# Patient Record
Sex: MALE | Race: WHITE | NOT HISPANIC OR LATINO
[De-identification: names, ages, dates, MRNs, and addresses within clinical notes are randomized per-mention and may not be internally consistent; named-entity substitution may affect disease eponyms.]

---

## 2018-01-11 ENCOUNTER — APPOINTMENT (OUTPATIENT)
Dept: OPHTHALMOLOGY | Facility: CLINIC | Age: 68
End: 2018-01-11
Payer: MEDICARE

## 2018-01-11 PROCEDURE — 92014 COMPRE OPH EXAM EST PT 1/>: CPT

## 2018-01-11 PROCEDURE — 92225: CPT | Mod: RT

## 2018-01-11 PROCEDURE — 92134 CPTRZ OPH DX IMG PST SGM RTA: CPT

## 2018-07-24 ENCOUNTER — APPOINTMENT (OUTPATIENT)
Dept: OPHTHALMOLOGY | Facility: CLINIC | Age: 68
End: 2018-07-24
Payer: MEDICARE

## 2018-07-24 PROCEDURE — 92226: CPT | Mod: RT

## 2018-07-24 PROCEDURE — 92014 COMPRE OPH EXAM EST PT 1/>: CPT

## 2018-07-24 PROCEDURE — 92015 DETERMINE REFRACTIVE STATE: CPT

## 2019-01-23 ENCOUNTER — APPOINTMENT (OUTPATIENT)
Dept: TRANSPLANT | Facility: CLINIC | Age: 69
End: 2019-01-23
Payer: COMMERCIAL

## 2019-01-23 ENCOUNTER — APPOINTMENT (OUTPATIENT)
Dept: NEPHROLOGY | Facility: CLINIC | Age: 69
End: 2019-01-23
Payer: COMMERCIAL

## 2019-01-23 VITALS
HEART RATE: 68 BPM | TEMPERATURE: 97.9 F | DIASTOLIC BLOOD PRESSURE: 63 MMHG | HEIGHT: 65 IN | SYSTOLIC BLOOD PRESSURE: 131 MMHG | BODY MASS INDEX: 34.16 KG/M2 | WEIGHT: 205 LBS | RESPIRATION RATE: 17 BRPM | OXYGEN SATURATION: 94 %

## 2019-01-23 VITALS
HEART RATE: 68 BPM | TEMPERATURE: 97.9 F | WEIGHT: 205 LBS | SYSTOLIC BLOOD PRESSURE: 131 MMHG | BODY MASS INDEX: 34.11 KG/M2 | RESPIRATION RATE: 17 BRPM | DIASTOLIC BLOOD PRESSURE: 63 MMHG

## 2019-01-23 DIAGNOSIS — E11.21 TYPE 2 DIABETES MELLITUS WITH DIABETIC NEPHROPATHY: ICD-10-CM

## 2019-01-23 PROCEDURE — 99205 OFFICE O/P NEW HI 60 MIN: CPT

## 2019-01-23 PROCEDURE — 99204 OFFICE O/P NEW MOD 45 MIN: CPT

## 2019-01-23 NOTE — ASSESSMENT
[FreeTextEntry1] : .Mr. MOSES 68 years old male, He is evaluated for kidney transplantation.\par Pre transplant/CKD: Patient will benefit from renal allotransplantation he is clinically an acceptable/ moderate risk candidate, diabetes\par Medical risks: Cardiovascular, cancer screening.\par Diabetes Mellitus: Discussed implications. Continue follow up with primary physicians\par Hypertension: Discussed implications. Continue follow up with primary physicians.\par Cardiac risk:  will get further evaluation; echo, stress test; Reviewed cardiovascular risk reduction strategies\par Cancer screening: PSA .  Colon jean-paul screening. No known h/o neoplastic disease\par ID: Serology for acute and chronic viral infections. Screening for latent TB \par Imaging: Renal/abdominal /chest /Iliac/ carotids imaging \par Consults: Nutrition, social work, cardiology, Transplant surgery.\par Reviewed factors affecting survival and morbidity while on wait list and reviewed kimberly-operative and long-term risk factors affecting outcome in kidney transplantation.\par Details of transplant surgery, immunosuppression and its complications and benefits of live donor transplantation as well as variability in wait times across regions and multiple listing were discussed. KDPI >85% and PHS high risk criteria donors were discussed. Discussed factors affecting morbidity and mortality while on hemodialysis.\par Patient has potential live donor (possioble ) at present. \par Will proceed with completing/ updating work up and listing for transplant/ live donor transplant once work up is reviewed and found to be ok.\par

## 2019-01-23 NOTE — PHYSICAL EXAM
[General Appearance - In No Acute Distress] : in no acute distress [Sclera] : the sclera and conjunctiva were normal [PERRL With Normal Accommodation] : pupils were equal in size, round, and reactive to light [Extraocular Movements] : extraocular movements were intact [Outer Ear] : the ears and nose were normal in appearance [Oropharynx] : the oropharynx was normal [Neck Appearance] : the appearance of the neck was normal [Neck Cervical Mass (___cm)] : no neck mass was observed [Jugular Venous Distention Increased] : there was no jugular-venous distention [Thyroid Diffuse Enlargement] : the thyroid was not enlarged [Thyroid Nodule] : there were no palpable thyroid nodules [Auscultation Breath Sounds / Voice Sounds] : lungs were clear to auscultation bilaterally [Heart Rate And Rhythm] : heart rate was normal and rhythm regular [Heart Sounds] : normal S1 and S2 [Heart Sounds Gallop] : no gallops [Murmurs] : no murmurs [Heart Sounds Pericardial Friction Rub] : no pericardial rub [Bowel Sounds] : normal bowel sounds [Abdomen Soft] : soft [Abdomen Tenderness] : non-tender [Cervical Lymph Nodes Enlarged Posterior Bilaterally] : posterior cervical [Cervical Lymph Nodes Enlarged Anterior Bilaterally] : anterior cervical [Supraclavicular Lymph Nodes Enlarged Bilaterally] : supraclavicular [Axillary Lymph Nodes Enlarged Bilaterally] : axillary [Inguinal Lymph Nodes Enlarged Bilaterally] : inguinal [Involuntary Movements] : no involuntary movements were seen [___ (cm) Fistula] : [unfilled] (cm) fistula [Bruit] : a bruit was present [Thrill] : a thrill was present [FreeTextEntry1] : left forearm [] : no rash [No Focal Deficits] : no focal deficits [Oriented To Time, Place, And Person] : oriented to person, place, and time [Impaired Insight] : insight and judgment were intact [Affect] : the affect was normal

## 2019-01-23 NOTE — HISTORY OF PRESENT ILLNESS
[FreeTextEntry1] : 68 years old male, born in [place], living in [place] since 1956\par Patient has known CKD (2016), on follow up with  is here for pre kidney transplant evaluation. \par He is a preemptive candidate.\par he is accompanied by his wife, Rossy today.\par He has known DM (age 43) On insulin (age 65); HTN (age 43). H/o Hyperlipidemia on lipitor / no h/o Gout\par No known h/o kidney stone/ Prostatism.\par No hematuria/Transfusions\par Urine out put: normal\par Nocturia:4 times\par Has no h/o Pneumonia / UTI.\par No known h/o active CAD/CVA/PVD/DVT/neoplasia/active infections/bleeding.\par He had stress test 2 weeks ago was told to be normal.\par Reports  major allergies- Ibuprofen (kidney function)  and Levaquin (swelling and breathing difficulty) Does not take any blood thinners.Takes aspirin 81 mg/day. No known h/o tuberculosis or hepatitis.\par Most recent hospitalization/for: 2018 for evaluation of kidney function.\par Past surgeries:\par Left forearm AVF\par No history of kidney/ bladder/ prostate surgery.\par Non smoker.\par \par Fam: Parents are  . Father -  at age 94 Mother- 94 years lives in [place] Siblings- sister who is 65 years old.\par Children: Healthy. 2 Children - 8 and 13- 2 sons.\par Lives with wife and children. Also has older children.\par Has family history of kidney disease- Maternal grand father has h/o kidney disease\par Independent for ADL\par Able to walk one block, can climb stairs with difficulty.\par ROS: Has h/o shortness of breath on exertion. No h/o Sleep apnea. No h/o Thyroid disease.\par Functional/employment status:  and Real estate\par Dialysis history: Preemptive\par Kidney Biopsy: None\par Potential Live donors: Being explored.\par \par Prior Studies:\par Cardiology: Micaela\par Endo: Dr. Abad\par Cancer Screen: Colonoscopy had 5 years ago\par Primary MD: Theodore Novak\par \par

## 2019-01-25 LAB
ABO + RH PNL BLD: NORMAL
ALBUMIN SERPL ELPH-MCNC: 3.7 G/DL
ALP BLD-CCNC: 94 U/L
ALT SERPL-CCNC: 15 U/L
ANION GAP SERPL CALC-SCNC: 15 MMOL/L
APPEARANCE: ABNORMAL
AST SERPL-CCNC: 15 U/L
BACTERIA: NEGATIVE
BASOPHILS # BLD AUTO: 0.03 K/UL
BASOPHILS NFR BLD AUTO: 0.5 %
BILIRUB SERPL-MCNC: 0.3 MG/DL
BILIRUBIN URINE: NEGATIVE
BLOOD URINE: NEGATIVE
BUN SERPL-MCNC: 65 MG/DL
C PEPTIDE SERPL-MCNC: 1.8 NG/ML
CALCIUM SERPL-MCNC: 9.4 MG/DL
CHLORIDE SERPL-SCNC: 106 MMOL/L
CMV IGG SERPL QL: <0.2 U/ML
CMV IGG SERPL-IMP: NEGATIVE
CO2 SERPL-SCNC: 21 MMOL/L
COLOR: YELLOW
CREAT SERPL-MCNC: 5.72 MG/DL
CREAT SPEC-SCNC: 74 MG/DL
CREAT/PROT UR: 4.7 RATIO
EBV DNA SERPL NAA+PROBE-ACNC: NOT DETECTED IU/ML
EBV EA AB SER IA-ACNC: <5 U/ML
EBV EA AB TITR SER IF: POSITIVE
EBV EA IGG SER QL IA: 216 U/ML
EBV EA IGG SER-ACNC: NEGATIVE
EBV EA IGM SER IA-ACNC: NEGATIVE
EBV PATRN SPEC IB-IMP: NORMAL
EBV VCA IGG SER IA-ACNC: 77.7 U/ML
EBV VCA IGM SER QL IA: <10 U/ML
EBVPCR LOG: NOT DETECTED LOGIU/ML
EOSINOPHIL # BLD AUTO: 0.11 K/UL
EOSINOPHIL NFR BLD AUTO: 1.8 %
EPSTEIN-BARR VIRUS CAPSID ANTIGEN IGG: POSITIVE
GLUCOSE QUALITATIVE U: NEGATIVE MG/DL
GLUCOSE SERPL-MCNC: 122 MG/DL
HAV IGM SER QL: NONREACTIVE
HBA1C MFR BLD HPLC: 7 %
HBV CORE IGG+IGM SER QL: NONREACTIVE
HBV SURFACE AB SER QL: REACTIVE
HBV SURFACE AG SER QL: NONREACTIVE
HCT VFR BLD CALC: 31.7 %
HCV AB SER QL: NONREACTIVE
HCV S/CO RATIO: 0.09 S/CO
HGB BLD-MCNC: 10.1 G/DL
HIV1+2 AB SPEC QL IA.RAPID: NONREACTIVE
HSV 1+2 IGG SER IA-IMP: NEGATIVE
HSV 1+2 IGG SER IA-IMP: NEGATIVE
HSV1 IGG SER QL: <0.01 INDEX
HSV2 IGG SER QL: 0.05 INDEX
HYALINE CASTS: 2 /LPF
IMM GRANULOCYTES NFR BLD AUTO: 0.3 %
KETONES URINE: NEGATIVE
LEUKOCYTE ESTERASE URINE: NEGATIVE
LYMPHOCYTES # BLD AUTO: 0.66 K/UL
LYMPHOCYTES NFR BLD AUTO: 10.9 %
MAGNESIUM SERPL-MCNC: 2.5 MG/DL
MAN DIFF?: NORMAL
MCHC RBC-ENTMCNC: 30.2 PG
MCHC RBC-ENTMCNC: 31.9 GM/DL
MCV RBC AUTO: 94.9 FL
MICROSCOPIC-UA: NORMAL
MONOCYTES # BLD AUTO: 0.58 K/UL
MONOCYTES NFR BLD AUTO: 9.5 %
NEUTROPHILS # BLD AUTO: 4.68 K/UL
NEUTROPHILS NFR BLD AUTO: 77 %
NITRITE URINE: NEGATIVE
PH URINE: 5.5
PHOSPHATE SERPL-MCNC: 4.8 MG/DL
PLATELET # BLD AUTO: 179 K/UL
POTASSIUM SERPL-SCNC: 5.4 MMOL/L
PROT SERPL-MCNC: 7 G/DL
PROT UR-MCNC: 350 MG/DL
PROTEIN URINE: 300 MG/DL
PSA SERPL-MCNC: 1.99 NG/ML
RBC # BLD: 3.34 M/UL
RBC # FLD: 13.7 %
RED BLOOD CELLS URINE: 2 /HPF
RUBV IGG FLD-ACNC: 28.5 INDEX
RUBV IGG SER-IMP: POSITIVE
SODIUM SERPL-SCNC: 142 MMOL/L
SPECIFIC GRAVITY URINE: 1.02
SQUAMOUS EPITHELIAL CELLS: 2 /HPF
T GONDII AB SER-IMP: NEGATIVE
T GONDII IGG SER QL: <3 IU/ML
T PALLIDUM AB SER QL IA: NEGATIVE
URATE SERPL-MCNC: 8.3 MG/DL
UROBILINOGEN URINE: NEGATIVE MG/DL
VZV AB TITR SER: POSITIVE
VZV IGG SER IF-ACNC: 2744 INDEX
WBC # FLD AUTO: 6.08 K/UL
WHITE BLOOD CELLS URINE: 6 /HPF

## 2019-01-28 LAB
M TB IFN-G BLD-IMP: NEGATIVE
QUANTIFERON TB PLUS MITOGEN MINUS NIL: 3.37 IU/ML
QUANTIFERON TB PLUS NIL: 0.02 IU/ML
QUANTIFERON TB PLUS TB1 MINUS NIL: -0.01 IU/ML
QUANTIFERON TB PLUS TB2 MINUS NIL: 0 IU/ML

## 2019-02-13 ENCOUNTER — APPOINTMENT (OUTPATIENT)
Dept: TRANSPLANT | Facility: CLINIC | Age: 69
End: 2019-02-13

## 2019-02-14 LAB — ABO + RH PNL BLD: NORMAL

## 2019-02-26 ENCOUNTER — NON-APPOINTMENT (OUTPATIENT)
Age: 69
End: 2019-02-26

## 2019-02-26 ENCOUNTER — APPOINTMENT (OUTPATIENT)
Dept: ULTRASOUND IMAGING | Facility: CLINIC | Age: 69
End: 2019-02-26
Payer: COMMERCIAL

## 2019-02-26 ENCOUNTER — APPOINTMENT (OUTPATIENT)
Dept: CARDIOLOGY | Facility: CLINIC | Age: 69
End: 2019-02-26
Payer: COMMERCIAL

## 2019-02-26 ENCOUNTER — APPOINTMENT (OUTPATIENT)
Dept: RADIOLOGY | Facility: CLINIC | Age: 69
End: 2019-02-26
Payer: COMMERCIAL

## 2019-02-26 ENCOUNTER — OUTPATIENT (OUTPATIENT)
Dept: OUTPATIENT SERVICES | Facility: HOSPITAL | Age: 69
LOS: 1 days | End: 2019-02-26
Payer: COMMERCIAL

## 2019-02-26 VITALS
HEART RATE: 54 BPM | BODY MASS INDEX: 31.49 KG/M2 | WEIGHT: 189 LBS | HEIGHT: 65 IN | SYSTOLIC BLOOD PRESSURE: 164 MMHG | DIASTOLIC BLOOD PRESSURE: 80 MMHG | OXYGEN SATURATION: 98 %

## 2019-02-26 VITALS — SYSTOLIC BLOOD PRESSURE: 136 MMHG | DIASTOLIC BLOOD PRESSURE: 70 MMHG

## 2019-02-26 DIAGNOSIS — Z01.818 ENCOUNTER FOR OTHER PREPROCEDURAL EXAMINATION: ICD-10-CM

## 2019-02-26 PROCEDURE — 71046 X-RAY EXAM CHEST 2 VIEWS: CPT | Mod: 26

## 2019-02-26 PROCEDURE — 76700 US EXAM ABDOM COMPLETE: CPT | Mod: 26,59

## 2019-02-26 PROCEDURE — 93975 VASCULAR STUDY: CPT | Mod: 26

## 2019-02-26 PROCEDURE — 99204 OFFICE O/P NEW MOD 45 MIN: CPT

## 2019-02-26 PROCEDURE — 93975 VASCULAR STUDY: CPT

## 2019-02-26 PROCEDURE — 93000 ELECTROCARDIOGRAM COMPLETE: CPT

## 2019-02-26 PROCEDURE — 71046 X-RAY EXAM CHEST 2 VIEWS: CPT

## 2019-02-26 PROCEDURE — 76700 US EXAM ABDOM COMPLETE: CPT

## 2019-02-26 RX ORDER — HYDRALAZINE HYDROCHLORIDE 100 MG/1
100 TABLET ORAL 3 TIMES DAILY
Refills: 0 | Status: DISCONTINUED | COMMUNITY
End: 2019-02-26

## 2019-02-26 NOTE — DISCUSSION/SUMMARY
[FreeTextEntry1] : Patient is a 69 year-old with cardiovascular risk factors as above who presents for evaluation prior to possible renal transplant.\par He is having great success with dietary changes to address his insulin dependent diabetes and chronic kidney disease. \par \par He had a recent negative ischemic evaluation and normal echocardiogram with his outpatient cardiologist.\par \par No further work-up is necessary at this time.\par Would repeat ischemic evaluation in one year or earlier as needed if clinical status changes.\par \par Follow-up with PMD/nephrologist, cardiologist, and endocrinologist.

## 2019-02-26 NOTE — PHYSICAL EXAM
[General Appearance - Well Developed] : well developed [Normal Appearance] : normal appearance [Well Groomed] : well groomed [General Appearance - Well Nourished] : well nourished [No Deformities] : no deformities [General Appearance - In No Acute Distress] : no acute distress [Conjunctiva] : the conjunctiva were normal in both eyes [PERRL] : pupils were equal in size, round, and reactive to light [EOM Intact] : extraocular movements were intact [Yellow Sclera (Icteric)] : no scleral icterus was seen [Normal Oral Mucosa] : normal oral mucosa [No Oral Pallor] : no oral pallor [No Oral Cyanosis] : no oral cyanosis [Normal Oropharynx] : normal oropharynx [Normal Jugular Venous A Waves Present] : normal jugular venous A waves present [Normal Jugular Venous V Waves Present] : normal jugular venous V waves present [No Jugular Venous Weiss A Waves] : no jugular venous weiss A waves [5th Left ICS - MCL] : palpated at the 5th LICS in the midclavicular line [Normal] : normal [No Precordial Heave] : no precordial heave was noted [Bradycardia] : bradycardic [Rhythm Regular] : regular [Normal S1] : normal S1 [Normal S2] : normal S2 [No Gallop] : no gallop heard [No Murmur] : no murmurs heard [Right Carotid Bruit] : no bruit heard over the right carotid [Left Carotid Bruit] : no bruit heard over the left carotid [1+] : right 1+ [No Pitting Edema] : no pitting edema present [] : no respiratory distress [Respiration, Rhythm And Depth] : normal respiratory rhythm and effort [Exaggerated Use Of Accessory Muscles For Inspiration] : no accessory muscle use [Auscultation Breath Sounds / Voice Sounds] : lungs were clear to auscultation bilaterally [Bowel Sounds] : normal bowel sounds [Abdomen Soft] : soft [Abdomen Tenderness] : non-tender [Abnormal Walk] : normal gait [Gait - Sufficient For Exercise Testing] : the gait was sufficient for exercise testing [Nail Clubbing] : no clubbing of the fingernails [Cyanosis, Localized] : no localized cyanosis [Skin Color & Pigmentation] : normal skin color and pigmentation [No Venous Stasis] : no venous stasis [No Xanthoma] : no  xanthoma was observed [Oriented To Time, Place, And Person] : oriented to person, place, and time [Impaired Insight] : insight and judgment were intact [Affect] : the affect was normal [Mood] : the mood was normal [No Anxiety] : not feeling anxious

## 2019-02-26 NOTE — HISTORY OF PRESENT ILLNESS
[FreeTextEntry1] : Patient is a 69 year-old gentleman with known cardiovascular risk factors of hypertension, dyslipidemia, insulin dependent type II diabetes, but no known cardiac disease who presents today for cardiac evaluation prior to possible renal transplant.\par Patient has recently started exercising with elliptical machine at his home.\par Recently lost 17 lbs by changing his diet to a plant based diet. Of note, he also had the flu in January 2019. Patient is requiring less insulin since changing his diet.\par No significant family history of coronary artery disease - both parents lived into their mid 90s.\par \par Patient had echo and pharmacologic nuclear stress test with his cardiologist in January 2019.\par Patient has daytime somnolence, but he has not yet been evaluated for sleep apnea.\par \par PMD/nephrologist: Demetrio Novak MD [phone]\par Cardiologist: Isaac Hinojosa MD [phone]\par Endocrinologist: Micky Abad MD [phone]

## 2019-02-26 NOTE — REASON FOR VISIT
[Initial Evaluation] : an initial evaluation of [FreeTextEntry2] : pretransplant cardiac evaluation prior to possible renal transplant [Spouse] : spouse

## 2019-03-22 ENCOUNTER — APPOINTMENT (OUTPATIENT)
Dept: PSYCHIATRY | Facility: CLINIC | Age: 69
End: 2019-03-22
Payer: COMMERCIAL

## 2019-03-22 PROCEDURE — 99205 OFFICE O/P NEW HI 60 MIN: CPT

## 2019-04-16 ENCOUNTER — APPOINTMENT (OUTPATIENT)
Dept: OPHTHALMOLOGY | Facility: CLINIC | Age: 69
End: 2019-04-16
Payer: COMMERCIAL

## 2019-04-16 ENCOUNTER — NON-APPOINTMENT (OUTPATIENT)
Age: 69
End: 2019-04-16

## 2019-04-16 PROCEDURE — 92015 DETERMINE REFRACTIVE STATE: CPT

## 2019-04-16 PROCEDURE — 92134 CPTRZ OPH DX IMG PST SGM RTA: CPT

## 2019-04-16 PROCEDURE — 92012 INTRM OPH EXAM EST PATIENT: CPT

## 2019-05-03 ENCOUNTER — APPOINTMENT (OUTPATIENT)
Dept: PSYCHIATRY | Facility: CLINIC | Age: 69
End: 2019-05-03
Payer: COMMERCIAL

## 2019-05-03 PROCEDURE — 99213 OFFICE O/P EST LOW 20 MIN: CPT

## 2019-05-03 RX ORDER — FLUOXETINE HYDROCHLORIDE 40 MG/1
40 CAPSULE ORAL DAILY
Refills: 0 | Status: DISCONTINUED | COMMUNITY
Start: 2019-01-23 | End: 2019-05-03

## 2019-05-07 ENCOUNTER — APPOINTMENT (OUTPATIENT)
Dept: TRANSPLANT | Facility: CLINIC | Age: 69
End: 2019-05-07

## 2019-07-18 ENCOUNTER — APPOINTMENT (OUTPATIENT)
Dept: TRANSPLANT | Facility: CLINIC | Age: 69
End: 2019-07-18

## 2019-07-25 ENCOUNTER — NON-APPOINTMENT (OUTPATIENT)
Age: 69
End: 2019-07-25

## 2019-07-25 ENCOUNTER — APPOINTMENT (OUTPATIENT)
Dept: OPHTHALMOLOGY | Facility: CLINIC | Age: 69
End: 2019-07-25
Payer: COMMERCIAL

## 2019-07-25 PROCEDURE — 92014 COMPRE OPH EXAM EST PT 1/>: CPT

## 2019-07-25 PROCEDURE — 92226: CPT | Mod: RT

## 2019-08-09 ENCOUNTER — APPOINTMENT (OUTPATIENT)
Dept: PSYCHIATRY | Facility: CLINIC | Age: 69
End: 2019-08-09

## 2019-08-16 ENCOUNTER — APPOINTMENT (OUTPATIENT)
Dept: TRANSPLANT | Facility: CLINIC | Age: 69
End: 2019-08-16

## 2019-10-09 ENCOUNTER — APPOINTMENT (OUTPATIENT)
Dept: TRANSPLANT | Facility: CLINIC | Age: 69
End: 2019-10-09

## 2019-11-12 ENCOUNTER — APPOINTMENT (OUTPATIENT)
Dept: TRANSPLANT | Facility: CLINIC | Age: 69
End: 2019-11-12

## 2020-01-13 ENCOUNTER — APPOINTMENT (OUTPATIENT)
Dept: TRANSPLANT | Facility: CLINIC | Age: 70
End: 2020-01-13

## 2020-01-15 ENCOUNTER — APPOINTMENT (OUTPATIENT)
Dept: TRANSPLANT | Facility: CLINIC | Age: 70
End: 2020-01-15

## 2020-04-14 ENCOUNTER — APPOINTMENT (OUTPATIENT)
Dept: TRANSPLANT | Facility: CLINIC | Age: 70
End: 2020-04-14

## 2020-05-13 ENCOUNTER — APPOINTMENT (OUTPATIENT)
Dept: TRANSPLANT | Facility: CLINIC | Age: 70
End: 2020-05-13
Payer: COMMERCIAL

## 2020-05-13 PROCEDURE — 99215 OFFICE O/P EST HI 40 MIN: CPT | Mod: 95

## 2020-05-13 NOTE — HISTORY OF PRESENT ILLNESS
[TextBox_42] : 70 years old male candidate who presents via telehealth for annual f/u while on transplant wait list. \par Patient has known CKD (2016) He is a preemptive candidate.  \par He has known DM (age 43) On insulin (age 65); HTN (age 43). H/o Hyperlipidemia on lipitor, Gout, Anxiety, Depression, OCD \par Surgical Hx: AVF, Humeral fx, Ruptured Achilles' tendon, Tonsillectomy, PD Cath placement \par \par Since last visit he has started dialysis 3 weeks ago at [facility] feeling well but looking to transition to PD. \par Nephrologist Dr. Novak \par \par Last Seen: 19\par Listed: 19\par ABO: A \par \par Most Recent Testing: \par Dr. Nieves  on 2019 No further work-up is necessary at this time\par [id number], sinus bradycardia at 54 bpm \par Stress Test: 2019, no Ischemia, pharmacologic nuclear stress test with inferior defect consistent with diaphragmatic attenuation; LVEF 70% and LVEDV 101 mL \par Echo: 2019, mild aortic sclerosis, normal LV function, no pulmonary    hypertension, normal LA size, no mitral regurgitation LVEF 55-60%.\par \par Radiology: \par CXR – 2019 Right costophrenic angle blunting which could be due to a small right pleural effusion or pleural reaction.\par US Abd/doppler – 2019 Both kidneys are mildly echogenic which is in keeping with medical renal disease. \par Vasculature: The aorta, common and external iliac arteries are patent without evidence to suggest a significant stenosis Arteriosclerotic changes of the abdominal aorta are demonstrated. The IVC and iliac veins are patent demonstrating phasic waveforms.\par \par Cancer Screening: \par PSA – 19 – 1.99\par Colonoscopy – 2012  - [facility] \par \par \par  [de-identified] : I personally discussed kidney transplantation and evaluated patient by telehealth.\par Ms. Jacquelin COUCH NP was also present.\par Since my last encounter with patient\par - has started dialysis in April 2020 (With Emiliano Novak MD)\par - has had a PD catheter placed\par - can walk 5 blocks or more and goes fishing\par - his lower extremity edema has resolved.\par Patient has a live donor who is waiting for a visa to come to the [place].\par Has been in contact with our center.\par Will continue with our routine follow up.\par Eventually will evaluate physically to determine pedal pulses.\par During our last encounter pedal pulses were difficult to evaluate given the lower extremity edema.\par

## 2020-05-13 NOTE — REASON FOR VISIT
[Home] : at home, [unfilled] , at the time of the visit. [Medical Office: ([facility])___] : at the medical office located in  [Patient] : the patient [Follow-Up] : a follow-up visit for [Self] : self [Kidney Transplant Evaluation] : kidney transplant evaluation

## 2020-06-25 ENCOUNTER — APPOINTMENT (OUTPATIENT)
Dept: CARDIOLOGY | Facility: CLINIC | Age: 70
End: 2020-06-25
Payer: COMMERCIAL

## 2020-06-25 ENCOUNTER — APPOINTMENT (OUTPATIENT)
Dept: ULTRASOUND IMAGING | Facility: CLINIC | Age: 70
End: 2020-06-25
Payer: COMMERCIAL

## 2020-06-25 ENCOUNTER — APPOINTMENT (OUTPATIENT)
Dept: RADIOLOGY | Facility: CLINIC | Age: 70
End: 2020-06-25
Payer: COMMERCIAL

## 2020-06-25 ENCOUNTER — OUTPATIENT (OUTPATIENT)
Dept: OUTPATIENT SERVICES | Facility: HOSPITAL | Age: 70
LOS: 1 days | End: 2020-06-25
Payer: COMMERCIAL

## 2020-06-25 ENCOUNTER — NON-APPOINTMENT (OUTPATIENT)
Age: 70
End: 2020-06-25

## 2020-06-25 VITALS
DIASTOLIC BLOOD PRESSURE: 84 MMHG | BODY MASS INDEX: 28.99 KG/M2 | RESPIRATION RATE: 17 BRPM | SYSTOLIC BLOOD PRESSURE: 184 MMHG | TEMPERATURE: 98 F | OXYGEN SATURATION: 96 % | HEIGHT: 65 IN | HEART RATE: 64 BPM | WEIGHT: 174 LBS

## 2020-06-25 VITALS — SYSTOLIC BLOOD PRESSURE: 160 MMHG | DIASTOLIC BLOOD PRESSURE: 80 MMHG

## 2020-06-25 DIAGNOSIS — N18.6 END STAGE RENAL DISEASE: ICD-10-CM

## 2020-06-25 DIAGNOSIS — Z00.8 ENCOUNTER FOR OTHER GENERAL EXAMINATION: ICD-10-CM

## 2020-06-25 DIAGNOSIS — Z99.2 END STAGE RENAL DISEASE: ICD-10-CM

## 2020-06-25 PROCEDURE — 93975 VASCULAR STUDY: CPT | Mod: 26

## 2020-06-25 PROCEDURE — 76700 US EXAM ABDOM COMPLETE: CPT | Mod: 26,59

## 2020-06-25 PROCEDURE — 71046 X-RAY EXAM CHEST 2 VIEWS: CPT

## 2020-06-25 PROCEDURE — 36415 COLL VENOUS BLD VENIPUNCTURE: CPT

## 2020-06-25 PROCEDURE — 99215 OFFICE O/P EST HI 40 MIN: CPT

## 2020-06-25 PROCEDURE — 93975 VASCULAR STUDY: CPT

## 2020-06-25 PROCEDURE — 71046 X-RAY EXAM CHEST 2 VIEWS: CPT | Mod: 26

## 2020-06-25 PROCEDURE — 93000 ELECTROCARDIOGRAM COMPLETE: CPT | Mod: NC

## 2020-06-25 PROCEDURE — 76700 US EXAM ABDOM COMPLETE: CPT

## 2020-06-25 NOTE — HISTORY OF PRESENT ILLNESS
[FreeTextEntry1] : Patient is a 70 year-old gentleman with known cardiovascular risk factors of hypertension, dyslipidemia, insulin dependent type II diabetes, but no known cardiac disease who presents today for cardiac evaluation prior to possible renal transplant.\par Patient has recently started exercising with elliptical machine at his home.\par Recently lost 17 lbs by changing his diet to a plant based diet. Of note, he also had the flu in January 2019. Patient is requiring less insulin since changing his diet.\par No significant family history of coronary artery disease - both parents lived into their mid 90s.\par \par Patient had echo and pharmacologic nuclear stress test with his cardiologist in January 2019.\par Patient has daytime somnolence, but he has not yet been evaluated for sleep apnea.\par \par PMD/nephrologist: Demetrio Novak MD [phone]\par Cardiologist: Isaac Hinojosa MD [phone]\par Endocrinologist: Micky Abad MD [phone]

## 2020-06-25 NOTE — PHYSICAL EXAM
[General Appearance - Well Developed] : well developed [Normal Appearance] : normal appearance [Well Groomed] : well groomed [General Appearance - Well Nourished] : well nourished [No Deformities] : no deformities [Normal Oral Mucosa] : normal oral mucosa [General Appearance - In No Acute Distress] : no acute distress [No Oral Cyanosis] : no oral cyanosis [No Oral Pallor] : no oral pallor [Normal Oropharynx] : normal oropharynx [Normal Jugular Venous A Waves Present] : normal jugular venous A waves present [Normal Jugular Venous V Waves Present] : normal jugular venous V waves present [No Jugular Venous Weiss A Waves] : no jugular venous weiss A waves [] : no respiratory distress [Exaggerated Use Of Accessory Muscles For Inspiration] : no accessory muscle use [Respiration, Rhythm And Depth] : normal respiratory rhythm and effort [Auscultation Breath Sounds / Voice Sounds] : lungs were clear to auscultation bilaterally [Abdomen Soft] : soft [Bowel Sounds] : normal bowel sounds [Gait - Sufficient For Exercise Testing] : the gait was sufficient for exercise testing [Abdomen Tenderness] : non-tender [Abnormal Walk] : normal gait [Cyanosis, Localized] : no localized cyanosis [Nail Clubbing] : no clubbing of the fingernails [No Venous Stasis] : no venous stasis [Skin Color & Pigmentation] : normal skin color and pigmentation [Oriented To Time, Place, And Person] : oriented to person, place, and time [No Xanthoma] : no  xanthoma was observed [Impaired Insight] : insight and judgment were intact [Affect] : the affect was normal [Mood] : the mood was normal [No Anxiety] : not feeling anxious [Conjunctiva] : the conjunctiva were normal in both eyes [PERRL] : pupils were equal in size, round, and reactive to light [EOM Intact] : extraocular movements were intact [Yellow Sclera (Icteric)] : no scleral icterus was seen [5th Left ICS - MCL] : palpated at the 5th LICS in the midclavicular line [Normal] : normal [No Precordial Heave] : no precordial heave was noted [Rhythm Regular] : regular [Bradycardia] : bradycardic [Normal S1] : normal S1 [Normal S2] : normal S2 [No Gallop] : no gallop heard [No Murmur] : no murmurs heard [Right Carotid Bruit] : no bruit heard over the right carotid [1+] : right 1+ [Left Carotid Bruit] : no bruit heard over the left carotid [No Pitting Edema] : no pitting edema present

## 2020-06-25 NOTE — DISCUSSION/SUMMARY
[FreeTextEntry1] : Patient is a 70 year-old with cardiovascular risk factors as above who presents for evaluation prior to possible renal transplant.\par He is having great success with dietary changes to address his insulin dependent diabetes and chronic kidney disease. \par \par In early 2019, he had a negative ischemic evaluation and normal echocardiogram with his outpatient cardiologist.\par Will repeat ischemic evaluation and echocardiogram at this time. \par \par Follow-up with PMD/nephrologist, cardiologist, and endocrinologist.

## 2020-06-25 NOTE — CARDIOLOGY SUMMARY
[No Ischemia] : no Ischemia [LVEF ___%] : LVEF [unfilled]% [___] : [unfilled] [Normal] : normal LA size [None] : no pulmonary hypertension

## 2020-06-25 NOTE — REASON FOR VISIT
[Follow-Up - Clinic] : a clinic follow-up of [FreeTextEntry1] : June 2020 - Patient returns today in his usual state of health. In April 2020, patient started hemodialysis via left forearm AV fistula. In June 2020, he transitioned to peritoneal dialysis. He finds dialysis very burdensome. [FreeTextEntry2] : pretransplant cardiac evaluation prior to possible renal transplant

## 2020-06-27 LAB
25(OH)D3 SERPL-MCNC: 60.6 NG/ML
ALBUMIN SERPL ELPH-MCNC: 3.9 G/DL
ALP BLD-CCNC: 110 U/L
ALT SERPL-CCNC: 12 U/L
ANION GAP SERPL CALC-SCNC: 19 MMOL/L
AST SERPL-CCNC: 8 U/L
BASOPHILS # BLD AUTO: 0.07 K/UL
BASOPHILS NFR BLD AUTO: 0.9 %
BILIRUB SERPL-MCNC: 0.3 MG/DL
BUN SERPL-MCNC: 60 MG/DL
CALCIUM SERPL-MCNC: 8.8 MG/DL
CHLORIDE SERPL-SCNC: 95 MMOL/L
CHOLEST SERPL-MCNC: 139 MG/DL
CHOLEST/HDLC SERPL: 2.8 RATIO
CO2 SERPL-SCNC: 25 MMOL/L
CREAT SERPL-MCNC: 8.92 MG/DL
EOSINOPHIL # BLD AUTO: 0.22 K/UL
EOSINOPHIL NFR BLD AUTO: 2.7 %
ESTIMATED AVERAGE GLUCOSE: 137 MG/DL
GLUCOSE SERPL-MCNC: 172 MG/DL
HBA1C MFR BLD HPLC: 6.4 %
HCT VFR BLD CALC: 38.4 %
HDLC SERPL-MCNC: 50 MG/DL
HGB BLD-MCNC: 12.7 G/DL
IMM GRANULOCYTES NFR BLD AUTO: 0.2 %
LDLC SERPL CALC-MCNC: 60 MG/DL
LYMPHOCYTES # BLD AUTO: 0.67 K/UL
LYMPHOCYTES NFR BLD AUTO: 8.3 %
MAN DIFF?: NORMAL
MCHC RBC-ENTMCNC: 31.2 PG
MCHC RBC-ENTMCNC: 33.1 GM/DL
MCV RBC AUTO: 94.3 FL
MONOCYTES # BLD AUTO: 0.61 K/UL
MONOCYTES NFR BLD AUTO: 7.5 %
NEUTROPHILS # BLD AUTO: 6.49 K/UL
NEUTROPHILS NFR BLD AUTO: 80.4 %
PLATELET # BLD AUTO: 152 K/UL
POTASSIUM SERPL-SCNC: 5.6 MMOL/L
PROT SERPL-MCNC: 6.7 G/DL
RBC # BLD: 4.07 M/UL
RBC # FLD: 13.4 %
SARS-COV-2 IGG SERPL IA-ACNC: 0.01 INDEX
SARS-COV-2 IGG SERPL QL IA: NEGATIVE
SODIUM SERPL-SCNC: 138 MMOL/L
TRIGL SERPL-MCNC: 143 MG/DL
TSH SERPL-ACNC: 2.16 UIU/ML
WBC # FLD AUTO: 8.08 K/UL

## 2020-07-09 ENCOUNTER — APPOINTMENT (OUTPATIENT)
Dept: CARDIOLOGY | Facility: CLINIC | Age: 70
End: 2020-07-09

## 2020-07-13 ENCOUNTER — APPOINTMENT (OUTPATIENT)
Dept: CARDIOLOGY | Facility: CLINIC | Age: 70
End: 2020-07-13

## 2020-07-22 ENCOUNTER — APPOINTMENT (OUTPATIENT)
Dept: CARDIOLOGY | Facility: CLINIC | Age: 70
End: 2020-07-22
Payer: COMMERCIAL

## 2020-07-22 PROCEDURE — 93306 TTE W/DOPPLER COMPLETE: CPT

## 2020-07-23 ENCOUNTER — APPOINTMENT (OUTPATIENT)
Dept: TRANSPLANT | Facility: CLINIC | Age: 70
End: 2020-07-23

## 2020-09-21 ENCOUNTER — APPOINTMENT (OUTPATIENT)
Dept: CARDIOLOGY | Facility: CLINIC | Age: 70
End: 2020-09-21
Payer: COMMERCIAL

## 2020-09-21 PROCEDURE — A9500: CPT

## 2020-09-21 PROCEDURE — 93015 CV STRESS TEST SUPVJ I&R: CPT

## 2020-09-21 PROCEDURE — 78452 HT MUSCLE IMAGE SPECT MULT: CPT

## 2021-03-08 ENCOUNTER — TRANSCRIPTION ENCOUNTER (OUTPATIENT)
Age: 71
End: 2021-03-08

## 2021-03-09 ENCOUNTER — NON-APPOINTMENT (OUTPATIENT)
Age: 71
End: 2021-03-09

## 2021-03-09 ENCOUNTER — INPATIENT (INPATIENT)
Facility: HOSPITAL | Age: 71
LOS: 11 days | Discharge: HOME | DRG: 651 | End: 2021-03-21
Attending: TRANSPLANT SURGERY | Admitting: TRANSPLANT SURGERY
Payer: COMMERCIAL

## 2021-03-09 ENCOUNTER — APPOINTMENT (OUTPATIENT)
Dept: TRANSPLANT | Facility: HOSPITAL | Age: 71
End: 2021-03-09

## 2021-03-09 VITALS
HEART RATE: 59 BPM | RESPIRATION RATE: 18 BRPM | TEMPERATURE: 99 F | SYSTOLIC BLOOD PRESSURE: 171 MMHG | WEIGHT: 184.31 LBS | OXYGEN SATURATION: 94 % | DIASTOLIC BLOOD PRESSURE: 76 MMHG

## 2021-03-09 DIAGNOSIS — E11.9 TYPE 2 DIABETES MELLITUS WITHOUT COMPLICATIONS: ICD-10-CM

## 2021-03-09 DIAGNOSIS — I10 ESSENTIAL (PRIMARY) HYPERTENSION: ICD-10-CM

## 2021-03-09 DIAGNOSIS — Z76.82 AWAITING ORGAN TRANSPLANT STATUS: ICD-10-CM

## 2021-03-09 DIAGNOSIS — Z94.0 KIDNEY TRANSPLANT STATUS: ICD-10-CM

## 2021-03-09 DIAGNOSIS — N18.6 END STAGE RENAL DISEASE: ICD-10-CM

## 2021-03-09 LAB
ALBUMIN SERPL ELPH-MCNC: 3.6 G/DL — SIGNIFICANT CHANGE UP (ref 3.3–5)
ALP SERPL-CCNC: 115 U/L — SIGNIFICANT CHANGE UP (ref 40–120)
ALT FLD-CCNC: 6 U/L — LOW (ref 10–45)
ANION GAP SERPL CALC-SCNC: 21 MMOL/L — HIGH (ref 5–17)
APTT BLD: 34.1 SEC — SIGNIFICANT CHANGE UP (ref 27.5–35.5)
AST SERPL-CCNC: 5 U/L — LOW (ref 10–40)
BILIRUB SERPL-MCNC: 0.3 MG/DL — SIGNIFICANT CHANGE UP (ref 0.2–1.2)
BLD GP AB SCN SERPL QL: NEGATIVE — SIGNIFICANT CHANGE UP
BUN SERPL-MCNC: 60 MG/DL — HIGH (ref 7–23)
CALCIUM SERPL-MCNC: 8.8 MG/DL — SIGNIFICANT CHANGE UP (ref 8.4–10.5)
CHLORIDE SERPL-SCNC: 93 MMOL/L — LOW (ref 96–108)
CO2 SERPL-SCNC: 21 MMOL/L — LOW (ref 22–31)
CREAT SERPL-MCNC: 10.88 MG/DL — HIGH (ref 0.5–1.3)
GLUCOSE SERPL-MCNC: 139 MG/DL — HIGH (ref 70–99)
HBV CORE AB SER-ACNC: SIGNIFICANT CHANGE UP
HBV SURFACE AB SER-ACNC: 56.4 MIU/ML — SIGNIFICANT CHANGE UP
HBV SURFACE AG SER-ACNC: SIGNIFICANT CHANGE UP
HCT VFR BLD CALC: 27.7 % — LOW (ref 39–50)
HCV AB S/CO SERPL IA: 0.32 S/CO — SIGNIFICANT CHANGE UP (ref 0–0.99)
HCV AB SERPL-IMP: SIGNIFICANT CHANGE UP
HGB BLD-MCNC: 9.2 G/DL — LOW (ref 13–17)
HIV 1+2 AB+HIV1 P24 AG SERPL QL IA: SIGNIFICANT CHANGE UP
INR BLD: 1.13 RATIO — SIGNIFICANT CHANGE UP (ref 0.88–1.16)
MAGNESIUM SERPL-MCNC: 2.3 MG/DL — SIGNIFICANT CHANGE UP (ref 1.6–2.6)
MAGNESIUM SERPL-MCNC: 2.4 MG/DL — SIGNIFICANT CHANGE UP (ref 1.6–2.6)
MCHC RBC-ENTMCNC: 31.7 PG — SIGNIFICANT CHANGE UP (ref 27–34)
MCHC RBC-ENTMCNC: 33.2 GM/DL — SIGNIFICANT CHANGE UP (ref 32–36)
MCV RBC AUTO: 95.5 FL — SIGNIFICANT CHANGE UP (ref 80–100)
PHOSPHATE SERPL-MCNC: 7.6 MG/DL — HIGH (ref 2.5–4.5)
PHOSPHATE SERPL-MCNC: 8.2 MG/DL — HIGH (ref 2.5–4.5)
PLATELET # BLD AUTO: 142 K/UL — LOW (ref 150–400)
POTASSIUM SERPL-MCNC: 4 MMOL/L — SIGNIFICANT CHANGE UP (ref 3.5–5.3)
POTASSIUM SERPL-SCNC: 4 MMOL/L — SIGNIFICANT CHANGE UP (ref 3.5–5.3)
PROT SERPL-MCNC: 7.2 G/DL — SIGNIFICANT CHANGE UP (ref 6–8.3)
PROTHROM AB SERPL-ACNC: 13.5 SEC — SIGNIFICANT CHANGE UP (ref 10.6–13.6)
RBC # BLD: 2.9 M/UL — LOW (ref 4.2–5.8)
RBC # FLD: 14.9 % — HIGH (ref 10.3–14.5)
RH IG SCN BLD-IMP: POSITIVE — SIGNIFICANT CHANGE UP
SARS-COV-2 RNA SPEC QL NAA+PROBE: SIGNIFICANT CHANGE UP
SODIUM SERPL-SCNC: 135 MMOL/L — SIGNIFICANT CHANGE UP (ref 135–145)
WBC # BLD: 9.06 K/UL — SIGNIFICANT CHANGE UP (ref 3.8–10.5)
WBC # FLD AUTO: 9.06 K/UL — SIGNIFICANT CHANGE UP (ref 3.8–10.5)

## 2021-03-09 PROCEDURE — 50360 RNL ALTRNSPLJ W/O RCP NFRCT: CPT | Mod: GC

## 2021-03-09 PROCEDURE — 93010 ELECTROCARDIOGRAM REPORT: CPT

## 2021-03-09 PROCEDURE — 74177 CT ABD & PELVIS W/CONTRAST: CPT | Mod: 26

## 2021-03-09 PROCEDURE — 71045 X-RAY EXAM CHEST 1 VIEW: CPT | Mod: 26

## 2021-03-09 PROCEDURE — 50605 INSERT URETERAL SUPPORT: CPT | Mod: GC

## 2021-03-09 PROCEDURE — 50325 PREP DONOR RENAL GRAFT: CPT | Mod: GC

## 2021-03-09 PROCEDURE — 99222 1ST HOSP IP/OBS MODERATE 55: CPT | Mod: GC

## 2021-03-09 PROCEDURE — 76776 US EXAM K TRANSPL W/DOPPLER: CPT | Mod: 26,RT

## 2021-03-09 PROCEDURE — 76998 US GUIDE INTRAOP: CPT | Mod: 26,GC

## 2021-03-09 RX ORDER — INSULIN GLARGINE 100 [IU]/ML
12 INJECTION, SOLUTION SUBCUTANEOUS ONCE
Refills: 0 | Status: COMPLETED | OUTPATIENT
Start: 2021-03-09 | End: 2021-03-09

## 2021-03-09 RX ORDER — VALGANCICLOVIR 450 MG/1
450 TABLET, FILM COATED ORAL DAILY
Refills: 0 | Status: DISCONTINUED | OUTPATIENT
Start: 2021-03-10 | End: 2021-03-10

## 2021-03-09 RX ORDER — INSULIN GLARGINE 100 [IU]/ML
25 INJECTION, SOLUTION SUBCUTANEOUS AT BEDTIME
Refills: 0 | Status: DISCONTINUED | OUTPATIENT
Start: 2021-03-10 | End: 2021-03-16

## 2021-03-09 RX ORDER — MYCOPHENOLATE MOFETIL 250 MG/1
1000 CAPSULE ORAL
Refills: 0 | Status: DISCONTINUED | OUTPATIENT
Start: 2021-03-10 | End: 2021-03-21

## 2021-03-09 RX ORDER — SODIUM CHLORIDE 9 MG/ML
1000 INJECTION, SOLUTION INTRAVENOUS
Refills: 0 | Status: DISCONTINUED | OUTPATIENT
Start: 2021-03-09 | End: 2021-03-21

## 2021-03-09 RX ORDER — DEXTROSE 50 % IN WATER 50 %
15 SYRINGE (ML) INTRAVENOUS ONCE
Refills: 0 | Status: DISCONTINUED | OUTPATIENT
Start: 2021-03-09 | End: 2021-03-21

## 2021-03-09 RX ORDER — SODIUM CHLORIDE 9 MG/ML
500 INJECTION, SOLUTION INTRAVENOUS
Refills: 0 | Status: DISCONTINUED | OUTPATIENT
Start: 2021-03-09 | End: 2021-03-10

## 2021-03-09 RX ORDER — HYDROMORPHONE HYDROCHLORIDE 2 MG/ML
0.5 INJECTION INTRAMUSCULAR; INTRAVENOUS; SUBCUTANEOUS
Refills: 0 | Status: DISCONTINUED | OUTPATIENT
Start: 2021-03-09 | End: 2021-03-10

## 2021-03-09 RX ORDER — SODIUM CHLORIDE 9 MG/ML
1000 INJECTION INTRAMUSCULAR; INTRAVENOUS; SUBCUTANEOUS
Refills: 0 | Status: DISCONTINUED | OUTPATIENT
Start: 2021-03-09 | End: 2021-03-10

## 2021-03-09 RX ORDER — BASILIXIMAB 20 MG/5ML
20 INJECTION, POWDER, FOR SOLUTION INTRAVENOUS ONCE
Refills: 0 | Status: COMPLETED | OUTPATIENT
Start: 2021-03-13 | End: 2021-03-13

## 2021-03-09 RX ORDER — ONDANSETRON 8 MG/1
4 TABLET, FILM COATED ORAL ONCE
Refills: 0 | Status: DISCONTINUED | OUTPATIENT
Start: 2021-03-09 | End: 2021-03-10

## 2021-03-09 RX ORDER — CARVEDILOL PHOSPHATE 80 MG/1
12.5 CAPSULE, EXTENDED RELEASE ORAL EVERY 12 HOURS
Refills: 0 | Status: DISCONTINUED | OUTPATIENT
Start: 2021-03-10 | End: 2021-03-11

## 2021-03-09 RX ORDER — TACROLIMUS 5 MG/1
6 CAPSULE ORAL
Refills: 0 | Status: DISCONTINUED | OUTPATIENT
Start: 2021-03-10 | End: 2021-03-13

## 2021-03-09 RX ORDER — GLUCAGON INJECTION, SOLUTION 0.5 MG/.1ML
1 INJECTION, SOLUTION SUBCUTANEOUS ONCE
Refills: 0 | Status: DISCONTINUED | OUTPATIENT
Start: 2021-03-09 | End: 2021-03-21

## 2021-03-09 RX ORDER — DEXTROSE 50 % IN WATER 50 %
12.5 SYRINGE (ML) INTRAVENOUS ONCE
Refills: 0 | Status: DISCONTINUED | OUTPATIENT
Start: 2021-03-09 | End: 2021-03-21

## 2021-03-09 RX ORDER — CEFAZOLIN SODIUM 1 G
2000 VIAL (EA) INJECTION ONCE
Refills: 0 | Status: DISCONTINUED | OUTPATIENT
Start: 2021-03-09 | End: 2021-03-09

## 2021-03-09 RX ORDER — DEXTROSE 50 % IN WATER 50 %
25 SYRINGE (ML) INTRAVENOUS ONCE
Refills: 0 | Status: DISCONTINUED | OUTPATIENT
Start: 2021-03-09 | End: 2021-03-21

## 2021-03-09 RX ORDER — FAMOTIDINE 10 MG/ML
20 INJECTION INTRAVENOUS DAILY
Refills: 0 | Status: DISCONTINUED | OUTPATIENT
Start: 2021-03-10 | End: 2021-03-21

## 2021-03-09 RX ORDER — BASILIXIMAB 20 MG/5ML
20 INJECTION, POWDER, FOR SOLUTION INTRAVENOUS ONCE
Refills: 0 | Status: DISCONTINUED | OUTPATIENT
Start: 2021-03-09 | End: 2021-03-09

## 2021-03-09 RX ORDER — FUROSEMIDE 40 MG
80 TABLET ORAL ONCE
Refills: 0 | Status: COMPLETED | OUTPATIENT
Start: 2021-03-09 | End: 2021-03-09

## 2021-03-09 RX ORDER — CARVEDILOL PHOSPHATE 80 MG/1
6.25 CAPSULE, EXTENDED RELEASE ORAL ONCE
Refills: 0 | Status: COMPLETED | OUTPATIENT
Start: 2021-03-09 | End: 2021-03-09

## 2021-03-09 RX ORDER — CHLORHEXIDINE GLUCONATE 213 G/1000ML
1 SOLUTION TOPICAL DAILY
Refills: 0 | Status: DISCONTINUED | OUTPATIENT
Start: 2021-03-09 | End: 2021-03-21

## 2021-03-09 RX ORDER — NYSTATIN 500MM UNIT
500000 POWDER (EA) MISCELLANEOUS
Refills: 0 | Status: DISCONTINUED | OUTPATIENT
Start: 2021-03-10 | End: 2021-03-21

## 2021-03-09 RX ADMIN — Medication 500000 UNIT(S): at 23:57

## 2021-03-09 RX ADMIN — CARVEDILOL PHOSPHATE 6.25 MILLIGRAM(S): 80 CAPSULE, EXTENDED RELEASE ORAL at 23:56

## 2021-03-09 RX ADMIN — SODIUM CHLORIDE 70 MILLILITER(S): 9 INJECTION INTRAMUSCULAR; INTRAVENOUS; SUBCUTANEOUS at 23:16

## 2021-03-09 RX ADMIN — INSULIN GLARGINE 12 UNIT(S): 100 INJECTION, SOLUTION SUBCUTANEOUS at 23:57

## 2021-03-09 RX ADMIN — Medication 80 MILLIGRAM(S): at 23:16

## 2021-03-09 NOTE — RESEARCH COMMUNICATION NOTE - NS AS RESEARCH COMMUNICATION NOTE FT
Subject initials:    Subject  met all the inclusion criteria and did not meet any exclusion criteria for the above trial.  He was enrolled on 03-09-21 @ 13:14. The study was explained; the subject had an opportunity to ask questions, and was given a signed copy of the consent form. Consent was obtained prior to the start of any study procedures.    Physical exam by Dr. Schuler    PMHx: DM nephropathy, ESRD

## 2021-03-09 NOTE — H&P ADULT - NSICDXPASTMEDICALHX_GEN_ALL_CORE_FT
PAST MEDICAL HISTORY:  Chronic kidney disease (CKD)     DM (diabetes mellitus)     HTN (hypertension)

## 2021-03-09 NOTE — PRE-OP CHECKLIST - COMMENTS
half a bottle of water today at 10am half a bottle of water today at 10am.  cbc sent to core lab from [facility]- dr lopes aware and will send abg in or

## 2021-03-09 NOTE — CONSULT NOTE ADULT - ASSESSMENT
Patient is a 72 y/o M w PMH of ESRD on PD for 5 months, was on HD in , with nephrologist Dr Novak, IDDM, HTN, gout, depression, anxiety and OCD. Here for  donor renal transplant.     1. ESRD on PD, now for  donor renal transplant today. Will be for Simulect induction. Monitor labs and urine output. Consent for HD/PD has been obtained and placed in chart just in case if needed for delayed graft function.     2. Immunosupression - Simulect induction, tacro, MMF 1g PO BID and steroid taper     3. Prophylaxis - bactrim/nystatin/valcyte     4. Hypertension - continue Coreg 25 mg PO BID     If any questions, please feel free to contact me     Jennifer Mcdowell  Nephrology Fellow  [facility] Pager: [phone]  [facility] Pager: [phone]

## 2021-03-09 NOTE — H&P ADULT - HISTORY OF PRESENT ILLNESS
70 y/o gentleman with h/o HTN (age 43), DM on insulin (age 43), gout, anxiety, depression, OCD and CKD since 2016.  He started HD via L AVF  in late  at [facility] [place] and has since transitioned to PD about 5 months ago. He makes ~2 ounces of urine 4 times daily.  Nephrologist is Dr. Novak  He now presents for  donor renal transplant.  72 y/o gentleman with h/o HTN (age 43), DM on insulin (age 43), gout, anxiety, depression, OCD and CKD since 2016.  He started HD via L AVF  in 2020 at [facility] [place] and transitioned to PD in 2020. He makes ~2 ounces of urine 4 times daily.  Nephrologist is Dr. Novak  He now presents for  donor renal transplant.  72 y/o gentleman with h/o HTN (age 43), DM on insulin (age 43), gout, anxiety, depression, OCD and CKD since 2016.  He started HD via L AVF  in 2020 at [facility] and transitioned to PD about 5 months ago. He makes ~2 ounces of urine 4 times daily.  Nephrologist is Dr. Novak  He now presents for  donor renal transplant.

## 2021-03-09 NOTE — H&P ADULT - NSHPLABSRESULTS_GEN_ALL_CORE
Vital Signs Last 24 Hrs  T(C): 37 (09 Mar 2021 12:51), Max: 37 (09 Mar 2021 12:51)  T(F): 98.6 (09 Mar 2021 12:51), Max: 98.6 (09 Mar 2021 12:51)  HR: 59 (09 Mar 2021 12:51) (59 - 59)  BP: 171/76 (09 Mar 2021 12:51) (171/76 - 171/76)  BP(mean): --  RR: 18 (09 Mar 2021 12:51) (18 - 18)  SpO2: 94% (09 Mar 2021 12:51) (94% - 94%)      03-09    135  |  93<L>  |  60<H>  ----------------------------<  139<H>  4.0   |  21<L>  |  10.88<H>    Ca    8.8      09 Mar 2021 13:39  Phos  8.2     03-09  Mg     2.4     03-09    TPro  7.2  /  Alb  3.6  /  TBili  0.3  /  DBili  x   /  AST  5<L>  /  ALT  6<L>  /  AlkPhos  115  03-09

## 2021-03-09 NOTE — H&P ADULT - NSHPPHYSICALEXAM_GEN_ALL_CORE
Constitutional: Well developed / well nourished  Eyes: Anicteric, PERRLA  ENMT: nc/at  Neck: supple  Respiratory: CTA B/L  Cardiovascular: RRR  Gastrointestinal: Soft abdomen, NT, ND. PD catheter present  Genitourinary: Voiding spontaneously  Extremities: SCD's in place and working bilaterally, No edema  Vascular: Palpable dp pulses bilaterally  Neurological: A&O x3  Skin: intact  Musculoskeletal: Moving all extremities  Psychiatric: Responsive

## 2021-03-09 NOTE — CONSULT NOTE ADULT - PROBLEM SELECTOR PROBLEM 1
DIRECT SLEEP REFERRAL    REFERRAL/ SLEEP CONSULT NOTE BY GUERO ON 2/9/2021 .    PT CAN HAVE TESTING DONE PRIOR TO NP APPT.    WILL CALL TO SCHEDULE.    PT WILL NEED TO START WITH HST DUE TO INSURANCE.    PLEASE SIGN ORDER.     
Pt was called 3 times for NP appt and once for testing.  
Kidney transplant candidate

## 2021-03-09 NOTE — H&P ADULT - ASSESSMENT
72 y/o gentleman with h/o HTN (age 43), DM on insulin (age 43), gout, anxiety, depression, OCD and CKD since 2016 started on HD briefly in  then transitioned to PD now admitted for  donor renal transplant.    ESRD  - Send Covid PCR  - NPO for OR today  - CBC, CMP, Mag, Phos, INR, Hep B Core, Surface AB quat, Hep B surface Ag, HIV, Hep C Ab, Hep C RNA by PCR  - Type and Screen  - Drain PD fluid  - CT A/P with IV contrast  - Ancef 2gm, Simulect 20mg, Methylpred 500mg IV for OR  - Consent obtained and placed in chart  - Consent obtained for CGM study. Patient given copy  - Discussed with Dr. Schuler

## 2021-03-09 NOTE — BRIEF OPERATIVE NOTE - OPERATION/FINDINGS
Single vein, single artery anastomosis. Ureteroneocystostomy performed over a J stent. 16F Wil drain left next to graft.

## 2021-03-09 NOTE — PATIENT PROFILE ADULT - INFLUENZA IMMUNIZATION DATE (APPROXIMATE)
Detail Level: Detailed 09-Oct-2020 Quality 474: Zoster Vaccination Status: Shingrix Vaccination not Administered or Previously Received, Reason not Otherwise Specified

## 2021-03-09 NOTE — CONSULT NOTE ADULT - ATTENDING COMMENTS
Kidney transplant candidate scheduled for DDRT, from DCD donor.  DM, HTN,   On Peritoneal dialysis  Reviewed history, prior work up and clinical, lab data.  Plan:  Proceed with DDRT as scheduled  Discussed post transplant follow up, Possibility of DGF and need for dialysis.  Has a functioning left forearm AVF in addition to PD catheter.  Will follow  I was present during and reviewed clinical and lab data as well as assessment and plan as documented by the house staff as noted. Please contact if any additional questions with any change in clinical condition or on availability of any additional information or reports.

## 2021-03-09 NOTE — RESEARCH COMMUNICATION NOTE - NS AS RESEARCH STUDY NAME FT
Study -RSGV    Post-operative complications and graft survival with conventional versus continuous glucose monitoring in patients with diabetes mellitus undergoing renal transplantation

## 2021-03-09 NOTE — H&P ADULT - NSHPREVIEWOFSYSTEMS_GEN_ALL_CORE
Gen: No weight changes, fatigue, fevers/chills, weakness  Skin: No rashes  Head/Eyes/Ears/Mouth: No headache; Normal hearing; Normal vision w/o blurriness; No sinus pain/discomfort, sore throat  Respiratory: No dyspnea, cough, wheezing, hemoptysis  CV: No chest pain, PND, orthopnea  GI: Denies diarrhea, constipation, nausea, vomiting, melena, hematochezia  : No increased frequency, dysuria, hematuria, nocturia  MSK: No joint pain/swelling; no back pain; no edema  Neuro: No dizziness/lightheadedness, weakness, seizures, numbness, tingling  Heme: No easy bruising or bleeding  Endo: No heat/cold intolerance  Psych: No significant nervousness, anxiety, stress, depression  All other systems were reviewed and are negative, except as noted.

## 2021-03-09 NOTE — CONSULT NOTE ADULT - SUBJECTIVE AND OBJECTIVE BOX
[facility] DIVISION OF KIDNEY DISEASES AND HYPERTENSION -- [phone]  -- INITIAL CONSULT NOTE  --------------------------------------------------------------------------------  HPI: Patient is a 70 y/o M w PMH of ESRD on PD for 5 months, was on HD in , with nephrologist Dr Novak, IDDM, HTN, gout, depression, anxiety and OCD. Here for  donor renal transplant.     Patient was seen and examined at bedside. Reported feeling well. Denies CP, SOB, fever, chills, nausea, vomiting, diarrhea, LE edema or dysuria.    PAST HISTORY  --------------------------------------------------------------------------------  PAST MEDICAL & SURGICAL HISTORY:  DM (diabetes mellitus)    HTN (hypertension)    Chronic kidney disease (CKD)    FAMILY HISTORY:    PAST SOCIAL HISTORY:    ALLERGIES & MEDICATIONS  --------------------------------------------------------------------------------  Allergies    ACE inhibitors (Angioedema)  Levaquin (Angioedema)    Intolerances    Standing Inpatient Medications  basiliximab  IVPB 20 milliGRAM(s) IV Intermittent once  ceFAZolin   IVPB 2000 milliGRAM(s) IV Intermittent once  methylPREDNISolone sodium succinate IVPB 500 milliGRAM(s) IV Intermittent once    PRN Inpatient Medications    REVIEW OF SYSTEMS  --------------------------------------------------------------------------------  Gen: No fevers/chills  Respiratory: No dyspnea, cough  CV: No chest pain  GI: No abdominal pain, diarrhea  : No dysuria, hematuria  MSK: No  edema    All other systems were reviewed and are negative, except as noted.    VITALS/PHYSICAL EXAM  --------------------------------------------------------------------------------  T(C): 37 (21 @ 12:51), Max: 37 (21 @ 12:51)  HR: 59 (21 @ 12:51) (59 - 59)  BP: 171/76 (21 @ 12:51) (171/76 - 171/76)  RR: 18 (21 @ 12:51) (18 - 18)  SpO2: 94% (21 @ 12:51) (94% - 94%)  Wt(kg): --    Physical Exam:  	Gen: NAD  	HEENT: MMM  	Pulm: CTA B/L  	CV: S1S2  	Abd: Soft, +BS   	Ext: No LE edema B/L  	Neuro: Awake  	Skin: Warm and dry  	Vascular access: PD catheter, LUE AVF with good bruit and palpable thrills     LABS/STUDIES  --------------------------------------------------------------------------------    135  |  93  |  60  ----------------------------<  139      [21 @ 13:39]  4.0   |  21  |  10.88        Ca     8.8     [21 @ 13:39]      Mg     2.4     [21 @ 13:39]      Phos  8.2     [21 @ 13:39]    TPro  7.2  /  Alb  3.6  /  TBili  0.3  /  DBili  x   /  AST  5   /  ALT  6   /  AlkPhos  115  [21 @ 13:39]    PT/INR: PT 13.5 , INR 1.13       [21 @ 13:39]  PTT: 34.1       [21 @ 13:39]      Creatinine Trend:  SCr 10.88 [ 13:39]

## 2021-03-10 ENCOUNTER — TRANSCRIPTION ENCOUNTER (OUTPATIENT)
Age: 71
End: 2021-03-10

## 2021-03-10 DIAGNOSIS — Z94.0 KIDNEY TRANSPLANT STATUS: ICD-10-CM

## 2021-03-10 DIAGNOSIS — N17.9 ACUTE KIDNEY FAILURE, UNSPECIFIED: ICD-10-CM

## 2021-03-10 LAB
ALBUMIN SERPL ELPH-MCNC: 3.1 G/DL — LOW (ref 3.3–5)
ALP SERPL-CCNC: 101 U/L — SIGNIFICANT CHANGE UP (ref 40–120)
ALT FLD-CCNC: 25 U/L — SIGNIFICANT CHANGE UP (ref 10–45)
ANION GAP SERPL CALC-SCNC: 21 MMOL/L — HIGH (ref 5–17)
ANISOCYTOSIS BLD QL: SLIGHT — SIGNIFICANT CHANGE UP
APTT BLD: 31.3 SEC — SIGNIFICANT CHANGE UP (ref 27.5–35.5)
AST SERPL-CCNC: 33 U/L — SIGNIFICANT CHANGE UP (ref 10–40)
BASOPHILS # BLD AUTO: 0.15 K/UL — SIGNIFICANT CHANGE UP (ref 0–0.2)
BASOPHILS NFR BLD AUTO: 1.7 % — SIGNIFICANT CHANGE UP (ref 0–2)
BILIRUB SERPL-MCNC: 0.4 MG/DL — SIGNIFICANT CHANGE UP (ref 0.2–1.2)
BUN SERPL-MCNC: 63 MG/DL — HIGH (ref 7–23)
CALCIUM SERPL-MCNC: 8.2 MG/DL — LOW (ref 8.4–10.5)
CHLORIDE SERPL-SCNC: 92 MMOL/L — LOW (ref 96–108)
CO2 SERPL-SCNC: 19 MMOL/L — LOW (ref 22–31)
CREAT SERPL-MCNC: 10.5 MG/DL — HIGH (ref 0.5–1.3)
EOSINOPHIL # BLD AUTO: 0 K/UL — SIGNIFICANT CHANGE UP (ref 0–0.5)
EOSINOPHIL NFR BLD AUTO: 0 % — SIGNIFICANT CHANGE UP (ref 0–6)
GLUCOSE SERPL-MCNC: 273 MG/DL — HIGH (ref 70–99)
HCT VFR BLD CALC: 28.6 % — LOW (ref 39–50)
HCV RNA SERPL NAA DL=5-ACNC: SIGNIFICANT CHANGE UP
HCV RNA SPEC NAA+PROBE-LOG IU: SIGNIFICANT CHANGE UP LOG10IU/ML
HGB BLD-MCNC: 9.6 G/DL — LOW (ref 13–17)
INR BLD: 1.13 RATIO — SIGNIFICANT CHANGE UP (ref 0.88–1.16)
LYMPHOCYTES # BLD AUTO: 0.15 K/UL — LOW (ref 1–3.3)
LYMPHOCYTES # BLD AUTO: 1.7 % — LOW (ref 13–44)
MANUAL SMEAR VERIFICATION: SIGNIFICANT CHANGE UP
MCHC RBC-ENTMCNC: 31.8 PG — SIGNIFICANT CHANGE UP (ref 27–34)
MCHC RBC-ENTMCNC: 33.6 GM/DL — SIGNIFICANT CHANGE UP (ref 32–36)
MCV RBC AUTO: 94.7 FL — SIGNIFICANT CHANGE UP (ref 80–100)
MONOCYTES # BLD AUTO: 0.08 K/UL — SIGNIFICANT CHANGE UP (ref 0–0.9)
MONOCYTES NFR BLD AUTO: 0.9 % — LOW (ref 2–14)
NEUTROPHILS # BLD AUTO: 8.67 K/UL — HIGH (ref 1.8–7.4)
NEUTROPHILS NFR BLD AUTO: 95.7 % — HIGH (ref 43–77)
NRBC # BLD: 0 /100 WBCS — SIGNIFICANT CHANGE UP (ref 0–0)
PLAT MORPH BLD: NORMAL — SIGNIFICANT CHANGE UP
PLATELET # BLD AUTO: 148 K/UL — LOW (ref 150–400)
POTASSIUM SERPL-MCNC: 5.2 MMOL/L — SIGNIFICANT CHANGE UP (ref 3.5–5.3)
POTASSIUM SERPL-SCNC: 5.2 MMOL/L — SIGNIFICANT CHANGE UP (ref 3.5–5.3)
PROT SERPL-MCNC: 6.1 G/DL — SIGNIFICANT CHANGE UP (ref 6–8.3)
PROTHROM AB SERPL-ACNC: 13.5 SEC — SIGNIFICANT CHANGE UP (ref 10.6–13.6)
RBC # BLD: 3.02 M/UL — LOW (ref 4.2–5.8)
RBC # FLD: 15.2 % — HIGH (ref 10.3–14.5)
RBC BLD AUTO: SIGNIFICANT CHANGE UP
SODIUM SERPL-SCNC: 132 MMOL/L — LOW (ref 135–145)
WBC # BLD: 11.96 K/UL — HIGH (ref 3.8–10.5)
WBC # FLD AUTO: 11.96 K/UL — HIGH (ref 3.8–10.5)

## 2021-03-10 PROCEDURE — 99232 SBSQ HOSP IP/OBS MODERATE 35: CPT | Mod: GC,24

## 2021-03-10 PROCEDURE — 90935 HEMODIALYSIS ONE EVALUATION: CPT | Mod: GC

## 2021-03-10 RX ORDER — ACETAMINOPHEN 500 MG
650 TABLET ORAL EVERY 6 HOURS
Refills: 0 | Status: DISCONTINUED | OUTPATIENT
Start: 2021-03-10 | End: 2021-03-21

## 2021-03-10 RX ORDER — TRAMADOL HYDROCHLORIDE 50 MG/1
50 TABLET ORAL EVERY 6 HOURS
Refills: 0 | Status: DISCONTINUED | OUTPATIENT
Start: 2021-03-10 | End: 2021-03-10

## 2021-03-10 RX ORDER — TRAMADOL HYDROCHLORIDE 50 MG/1
50 TABLET ORAL EVERY 4 HOURS
Refills: 0 | Status: DISCONTINUED | OUTPATIENT
Start: 2021-03-10 | End: 2021-03-17

## 2021-03-10 RX ORDER — INSULIN LISPRO 100/ML
VIAL (ML) SUBCUTANEOUS AT BEDTIME
Refills: 0 | Status: DISCONTINUED | OUTPATIENT
Start: 2021-03-10 | End: 2021-03-21

## 2021-03-10 RX ORDER — FUROSEMIDE 40 MG
80 TABLET ORAL
Refills: 0 | Status: DISCONTINUED | OUTPATIENT
Start: 2021-03-10 | End: 2021-03-13

## 2021-03-10 RX ORDER — INSULIN LISPRO 100/ML
VIAL (ML) SUBCUTANEOUS EVERY 6 HOURS
Refills: 0 | Status: DISCONTINUED | OUTPATIENT
Start: 2021-03-10 | End: 2021-03-10

## 2021-03-10 RX ORDER — HYDRALAZINE HCL 50 MG
10 TABLET ORAL ONCE
Refills: 0 | Status: COMPLETED | OUTPATIENT
Start: 2021-03-10 | End: 2021-03-10

## 2021-03-10 RX ORDER — SENNA PLUS 8.6 MG/1
2 TABLET ORAL AT BEDTIME
Refills: 0 | Status: DISCONTINUED | OUTPATIENT
Start: 2021-03-10 | End: 2021-03-21

## 2021-03-10 RX ORDER — ACETAMINOPHEN 500 MG
1000 TABLET ORAL ONCE
Refills: 0 | Status: COMPLETED | OUTPATIENT
Start: 2021-03-10 | End: 2021-03-10

## 2021-03-10 RX ORDER — INSULIN LISPRO 100/ML
VIAL (ML) SUBCUTANEOUS
Refills: 0 | Status: DISCONTINUED | OUTPATIENT
Start: 2021-03-10 | End: 2021-03-21

## 2021-03-10 RX ORDER — VALGANCICLOVIR 450 MG/1
450 TABLET, FILM COATED ORAL
Refills: 0 | Status: DISCONTINUED | OUTPATIENT
Start: 2021-03-10 | End: 2021-03-21

## 2021-03-10 RX ORDER — TRAMADOL HYDROCHLORIDE 50 MG/1
25 TABLET ORAL EVERY 6 HOURS
Refills: 0 | Status: DISCONTINUED | OUTPATIENT
Start: 2021-03-10 | End: 2021-03-10

## 2021-03-10 RX ORDER — TRAMADOL HYDROCHLORIDE 50 MG/1
25 TABLET ORAL EVERY 4 HOURS
Refills: 0 | Status: DISCONTINUED | OUTPATIENT
Start: 2021-03-10 | End: 2021-03-17

## 2021-03-10 RX ADMIN — Medication 1 TABLET(S): at 11:47

## 2021-03-10 RX ADMIN — INSULIN GLARGINE 25 UNIT(S): 100 INJECTION, SOLUTION SUBCUTANEOUS at 22:32

## 2021-03-10 RX ADMIN — Medication 10 MILLIGRAM(S): at 22:32

## 2021-03-10 RX ADMIN — CARVEDILOL PHOSPHATE 12.5 MILLIGRAM(S): 80 CAPSULE, EXTENDED RELEASE ORAL at 06:09

## 2021-03-10 RX ADMIN — Medication 6: at 02:34

## 2021-03-10 RX ADMIN — Medication 400 MILLIGRAM(S): at 06:10

## 2021-03-10 RX ADMIN — CARVEDILOL PHOSPHATE 12.5 MILLIGRAM(S): 80 CAPSULE, EXTENDED RELEASE ORAL at 17:27

## 2021-03-10 RX ADMIN — Medication 80 MILLIGRAM(S): at 17:29

## 2021-03-10 RX ADMIN — Medication 500000 UNIT(S): at 06:11

## 2021-03-10 RX ADMIN — Medication 125 MILLIGRAM(S): at 17:27

## 2021-03-10 RX ADMIN — Medication 80 MILLIGRAM(S): at 06:14

## 2021-03-10 RX ADMIN — TRAMADOL HYDROCHLORIDE 50 MILLIGRAM(S): 50 TABLET ORAL at 20:15

## 2021-03-10 RX ADMIN — Medication 500000 UNIT(S): at 11:46

## 2021-03-10 RX ADMIN — TRAMADOL HYDROCHLORIDE 50 MILLIGRAM(S): 50 TABLET ORAL at 14:16

## 2021-03-10 RX ADMIN — Medication 10 MILLIGRAM(S): at 00:40

## 2021-03-10 RX ADMIN — Medication 500000 UNIT(S): at 23:15

## 2021-03-10 RX ADMIN — SENNA PLUS 2 TABLET(S): 8.6 TABLET ORAL at 22:32

## 2021-03-10 RX ADMIN — VALGANCICLOVIR 450 MILLIGRAM(S): 450 TABLET, FILM COATED ORAL at 11:46

## 2021-03-10 RX ADMIN — TRAMADOL HYDROCHLORIDE 50 MILLIGRAM(S): 50 TABLET ORAL at 19:44

## 2021-03-10 RX ADMIN — Medication 1000 MILLIGRAM(S): at 06:45

## 2021-03-10 RX ADMIN — Medication 2: at 16:29

## 2021-03-10 RX ADMIN — Medication 4: at 06:09

## 2021-03-10 RX ADMIN — MYCOPHENOLATE MOFETIL 1000 MILLIGRAM(S): 250 CAPSULE ORAL at 09:01

## 2021-03-10 RX ADMIN — CHLORHEXIDINE GLUCONATE 1 APPLICATION(S): 213 SOLUTION TOPICAL at 11:49

## 2021-03-10 RX ADMIN — Medication 500000 UNIT(S): at 17:27

## 2021-03-10 RX ADMIN — TRAMADOL HYDROCHLORIDE 50 MILLIGRAM(S): 50 TABLET ORAL at 15:16

## 2021-03-10 RX ADMIN — TACROLIMUS 6 MILLIGRAM(S): 5 CAPSULE ORAL at 09:01

## 2021-03-10 RX ADMIN — Medication 4: at 11:49

## 2021-03-10 RX ADMIN — FAMOTIDINE 20 MILLIGRAM(S): 10 INJECTION INTRAVENOUS at 11:46

## 2021-03-10 RX ADMIN — SODIUM CHLORIDE 70 MILLILITER(S): 9 INJECTION INTRAMUSCULAR; INTRAVENOUS; SUBCUTANEOUS at 06:14

## 2021-03-10 RX ADMIN — Medication 125 MILLIGRAM(S): at 06:10

## 2021-03-10 RX ADMIN — MYCOPHENOLATE MOFETIL 1000 MILLIGRAM(S): 250 CAPSULE ORAL at 19:44

## 2021-03-10 NOTE — PROGRESS NOTE ADULT - ASSESSMENT
Patient is a 70 y/o M w PMH of ESRD on PD for 5 months, was on HD in 2020, with nephrologist Dr Novak, IDDM, HTN, gout, depression, anxiety and OCD. S/p DCD on 3/9/21.     1. S/p DCD on 3/9/21. S/p Simulect induction. Urine output was 195 cc over 24h. Started on lasix 80 mg IVP BID. Last Scr 10.5. Will do HD today with 0 UF for clearance. Monitor labs and urine output.     2. Immunosupression - Simulect induction, started on Envarsus 7 mg PO daily, MMF 1g PO BID and steroid taper     3. Prophylaxis - bactrim/nystatin/valcyte     4. Hypertension - continue Coreg 12.5 mg PO BID     If any questions, please feel free to contact me     Jennifer Mcdowell  Nephrology Fellow  [facility] Pager: [phone]  [facility] Pager: [phone]

## 2021-03-10 NOTE — PROGRESS NOTE ADULT - ASSESSMENT
71M with h/o HTN (age 43), IDDM (age 43), gout, anxiety, depression, OCD, CKD (since 2016) was on HD via L AVF (4/2020 Dr. Novak) with transition to PD in 9/2020 now s/p R DCD DDRT (3/9/21)    POD#1 s/p R DCD DDRT  -Continue to monitor graft function  -Hemodialysis today  -Remove RIJ central line  -Trend labs  -strict I&Os  -Renal restricted clears  -Coreg   -Lantus 25 units tonight  -CGM study: conventional group  -SCDs on at all times  -Encourage spirometry  -OOB with PT  -Immuno:  ENV 6, MMF 1g BID, standard steroid taper, Simulect #2 on POD#4  -PPx: Bactrim/Valcyte/Nystatin/Pepcid

## 2021-03-10 NOTE — PHYSICAL THERAPY INITIAL EVALUATION ADULT - GENERAL OBSERVATIONS, REHAB EVAL
Imaging Studies/Medications/EKG/Labs
Pt rec'd in chair w/ IVP (disconnect by [facility] levine), room air, alfaro & tele.
Yes

## 2021-03-10 NOTE — PROGRESS NOTE ADULT - ATTENDING COMMENTS
Kidney Recipient with delayed graft function  DM, HTN  JOJO  Reviewed immunosuppression, I/O, clinical and lab data  Plan  Tacrolimus trough level 8-10 ng/ml  Hemodialysis  I have seen the patient and reviewed dialysis prescription and flow sheet. Dialysis access is functioning well. Patient is tolerating dialysis well with no acute symptoms or distress. Dialysis prescription has been adjusted for optimized control of volume status, uremia and electrolytes. Management of additional metabolic abnormalities/anemia will continue to be addressed on follow up.  I was present during and reviewed clinical and lab data as well as assessment and plan as documented by the housestaff as noted. Please contact if any additional questions with any change in clinical condition or on availability of any additional information or reports.

## 2021-03-10 NOTE — PROGRESS NOTE ADULT - ASSESSMENT
MALISSA MOSES is a 71y Male s/p DDRT on 3/9/2021. PMH is significant for HTN, DM2 on insulin, gout, anxiety, depression, OCD, CKD since 2016.    Allergies: ACE inhibitors (angioedema) and levofloxacin (angioedema)  CMV -/-    Transplant Medications  Induction  -Basiliximab 20 mg POD 0 (given in OR) and POD 4  -Methyprednisolone taper (switch to PO prednisone on POD 4)            POD 0: 500 mg IV in OR            POD 1: 125 mg IV Q12H            POD 2: 60 mg IV Q12H            POD 3: 30 mg IV Q12H        Maintenance Immunosuppression  -Envarsus 0.14 mg/kg daily at 8AM (Adjust for goal trough: 8-10)   -Mycophenolate 1,000 mg PO Q12H  -Prednisone             POD 4: 20 mg PO Q12H            POD 5: 10 mg PO Q12H            POD 6: 5 mg PO Q12H            POD 7-: 5 mg daily     Anti-infection   -Bactrim SS tablet (frequency based on renal function)  -Valganciclovir (dose based on CMV serostatus and frequency based on renal function)  -Nystatin swish and swallow 5 mL four times daily    Surgical prophylaxis pre- and intra-operative dosing  -Cefazolin    Prophylaxis  -GI ppx: famotidine 20 mg daily  -Bowel ppx: senna  -DVT: sequential compression device  -Pain:            Mild: Acetaminophen 650 mg every 6 hours PRN           Moderate: Tramadol 25 mg every 4 hours PRN (adjust for renal function)           Severe: Tramadol 50 mg every 4 hours PRN (adjust for renal function)    Home medications  - Aspirin 81 mg daily  - Carvedilol 25 mg twice daily  - Doxazosin 2 mg daily  - Diltiazem 240mg/24hours extended release capsule, 1 capsule daily  - Olmesartan 40 mg daily  - Furosemide 40 mg tablets, 2 tablets once daily (80 mg/day)  - Lantus 25 units at bedtime  - Humalog 1 to 3 units three times daily before meals  - Sevelamer HCl 800 mg tablets, 2 tablets four times daily with food  - Rayaldee 30 mcg extended release capsule, 1 capsule at bedtime  - Iron supplement daily  - Vitamin D 1000 units daily  - Dulcolax 2 tablets at bedtime as needed    Outpatient medication reconciliation reviewed and will be re-started appropriately.  Plan discussed with multidisciplinary team.     Giles Orozco, MistiD

## 2021-03-10 NOTE — PHYSICAL THERAPY INITIAL EVALUATION ADULT - ADDITIONAL COMMENTS
PTA pt lives in private home w/ wife, children & mother in law. ~few steps to enter & 1 flt within w/ R HR to bedroom. Pt endorses being I w/ all ADLs, no devices. +glasses & hearing intact

## 2021-03-10 NOTE — DISCHARGE NOTE PROVIDER - NSDCMRMEDTOKEN_GEN_ALL_CORE_FT
aspirin 81 mg oral tablet, chewable: 1 tab(s) orally once a day  Coreg 25 mg oral tablet: 1 tab(s) orally 2 times a day  dilTIAZem 240 mg/24 hours oral capsule, extended release: 1 cap(s) orally once a day  doxazosin 2 mg oral tablet: 1 tab(s) orally once a day  HumaLOG 100 units/mL subcutaneous solution: 1-3 unit(s) subcutaneous 3 times a day (before meals)  Lantus 100 units/mL subcutaneous solution: 25 unit(s) subcutaneous once a day (at bedtime)  Lasix 40 mg oral tablet: 2 tab(s) orally once a day  olmesartan 40 mg oral tablet: 1 tab(s) orally once a day  Rayaldee 30 mcg oral capsule, extended release: 1 cap(s) orally once a day (at bedtime)  rolling walker:   sevelamer hydrochloride 800 mg oral tablet: 2 tab(s) orally 2 times a day   aspirin 81 mg oral tablet, chewable: 1 tab(s) orally once a day  calcitriol 0.25 mcg oral capsule: 1 cap(s) orally once a day  carvedilol 25 mg oral tablet: 1 tab(s) orally every 12 hours  famotidine 20 mg oral tablet: 1 tab(s) orally once a day  insulin glargine: 20 unit(s) subcutaneous once a day (at bedtime)  insulin lispro 100 units/mL injectable solution: 3 unit(s) injectable 3 times a day  Lasix 80 mg oral tablet: 1 tab(s) orally 2 times a day   mycophenolate mofetil 250 mg oral capsule: 1 milligram(s) orally 2 times a day  NIFEdipine 90 mg oral tablet, extended release: 1 tab(s) orally once a day  nystatin 100,000 units/mL oral suspension: 5 milliliter(s) orally 4 times a day  polyethylene glycol 3350 oral powder for reconstitution: 17 gram(s) orally once a day  predniSONE: 5 milligram(s) orally once a day  senna oral tablet: 2 tab(s) orally once a day (at bedtime)  sevelamer carbonate 800 mg oral tablet: 2 tab(s) orally 3 times a day (with meals)  sulfamethoxazole-trimethoprim 400 mg-80 mg oral tablet: 1 tab(s) orally once a day  tacrolimus 1 mg oral tablet, extended release: 5 tab(s) orally   tamsulosin 0.4 mg oral capsule: 1 cap(s) orally once a day (at bedtime)  valGANciclovir 450 mg oral tablet: 1 tab(s) orally

## 2021-03-10 NOTE — DISCHARGE NOTE PROVIDER - NSDCCPCAREPLAN_GEN_ALL_CORE_FT
PRINCIPAL DISCHARGE DIAGNOSIS  Diagnosis: Kidney transplant recipient  Assessment and Plan of Treatment: - Avoid heavy lifting aything over 5lbs for six weeks following your surgery date. Do NOT drive a car or operate machinery unless cleared by your transplant surgeon during your follow up visit. Avoid straining, use stool softners as needed. Stop stool softners if diarrhea develops.   - Call transplant clinic if you develop fever, increased abdominal pain, redness/swelling or bleeding around your incision site  - Call transplant clinic if you have difficulty urinating, notice decrease in urine output or blood in urine.   - Follow FOOD SAFETY instruction provided to you in your patient education guide bookelet   - Bathing: Shower is allowed, you can let soap and water flow over your incision; do NOT rub the area. Bath is NOT allowed until your incision is healed and you have been cleared by your transplant surgeon.      SECONDARY DISCHARGE DIAGNOSES  Diagnosis: Immunosuppression  Assessment and Plan of Treatment: - Transplant recipients are at risk for developing infection because immune system is lowered due to transplant medications.   - Avoid contact with sick people; practice good hand hygiene; wear mask when around people, social distance, avoid travel  - Take medications as directed by your translant team. Never stop taking medications unless instructed by your transplant physician.  - Do NOT double up medication dose if you missed your dose; call the transplant office for instructions.

## 2021-03-10 NOTE — PROGRESS NOTE ADULT - SUBJECTIVE AND OBJECTIVE BOX
Transplant Surgery - Multidisciplinary Rounds  --------------------------------------------------------------  DCD DDRT (1a/1v/1u--stented)   Date: 3/9/2021         POD# 1    Present:   Patient seen and examined with multidisciplinary team including Transplant Surgeon: Dr. Anthony, Dr. Engel, Dr. Moore, Dr. Floyd, Dr. Schuler, Dr. Pringle. Transplant Nephrologist: Dr. Coburn, Dr. Beck Solares, Dr. Anne Olmos,   Pharmacist: Tammie Arzate. ACPs Osbaldo, Milad, Ruth Ann, Karlos Wiseman, Lucius and unit RN during am rounds.  Disciplines not in attendance will be notified of the plan.     HPI:  70 y/o gentleman with h/o HTN (age 43), DM on insulin (age 43), gout, anxiety, depression, OCD and CKD since 2016.  He started HD via L AVF  in 2020 at [facility] and transitioned to PD about 5 months ago. He makes ~2 ounces of urine 4 times daily.  Nephrologist is Dr. Novak  He now presents for  donor renal transplant.     Recipient: info:  Hx: ESRD on PD since 2020, DM2 x 20yrs, HTN x 20 yrs, HLD, Gout, Anxiety, Depression, OCD.  PSH: AVF, PD Cath, Humeral fx, Ruptured Achilles tendon  Nephrologist: Dr. Demetrio Novak  CPRA: 0%              ABO:  A  CMV: NEGATIVE  EBV Status:  POSITIVE  Last Dialysis: Patient does PD every night. Last dialysis last night.  ALLERGIES: ACE Inhibitors (Angioedema, swelling), Levaquin (Angioedema)    Donor (local): WINIFRED Diaz  Donor ID: [id number]  Match: [id number]  OPO:  GINO Moreno  Age:  50 y/o  ABO:  A1  PHS Increased Risk: No  KDPI:  71%  DCD: YES  WIT: 24 mins  COD:  Anoxia / CV  X Clamp Time: 3/9/21 at 3:10  Medical Hx:  CAD, HTN, Morbid obesity, HLD  Terminal Cr: 8.02  Covid Testing: 3/5 NP Negative, 3/8 NP negative, 3/8 BAL Negative.  CMV-NEGATIVE  EBV-POSITIVE  HepBcAb-NEGATIVE  Hepatitis C-LAKISHA- NEGATIVE  Hepatitis C ab-NEGATIVE  MISMATCH: 0, 0, 0  Kidney Laterality:  RIGHT    Interval Events:  - Renal duplex in PACU with good flow. Small upper pole collection.   - UO steady at 30cc/h after Lasix challenge (80mg IV x1)    Potential Discharge date: pending clinical stability    Education:  Medications    Plan of care:  See Below    MEDICATIONS  (STANDING):  carvedilol 12.5 milliGRAM(s) Oral every 12 hours  chlorhexidine 2% Cloths 1 Application(s) Topical daily  dextrose 40% Gel 15 Gram(s) Oral once  dextrose 5%. 1000 milliLiter(s) (50 mL/Hr) IV Continuous <Continuous>  dextrose 5%. 1000 milliLiter(s) (100 mL/Hr) IV Continuous <Continuous>  dextrose 50% Injectable 25 Gram(s) IV Push once  dextrose 50% Injectable 12.5 Gram(s) IV Push once  dextrose 50% Injectable 25 Gram(s) IV Push once  famotidine    Tablet 20 milliGRAM(s) Oral daily  furosemide   Injectable 80 milliGRAM(s) IV Push two times a day  glucagon  Injectable 1 milliGRAM(s) IntraMuscular once  insulin glargine Injectable (LANTUS) 25 Unit(s) SubCutaneous at bedtime  insulin lispro (ADMELOG) corrective regimen sliding scale   SubCutaneous every 6 hours  methylPREDNISolone sodium succinate Injectable 125 milliGRAM(s) IV Push two times a day  mycophenolate mofetil 1000 milliGRAM(s) Oral <User Schedule>  nystatin    Suspension 933882 Unit(s) Swish and Swallow four times a day  senna 2 Tablet(s) Oral at bedtime  sodium chloride 0.45%. 500 milliLiter(s) (50 mL/Hr) IV Continuous <Continuous>  sodium chloride 0.9%. 1000 milliLiter(s) (70 mL/Hr) IV Continuous <Continuous>  tacrolimus ER Tablet (ENVARSUS XR) 6 milliGRAM(s) Oral <User Schedule>  trimethoprim   80 mG/sulfamethoxazole 400 mG 1 Tablet(s) Oral daily  valGANciclovir 450 milliGRAM(s) Oral daily    MEDICATIONS  (PRN):  acetaminophen   Tablet .. 650 milliGRAM(s) Oral every 6 hours PRN Mild Pain (1 - 3)  traMADol 25 milliGRAM(s) Oral every 4 hours PRN Moderate Pain (4 - 6)  traMADol 50 milliGRAM(s) Oral every 4 hours PRN Severe Pain (7 - 10)      PAST MEDICAL & SURGICAL HISTORY:  DM (diabetes mellitus)    HTN (hypertension)    Chronic kidney disease (CKD)        Vital Signs Last 24 Hrs  T(C): 36.6 (10 Mar 2021 09:00), Max: 37 (09 Mar 2021 12:51)  T(F): 97.9 (10 Mar 2021 09:00), Max: 98.6 (09 Mar 2021 12:51)  HR: 62 (10 Mar 2021 09:) (54 - 65)  BP: 140/67 (10 Mar 2021 09:00) (132/60 - 190/84)  BP(mean): 93 (10 Mar 2021 07:) (83 - 112)  RR: 18 (10 Mar 2021 09:) (12 - 18)  SpO2: 95% (10 Mar 2021 09:00) (94% - 100%)    I&O's Summary    09 Mar 2021 07:01  -  10 Mar 2021 07:00  --------------------------------------------------------  IN: 750 mL / OUT: 435 mL / NET: 315 mL    10 Mar 2021 07:01  -  10 Mar 2021 09:52  --------------------------------------------------------  IN: 140 mL / OUT: 50 mL / NET: 90 mL                              9.6    11.96 )-----------( 148      ( 10 Mar 2021 02:51 )             28.6     03-10    132<L>  |  92<L>  |  63<H>  ----------------------------<  273<H>  5.2   |  19<L>  |  10.50<H>    Ca    8.2<L>      10 Mar 2021 02:51  Phos  8.7     03-10  Mg     2.4     03-10    TPro  6.1  /  Alb  3.1<L>  /  TBili  0.4  /  DBili  x   /  AST  33  /  ALT  25  /  AlkPhos  101  03-10            Review of systems  Gen: No weight changes, fatigue, fevers/chills, weakness  Skin: No rashes  Head/Eyes/Ears/Mouth: No headache; Normal hearing; Normal vision w/o blurriness; No sinus pain/discomfort, sore throat  Respiratory: No dyspnea, cough, wheezing, hemoptysis  CV: No chest pain, PND, orthopnea  GI: Mild abdominal pain at surgical incision site; denies diarrhea, constipation, nausea, vomiting, melena, hematochezia  : No increased frequency, dysuria, hematuria, nocturia  MSK: No joint pain/swelling; no back pain; no edema  Neuro: No dizziness/lightheadedness, weakness, seizures, numbness, tingling  Heme: No easy bruising or bleeding  Endo: No heat/cold intolerance  Psych: No significant nervousness, anxiety, stress, depression  All other systems were reviewed and are negative, except as noted.      PHYSICAL EXAM:  Constitutional: Well developed / well nourished  Eyes: Anicteric, PERRLA  ENMT: nc/at  Neck: central line RIJ  Respiratory: Decreased breath sounds on R lung base  Cardiovascular: RRR  Gastrointestinal: Soft, non distended, mild tenderness at the incision site; incision c/d/i; LORETTA serosanguinous  Genitourinary: Urinary catheter in place  Extremities: SCD's in place and working bilaterally, AVF palpable  Vascular: 1+ DP pulses b/l  Neurological: A&O x3  Skin: no rashes, ulcerations or lesions;  Musculoskeletal: Moving all extremities  Psychiatric: Responsive

## 2021-03-10 NOTE — DISCHARGE NOTE PROVIDER - NSDCFUADDAPPT_GEN_ALL_CORE_FT
Please follow-up with Dr. Schuler on----- Please follow-up with Dr. Schuler on Tuesday  Please follow-up with Dr. Schuler on Monday

## 2021-03-10 NOTE — DISCHARGE NOTE PROVIDER - NSDCHHPTASSISTHOME_GEN_ALL_CORE
Follow up with Dr. Kaiser or Louisa in 2 weeks  Call me if ?'s arise [phone]  Augusta Landeros RN  
Patient Needs Assistance to Leave Residence...

## 2021-03-10 NOTE — PROGRESS NOTE ADULT - SUBJECTIVE AND OBJECTIVE BOX
[facility] DIVISION OF KIDNEY DISEASES AND HYPERTENSION -- FOLLOW UP NOTE  --------------------------------------------------------------------------------    24 hour events/subjective: Patient was seen and examined at bedside. Urine output 195 cc since surgery. Reported feeling well. Denies CP, SOB, fever, chills, nausea, vomiting, diarrhea, LE edema.    PAST HISTORY  --------------------------------------------------------------------------------  No significant changes to PMH, PSH, FHx, SHx, unless otherwise noted    ALLERGIES & MEDICATIONS  --------------------------------------------------------------------------------  Allergies    ACE inhibitors (Angioedema)  Levaquin (Angioedema)    Intolerances      Standing Inpatient Medications  carvedilol 12.5 milliGRAM(s) Oral every 12 hours  chlorhexidine 2% Cloths 1 Application(s) Topical daily  dextrose 40% Gel 15 Gram(s) Oral once  dextrose 5%. 1000 milliLiter(s) IV Continuous <Continuous>  dextrose 5%. 1000 milliLiter(s) IV Continuous <Continuous>  dextrose 50% Injectable 25 Gram(s) IV Push once  dextrose 50% Injectable 12.5 Gram(s) IV Push once  dextrose 50% Injectable 25 Gram(s) IV Push once  famotidine    Tablet 20 milliGRAM(s) Oral daily  furosemide   Injectable 80 milliGRAM(s) IV Push two times a day  glucagon  Injectable 1 milliGRAM(s) IntraMuscular once  insulin glargine Injectable (LANTUS) 25 Unit(s) SubCutaneous at bedtime  insulin lispro (ADMELOG) corrective regimen sliding scale   SubCutaneous every 6 hours  methylPREDNISolone sodium succinate Injectable 125 milliGRAM(s) IV Push two times a day  mycophenolate mofetil 1000 milliGRAM(s) Oral <User Schedule>  nystatin    Suspension 623930 Unit(s) Swish and Swallow four times a day  senna 2 Tablet(s) Oral at bedtime  sodium chloride 0.45%. 500 milliLiter(s) IV Continuous <Continuous>  sodium chloride 0.9%. 1000 milliLiter(s) IV Continuous <Continuous>  tacrolimus ER Tablet (ENVARSUS XR) 6 milliGRAM(s) Oral <User Schedule>  trimethoprim   80 mG/sulfamethoxazole 400 mG 1 Tablet(s) Oral daily  valGANciclovir 450 milliGRAM(s) Oral daily    PRN Inpatient Medications  acetaminophen   Tablet .. 650 milliGRAM(s) Oral every 6 hours PRN  traMADol 25 milliGRAM(s) Oral every 4 hours PRN  traMADol 50 milliGRAM(s) Oral every 4 hours PRN      REVIEW OF SYSTEMS  --------------------------------------------------------------------------------  Gen: No fevers/chills  Respiratory: No dyspnea, cough  CV: No chest pain  GI: No abdominal pain, diarrhea  MSK: No  edema      All other systems were reviewed and are negative, except as noted.    VITALS/PHYSICAL EXAM  --------------------------------------------------------------------------------  T(C): 36.6 (03-10-21 @ 09:00), Max: 37 (03-09-21 @ 12:51)  HR: 61 (03-10-21 @ 11:00) (54 - 65)  BP: 144/66 (03-10-21 @ 11:00) (132/60 - 190/84)  RR: 18 (03-10-21 @ 11:00) (12 - 18)  SpO2: 96% (03-10-21 @ 11:00) (94% - 100%)  Wt(kg): --    Weight (kg): 83.6 (03-09-21 @ 18:16)    03-09-21 @ 07:01  -  03-10-21 @ 07:00  --------------------------------------------------------  IN: 750 mL / OUT: 435 mL / NET: 315 mL    03-10-21 @ 07:01  -  03-10-21 @ 11:43  --------------------------------------------------------  IN: 640 mL / OUT: 75 mL / NET: 565 mL        Physical Exam:  	Gen: NAD  	HEENT: MMM  	Pulm: CTA B/L  	CV: S1S2  	Abd: Soft, +BS   	Ext: No LE edema B/L  	Neuro: Awake  	Skin: Warm and dry  	Vascular access: LUE AVF with good bruit and palpable thrills, PD catheter       LABS/STUDIES  --------------------------------------------------------------------------------              9.6    11.96 >-----------<  148      [03-10-21 @ 02:51]              28.6     132  |  92  |  63  ----------------------------<  273      [03-10-21 @ 02:51]  5.2   |  19  |  10.50        Ca     8.2     [03-10-21 @ 02:51]      Mg     2.4     [03-10-21 @ 02:51]      Phos  8.7     [03-10-21 @ 02:51]    TPro  6.1  /  Alb  3.1  /  TBili  0.4  /  DBili  x   /  AST  33  /  ALT  25  /  AlkPhos  101  [03-10-21 @ 02:51]    PT/INR: PT 13.5 , INR 1.13       [03-10-21 @ 02:51]  PTT: 31.3       [03-10-21 @ 02:51]      Creatinine Trend:  SCr 10.50 [03-10 @ 02:51]  SCr 10.60 [03-09 @ 22:16]  SCr 10.88 [03-09 @ 13:39]          HBsAb 56.4      [03-09-21 @ 17:13]  HBsAg Nonreact      [03-09-21 @ 17:13]  HBcAb Nonreact      [03-09-21 @ 17:13]  HCV 0.32, Nonreact      [03-09-21 @ 17:13]  HIV Nonreact      [03-09-21 @ 18:20]

## 2021-03-10 NOTE — DISCHARGE NOTE PROVIDER - CARE PROVIDER_API CALL
Som Schuler)  Surgery  [address] [place] [address]  Phone: [phone]  Fax: [phone]  Follow Up Time:

## 2021-03-10 NOTE — DISCHARGE NOTE PROVIDER - HOSPITAL COURSE
72 y/o M with PMHx of HTN (age 43), DM on insulin (age 43), gout, anxiety, depression, OCD and CKD since 2016.  He started HD via L AVF  in April 2020 at [facility] and transitioned to PD about 5 months ago. He makes ~2 ounces of urine 4 times daily.      Underwent R DCD DDRT 3/9/2021 on 3/9/2021 (1A/1V/1U stented).   -Post op course c/b DGF requiring HD (which was later transitioned to overnight PD) and diuretic trials with slow response.  -From a surgical perspective, he tolerated diet, had bowel function, had good pain control, and ambulated with PT.    -He suffered from urinary retention after alfaro removal post-op.  Alfaro was replaced, Flomax was started with successful TOV afterwards.    -LORETTA was removed.   -Completed Simulect induction (2 doses).  -D/c Cr:--------    Was discharged home after cleared by our multidisciplinary team of surgeons, nephrologists, pharmacists, ACPs, SW, RNs.    D/C PLAN:  -Envarsus=========, MMF 1g BID, steroid taper  -PPx: Bactrim/Nystatin/Valcyte/Pepcid  -f/u with Dr. Schuler on----    ===========    Recipient: info:  Hx: ESRD on PD since April 2020, DM2 x 20yrs, HTN x 20 yrs, HLD, Gout, Anxiety, Depression, OCD.  PSH: AVF, PD Cath, Humeral fx, Ruptured Achilles tendon  Nephrologist: Dr. Demetrio Novak  CPRA: 0%              ABO:  A  CMV: NEGATIVE  EBV Status:  POSITIVE  Last Dialysis: Patient does PD every night. Last dialysis last night.  ALLERGIES: ACE Inhibitors (Angioedema, swelling), Levaquin (Angioedema)    Donor (local): [place], [place]  Donor ID: [id number]  Match: [id number]  OPO:  GINO Moreno  Age:  48 y/o  ABO:  A1  PHS Increased Risk: No  KDPI:  71%  DCD: YES  WIT: 24 mins  COD:  Anoxia / CV  X Clamp Time: 3/9/21 at 3:10  Medical Hx:  CAD, HTN, Morbid obesity, HLD  Terminal Cr: 8.02  ovid Testing: 3/5 NP Negative, 3/8 NP negative, 3/8 BAL Negative.  CMV-NEGATIVE  EBV-POSITIVE  HepBcAb-NEGATIVE  Hepatitis C-LAKISHA- NEGATIVE  Hepatitis C ab-NEGATIVE  MISMATCH: 0, 0, 0  Kidney Laterality:  RIGHT  -  OR  R DCD DDRT 1a/1v/1u--stented. 1U PRBC intra-op   70 y/o M with PMHx of HTN (age 43), DM on insulin (age 43), gout, anxiety, depression, OCD and CKD since 2016.  He started HD via L AVF  in April 2020 at [facility] and transitioned to PD about 5 months ago. He makes ~2 ounces of urine 4 times daily.      Underwent R DCD DDRT 3/9/2021 on 3/9/2021 (1A/1V/1U stented).   -Post op course c/b DGF requiring HD (which was later transitioned to overnight PD) and diuretic trials with slow response.  -From a surgical perspective, he tolerated diet, had bowel function, had good pain control, and ambulated with PT.    -He had urinary retention after alfaro removal post-op.  Alfaro was replaced, Flomax was started with successful TOV afterwards.    -LORETTA was removed.   -Completed Simulect induction (2 doses).  -D/c Cr  6.7    Was discharged home after cleared by our multidisciplinary team of surgeons, nephrologists, pharmacists, ACPs, SW, RNs.    D/C PLAN:  -Envarsus 5mg daily, MMF 1g BID, Prednisone 5mg daily   -PPx: Bactrim/Nystatin/Valcyte/Pepcid  -Lasix 80mg PO bid  -f/u with Dr. Schuler on Tuesday     ===========    Recipient: info:  Hx: ESRD on PD since April 2020, DM2 x 20yrs, HTN x 20 yrs, HLD, Gout, Anxiety, Depression, OCD.  PSH: AVF, PD Cath, Humeral fx, Ruptured Achilles tendon  Nephrologist: Dr. Demetrio Novak  CPRA: 0%              ABO:  A  CMV: NEGATIVE  EBV Status:  POSITIVE  Last Dialysis: Patient does PD every night. Last dialysis last night.  ALLERGIES: ACE Inhibitors (Angioedema, swelling), Levaquin (Angioedema)    Donor (local): [place]  Donor ID: [id number]  Match: [id number]  OPO:  GINO Moreno  Age:  48 y/o  ABO:  A1  PHS Increased Risk: No  KDPI:  71%  DCD: YES  WIT: 24 mins  COD:  Anoxia / CV  X Clamp Time: 3/9/21 at 3:10  Medical Hx:  CAD, HTN, Morbid obesity, HLD  Terminal Cr: 8.02  ovid Testing: 3/5 NP Negative, 3/8 NP negative, 3/8 BAL Negative.  CMV-NEGATIVE  EBV-POSITIVE  HepBcAb-NEGATIVE  Hepatitis C-LAKISHA- NEGATIVE  Hepatitis C ab-NEGATIVE  MISMATCH: 0, 0, 0  Kidney Laterality:  RIGHT  -  OR  R DCD DDRT 1a/1v/1u--stented. 1U PRBC intra-op   72 y/o M with PMHx of HTN (age 43), DM on insulin (age 43), gout, anxiety, depression, OCD and CKD since 2016.  He started HD via L AVF  in April 2020 at [facility] and transitioned to PD about 5 months ago. He makes ~2 ounces of urine 4 times daily.      Underwent R DCD DDRT 3/9/2021 on 3/9/2021 (1A/1V/1U stented).   -Post op course c/b DGF requiring HD (which was later transitioned to overnight PD) and diuretic trials with slow response.  -From a surgical perspective, he tolerated diet, had bowel function, had good pain control, and ambulated with PT.    -He had urinary retention after alfaro removal post-op.  Alfaro was replaced, Flomax was started with successful TOV afterwards.    -LORETTA was removed.   -Completed Simulect induction (2 doses).  -D/c Cr  6.7    Was discharged home after cleared by our multidisciplinary team of surgeons, nephrologists, pharmacists, ACPs, SW, RNs.    D/C PLAN:  -Envarsus 5mg daily, MMF 1g BID, Prednisone 5mg daily   -PPx: Bactrim/Nystatin/Valcyte/Pepcid  -Lasix 80mg PO bid  -f/u with Dr. Schuler Monday     ===========    Recipient: info:  Hx: ESRD on PD since April 2020, DM2 x 20yrs, HTN x 20 yrs, HLD, Gout, Anxiety, Depression, OCD.  PSH: AVF, PD Cath, Humeral fx, Ruptured Achilles tendon  Nephrologist: Dr. Demetrio Novak  CPRA: 0%              ABO:  A  CMV: NEGATIVE  EBV Status:  POSITIVE  Last Dialysis: Patient does PD every night. Last dialysis last night.  ALLERGIES: ACE Inhibitors (Angioedema, swelling), Levaquin (Angioedema)    Donor (local): WINIFRED [place]  Donor ID: [id number]  Match: [id number]  OPO:  GINO Moreno  Age:  50 y/o  ABO:  A1  PHS Increased Risk: No  KDPI:  71%  DCD: YES  WIT: 24 mins  COD:  Anoxia / CV  X Clamp Time: 3/9/21 at 3:10  Medical Hx:  CAD, HTN, Morbid obesity, HLD  Terminal Cr: 8.02  ovid Testing: 3/5 NP Negative, 3/8 NP negative, 3/8 BAL Negative.  CMV-NEGATIVE  EBV-POSITIVE  HepBcAb-NEGATIVE  Hepatitis C-LAKISHA- NEGATIVE  Hepatitis C ab-NEGATIVE  MISMATCH: 0, 0, 0  Kidney Laterality:  RIGHT  -  OR  R DCD DDRT 1a/1v/1u--stented. 1U PRBC intra-op

## 2021-03-10 NOTE — PROGRESS NOTE ADULT - ASSESSMENT
71M with h/o HTN (age 43), IDDM (age 43), gout, anxiety, depression, OCD, CKD (since 2016) was on HD via L AVF (4/2020 Dr. Novak) with transition to PD in 9/2020 now s/p R DCD DDRT    POD#0 s/p R DCD DDRT  -post op labs stable so far, will trend  -strict I&Os  -SCDs on at all times  -Encourage spirometry  -OOB with PT in AM  -Immuno:  ENV 6, MMF 1g BID, standard steroid taper, Simulect #2 on POD#4  -PPx: Bactrim/Valcyte/Nystatin/Pepcid    HTN  -Coreg started, will adjust regimen accordingly    IDDM  -Lantus 12 post-op, GENOVEVA AC, will adjust accordingly  -CGM study: conventional group    DISPO  -6Monti

## 2021-03-10 NOTE — PROGRESS NOTE ADULT - SUBJECTIVE AND OBJECTIVE BOX
Transplant Surgery - POC  --------------------------------------------------------------  R DCD DDRT 3/10/2021     71M with h/o HTN (age 43), IDDM (age 43), gout, anxiety, depression, OCD, CKD (since 2016) was on HD via L AVF (4/2020 Dr. Novak) with transition to PD in 9/2020 (made urine pre-op).    Now s/p R DCD DDRT (1a/1v/1u--stented)    POD#0  -u/s in PACU with good flow. small upper pole collection.   -UO steady at 30cc/h after Lasix challenge (80mg IV x1).    -LORETTA ss now tapering off, 190 so far post-op  -Afebrile, mild HTN, rest of vitals stable  -AAOx3, pain well controlled      Potential Discharge date: pending clinical stability    Education:  Medications    Plan of care:  See Below    ====  Recipient: info:  Hx: ESRD on PD since April 2020, DM2 x 20yrs, HTN x 20 yrs, HLD, Gout, Anxiety, Depression, OCD.  PSH: AVF, PD Cath, Humeral fx, Ruptured Achilles tendon  Nephrologist: Dr. Demetrio Novak  CPRA: 0%              ABO:  A  CMV: NEGATIVE  EBV Status:  POSITIVE  Last Dialysis: Patient does PD every night. Last dialysis last night.  ALLERGIES: ACE Inhibitors (Angioedema, swelling), Levaquin (Angioedema)    Donor (local): [place], [place]  Donor ID: [id number]  Match: [id number]  OPO:  [place] - Josh  Age:  50 y/o  ABO:  A1  PHS Increased Risk: No  KDPI:  71%  DCD: YES  WIT: 24 mins  COD:  Anoxia / CV  X Clamp Time: 3/9/21 at 3:10  Medical Hx:  CAD, HTN, Morbid obesity, HLD  Terminal Cr: 8.02  Covid Testing: 3/5 NP Negative, 3/8 NP negative, 3/8 BAL Negative.  CMV-NEGATIVE  EBV-POSITIVE  HepBcAb-NEGATIVE  Hepatitis C-LAKISHA- NEGATIVE  Hepatitis C ab-NEGATIVE  MISMATCH: 0, 0, 0  Kidney Laterality:  RIGHT  -    ===  MEDICATIONS  (STANDING):  carvedilol 12.5 milliGRAM(s) Oral every 12 hours  chlorhexidine 2% Cloths 1 Application(s) Topical daily  dextrose 40% Gel 15 Gram(s) Oral once  dextrose 5%. 1000 milliLiter(s) (50 mL/Hr) IV Continuous <Continuous>  dextrose 5%. 1000 milliLiter(s) (100 mL/Hr) IV Continuous <Continuous>  dextrose 50% Injectable 25 Gram(s) IV Push once  dextrose 50% Injectable 12.5 Gram(s) IV Push once  dextrose 50% Injectable 25 Gram(s) IV Push once  famotidine    Tablet 20 milliGRAM(s) Oral daily  glucagon  Injectable 1 milliGRAM(s) IntraMuscular once  insulin glargine Injectable (LANTUS) 25 Unit(s) SubCutaneous at bedtime  insulin lispro (ADMELOG) corrective regimen sliding scale   SubCutaneous every 6 hours  methylPREDNISolone sodium succinate Injectable 125 milliGRAM(s) IV Push two times a day  mycophenolate mofetil 1000 milliGRAM(s) Oral <User Schedule>  nystatin    Suspension 878140 Unit(s) Swish and Swallow four times a day  senna 2 Tablet(s) Oral at bedtime  sodium chloride 0.45%. 500 milliLiter(s) (50 mL/Hr) IV Continuous <Continuous>  sodium chloride 0.9%. 1000 milliLiter(s) (70 mL/Hr) IV Continuous <Continuous>  tacrolimus ER Tablet (ENVARSUS XR) 6 milliGRAM(s) Oral <User Schedule>  trimethoprim   80 mG/sulfamethoxazole 400 mG 1 Tablet(s) Oral daily  valGANciclovir 450 milliGRAM(s) Oral daily    MEDICATIONS  (PRN):  HYDROmorphone  Injectable 0.5 milliGRAM(s) IV Push every 10 minutes PRN Moderate Pain (4 - 6)  ondansetron Injectable 4 milliGRAM(s) IV Push once PRN Nausea and/or Vomiting      PAST MEDICAL & SURGICAL HISTORY:  DM (diabetes mellitus)  HTN (hypertension)  Chronic kidney disease (CKD)        Vital Signs Last 24 Hrs  T(C): 36.4 (10 Mar 2021 01:00), Max: 37 (09 Mar 2021 12:51)  T(F): 97.5 (10 Mar 2021 01:00), Max: 98.6 (09 Mar 2021 12:51)  HR: 63 (10 Mar 2021 02:00) (54 - 63)  BP: 142/65 (10 Mar 2021 02:00) (132/60 - 190/84)  BP(mean): 93 (10 Mar 2021 02:00) (87 - 112)  RR: 16 (10 Mar 2021 02:00) (12 - 18)  SpO2: 100% (10 Mar 2021 02:00) (94% - 100%)    I&O's Summary    09 Mar 2021 07:01  -  10 Mar 2021 02:39  --------------------------------------------------------  IN: 350 mL / OUT: 320 mL / NET: 30 mL                              9.2    9.06  )-----------( 142      ( 09 Mar 2021 22:16 )             27.7     03-09    135  |  93<L>  |  61<H>  ----------------------------<  195<H>  4.6   |  21<L>  |  10.60<H>    Ca    8.5      09 Mar 2021 22:16  Phos  7.6     03-09  Mg     2.3     03-09    TPro  5.7<L>  /  Alb  2.9<L>  /  TBili  0.3  /  DBili  x   /  AST  36  /  ALT  25  /  AlkPhos  96  03-09            Review of systems  Gen: No weight changes, fatigue, fevers/chills, weakness  Skin: No rashes  Head/Eyes/Ears/Mouth: No headache; Normal hearing; Normal vision w/o blurriness; No sinus pain/discomfort, sore throat  Respiratory: No dyspnea, cough, wheezing, hemoptysis  CV: No chest pain, PND, orthopnea  GI: Mild abdominal pain at surgical incision site; denies diarrhea, constipation, nausea, vomiting, melena, hematochezia  : No increased frequency, dysuria, hematuria, nocturia  MSK: No joint pain/swelling; no back pain; no edema  Neuro: No dizziness/lightheadedness, weakness, seizures, numbness, tingling  Heme: No easy bruising or bleeding  Endo: No heat/cold intolerance  Psych: No significant nervousness, anxiety, stress, depression  All other systems were reviewed and are negative, except as noted.      PHYSICAL EXAM:  Constitutional: Well developed / well nourished  Eyes: Anicteric, PERRLA  ENMT: nc/at  Neck: R TLC c/d/i, supple  Respiratory: CTA B/L  Cardiovascular: RRR  Gastrointestinal: Soft, ND. appropriate incisional TTP along RLQ.  dressing c/d/i. JPx1 ss  Genitourinary: Urinary catheter in place, resolving hematuria  Extremities: SCD's in place and working bilaterally, L AVF palpable  Vascular: faint, but palpable dp pulses bilaterally   Neurological: A&O x3  Skin: no rashes, ulcerations or lesions;  Musculoskeletal: Moving all extremities  Psychiatric: Responsive

## 2021-03-11 LAB
ALBUMIN SERPL ELPH-MCNC: 2.9 G/DL — LOW (ref 3.3–5)
ALP SERPL-CCNC: 88 U/L — SIGNIFICANT CHANGE UP (ref 40–120)
ALT FLD-CCNC: 10 U/L — SIGNIFICANT CHANGE UP (ref 10–45)
ANION GAP SERPL CALC-SCNC: 17 MMOL/L — SIGNIFICANT CHANGE UP (ref 5–17)
ANION GAP SERPL CALC-SCNC: 18 MMOL/L — HIGH (ref 5–17)
AST SERPL-CCNC: 10 U/L — SIGNIFICANT CHANGE UP (ref 10–40)
BASOPHILS # BLD AUTO: 0.01 K/UL — SIGNIFICANT CHANGE UP (ref 0–0.2)
BASOPHILS NFR BLD AUTO: 0.1 % — SIGNIFICANT CHANGE UP (ref 0–2)
BILIRUB SERPL-MCNC: 0.2 MG/DL — SIGNIFICANT CHANGE UP (ref 0.2–1.2)
BUN SERPL-MCNC: 54 MG/DL — HIGH (ref 7–23)
BUN SERPL-MCNC: 66 MG/DL — HIGH (ref 7–23)
CALCIUM SERPL-MCNC: 8.2 MG/DL — LOW (ref 8.4–10.5)
CALCIUM SERPL-MCNC: 8.2 MG/DL — LOW (ref 8.4–10.5)
CHLORIDE SERPL-SCNC: 92 MMOL/L — LOW (ref 96–108)
CHLORIDE SERPL-SCNC: 92 MMOL/L — LOW (ref 96–108)
CO2 SERPL-SCNC: 22 MMOL/L — SIGNIFICANT CHANGE UP (ref 22–31)
CO2 SERPL-SCNC: 24 MMOL/L — SIGNIFICANT CHANGE UP (ref 22–31)
CREAT SERPL-MCNC: 8.06 MG/DL — HIGH (ref 0.5–1.3)
CREAT SERPL-MCNC: 8.39 MG/DL — HIGH (ref 0.5–1.3)
EOSINOPHIL # BLD AUTO: 0 K/UL — SIGNIFICANT CHANGE UP (ref 0–0.5)
EOSINOPHIL NFR BLD AUTO: 0 % — SIGNIFICANT CHANGE UP (ref 0–6)
GLUCOSE SERPL-MCNC: 244 MG/DL — HIGH (ref 70–99)
GLUCOSE SERPL-MCNC: 246 MG/DL — HIGH (ref 70–99)
HCT VFR BLD CALC: 23.3 % — LOW (ref 39–50)
HCT VFR BLD CALC: 23.4 % — LOW (ref 39–50)
HGB BLD-MCNC: 7.8 G/DL — LOW (ref 13–17)
HGB BLD-MCNC: 7.9 G/DL — LOW (ref 13–17)
IMM GRANULOCYTES NFR BLD AUTO: 0.3 % — SIGNIFICANT CHANGE UP (ref 0–1.5)
LYMPHOCYTES # BLD AUTO: 0.19 K/UL — LOW (ref 1–3.3)
LYMPHOCYTES # BLD AUTO: 2 % — LOW (ref 13–44)
MAGNESIUM SERPL-MCNC: 2.2 MG/DL — SIGNIFICANT CHANGE UP (ref 1.6–2.6)
MCHC RBC-ENTMCNC: 32.1 PG — SIGNIFICANT CHANGE UP (ref 27–34)
MCHC RBC-ENTMCNC: 32.4 PG — SIGNIFICANT CHANGE UP (ref 27–34)
MCHC RBC-ENTMCNC: 33.5 GM/DL — SIGNIFICANT CHANGE UP (ref 32–36)
MCHC RBC-ENTMCNC: 33.8 GM/DL — SIGNIFICANT CHANGE UP (ref 32–36)
MCV RBC AUTO: 95.9 FL — SIGNIFICANT CHANGE UP (ref 80–100)
MCV RBC AUTO: 95.9 FL — SIGNIFICANT CHANGE UP (ref 80–100)
MONOCYTES # BLD AUTO: 0.58 K/UL — SIGNIFICANT CHANGE UP (ref 0–0.9)
MONOCYTES NFR BLD AUTO: 6.2 % — SIGNIFICANT CHANGE UP (ref 2–14)
NEUTROPHILS # BLD AUTO: 8.6 K/UL — HIGH (ref 1.8–7.4)
NEUTROPHILS NFR BLD AUTO: 91.4 % — HIGH (ref 43–77)
NRBC # BLD: 0 /100 WBCS — SIGNIFICANT CHANGE UP (ref 0–0)
NRBC # BLD: 0 /100 WBCS — SIGNIFICANT CHANGE UP (ref 0–0)
PHOSPHATE SERPL-MCNC: 8 MG/DL — HIGH (ref 2.5–4.5)
PLATELET # BLD AUTO: 142 K/UL — LOW (ref 150–400)
PLATELET # BLD AUTO: 150 K/UL — SIGNIFICANT CHANGE UP (ref 150–400)
POTASSIUM SERPL-MCNC: 4.4 MMOL/L — SIGNIFICANT CHANGE UP (ref 3.5–5.3)
POTASSIUM SERPL-MCNC: 4.6 MMOL/L — SIGNIFICANT CHANGE UP (ref 3.5–5.3)
POTASSIUM SERPL-SCNC: 4.4 MMOL/L — SIGNIFICANT CHANGE UP (ref 3.5–5.3)
POTASSIUM SERPL-SCNC: 4.6 MMOL/L — SIGNIFICANT CHANGE UP (ref 3.5–5.3)
PROT SERPL-MCNC: 5.6 G/DL — LOW (ref 6–8.3)
RBC # BLD: 2.43 M/UL — LOW (ref 4.2–5.8)
RBC # BLD: 2.44 M/UL — LOW (ref 4.2–5.8)
RBC # FLD: 15.9 % — HIGH (ref 10.3–14.5)
RBC # FLD: 15.9 % — HIGH (ref 10.3–14.5)
SODIUM SERPL-SCNC: 132 MMOL/L — LOW (ref 135–145)
SODIUM SERPL-SCNC: 133 MMOL/L — LOW (ref 135–145)
TACROLIMUS SERPL-MCNC: 6.6 NG/ML — SIGNIFICANT CHANGE UP
WBC # BLD: 8.59 K/UL — SIGNIFICANT CHANGE UP (ref 3.8–10.5)
WBC # BLD: 9.41 K/UL — SIGNIFICANT CHANGE UP (ref 3.8–10.5)
WBC # FLD AUTO: 8.59 K/UL — SIGNIFICANT CHANGE UP (ref 3.8–10.5)
WBC # FLD AUTO: 9.41 K/UL — SIGNIFICANT CHANGE UP (ref 3.8–10.5)

## 2021-03-11 PROCEDURE — 72170 X-RAY EXAM OF PELVIS: CPT | Mod: 26

## 2021-03-11 PROCEDURE — 99232 SBSQ HOSP IP/OBS MODERATE 35: CPT | Mod: GC

## 2021-03-11 PROCEDURE — 99232 SBSQ HOSP IP/OBS MODERATE 35: CPT | Mod: GC,24

## 2021-03-11 PROCEDURE — 73070 X-RAY EXAM OF ELBOW: CPT | Mod: 26,50

## 2021-03-11 RX ORDER — NIFEDIPINE 30 MG
30 TABLET, EXTENDED RELEASE 24 HR ORAL DAILY
Refills: 0 | Status: DISCONTINUED | OUTPATIENT
Start: 2021-03-11 | End: 2021-03-13

## 2021-03-11 RX ORDER — CARVEDILOL PHOSPHATE 80 MG/1
25 CAPSULE, EXTENDED RELEASE ORAL EVERY 12 HOURS
Refills: 0 | Status: DISCONTINUED | OUTPATIENT
Start: 2021-03-11 | End: 2021-03-21

## 2021-03-11 RX ORDER — DIPHENHYDRAMINE HCL 50 MG
25 CAPSULE ORAL ONCE
Refills: 0 | Status: COMPLETED | OUTPATIENT
Start: 2021-03-11 | End: 2021-03-11

## 2021-03-11 RX ORDER — INSULIN LISPRO 100/ML
4 VIAL (ML) SUBCUTANEOUS
Refills: 0 | Status: DISCONTINUED | OUTPATIENT
Start: 2021-03-11 | End: 2021-03-14

## 2021-03-11 RX ORDER — CARVEDILOL PHOSPHATE 80 MG/1
12.5 CAPSULE, EXTENDED RELEASE ORAL ONCE
Refills: 0 | Status: COMPLETED | OUTPATIENT
Start: 2021-03-11 | End: 2021-03-11

## 2021-03-11 RX ADMIN — TRAMADOL HYDROCHLORIDE 50 MILLIGRAM(S): 50 TABLET ORAL at 06:28

## 2021-03-11 RX ADMIN — TRAMADOL HYDROCHLORIDE 50 MILLIGRAM(S): 50 TABLET ORAL at 07:30

## 2021-03-11 RX ADMIN — MYCOPHENOLATE MOFETIL 1000 MILLIGRAM(S): 250 CAPSULE ORAL at 07:54

## 2021-03-11 RX ADMIN — Medication 6: at 17:27

## 2021-03-11 RX ADMIN — Medication 4: at 21:43

## 2021-03-11 RX ADMIN — CARVEDILOL PHOSPHATE 12.5 MILLIGRAM(S): 80 CAPSULE, EXTENDED RELEASE ORAL at 06:28

## 2021-03-11 RX ADMIN — Medication 80 MILLIGRAM(S): at 17:25

## 2021-03-11 RX ADMIN — Medication 25 MILLIGRAM(S): at 01:50

## 2021-03-11 RX ADMIN — INSULIN GLARGINE 25 UNIT(S): 100 INJECTION, SOLUTION SUBCUTANEOUS at 21:43

## 2021-03-11 RX ADMIN — SENNA PLUS 2 TABLET(S): 8.6 TABLET ORAL at 21:44

## 2021-03-11 RX ADMIN — Medication 4 UNIT(S): at 17:32

## 2021-03-11 RX ADMIN — MYCOPHENOLATE MOFETIL 1000 MILLIGRAM(S): 250 CAPSULE ORAL at 20:41

## 2021-03-11 RX ADMIN — TACROLIMUS 6 MILLIGRAM(S): 5 CAPSULE ORAL at 07:55

## 2021-03-11 RX ADMIN — CARVEDILOL PHOSPHATE 25 MILLIGRAM(S): 80 CAPSULE, EXTENDED RELEASE ORAL at 17:28

## 2021-03-11 RX ADMIN — Medication 80 MILLIGRAM(S): at 06:29

## 2021-03-11 RX ADMIN — Medication 6: at 08:13

## 2021-03-11 RX ADMIN — Medication 60 MILLIGRAM(S): at 06:29

## 2021-03-11 RX ADMIN — Medication 500000 UNIT(S): at 11:18

## 2021-03-11 RX ADMIN — Medication 500000 UNIT(S): at 17:25

## 2021-03-11 RX ADMIN — Medication 4: at 13:01

## 2021-03-11 RX ADMIN — FAMOTIDINE 20 MILLIGRAM(S): 10 INJECTION INTRAVENOUS at 11:18

## 2021-03-11 RX ADMIN — Medication 500000 UNIT(S): at 06:29

## 2021-03-11 RX ADMIN — Medication 1 TABLET(S): at 11:18

## 2021-03-11 RX ADMIN — Medication 30 MILLIGRAM(S): at 11:17

## 2021-03-11 RX ADMIN — CHLORHEXIDINE GLUCONATE 1 APPLICATION(S): 213 SOLUTION TOPICAL at 11:18

## 2021-03-11 RX ADMIN — Medication 60 MILLIGRAM(S): at 17:25

## 2021-03-11 RX ADMIN — CARVEDILOL PHOSPHATE 12.5 MILLIGRAM(S): 80 CAPSULE, EXTENDED RELEASE ORAL at 11:17

## 2021-03-11 NOTE — PROGRESS NOTE ADULT - SUBJECTIVE AND OBJECTIVE BOX
[facility] DIVISION OF KIDNEY DISEASES AND HYPERTENSION -- FOLLOW UP NOTE  --------------------------------------------------------------------------------      24 hour events/subjective: urine output 96 cc over 24h period. Got HD yesterday with 0L UF. Patient was seen and examined at bedside. Reported feeling well. Denies CP, SOB, fever, chills, nausea, vomiting, diarrhea, LE edema.    PAST HISTORY  --------------------------------------------------------------------------------  No significant changes to PMH, PSH, FHx, SHx, unless otherwise noted    ALLERGIES & MEDICATIONS  --------------------------------------------------------------------------------  Allergies    ACE inhibitors (Angioedema)  Levaquin (Angioedema)    Intolerances    Standing Inpatient Medications  carvedilol 12.5 milliGRAM(s) Oral once  carvedilol 25 milliGRAM(s) Oral every 12 hours  chlorhexidine 2% Cloths 1 Application(s) Topical daily  dextrose 40% Gel 15 Gram(s) Oral once  dextrose 5%. 1000 milliLiter(s) IV Continuous <Continuous>  dextrose 5%. 1000 milliLiter(s) IV Continuous <Continuous>  dextrose 50% Injectable 25 Gram(s) IV Push once  dextrose 50% Injectable 12.5 Gram(s) IV Push once  dextrose 50% Injectable 25 Gram(s) IV Push once  famotidine    Tablet 20 milliGRAM(s) Oral daily  furosemide   Injectable 80 milliGRAM(s) IV Push two times a day  glucagon  Injectable 1 milliGRAM(s) IntraMuscular once  insulin glargine Injectable (LANTUS) 25 Unit(s) SubCutaneous at bedtime  insulin lispro (ADMELOG) corrective regimen sliding scale   SubCutaneous three times a day before meals  insulin lispro (ADMELOG) corrective regimen sliding scale   SubCutaneous at bedtime  methylPREDNISolone sodium succinate Injectable 60 milliGRAM(s) IV Push two times a day  mycophenolate mofetil 1000 milliGRAM(s) Oral <User Schedule>  NIFEdipine XL 30 milliGRAM(s) Oral daily  nystatin    Suspension 815186 Unit(s) Swish and Swallow four times a day  senna 2 Tablet(s) Oral at bedtime  tacrolimus ER Tablet (ENVARSUS XR) 6 milliGRAM(s) Oral <User Schedule>  trimethoprim   80 mG/sulfamethoxazole 400 mG 1 Tablet(s) Oral daily  valGANciclovir 450 milliGRAM(s) Oral <User Schedule>    PRN Inpatient Medications  acetaminophen   Tablet .. 650 milliGRAM(s) Oral every 6 hours PRN  traMADol 25 milliGRAM(s) Oral every 4 hours PRN  traMADol 50 milliGRAM(s) Oral every 4 hours PRN      REVIEW OF SYSTEMS  --------------------------------------------------------------------------------  Gen: No fevers/chills  Respiratory: No dyspnea, cough  CV: No chest pain  GI: No abdominal pain, diarrhea  MSK: No  edema      All other systems were reviewed and are negative, except as noted.    VITALS/PHYSICAL EXAM  --------------------------------------------------------------------------------  T(C): 36.7 (03-11-21 @ 09:00), Max: 37.1 (03-11-21 @ 01:00)  HR: 81 (03-11-21 @ 09:00) (61 - 81)  BP: 173/85 (03-11-21 @ 09:15) (116/81 - 180/83)  RR: 18 (03-11-21 @ 09:00) (18 - 19)  SpO2: 93% (03-11-21 @ 09:00) (92% - 98%)  Wt(kg): --    Weight (kg): 83.6 (03-09-21 @ 18:16)    03-10-21 @ 07:01  -  03-11-21 @ 07:00  --------------------------------------------------------  IN: 2920 mL / OUT: 1032 mL / NET: 1888 mL    03-11-21 @ 07:01  -  03-11-21 @ 10:37  --------------------------------------------------------  IN: 240 mL / OUT: 22 mL / NET: 218 mL        Physical Exam:  	Gen: NAD  	HEENT: MMM  	Pulm: CTA B/L  	CV: S1S2  	Abd: Soft, +BS   	Ext: No LE edema B/L  	Neuro: Awake  	Skin: Warm and dry  	Vascular access: PD catheter, LUE AVF with good bruit and palpable thrills       LABS/STUDIES  --------------------------------------------------------------------------------              7.8    9.41  >-----------<  142      [03-11-21 @ 06:55]              23.3     133  |  92  |  54  ----------------------------<  246      [03-11-21 @ 06:53]  4.4   |  24  |  8.06        Ca     8.2     [03-11-21 @ 06:53]      Mg     2.2     [03-11-21 @ 06:53]      Phos  8.0     [03-11-21 @ 06:53]    TPro  5.6  /  Alb  2.9  /  TBili  0.2  /  DBili  x   /  AST  10  /  ALT  10  /  AlkPhos  88  [03-11-21 @ 06:53]    PT/INR: PT 13.5 , INR 1.13       [03-10-21 @ 02:51]  PTT: 31.3       [03-10-21 @ 02:51]      Creatinine Trend:  SCr 8.06 [03-11 @ 06:53]  SCr 10.50 [03-10 @ 02:51]  SCr 10.60 [03-09 @ 22:16]  SCr 10.88 [03-09 @ 13:39]          HBsAb 56.4      [03-09-21 @ 17:13]  HBsAg Nonreact      [03-09-21 @ 17:13]  HBcAb Nonreact      [03-09-21 @ 17:13]  HCV 0.32, Nonreact      [03-09-21 @ 17:13]  HIV Nonreact      [03-09-21 @ 18:20]

## 2021-03-11 NOTE — DIETITIAN INITIAL EVALUATION ADULT. - ADD RECOMMEND
1. Will continue to monitor PO intake, weight, labs, skin, GI status, diet, urine output. 2. Encourage PO intake and provide food preferences. 3. Provided education on DM and post transplant nutrition therapy and food safety guidelines for transplant recipients with nutrition package before discharge - made aware RD remains available. 1. Will continue to monitor PO intake, weight, labs, skin, GI status, diet, urine output. 2. Encourage PO intake and provide food preferences. 3. Provided extensive education on DM and post transplant nutrition therapy and food safety guidelines for transplant recipients with nutrition package before discharge - made aware RD remains available.

## 2021-03-11 NOTE — DIETITIAN INITIAL EVALUATION ADULT. - ORAL INTAKE PTA/DIET HISTORY
Pt reports good appetite and PO intake at home, but complains of disliking the taste of food since started HD. Confirms NKFA. Reports taking Vitamin C PTA; denies drinking supplements. Reports following a renal diet PTA; low in K+ and Phos. Admits to consume salt since HD, sugar and pastries due to "good BG" and to add taste. Reports monitoring BG 3xday with ranges of 100-140 mg/dl and sometimes 75 mg/dl and states taking Lantus and Humalog at home; HbA1c (03/15) 6.3% per pt - good BG control.

## 2021-03-11 NOTE — PROGRESS NOTE ADULT - ATTENDING COMMENTS
DGF graft.   Otherwise well.  Had fall, no apparent injuries.    XRay, U/S ordered.  Immuno: tac/cellcept/steroid taper.

## 2021-03-11 NOTE — DIETITIAN INITIAL EVALUATION ADULT. - ETIOLOGY
Increased demands for nutrients for surgical healing limited prior education on post-transplant nutrition therapy and food safety guidelines

## 2021-03-11 NOTE — DIETITIAN INITIAL EVALUATION ADULT. - PHYSCIAL ASSESSMENT
Skin: no noted pressure injuries as per documentation.   No visual signs of muscle/fat loss noted. overweight Skin: no noted pressure injuries as per documentation.   Performed nutrition focused physical exam with pt's consent and noted no signs of muscle/fat loss in any area.

## 2021-03-11 NOTE — PROGRESS NOTE ADULT - ASSESSMENT
71M with h/o HTN (age 43), IDDM (age 43), gout, anxiety, depression, OCD, CKD (since 2016) was on HD via L AVF (4/2020 Dr. Novak) with transition to PD in 9/2020 now s/p R DCD DDRT (3/9/21)    POD#2 s/p R DCD DDRT  -Continue to monitor graft function  -Hemodialysis completed 3/10 (no fluid removed)   -Trend labs  -strict I&Os  -Renal restricted diet   -Coreg   -Lantus 25 units tonight  -CGM study: conventional group  -SCDs on at all times  -Encourage spirometry  -OOB with PT  -Immuno:  ENV 6, MMF 1g BID, standard steroid taper, Simulect #2 on POD#4  -PPx: Bactrim/Valcyte/Nystatin/Pepcid  - BP medications adjusted w/ nifedipine 30mg and coreg increased to 25mg BID

## 2021-03-11 NOTE — DIETITIAN INITIAL EVALUATION ADULT. - CONTINUE CURRENT NUTRITION CARE PLAN
Recommend continue Consistent Carbohydrate with snack + renal diet upon discharge. Recommend follow up visit with Transplant MD and outpatient RD for dietary modifications as warranted./yes

## 2021-03-11 NOTE — PROGRESS NOTE ADULT - ASSESSMENT
Patient is a 70 y/o M w PMH of ESRD on PD for 5 months, was on HD in 2020, with nephrologist Dr Novak, IDDM, HTN, gout, depression, anxiety and OCD. S/p DCD on 3/9/21.     1. S/p DCD on 3/9/21. S/p Simulect induction. Urine output was 96 cc over 24h. On Lasix 80 mg IVP BID. Got HD yesterday with 0L UF for clerance. Last Scr 8.06 this morning. Monitor labs and urine output.     2. Immunosupression - Simulect induction, currently on Envarsus 7 mg PO daily, MMF 1g PO BID and steroid taper     3. Prophylaxis - bactrim/nystatin/valcyte     4. Hypertension - BP above target range overnight, increase Coreg to 25 mg PO BID, start on Nifedipine 30 mg PO daily. Monitor BP. Low salt diet.     If any questions, please feel free to contact me     Jennifer Mcdowell  Nephrology Fellow  [facility] Pager: [phone]  FUENTES Pager: [phone]

## 2021-03-11 NOTE — DIETITIAN INITIAL EVALUATION ADULT. - OTHER INFO
Provided education on DM and post transplant nutrition therapy and food safety guidelines for transplant recipients. Discussed importance of thoroughly washing all fresh fruits/vegetables, importance of avoiding uncooked/raw/unpasteurized foods, avoiding pre-made deli/buffet/salad bar meals. Foods recommended as healthy well balanced diet and importance of adequate protein intakes for proper post-surgical healing discussed. Reviewed recommendations to avoid grapefruit, pomegranate and star fruit while taking immunosuppressant medication. Reviewed recommendations for moderate intake of sodium and carbohydrates with transplant medications. Reviewed effect of steroids on BG levels and importance of limiting concentrated sweets. Provided education on foods containing carbohydrates, foods containing proteins, and portion sizes. Stressed the importance of a balanced meal to maintain blood glucose. Encouraged vegetables consumption. Recommended water consumption with avoidance of soda and juice. Pt was receptive and expressed understanding. All questions answered. Provided nutrition package including: USDA Food Safety for Transplant Recipients booklet; food safety and BG/DM control handouts, fridge magnet with cooking temperatures, food thermometer, and RD information card. Pt S/P kidney transplant recipient (03/09) - reports good appetite and PO intake in house, tolerating diet. Denies difficulty chewing. Reports difficulty swallowing due to "horrible" taste in his mouth - requests soft diet (discussed with transplant team). Pt denies nausea, vomiting, diarrhea, or constipation, last BM 3 days ago (03/08). Urine output as per flow sheets (03/11) 3 ml -> (03/10) 96 ml -> (03/09) 195 ml.    Pt reports history of intentional 75 pounds weight loss before dialysis to improve kidney's health, from 235 to 160 pounds; followed by weight gain to 185 pounds. States weight has been stable at 185 pounds for at least 9 months "due to HD". Weight as per flow sheets (03/09) 184 pounds -> (03/11) 194.6 pounds -?accuracy of weight fluctuation as pt S/P DDRT, will continue to monitor.      Provided extensive education on DM and post transplant nutrition therapy and food safety guidelines for transplant recipients. Discussed importance of thoroughly washing all fresh fruits/vegetables, importance of avoiding uncooked/raw/unpasteurized foods, avoiding pre-made deli/buffet/salad bar meals. Foods recommended as healthy well balanced diet and importance of adequate protein intakes for proper post-surgical healing discussed. Reviewed recommendations to avoid grapefruit, pomegranate and star fruit while taking immunosuppressant medication. Reviewed recommendations for moderate intake of sodium and carbohydrates with transplant medications. Reviewed effect of steroids on BG levels and importance of limiting concentrated sweets. Provided education on foods containing carbohydrates, foods containing proteins, and portion sizes. Stressed the importance of a balanced meal to maintain blood glucose. Encouraged vegetables consumption. Recommended water consumption with avoidance of soda and juice. Described HbA1c and stressed importance of its normal levels. Encouraged Pt to continue monitoring blood glucose at home. Discussed how to manage hypoglycemia. Pt was receptive and expressed understanding. All questions answered. Provided nutrition package including: USDA Food Safety for Transplant Recipients booklet; food safety and BG/DM control handouts, fridge magnet with cooking temperatures, food thermometer, and RD information card.

## 2021-03-11 NOTE — DIETITIAN INITIAL EVALUATION ADULT. - PERSON TAUGHT/METHOD
DM and post transplant nutrition therapy and food safety guidelines for transplant recipients/verbal instruction/written material/patient instructed/teach back - (Patient repeats in own words)

## 2021-03-11 NOTE — PROGRESS NOTE ADULT - SUBJECTIVE AND OBJECTIVE BOX
Transplant Surgery - Multidisciplinary Rounds  --------------------------------------------------------------  DCD DDRT (1a/1v/1u--stented)   Date: 3/9/2021         POD# 2    Present:   Patient seen and examined with multidisciplinary team including Transplant Surgeon: Dr. Anthony, Dr. Engel, Dr. Moore, Dr. Floyd, Dr. Schuler, Dr. Pringle. Transplant Nephrologist: Dr. Coburn, Dr. Beck Solares, Dr. Anne Olmos,   Pharmacist: Tammie Arzate. ACPs Osbaldo, Milad, Ruth Ann, Karlos Wiseman, Lucius and unit RN during am rounds.  Disciplines not in attendance will be notified of the plan.     HPI:  72 y/o gentleman with h/o HTN (age 43), DM on insulin (age 43), gout, anxiety, depression, OCD and CKD since 2016.  He started HD via L AVF  in 2020 at [facility] and transitioned to PD about 5 months ago. He makes ~2 ounces of urine 4 times daily.  Nephrologist is Dr. Novak  He now presents for  donor renal transplant.     Recipient: info:  Hx: ESRD on PD since 2020, DM2 x 20yrs, HTN x 20 yrs, HLD, Gout, Anxiety, Depression, OCD.  PSH: AVF, PD Cath, Humeral fx, Ruptured Achilles tendon  Nephrologist: Dr. Demetrio Novak  CPRA: 0%              ABO:  A  CMV: NEGATIVE  EBV Status:  POSITIVE  Last Dialysis: Patient does PD every night. Last dialysis last night.  ALLERGIES: ACE Inhibitors (Angioedema, swelling), Levaquin (Angioedema)    Donor (local): WINIFRED Diaz  Donor ID: [id number]  Match: [id number]  OPO:  GINO Moreno  Age:  50 y/o  ABO:  A1  PHS Increased Risk: No  KDPI:  71%  DCD: YES  WIT: 24 mins  COD:  Anoxia / CV  X Clamp Time: 3/9/21 at 3:10  Medical Hx:  CAD, HTN, Morbid obesity, HLD  Terminal Cr: 8.02  Covid Testing: 3/5 NP Negative, 3/8 NP negative, 3/8 BAL Negative.  CMV-NEGATIVE  EBV-POSITIVE  HepBcAb-NEGATIVE  Hepatitis C-LAKISHA- NEGATIVE  Hepatitis C ab-NEGATIVE  MISMATCH: 0, 0, 0  Kidney Laterality:  RIGHT    Interval Events:  3/9 Admitted for R DCD DDRT (1a/1v/1u--stented). 1U PRBC intra-op.  post-op u/s with good flow.  low UO: Lasix 80x1-->BID.  3/10: HD today, no fluid off. off IVF    Potential Discharge date: pending clinical stability  Education:  Medications  Plan of care:  See Below    MEDICATIONS  (STANDING):  carvedilol 12.5 milliGRAM(s) Oral every 12 hours  chlorhexidine 2% Cloths 1 Application(s) Topical daily  dextrose 40% Gel 15 Gram(s) Oral once  dextrose 5%. 1000 milliLiter(s) (50 mL/Hr) IV Continuous <Continuous>  dextrose 5%. 1000 milliLiter(s) (100 mL/Hr) IV Continuous <Continuous>  dextrose 50% Injectable 25 Gram(s) IV Push once  dextrose 50% Injectable 12.5 Gram(s) IV Push once  dextrose 50% Injectable 25 Gram(s) IV Push once  famotidine    Tablet 20 milliGRAM(s) Oral daily  furosemide   Injectable 80 milliGRAM(s) IV Push two times a day  glucagon  Injectable 1 milliGRAM(s) IntraMuscular once  insulin glargine Injectable (LANTUS) 25 Unit(s) SubCutaneous at bedtime  insulin lispro (ADMELOG) corrective regimen sliding scale   SubCutaneous every 6 hours  methylPREDNISolone sodium succinate Injectable 125 milliGRAM(s) IV Push two times a day  mycophenolate mofetil 1000 milliGRAM(s) Oral <User Schedule>  nystatin    Suspension 480921 Unit(s) Swish and Swallow four times a day  senna 2 Tablet(s) Oral at bedtime  sodium chloride 0.45%. 500 milliLiter(s) (50 mL/Hr) IV Continuous <Continuous>  sodium chloride 0.9%. 1000 milliLiter(s) (70 mL/Hr) IV Continuous <Continuous>  tacrolimus ER Tablet (ENVARSUS XR) 6 milliGRAM(s) Oral <User Schedule>  trimethoprim   80 mG/sulfamethoxazole 400 mG 1 Tablet(s) Oral daily  valGANciclovir 450 milliGRAM(s) Oral daily    MEDICATIONS  (PRN):  acetaminophen   Tablet .. 650 milliGRAM(s) Oral every 6 hours PRN Mild Pain (1 - 3)  traMADol 25 milliGRAM(s) Oral every 4 hours PRN Moderate Pain (4 - 6)  traMADol 50 milliGRAM(s) Oral every 4 hours PRN Severe Pain (7 - 10)    PAST MEDICAL & SURGICAL HISTORY:  DM (diabetes mellitus)  HTN (hypertension)  Chronic kidney disease (CKD)    Vital Signs Last 24 Hrs  T(C): 36.7 (11 Mar 2021 09:00), Max: 37.1 (11 Mar 2021 01:00)  T(F): 98.1 (11 Mar 2021 09:00), Max: 98.7 (11 Mar 2021 01:00)  HR: 81 (11 Mar 2021 09:00) (59 - 81)  BP: 173/85 (11 Mar 2021 09:15) (116/81 - 180/83)  BP(mean): --  RR: 18 (11 Mar 2021 09:00) (18 - 19)  SpO2: 93% (11 Mar 2021 09:00) (92% - 98%)    I&O's Detail    10 Mar 2021 07:01  -  11 Mar 2021 07:00  --------------------------------------------------------  IN:    Oral Fluid: 1280 mL    Other (mL): 800 mL    sodium chloride 0.9%: 840 mL  Total IN: 2920 mL    OUT:    Bulb (mL): 136 mL    Indwelling Catheter - Urethral (mL): 96 mL    Other (mL): 800 mL    sodium chloride 0.45%: 0 mL  Total OUT: 1032 mL    Total NET: 1888 mL      11 Mar 2021 07:01  -  11 Mar 2021 10:20  --------------------------------------------------------  IN:    Oral Fluid: 240 mL  Total IN: 240 mL    OUT:    Bulb (mL): 15 mL    Indwelling Catheter - Urethral (mL): 7 mL  Total OUT: 22 mL    Total NET: 218 mL               7.8    9.41  )-----------( 142      ( 11 Mar 2021 06:55 )             23.3     03-11    133<L>  |  92<L>  |  54<H>  ----------------------------<  246<H>  4.4   |  24  |  8.06<H>    Ca    8.2<L>      11 Mar 2021 06:53  Phos  8.0     -  Mg     2.2     -    TPro  5.6<L>  /  Alb  2.9<L>  /  TBili  0.2  /  DBili  x   /  AST  10  /  ALT  10  /  AlkPhos  88        Review of systems  Gen: No weight changes, fatigue, fevers/chills, weakness  Skin: No rashes  Head/Eyes/Ears/Mouth: No headache; Normal hearing; Normal vision w/o blurriness; No sinus pain/discomfort, sore throat  Respiratory: No dyspnea, cough, wheezing, hemoptysis  CV: No chest pain, PND, orthopnea  GI: Mild abdominal pain at surgical incision site; denies diarrhea, constipation, nausea, vomiting, melena, hematochezia  : No increased frequency, dysuria, hematuria, nocturia  MSK: No joint pain/swelling; no back pain; no edema  Neuro: No dizziness/lightheadedness, weakness, seizures, numbness, tingling  Heme: No easy bruising or bleeding  Endo: No heat/cold intolerance  Psych: No significant nervousness, anxiety, stress, depression  All other systems were reviewed and are negative, except as noted.    PHYSICAL EXAM:  Constitutional: Well developed / well nourished  Eyes: Anicteric, PERRLA  ENMT: nc/at  Neck: central line RIJ  Respiratory: Decreased breath sounds on R lung base  Cardiovascular: RRR  Gastrointestinal: Soft, non distended, mild tenderness at the incision site; incision c/d/i; LORETTA serosanguinous  Genitourinary: Urinary catheter in place  Extremities: SCD's in place and working bilaterally, AVF palpable  Vascular: 1+ DP pulses b/l  Neurological: A&O x3  Skin: no rashes, ulcerations or lesions;  Musculoskeletal: Moving all extremities  Psychiatric: Responsive

## 2021-03-11 NOTE — PROGRESS NOTE ADULT - ATTENDING COMMENTS
Kidney recipient  DGF, JOJO  Reviewed immunosuppression and allograft function  Has native kidney urine output. Clinical and lab data reviewed, meds reviewed.  Plan;  Tac level target 8-10 ng/ml  Will monitor for dialysis need  Will follow  I was present during and reviewed clinical and lab data as well as assessment and plan as documented by the housestaff as noted. Please contact if any additional questions with any change in clinical condition or on availability of any additional information or reports.

## 2021-03-11 NOTE — DIETITIAN INITIAL EVALUATION ADULT. - DIET TYPE
Recommend Soft + Consistent Carbohydrate with snack + renal diet upon discharge (spoke to Transplant Team). Defer diet/fluid consistencies to medical team/pt's preference. Recommend follow up visit with Transplant MD and outpatient RD for dietary modifications as warranted.

## 2021-03-11 NOTE — DIETITIAN INITIAL EVALUATION ADULT. - LITERATURE/VIDEOS GIVEN
Provided nutrition package including: USDA Food Safety for Transplant Recipients booklet; food safety and BG/DM control handouts, fridge magnet with cooking temperatures, food thermometer, and RD information card.

## 2021-03-11 NOTE — DIETITIAN INITIAL EVALUATION ADULT. - REASON FOR ADMISSION
Pt 70 y/o M with PMH as per chart: HTN, DM on insulin, gout, anxiety, depression, OCD, CKD since (2016) previously on HD - transitioned to PD about 5 months ago, admitted for kidney transplant, S/P DDRT (03/09), with delayed graft function, received HD yesterday (03/10).

## 2021-03-12 LAB
ALBUMIN SERPL ELPH-MCNC: 3 G/DL — LOW (ref 3.3–5)
ALP SERPL-CCNC: 95 U/L — SIGNIFICANT CHANGE UP (ref 40–120)
ALT FLD-CCNC: 9 U/L — LOW (ref 10–45)
ANION GAP SERPL CALC-SCNC: 18 MMOL/L — HIGH (ref 5–17)
AST SERPL-CCNC: 13 U/L — SIGNIFICANT CHANGE UP (ref 10–40)
BASOPHILS # BLD AUTO: 0 K/UL — SIGNIFICANT CHANGE UP (ref 0–0.2)
BASOPHILS NFR BLD AUTO: 0 % — SIGNIFICANT CHANGE UP (ref 0–2)
BILIRUB SERPL-MCNC: 0.3 MG/DL — SIGNIFICANT CHANGE UP (ref 0.2–1.2)
BUN SERPL-MCNC: 78 MG/DL — HIGH (ref 7–23)
CALCIUM SERPL-MCNC: 8.2 MG/DL — LOW (ref 8.4–10.5)
CHLORIDE SERPL-SCNC: 88 MMOL/L — LOW (ref 96–108)
CO2 SERPL-SCNC: 22 MMOL/L — SIGNIFICANT CHANGE UP (ref 22–31)
CREAT SERPL-MCNC: 9 MG/DL — HIGH (ref 0.5–1.3)
EOSINOPHIL # BLD AUTO: 0 K/UL — SIGNIFICANT CHANGE UP (ref 0–0.5)
EOSINOPHIL NFR BLD AUTO: 0 % — SIGNIFICANT CHANGE UP (ref 0–6)
GLUCOSE SERPL-MCNC: 279 MG/DL — HIGH (ref 70–99)
HCT VFR BLD CALC: 23 % — LOW (ref 39–50)
HGB BLD-MCNC: 7.7 G/DL — LOW (ref 13–17)
IMM GRANULOCYTES NFR BLD AUTO: 0.7 % — SIGNIFICANT CHANGE UP (ref 0–1.5)
LYMPHOCYTES # BLD AUTO: 0.18 K/UL — LOW (ref 1–3.3)
LYMPHOCYTES # BLD AUTO: 2.2 % — LOW (ref 13–44)
MAGNESIUM SERPL-MCNC: 2.4 MG/DL — SIGNIFICANT CHANGE UP (ref 1.6–2.6)
MCHC RBC-ENTMCNC: 31.8 PG — SIGNIFICANT CHANGE UP (ref 27–34)
MCHC RBC-ENTMCNC: 33.5 GM/DL — SIGNIFICANT CHANGE UP (ref 32–36)
MCV RBC AUTO: 95 FL — SIGNIFICANT CHANGE UP (ref 80–100)
MONOCYTES # BLD AUTO: 0.5 K/UL — SIGNIFICANT CHANGE UP (ref 0–0.9)
MONOCYTES NFR BLD AUTO: 6.2 % — SIGNIFICANT CHANGE UP (ref 2–14)
NEUTROPHILS # BLD AUTO: 7.34 K/UL — SIGNIFICANT CHANGE UP (ref 1.8–7.4)
NEUTROPHILS NFR BLD AUTO: 90.9 % — HIGH (ref 43–77)
NRBC # BLD: 0 /100 WBCS — SIGNIFICANT CHANGE UP (ref 0–0)
PHOSPHATE SERPL-MCNC: 9.2 MG/DL — HIGH (ref 2.5–4.5)
PLATELET # BLD AUTO: 126 K/UL — LOW (ref 150–400)
POTASSIUM SERPL-MCNC: 4.9 MMOL/L — SIGNIFICANT CHANGE UP (ref 3.5–5.3)
POTASSIUM SERPL-SCNC: 4.9 MMOL/L — SIGNIFICANT CHANGE UP (ref 3.5–5.3)
PROT SERPL-MCNC: 5.9 G/DL — LOW (ref 6–8.3)
RBC # BLD: 2.42 M/UL — LOW (ref 4.2–5.8)
RBC # FLD: 15.4 % — HIGH (ref 10.3–14.5)
SODIUM SERPL-SCNC: 128 MMOL/L — LOW (ref 135–145)
TACROLIMUS SERPL-MCNC: 9.5 NG/ML — SIGNIFICANT CHANGE UP
WBC # BLD: 8.08 K/UL — SIGNIFICANT CHANGE UP (ref 3.8–10.5)
WBC # FLD AUTO: 8.08 K/UL — SIGNIFICANT CHANGE UP (ref 3.8–10.5)

## 2021-03-12 PROCEDURE — 71045 X-RAY EXAM CHEST 1 VIEW: CPT | Mod: 26

## 2021-03-12 PROCEDURE — 90935 HEMODIALYSIS ONE EVALUATION: CPT | Mod: GC

## 2021-03-12 PROCEDURE — 76776 US EXAM K TRANSPL W/DOPPLER: CPT | Mod: 26,RT

## 2021-03-12 RX ORDER — POLYETHYLENE GLYCOL 3350 17 G/17G
17 POWDER, FOR SOLUTION ORAL DAILY
Refills: 0 | Status: DISCONTINUED | OUTPATIENT
Start: 2021-03-12 | End: 2021-03-21

## 2021-03-12 RX ORDER — GENTAMICIN SULFATE 0.1 %
1 OINTMENT (GRAM) TOPICAL
Refills: 0 | Status: DISCONTINUED | OUTPATIENT
Start: 2021-03-12 | End: 2021-03-21

## 2021-03-12 RX ADMIN — CARVEDILOL PHOSPHATE 25 MILLIGRAM(S): 80 CAPSULE, EXTENDED RELEASE ORAL at 05:41

## 2021-03-12 RX ADMIN — Medication 500000 UNIT(S): at 05:41

## 2021-03-12 RX ADMIN — VALGANCICLOVIR 450 MILLIGRAM(S): 450 TABLET, FILM COATED ORAL at 07:51

## 2021-03-12 RX ADMIN — INSULIN GLARGINE 25 UNIT(S): 100 INJECTION, SOLUTION SUBCUTANEOUS at 21:30

## 2021-03-12 RX ADMIN — SENNA PLUS 2 TABLET(S): 8.6 TABLET ORAL at 19:50

## 2021-03-12 RX ADMIN — Medication 30 MILLIGRAM(S): at 05:41

## 2021-03-12 RX ADMIN — TRAMADOL HYDROCHLORIDE 50 MILLIGRAM(S): 50 TABLET ORAL at 19:50

## 2021-03-12 RX ADMIN — Medication 4: at 12:39

## 2021-03-12 RX ADMIN — Medication 80 MILLIGRAM(S): at 17:05

## 2021-03-12 RX ADMIN — TRAMADOL HYDROCHLORIDE 50 MILLIGRAM(S): 50 TABLET ORAL at 21:26

## 2021-03-12 RX ADMIN — Medication 1 APPLICATION(S): at 05:41

## 2021-03-12 RX ADMIN — Medication 4 UNIT(S): at 12:39

## 2021-03-12 RX ADMIN — Medication 4 UNIT(S): at 09:03

## 2021-03-12 RX ADMIN — Medication 8: at 09:03

## 2021-03-12 RX ADMIN — Medication 4: at 18:57

## 2021-03-12 RX ADMIN — CHLORHEXIDINE GLUCONATE 1 APPLICATION(S): 213 SOLUTION TOPICAL at 12:40

## 2021-03-12 RX ADMIN — Medication 30 MILLIGRAM(S): at 05:42

## 2021-03-12 RX ADMIN — MYCOPHENOLATE MOFETIL 1000 MILLIGRAM(S): 250 CAPSULE ORAL at 07:55

## 2021-03-12 RX ADMIN — Medication 500000 UNIT(S): at 17:06

## 2021-03-12 RX ADMIN — Medication 80 MILLIGRAM(S): at 05:41

## 2021-03-12 RX ADMIN — CARVEDILOL PHOSPHATE 25 MILLIGRAM(S): 80 CAPSULE, EXTENDED RELEASE ORAL at 17:05

## 2021-03-12 RX ADMIN — MYCOPHENOLATE MOFETIL 1000 MILLIGRAM(S): 250 CAPSULE ORAL at 19:50

## 2021-03-12 RX ADMIN — Medication 500000 UNIT(S): at 00:29

## 2021-03-12 RX ADMIN — Medication 4 UNIT(S): at 18:56

## 2021-03-12 RX ADMIN — TACROLIMUS 6 MILLIGRAM(S): 5 CAPSULE ORAL at 07:51

## 2021-03-12 RX ADMIN — Medication 1 TABLET(S): at 12:38

## 2021-03-12 RX ADMIN — Medication 30 MILLIGRAM(S): at 17:06

## 2021-03-12 RX ADMIN — POLYETHYLENE GLYCOL 3350 17 GRAM(S): 17 POWDER, FOR SOLUTION ORAL at 12:37

## 2021-03-12 RX ADMIN — FAMOTIDINE 20 MILLIGRAM(S): 10 INJECTION INTRAVENOUS at 12:39

## 2021-03-12 RX ADMIN — Medication 500000 UNIT(S): at 12:39

## 2021-03-12 NOTE — PROGRESS NOTE ADULT - ATTENDING COMMENTS
POD#3 s/p DDRT with delayed graft function  plan for HD tomorrow  Cont current immunosuppression with Envarsus 6mg daily, Solumedrol taper, Cellcept 1gm bid.   Will check tacrolimus level and adjust dose accordingly.  Transplant Prophylaxis with nystatin, bactrim, valcyte  GI prophylaxis due to steroids  monitor glucose levels and adjust sliding scare as necessary   OOB, physical therapy  fall risk- xrays negative after fall yesterday  medication teaching

## 2021-03-12 NOTE — PROGRESS NOTE ADULT - ASSESSMENT
Patient is a 72 y/o M w PMH of ESRD on PD for 5 months, was on HD in 2020, with nephrologist Dr Novak, IDDM, HTN, gout, depression, anxiety and OCD. S/p DCD on 3/9/21.     1. S/p DCD on 3/9/21. S/p Simulect induction. Urine output was 90 cc over 24h. On Lasix 80 mg IVP BID. Got HD 3/10/21 with 0L UF. Last Scr 9 this morning. Will be for HD today. Will need to be transitioned to PD over weekend or next week prior to being discharged home. Monitor labs and urine output.     2. Immunosupression - Simulect induction, currently on Envarsus 6 mg PO daily (last trough 9.5), MMF 1g PO BID and steroid taper     3. Prophylaxis - bactrim/nystatin/valcyte     4. Hypertension - BP at target range, on Coreg to 25 mg PO BID and Nifedipine 30 mg PO daily. Monitor BP. Low salt diet.     If any questions, please feel free to contact me     Jennifer Mcdowell  Nephrology Fellow  [facility] Pager: [phone]  [facility] Pager: [phone]

## 2021-03-12 NOTE — PROGRESS NOTE ADULT - SUBJECTIVE AND OBJECTIVE BOX
Transplant Surgery - Multidisciplinary Rounds  --------------------------------------------------------------  DCD DDRT (1a/1v/1u--stented)   Date: 3/9/2021         POD# 3    Present:   Patient seen and examined with multidisciplinary team including Transplant Surgeon: Dr. Anthony, Dr. Engel, Dr. Moore, Dr. Floyd, Dr. Schuler, Dr. Pringle. Transplant Nephrologist: Dr. Coburn, Dr. Beck Solares, Dr. Anne Olmos,   Pharmacist: Tammie Arzate. ACPs Osbaldo, Milad, Ruth Ann, Karlos Wiseman, Lucius and unit RN during am rounds.  Disciplines not in attendance will be notified of the plan.     HPI:  72 y/o gentleman with h/o HTN (age 43), DM on insulin (age 43), gout, anxiety, depression, OCD and CKD since 2016.  He started HD via L AVF  in 2020 at [facility] and transitioned to PD about 5 months ago. He makes ~2 ounces of urine 4 times daily.  Nephrologist is Dr. Novak  He now presents for  donor renal transplant.     Recipient: info:  Hx: ESRD on PD since 2020, DM2 x 20yrs, HTN x 20 yrs, HLD, Gout, Anxiety, Depression, OCD.  PSH: AVF, PD Cath, Humeral fx, Ruptured Achilles tendon  Nephrologist: Dr. Demetrio Novak  CPRA: 0%              ABO:  A  CMV: NEGATIVE  EBV Status:  POSITIVE  Last Dialysis: Patient does PD every night. Last dialysis last night.  ALLERGIES: ACE Inhibitors (Angioedema, swelling), Levaquin (Angioedema)    Donor (local): WINIFRED Diaz  Donor ID: [id number]  Match: [id number]  OPO:  GINO Moreno  Age:  50 y/o  ABO:  A1  PHS Increased Risk: No  KDPI:  71%  DCD: YES  WIT: 24 mins  COD:  Anoxia / CV  X Clamp Time: 3/9/21 at 3:10  Medical Hx:  CAD, HTN, Morbid obesity, HLD  Terminal Cr: 8.02  Covid Testing: 3/5 NP Negative, 3/8 NP negative, 3/8 BAL Negative.  CMV-NEGATIVE  EBV-POSITIVE  HepBcAb-NEGATIVE  Hepatitis C-LAKISHA- NEGATIVE  Hepatitis C ab-NEGATIVE  MISMATCH: 0, 0, 0  Kidney Laterality:  RIGHT    Interval Events:  3/10: HD today, no fluid off. off IVF  3/11-Inc coreg to 25mg bid, start nifedipine 30mg QD, DC TLC, s/p fall to floor. Xray pelvis/ elbows ordered. US renal, NO LOC.  FK level 6.6 no change      Potential Discharge date: pending clinical stability  Education:  Medications  Plan of care:  See Below    MEDICATIONS  (STANDING):  carvedilol 12.5 milliGRAM(s) Oral every 12 hours  chlorhexidine 2% Cloths 1 Application(s) Topical daily  dextrose 40% Gel 15 Gram(s) Oral once  dextrose 5%. 1000 milliLiter(s) (50 mL/Hr) IV Continuous <Continuous>  dextrose 5%. 1000 milliLiter(s) (100 mL/Hr) IV Continuous <Continuous>  dextrose 50% Injectable 25 Gram(s) IV Push once  dextrose 50% Injectable 12.5 Gram(s) IV Push once  dextrose 50% Injectable 25 Gram(s) IV Push once  famotidine    Tablet 20 milliGRAM(s) Oral daily  furosemide   Injectable 80 milliGRAM(s) IV Push two times a day  glucagon  Injectable 1 milliGRAM(s) IntraMuscular once  insulin glargine Injectable (LANTUS) 25 Unit(s) SubCutaneous at bedtime  insulin lispro (ADMELOG) corrective regimen sliding scale   SubCutaneous every 6 hours  methylPREDNISolone sodium succinate Injectable 125 milliGRAM(s) IV Push two times a day  mycophenolate mofetil 1000 milliGRAM(s) Oral <User Schedule>  nystatin    Suspension 661371 Unit(s) Swish and Swallow four times a day  senna 2 Tablet(s) Oral at bedtime  sodium chloride 0.45%. 500 milliLiter(s) (50 mL/Hr) IV Continuous <Continuous>  sodium chloride 0.9%. 1000 milliLiter(s) (70 mL/Hr) IV Continuous <Continuous>  tacrolimus ER Tablet (ENVARSUS XR) 6 milliGRAM(s) Oral <User Schedule>  trimethoprim   80 mG/sulfamethoxazole 400 mG 1 Tablet(s) Oral daily  valGANciclovir 450 milliGRAM(s) Oral daily    MEDICATIONS  (PRN):  acetaminophen   Tablet .. 650 milliGRAM(s) Oral every 6 hours PRN Mild Pain (1 - 3)  traMADol 25 milliGRAM(s) Oral every 4 hours PRN Moderate Pain (4 - 6)  traMADol 50 milliGRAM(s) Oral every 4 hours PRN Severe Pain (7 - 10)    PAST MEDICAL & SURGICAL HISTORY:  DM (diabetes mellitus)  HTN (hypertension)  Chronic kidney disease (CKD)      Vital Signs Last 24 Hrs  T(C): 37 (12 Mar 2021 08:15), Max: 37.1 (11 Mar 2021 16:20)  T(F): 98.6 (12 Mar 2021 08:15), Max: 98.8 (11 Mar 2021 16:20)  HR: 71 (12 Mar 2021 08:15) (68 - 88)  BP: 145/76 (12 Mar 2021 08:15) (132/59 - 185/95)  BP(mean): --  RR: 18 (12 Mar 2021 08:15) (18 - 18)  SpO2: 93% (12 Mar 2021 08:15) (92% - 97%)      I&O's Summary    11 Mar 2021 07:  -  12 Mar 2021 07:00  --------------------------------------------------------  IN: 1440 mL / OUT: 169 mL / NET: 1271 mL    12 Mar 2021 07:01  -  12 Mar 2021 10:58  --------------------------------------------------------  IN: 300 mL / OUT: 17 mL / NET: 283 mL                            7.7    8.08  )-----------( 126      ( 12 Mar 2021 06:34 )             23.0     03-12    128<L>  |  88<L>  |  78<H>  ----------------------------<  279<H>  4.9   |  22  |  9.00<H>    Ca    8.2<L>      12 Mar 2021 06:30  Phos  9.2     -12  Mg     2.4     -    TPro  5.9<L>  /  Alb  3.0<L>  /  TBili  0.3  /  DBili  x   /  AST  13  /  ALT  9<L>  /  AlkPhos  95          Review of systems  Gen: No weight changes, fatigue, fevers/chills, weakness  Skin: No rashes  Head/Eyes/Ears/Mouth: No headache; Normal hearing; Normal vision w/o blurriness; No sinus pain/discomfort, sore throat  Respiratory: No dyspnea, cough, wheezing, hemoptysis  CV: No chest pain, PND, orthopnea  GI: Mild abdominal pain at surgical incision site; denies diarrhea, constipation, nausea, vomiting, melena, hematochezia  : No increased frequency, dysuria, hematuria, nocturia  MSK: No joint pain/swelling; no back pain; no edema  Neuro: No dizziness/lightheadedness, weakness, seizures, numbness, tingling  Heme: No easy bruising or bleeding  Endo: No heat/cold intolerance  Psych: No significant nervousness, anxiety, stress, depression  All other systems were reviewed and are negative, except as noted.    PHYSICAL EXAM:  Constitutional: Well developed / well nourished  Eyes: Anicteric, PERRLA  ENMT: nc/at  Neck: central line RIJ  Respiratory: Decreased breath sounds on R lung base  Cardiovascular: RRR  Gastrointestinal: Soft, non distended, mild tenderness at the incision site; incision c/d/i; LORETTA serosanguinous  Genitourinary: Urinary catheter in place  Extremities: SCD's in place and working bilaterally, AVF palpable  Vascular: 1+ DP pulses b/l  Neurological: A&O x3  Skin: no rashes, ulcerations or lesions;  Musculoskeletal: Moving all extremities  Psychiatric: Responsive

## 2021-03-12 NOTE — PROGRESS NOTE ADULT - ATTENDING COMMENTS
donor kidney recipient, DGF  DM, HTN,   Noted events  Reviewed clinical, lab data, imaging, medications  Suggestions  1. Hemodialysis today  2. Tacrolimus target 8-10 ng/ml  3. Fall precautions  Will follow  I have seen the patient and reviewed dialysis prescription   Dialysis access is functioning well.  Dialysis prescription has been adjusted for optimized control of volume status, uremia and electrolytes. Management of additional metabolic abnormalities/anemia will continue to be addressed on follow up.    I was present during and reviewed clinical and lab data as well as assessment and plan as documented by the house staff as noted. Please contact if any additional questions with any change in clinical condition or on availability of any additional information or reports.

## 2021-03-12 NOTE — PROGRESS NOTE ADULT - ASSESSMENT
71M with h/o HTN (age 43), IDDM (age 43), gout, anxiety, depression, OCD, CKD (since 2016) was on HD via L AVF (4/2020 Dr. Novak) with transition to PD in 9/2020 now s/p R DCD DDRT (3/9/21)    POD#3 s/p R DCD DDRT  -Continue to monitor graft function  -Hemodialysis today, plan to restart PD over the weekend   -Trend labs  -strict I&Os  - f/u final US read performed after patient's fall   -Renal restricted diet   -Coreg   -Lantus 25 units tonight  -CGM study: conventional group  -SCDs on at all times  -Encourage spirometry  -OOB with PT  -Immuno:  ENV 6, MMF 1g BID, standard steroid taper, Simulect #2 on POD#4  -PPx: Bactrim/Valcyte/Nystatin/Pepcid  - BP medications adjusted w/ nifedipine 30mg and coreg increased to 25mg BID   - possible dc home monday

## 2021-03-12 NOTE — CHART NOTE - NSCHARTNOTEFT_GEN_A_CORE
Called by RN to assess pt who was found on the floor on 3/11/21 @1620pm.   Pt is 72 y/o/m s/p DDRT on 3/9/2021 POD #2 with DGF on HD  Pt was assessed back in to bed, pt states "was setting in chair dropped his phone was trying to get up to reach the phone on the floor and found himself on the floor did not hit his head and went down on hid buttocks".  Pt with no obvious injury, no laceration, no bleeding, A&O x3, VSS.  Pt did have c/o elbow pain post fall. .     @1620: Temp 37.1, /80, HR 78, O2sat 96% on RA    Labs @ 6:53am  H&H 7.8/23.3, Plate 142  BMP: 133/4.4/92/24/54/8.06,     A/P:  B/L elbow xray and renal US + LABS  Continue monitor any changes in VS, O2sat, LORETTA output, and mental status notify provider.    Will continue to monitor and f/u xray and US. Called by RN to assess pt who was found on the floor on 3/11/21 @1620pm.   Pt is 72 y/o/m s/p DDRT on 3/9/2021 POD #2 with DGF on HD  Pt was assessed, and assisted back in to bed safely by staff, pt states "was setting in chair dropped his phone was trying to get up to reach the phone on the floor and found himself on the floor did not hit his head and went down on hid buttocks".  Pt with no obvious injury, no laceration, no bleeding, A&O x3, VSS.  Pt did have c/o elbow pain post fall. .     @1620: Temp 37.1, /80, HR 78, O2sat 96% on RA    Labs @ 6:53am  H&H 7.8/23.3, Plate 142  BMP: 133/4.4/92/24/54/8.06,     A/P:  B/L elbow xray and renal US + LABS  Continue monitor any changes in VS, O2sat, LORETTA output, and mental status notify provider.    Will continue to monitor and f/u xray and US.  transplant surgeon Dr. Marie was notified who agreed with plan.

## 2021-03-12 NOTE — PROGRESS NOTE ADULT - SUBJECTIVE AND OBJECTIVE BOX
[facility] DIVISION OF KIDNEY DISEASES AND HYPERTENSION -- FOLLOW UP NOTE  --------------------------------------------------------------------------------    24 hour events/subjective: Had a fall last night as he was trying to stand up. Patient was seen and examined at bedside. Reported feeling well. Denies CP, SOB, fever, chills, nausea, vomiting, diarrhea, LE edema.        PAST HISTORY  --------------------------------------------------------------------------------  No significant changes to PMH, PSH, FHx, SHx, unless otherwise noted    ALLERGIES & MEDICATIONS  --------------------------------------------------------------------------------  Allergies    ACE inhibitors (Angioedema)  Levaquin (Angioedema)    Intolerances      Standing Inpatient Medications  carvedilol 25 milliGRAM(s) Oral every 12 hours  chlorhexidine 2% Cloths 1 Application(s) Topical daily  dextrose 40% Gel 15 Gram(s) Oral once  dextrose 5%. 1000 milliLiter(s) IV Continuous <Continuous>  dextrose 5%. 1000 milliLiter(s) IV Continuous <Continuous>  dextrose 50% Injectable 25 Gram(s) IV Push once  dextrose 50% Injectable 12.5 Gram(s) IV Push once  dextrose 50% Injectable 25 Gram(s) IV Push once  famotidine    Tablet 20 milliGRAM(s) Oral daily  furosemide   Injectable 80 milliGRAM(s) IV Push two times a day  gentamicin 0.1% Ointment 1 Application(s) Topical <User Schedule>  glucagon  Injectable 1 milliGRAM(s) IntraMuscular once  insulin glargine Injectable (LANTUS) 25 Unit(s) SubCutaneous at bedtime  insulin lispro (ADMELOG) corrective regimen sliding scale   SubCutaneous three times a day before meals  insulin lispro (ADMELOG) corrective regimen sliding scale   SubCutaneous at bedtime  insulin lispro Injectable (ADMELOG) 4 Unit(s) SubCutaneous three times a day before meals  methylPREDNISolone sodium succinate Injectable 30 milliGRAM(s) IV Push two times a day  mycophenolate mofetil 1000 milliGRAM(s) Oral <User Schedule>  NIFEdipine XL 30 milliGRAM(s) Oral daily  nystatin    Suspension 283672 Unit(s) Swish and Swallow four times a day  polyethylene glycol 3350 17 Gram(s) Oral daily  senna 2 Tablet(s) Oral at bedtime  sorbitol 70%/mineral oil/magnesium hydroxide/glycerin Enema 120 milliLiter(s) Rectal once  tacrolimus ER Tablet (ENVARSUS XR) 6 milliGRAM(s) Oral <User Schedule>  trimethoprim   80 mG/sulfamethoxazole 400 mG 1 Tablet(s) Oral daily  valGANciclovir 450 milliGRAM(s) Oral <User Schedule>    PRN Inpatient Medications  acetaminophen   Tablet .. 650 milliGRAM(s) Oral every 6 hours PRN  traMADol 25 milliGRAM(s) Oral every 4 hours PRN  traMADol 50 milliGRAM(s) Oral every 4 hours PRN      REVIEW OF SYSTEMS  --------------------------------------------------------------------------------  Gen: No fevers/chills  Respiratory: No dyspnea, cough  CV: No chest pain  GI: No abdominal pain, diarrhea  : No dysuria, hematuria  MSK: No  edema    All other systems were reviewed and are negative, except as noted.    VITALS/PHYSICAL EXAM  --------------------------------------------------------------------------------  T(C): 37 (03-12-21 @ 08:15), Max: 37.1 (03-11-21 @ 16:20)  HR: 71 (03-12-21 @ 08:15) (68 - 88)  BP: 145/76 (03-12-21 @ 08:15) (132/59 - 185/95)  RR: 18 (03-12-21 @ 08:15) (18 - 18)  SpO2: 93% (03-12-21 @ 08:15) (92% - 97%)  Wt(kg): --        03-11-21 @ 07:01  -  03-12-21 @ 07:00  --------------------------------------------------------  IN: 1440 mL / OUT: 169 mL / NET: 1271 mL    03-12-21 @ 07:01  -  03-12-21 @ 11:25  --------------------------------------------------------  IN: 300 mL / OUT: 17 mL / NET: 283 mL        Physical Exam:  	Gen: NAD  	HEENT: MMM  	Pulm: CTA B/L  	CV: S1S2  	Abd: Soft, +BS   	Ext: No LE edema B/L  	Neuro: Awake  	Skin: Warm and dry  	Vascular access: LUE AVF, PD catheter       LABS/STUDIES  --------------------------------------------------------------------------------              7.7    8.08  >-----------<  126      [03-12-21 @ 06:34]              23.0     128  |  88  |  78  ----------------------------<  279      [03-12-21 @ 06:30]  4.9   |  22  |  9.00        Ca     8.2     [03-12-21 @ 06:30]      Mg     2.4     [03-12-21 @ 06:30]      Phos  9.2     [03-12-21 @ 06:30]    TPro  5.9  /  Alb  3.0  /  TBili  0.3  /  DBili  x   /  AST  13  /  ALT  9   /  AlkPhos  95  [03-12-21 @ 06:30]          Creatinine Trend:  SCr 9.00 [03-12 @ 06:30]  SCr 8.39 [03-11 @ 16:41]  SCr 8.06 [03-11 @ 06:53]  SCr 10.50 [03-10 @ 02:51]  SCr 10.60 [03-09 @ 22:16]          HBsAb 56.4      [03-09-21 @ 17:13]  HBsAg Nonreact      [03-09-21 @ 17:13]  HBcAb Nonreact      [03-09-21 @ 17:13]  HCV 0.32, Nonreact      [03-09-21 @ 17:13]  HIV Nonreact      [03-09-21 @ 18:20]

## 2021-03-13 LAB
ALBUMIN SERPL ELPH-MCNC: 3.5 G/DL — SIGNIFICANT CHANGE UP (ref 3.3–5)
ALP SERPL-CCNC: 99 U/L — SIGNIFICANT CHANGE UP (ref 40–120)
ALT FLD-CCNC: 16 U/L — SIGNIFICANT CHANGE UP (ref 10–45)
ANION GAP SERPL CALC-SCNC: 15 MMOL/L — SIGNIFICANT CHANGE UP (ref 5–17)
AST SERPL-CCNC: 25 U/L — SIGNIFICANT CHANGE UP (ref 10–40)
BILIRUB SERPL-MCNC: 0.4 MG/DL — SIGNIFICANT CHANGE UP (ref 0.2–1.2)
BUN SERPL-MCNC: 58 MG/DL — HIGH (ref 7–23)
CALCIUM SERPL-MCNC: 8 MG/DL — LOW (ref 8.4–10.5)
CHLORIDE SERPL-SCNC: 92 MMOL/L — LOW (ref 96–108)
CO2 SERPL-SCNC: 24 MMOL/L — SIGNIFICANT CHANGE UP (ref 22–31)
CREAT SERPL-MCNC: 6.34 MG/DL — HIGH (ref 0.5–1.3)
GLUCOSE SERPL-MCNC: 182 MG/DL — HIGH (ref 70–99)
HCT VFR BLD CALC: 23.1 % — LOW (ref 39–50)
HGB BLD-MCNC: 7.6 G/DL — LOW (ref 13–17)
MAGNESIUM SERPL-MCNC: 2.1 MG/DL — SIGNIFICANT CHANGE UP (ref 1.6–2.6)
MCHC RBC-ENTMCNC: 31.5 PG — SIGNIFICANT CHANGE UP (ref 27–34)
MCHC RBC-ENTMCNC: 32.9 GM/DL — SIGNIFICANT CHANGE UP (ref 32–36)
MCV RBC AUTO: 95.9 FL — SIGNIFICANT CHANGE UP (ref 80–100)
NRBC # BLD: 0 /100 WBCS — SIGNIFICANT CHANGE UP (ref 0–0)
PHOSPHATE SERPL-MCNC: 6.6 MG/DL — HIGH (ref 2.5–4.5)
PLATELET # BLD AUTO: 116 K/UL — LOW (ref 150–400)
POTASSIUM SERPL-MCNC: 4.2 MMOL/L — SIGNIFICANT CHANGE UP (ref 3.5–5.3)
POTASSIUM SERPL-SCNC: 4.2 MMOL/L — SIGNIFICANT CHANGE UP (ref 3.5–5.3)
PROT SERPL-MCNC: 5.9 G/DL — LOW (ref 6–8.3)
RBC # BLD: 2.41 M/UL — LOW (ref 4.2–5.8)
RBC # FLD: 14.9 % — HIGH (ref 10.3–14.5)
SODIUM SERPL-SCNC: 131 MMOL/L — LOW (ref 135–145)
TACROLIMUS SERPL-MCNC: 6.9 NG/ML — SIGNIFICANT CHANGE UP
WBC # BLD: 7.96 K/UL — SIGNIFICANT CHANGE UP (ref 3.8–10.5)
WBC # FLD AUTO: 7.96 K/UL — SIGNIFICANT CHANGE UP (ref 3.8–10.5)

## 2021-03-13 PROCEDURE — 99232 SBSQ HOSP IP/OBS MODERATE 35: CPT

## 2021-03-13 RX ORDER — TACROLIMUS 5 MG/1
7 CAPSULE ORAL
Refills: 0 | Status: DISCONTINUED | OUTPATIENT
Start: 2021-03-14 | End: 2021-03-15

## 2021-03-13 RX ORDER — ERYTHROPOIETIN 10000 [IU]/ML
10000 INJECTION, SOLUTION INTRAVENOUS; SUBCUTANEOUS ONCE
Refills: 0 | Status: COMPLETED | OUTPATIENT
Start: 2021-03-13 | End: 2021-03-13

## 2021-03-13 RX ORDER — TACROLIMUS 5 MG/1
1 CAPSULE ORAL ONCE
Refills: 0 | Status: COMPLETED | OUTPATIENT
Start: 2021-03-13 | End: 2021-03-13

## 2021-03-13 RX ORDER — NIFEDIPINE 30 MG
30 TABLET, EXTENDED RELEASE 24 HR ORAL ONCE
Refills: 0 | Status: COMPLETED | OUTPATIENT
Start: 2021-03-13 | End: 2021-03-13

## 2021-03-13 RX ORDER — SEVELAMER CARBONATE 2400 MG/1
800 POWDER, FOR SUSPENSION ORAL
Refills: 0 | Status: DISCONTINUED | OUTPATIENT
Start: 2021-03-13 | End: 2021-03-14

## 2021-03-13 RX ORDER — NIFEDIPINE 30 MG
60 TABLET, EXTENDED RELEASE 24 HR ORAL DAILY
Refills: 0 | Status: DISCONTINUED | OUTPATIENT
Start: 2021-03-14 | End: 2021-03-16

## 2021-03-13 RX ADMIN — Medication 500000 UNIT(S): at 17:11

## 2021-03-13 RX ADMIN — Medication 4 UNIT(S): at 09:05

## 2021-03-13 RX ADMIN — Medication 500000 UNIT(S): at 13:08

## 2021-03-13 RX ADMIN — Medication 30 MILLIGRAM(S): at 17:31

## 2021-03-13 RX ADMIN — Medication 4 UNIT(S): at 18:38

## 2021-03-13 RX ADMIN — INSULIN GLARGINE 25 UNIT(S): 100 INJECTION, SOLUTION SUBCUTANEOUS at 22:18

## 2021-03-13 RX ADMIN — TACROLIMUS 1 MILLIGRAM(S): 5 CAPSULE ORAL at 17:10

## 2021-03-13 RX ADMIN — Medication 500000 UNIT(S): at 00:35

## 2021-03-13 RX ADMIN — Medication 4: at 13:08

## 2021-03-13 RX ADMIN — Medication 20 MILLIGRAM(S): at 05:36

## 2021-03-13 RX ADMIN — Medication 500000 UNIT(S): at 05:36

## 2021-03-13 RX ADMIN — TACROLIMUS 6 MILLIGRAM(S): 5 CAPSULE ORAL at 07:59

## 2021-03-13 RX ADMIN — Medication 1 TABLET(S): at 13:10

## 2021-03-13 RX ADMIN — POLYETHYLENE GLYCOL 3350 17 GRAM(S): 17 POWDER, FOR SOLUTION ORAL at 13:10

## 2021-03-13 RX ADMIN — MYCOPHENOLATE MOFETIL 1000 MILLIGRAM(S): 250 CAPSULE ORAL at 07:59

## 2021-03-13 RX ADMIN — CHLORHEXIDINE GLUCONATE 1 APPLICATION(S): 213 SOLUTION TOPICAL at 13:09

## 2021-03-13 RX ADMIN — Medication 30 MILLIGRAM(S): at 05:36

## 2021-03-13 RX ADMIN — Medication 2: at 18:39

## 2021-03-13 RX ADMIN — TRAMADOL HYDROCHLORIDE 50 MILLIGRAM(S): 50 TABLET ORAL at 01:00

## 2021-03-13 RX ADMIN — Medication 20 MILLIGRAM(S): at 17:12

## 2021-03-13 RX ADMIN — TRAMADOL HYDROCHLORIDE 50 MILLIGRAM(S): 50 TABLET ORAL at 00:35

## 2021-03-13 RX ADMIN — MYCOPHENOLATE MOFETIL 1000 MILLIGRAM(S): 250 CAPSULE ORAL at 20:06

## 2021-03-13 RX ADMIN — FAMOTIDINE 20 MILLIGRAM(S): 10 INJECTION INTRAVENOUS at 13:09

## 2021-03-13 RX ADMIN — ERYTHROPOIETIN 10000 UNIT(S): 10000 INJECTION, SOLUTION INTRAVENOUS; SUBCUTANEOUS at 13:08

## 2021-03-13 RX ADMIN — CARVEDILOL PHOSPHATE 25 MILLIGRAM(S): 80 CAPSULE, EXTENDED RELEASE ORAL at 17:11

## 2021-03-13 RX ADMIN — BASILIXIMAB 100 MILLIGRAM(S): 20 INJECTION, POWDER, FOR SOLUTION INTRAVENOUS at 17:11

## 2021-03-13 RX ADMIN — SEVELAMER CARBONATE 800 MILLIGRAM(S): 2400 POWDER, FOR SUSPENSION ORAL at 18:39

## 2021-03-13 RX ADMIN — Medication 80 MILLIGRAM(S): at 05:36

## 2021-03-13 RX ADMIN — CARVEDILOL PHOSPHATE 25 MILLIGRAM(S): 80 CAPSULE, EXTENDED RELEASE ORAL at 05:36

## 2021-03-13 RX ADMIN — Medication 4 UNIT(S): at 13:08

## 2021-03-13 RX ADMIN — SENNA PLUS 2 TABLET(S): 8.6 TABLET ORAL at 22:18

## 2021-03-13 NOTE — PROGRESS NOTE ADULT - ASSESSMENT
Patient is a 70 y/o M w PMH of ESRD on PD for 5 months, was on HD in 2020, with nephrologist Dr Novak, IDDM, HTN, gout, depression, anxiety and OCD. S/p DCD with JOJO DDRT on 3/9/21.     1. S/p DDRT on 3/9/21 - has ATN from ischemia reperfusion injury and ATN of donor. S/p Simulect induction. Urine output is low.  Weight is increased.  Will need UF today with 2 L off. Will need to be transitioned to PD over weekend or next week prior to being discharged home. Monitor labs and urine output.     2. Immunosupression - Simulect induction, currently on Envarsus, MMF 1g PO BID and steroid taper     3. Prophylaxis - bactrim/nystatin/valcyte     4. Hypertension - BP slightly high.  on Coreg to 25 mg PO BID and Nifedipine 30 mg PO daily. Monitor BP. Low salt diet.  Should improve after fluid removal today.     5.  DM2 - blood sugar reasonable control this morning.  Will increase insulin as needed.

## 2021-03-13 NOTE — PROGRESS NOTE ADULT - SUBJECTIVE AND OBJECTIVE BOX
Transplant Surgery - Multidisciplinary Rounds  --------------------------------------------------------------  DCD DDRT (1a/1v/1u--stented)   Date: 3/9/2021         POD# 5    Present: Patient seen and examined with multidisciplinary team including Transplant Surgeon: Dr. Floyd, Transplant Nephrologist: Dr. Beck Olmos, [facility], and bedside RN during am rounds. Disciplines not in attendance will be notified of the plan.     HPI: 72 y/o M with PMHx of HTN (age 43), DM on insulin (age 43), gout, anxiety, depression, OCD and CKD since 2016.  He started HD via L AVF  in April 2020 at [facility] and transitioned to PD about 5 months ago. He makes ~2 ounces of urine 4 times daily.      Underwent DCD DDRT 3/9/2021 on 3/9/2021 (1A/1V/1U stented). Post op course c/b DGF requiring HD    Interval Events:  - POD 4 s/p DCD DDRT: remains oliguric (u/o 250cc), no response to lasix  - Plan for HD today     Potential Discharge date: pending clinical stability  Education:  Medications  Plan of care:  See Below    MEDICATIONS  (STANDING):  basiliximab  IVPB 20 milliGRAM(s) IV Intermittent once  carvedilol 25 milliGRAM(s) Oral every 12 hours  chlorhexidine 2% Cloths 1 Application(s) Topical daily  dextrose 40% Gel 15 Gram(s) Oral once  dextrose 5%. 1000 milliLiter(s) (50 mL/Hr) IV Continuous <Continuous>  dextrose 5%. 1000 milliLiter(s) (100 mL/Hr) IV Continuous <Continuous>  dextrose 50% Injectable 25 Gram(s) IV Push once  dextrose 50% Injectable 12.5 Gram(s) IV Push once  dextrose 50% Injectable 25 Gram(s) IV Push once  famotidine    Tablet 20 milliGRAM(s) Oral daily  gentamicin 0.1% Ointment 1 Application(s) Topical <User Schedule>  glucagon  Injectable 1 milliGRAM(s) IntraMuscular once  insulin glargine Injectable (LANTUS) 25 Unit(s) SubCutaneous at bedtime  insulin lispro (ADMELOG) corrective regimen sliding scale   SubCutaneous three times a day before meals  insulin lispro (ADMELOG) corrective regimen sliding scale   SubCutaneous at bedtime  insulin lispro Injectable (ADMELOG) 4 Unit(s) SubCutaneous three times a day before meals  mycophenolate mofetil 1000 milliGRAM(s) Oral <User Schedule>  NIFEdipine XL 30 milliGRAM(s) Oral daily  nystatin    Suspension 064028 Unit(s) Swish and Swallow four times a day  polyethylene glycol 3350 17 Gram(s) Oral daily  predniSONE   Tablet 20 milliGRAM(s) Oral two times a day  senna 2 Tablet(s) Oral at bedtime  sevelamer carbonate 800 milliGRAM(s) Oral two times a day  trimethoprim   80 mG/sulfamethoxazole 400 mG 1 Tablet(s) Oral daily  valGANciclovir 450 milliGRAM(s) Oral <User Schedule>    MEDICATIONS  (PRN):  acetaminophen   Tablet .. 650 milliGRAM(s) Oral every 6 hours PRN Mild Pain (1 - 3)  traMADol 25 milliGRAM(s) Oral every 4 hours PRN Moderate Pain (4 - 6)  traMADol 50 milliGRAM(s) Oral every 4 hours PRN Severe Pain (7 - 10)      PAST MEDICAL & SURGICAL HISTORY:  DM (diabetes mellitus)  HTN (hypertension)  Chronic kidney disease (CKD)    Vital Signs Last 24 Hrs  T(C): 36.7 (13 Mar 2021 11:59), Max: 37.1 (12 Mar 2021 17:00)  T(F): 98 (13 Mar 2021 11:59), Max: 98.7 (12 Mar 2021 17:00)  HR: 68 (13 Mar 2021 11:59) (68 - 77)  BP: 157/74 (13 Mar 2021 11:59) (138/65 - 176/86)  BP(mean): 106 (13 Mar 2021 11:59) (106 - 125)  RR: 18 (13 Mar 2021 11:59) (18 - 18)  SpO2: 98% (13 Mar 2021 11:59) (92% - 98%)    I&O's Summary    12 Mar 2021 07:01  -  13 Mar 2021 07:00  --------------------------------------------------------  IN: 3380 mL / OUT: 1083 mL / NET: 2297 mL    13 Mar 2021 06:01  -  13 Mar 2021 14:58  --------------------------------------------------------  IN: 1320 mL / OUT: 237 mL / NET: 1083 mL                          7.6    7.96  )-----------( 116      ( 13 Mar 2021 05:56 )             23.1     03-13    131<L>  |  92<L>  |  58<H>  ----------------------------<  182<H>  4.2   |  24  |  6.34<H>    Ca    8.0<L>      13 Mar 2021 05:56  Phos  6.6     03-13  Mg     2.1     03-13    TPro  5.9<L>  /  Alb  3.5  /  TBili  0.4  /  DBili  x   /  AST  25  /  ALT  16  /  AlkPhos  99  03-13    Tacrolimus (), Serum: 6.9 ng/mL (03-13 @ 08:17)    Review of systems  Gen: No weight changes, fatigue, fevers/chills, weakness  Skin: No rashes  Head/Eyes/Ears/Mouth: No headache; Normal hearing; Normal vision w/o blurriness; No sinus pain/discomfort, sore throat  Respiratory: No dyspnea, cough, wheezing, hemoptysis  CV: No chest pain, PND, orthopnea  GI: Mild abdominal pain at surgical incision site; denies diarrhea, constipation, nausea, vomiting, melena, hematochezia  : No increased frequency, dysuria, hematuria, nocturia  MSK: No joint pain/swelling; no back pain; no edema  Neuro: No dizziness/lightheadedness, weakness, seizures, numbness, tingling  Heme: No easy bruising or bleeding  Endo: No heat/cold intolerance  Psych: No significant nervousness, anxiety, stress, depression  All other systems were reviewed and are negative, except as noted.    PHYSICAL EXAM:  Constitutional: Well developed / well nourished  Eyes: Anicteric, PERRLA  ENMT: nc/at  Neck: central line RIJ  Respiratory: Decreased breath sounds on R lung base  Cardiovascular: RRR  Gastrointestinal: Soft, non distended, mild tenderness at the incision site; incision c/d/i; LORETTA serosanguinous  Genitourinary: Urinary catheter in place  Extremities: SCD's in place and working bilaterally, AVF palpablem + edema  Vascular: 1+ DP pulses b/l  Neurological: A&O x3  Skin: no rashes, ulcerations or lesions;  Musculoskeletal: Moving all extremities  Psychiatric: Responsive

## 2021-03-13 NOTE — PROGRESS NOTE ADULT - ASSESSMENT
71M with h/o HTN (age 43), IDDM (age 43), gout, anxiety, depression, OCD, CKD (since 2016) was on HD via L AVF (4/2020 Dr. Novak) with transition to PD in 9/2020 now s/p R DCD DDRT (3/9/21)    [] POD#4 s/p R DCD DDRT c/b DGF  - Remains oliguric, Plan for HD today  - Immuno: Env 7mg daily, MMF 1g bid, Pred taper, Simulect today  - PPx: valcyte/bactrim/nystatin  - Cont strict I&Os  - Anemia: Epo 10K   - dc Lasix  - Diet: Renal   - Pain control  - Bowel regimen   - SCDs/IS/OOB  - PT c/s    [] DM  - Enrolled in CGM study (conventional group)   - glargine 25u qhs, lispro 4u tid with meals, ISS    [] HTN  - cont coreg 25mg bid and nifedipine 30mg daily

## 2021-03-13 NOTE — PROGRESS NOTE ADULT - SUBJECTIVE AND OBJECTIVE BOX
[facility] DIVISION OF KIDNEY DISEASES AND HYPERTENSION -- FOLLOW UP NOTE  --------------------------------------------------------------------------------  Chief Complaint:  kidney transplant    24 hour events/subjective:  Pt feels ok.  Has mild pain, requires oxygen.  Gaining weight.       PAST HISTORY  --------------------------------------------------------------------------------  No significant changes to PMH, PSH, FHx, SHx, unless otherwise noted    ALLERGIES & MEDICATIONS  --------------------------------------------------------------------------------  Allergies    ACE inhibitors (Angioedema)  Levaquin (Angioedema)    Intolerances      Standing Inpatient Medications  basiliximab  IVPB 20 milliGRAM(s) IV Intermittent once  carvedilol 25 milliGRAM(s) Oral every 12 hours  chlorhexidine 2% Cloths 1 Application(s) Topical daily  dextrose 40% Gel 15 Gram(s) Oral once  dextrose 5%. 1000 milliLiter(s) IV Continuous <Continuous>  dextrose 5%. 1000 milliLiter(s) IV Continuous <Continuous>  dextrose 50% Injectable 25 Gram(s) IV Push once  dextrose 50% Injectable 12.5 Gram(s) IV Push once  dextrose 50% Injectable 25 Gram(s) IV Push once  famotidine    Tablet 20 milliGRAM(s) Oral daily  furosemide   Injectable 80 milliGRAM(s) IV Push two times a day  gentamicin 0.1% Ointment 1 Application(s) Topical <User Schedule>  glucagon  Injectable 1 milliGRAM(s) IntraMuscular once  insulin glargine Injectable (LANTUS) 25 Unit(s) SubCutaneous at bedtime  insulin lispro (ADMELOG) corrective regimen sliding scale   SubCutaneous three times a day before meals  insulin lispro (ADMELOG) corrective regimen sliding scale   SubCutaneous at bedtime  insulin lispro Injectable (ADMELOG) 4 Unit(s) SubCutaneous three times a day before meals  mycophenolate mofetil 1000 milliGRAM(s) Oral <User Schedule>  NIFEdipine XL 30 milliGRAM(s) Oral daily  nystatin    Suspension [id number] Unit(s) Swish and Swallow four times a day  polyethylene glycol 3350 17 Gram(s) Oral daily  predniSONE   Tablet 20 milliGRAM(s) Oral two times a day  senna 2 Tablet(s) Oral at bedtime  tacrolimus ER Tablet (ENVARSUS XR) 6 milliGRAM(s) Oral <User Schedule>  trimethoprim   80 mG/sulfamethoxazole 400 mG 1 Tablet(s) Oral daily  valGANciclovir 450 milliGRAM(s) Oral <User Schedule>    PRN Inpatient Medications  acetaminophen   Tablet .. 650 milliGRAM(s) Oral every 6 hours PRN  traMADol 25 milliGRAM(s) Oral every 4 hours PRN  traMADol 50 milliGRAM(s) Oral every 4 hours PRN      REVIEW OF SYSTEMS  --------------------------------------------------------------------------------  Gen: mild fatigue  Skin: No rashes  Head/Eyes/Ears/Mouth: No headache;No sore throat  Respiratory: No dyspnea, cough,   CV: No chest pain, PND, orthopnea  GI: feels distended   Transplant: mild pain  : No increased frequency, dysuria, hematuria, nocturia  MSK: No joint pain/swelling; no back pain; no edema  Neuro: No dizziness/lightheadedness, weakness, seizures, numbness, tingling  Psych: No significant nervousness, anxiety, stress, depression    All other systems were reviewed and are negative, except as noted.    VITALS/PHYSICAL EXAM  --------------------------------------------------------------------------------  T(C): 36.8 (03-13-21 @ 08:58), Max: 37.1 (03-12-21 @ 17:00)  HR: 69 (03-13-21 @ 08:58) (69 - 77)  BP: 176/86 (03-13-21 @ 08:58) (138/65 - 176/86)  RR: 18 (03-13-21 @ 08:58) (18 - 18)  SpO2: 98% (03-13-21 @ 08:58) (91% - 98%)  Wt(kg): --        03-12-21 @ 07:01  -  03-13-21 @ 07:00  --------------------------------------------------------  IN: 3380 mL / OUT: 1083 mL / NET: 2297 mL    03-13-21 @ 06:01  -  03-13-21 @ 10:40  --------------------------------------------------------  IN: 480 mL / OUT: 140 mL / NET: 340 mL      Physical Exam:  	Gen: NAD  	HEENT: PERRL, supple neck, clear oropharynx  	Pulm: CTA B/L  	CV: RRR, S1S2; no rub  	Back: No spinal or CVA tenderness; no sacral edema  	Abd: +BS, soft, nontender, + distension, tympanic                      Transplant: No tenderness, swelling, incision c/d/i  	: No suprapubic tenderness  	LE: +1 hip edema  	Neuro: No focal deficits  	Psych: Normal affect and mood  	Skin: Warm, without rashes      LABS/STUDIES  --------------------------------------------------------------------------------              7.6    7.96  >-----------<  116      [03-13-21 @ 05:56]              23.1     131  |  92  |  58  ----------------------------<  182      [03-13-21 @ 05:56]  4.2   |  24  |  6.34        Ca     8.0     [03-13-21 @ 05:56]      Mg     2.1     [03-13-21 @ 05:56]      Phos  6.6     [03-13-21 @ 05:56]    TPro  5.9  /  Alb  3.5  /  TBili  0.4  /  DBili  x   /  AST  25  /  ALT  16  /  AlkPhos  99  [03-13-21 @ 05:56]          Creatinine Trend:  SCr 6.34 [03-13 @ 05:56]  SCr 9.00 [03-12 @ 06:30]  SCr 8.39 [03-11 @ 16:41]  SCr 8.06 [03-11 @ 06:53]  SCr 10.50 [03-10 @ 02:51]    Tacrolimus (), Serum: 9.5 ng/mL (03-12 @ 07:28)  Tacrolimus (), Serum: 6.6 ng/mL (03-11 @ 07:25)                  HBsAb 56.4      [03-09-21 @ 17:13]  HBsAg Nonreact      [03-09-21 @ 17:13]  HBcAb Nonreact      [03-09-21 @ 17:13]  HCV 0.32, Nonreact      [03-09-21 @ 17:13]  HIV Nonreact      [03-09-21 @ 18:20]

## 2021-03-13 NOTE — PROGRESS NOTE ADULT - ATTENDING COMMENTS
POD#4 s/p DDRT with delayed graft function  had HD yesterday without fluid removal. Plan for UF today for fluid removal. Patient 9kg over dry weight  Cont current immunosuppression with Envarsus 7mg daily, Solumedrol taper, Cellcept 1gm bid.   Will check tacrolimus level and adjust dose accordingly.  Transplant Prophylaxis with nystatin, bactrim, valcyte  GI prophylaxis due to steroids  monitor glucose levels and adjust sliding scare as necessary   OOB, physical therapy  medication teaching

## 2021-03-14 LAB
ALBUMIN SERPL ELPH-MCNC: 3.3 G/DL — SIGNIFICANT CHANGE UP (ref 3.3–5)
ALP SERPL-CCNC: 117 U/L — SIGNIFICANT CHANGE UP (ref 40–120)
ALT FLD-CCNC: 31 U/L — SIGNIFICANT CHANGE UP (ref 10–45)
ANION GAP SERPL CALC-SCNC: 20 MMOL/L — HIGH (ref 5–17)
ANION GAP SERPL CALC-SCNC: 21 MMOL/L — HIGH (ref 5–17)
AST SERPL-CCNC: 33 U/L — SIGNIFICANT CHANGE UP (ref 10–40)
BILIRUB SERPL-MCNC: 0.5 MG/DL — SIGNIFICANT CHANGE UP (ref 0.2–1.2)
BUN SERPL-MCNC: 74 MG/DL — HIGH (ref 7–23)
BUN SERPL-MCNC: 89 MG/DL — HIGH (ref 7–23)
CALCIUM SERPL-MCNC: 7.9 MG/DL — LOW (ref 8.4–10.5)
CALCIUM SERPL-MCNC: 7.9 MG/DL — LOW (ref 8.4–10.5)
CHLORIDE SERPL-SCNC: 84 MMOL/L — LOW (ref 96–108)
CHLORIDE SERPL-SCNC: 85 MMOL/L — LOW (ref 96–108)
CO2 SERPL-SCNC: 21 MMOL/L — LOW (ref 22–31)
CO2 SERPL-SCNC: 21 MMOL/L — LOW (ref 22–31)
CREAT SERPL-MCNC: 6.99 MG/DL — HIGH (ref 0.5–1.3)
CREAT SERPL-MCNC: 7.51 MG/DL — HIGH (ref 0.5–1.3)
GLUCOSE SERPL-MCNC: 178 MG/DL — HIGH (ref 70–99)
GLUCOSE SERPL-MCNC: 188 MG/DL — HIGH (ref 70–99)
HCT VFR BLD CALC: 25.4 % — LOW (ref 39–50)
HGB BLD-MCNC: 8.9 G/DL — LOW (ref 13–17)
MAGNESIUM SERPL-MCNC: 2.2 MG/DL — SIGNIFICANT CHANGE UP (ref 1.6–2.6)
MCHC RBC-ENTMCNC: 32.4 PG — SIGNIFICANT CHANGE UP (ref 27–34)
MCHC RBC-ENTMCNC: 35 GM/DL — SIGNIFICANT CHANGE UP (ref 32–36)
MCV RBC AUTO: 92.4 FL — SIGNIFICANT CHANGE UP (ref 80–100)
NRBC # BLD: 0 /100 WBCS — SIGNIFICANT CHANGE UP (ref 0–0)
PHOSPHATE SERPL-MCNC: 7.7 MG/DL — HIGH (ref 2.5–4.5)
PLATELET # BLD AUTO: 117 K/UL — LOW (ref 150–400)
POTASSIUM SERPL-MCNC: 4.4 MMOL/L — SIGNIFICANT CHANGE UP (ref 3.5–5.3)
POTASSIUM SERPL-MCNC: 4.5 MMOL/L — SIGNIFICANT CHANGE UP (ref 3.5–5.3)
POTASSIUM SERPL-SCNC: 4.4 MMOL/L — SIGNIFICANT CHANGE UP (ref 3.5–5.3)
POTASSIUM SERPL-SCNC: 4.5 MMOL/L — SIGNIFICANT CHANGE UP (ref 3.5–5.3)
PROT SERPL-MCNC: 6 G/DL — SIGNIFICANT CHANGE UP (ref 6–8.3)
RBC # BLD: 2.75 M/UL — LOW (ref 4.2–5.8)
RBC # FLD: 14.5 % — SIGNIFICANT CHANGE UP (ref 10.3–14.5)
SODIUM SERPL-SCNC: 125 MMOL/L — LOW (ref 135–145)
SODIUM SERPL-SCNC: 127 MMOL/L — LOW (ref 135–145)
TACROLIMUS SERPL-MCNC: 11.1 NG/ML — SIGNIFICANT CHANGE UP
WBC # BLD: 9.72 K/UL — SIGNIFICANT CHANGE UP (ref 3.8–10.5)
WBC # FLD AUTO: 9.72 K/UL — SIGNIFICANT CHANGE UP (ref 3.8–10.5)

## 2021-03-14 PROCEDURE — 99232 SBSQ HOSP IP/OBS MODERATE 35: CPT

## 2021-03-14 RX ORDER — SEVELAMER CARBONATE 2400 MG/1
800 POWDER, FOR SUSPENSION ORAL
Refills: 0 | Status: DISCONTINUED | OUTPATIENT
Start: 2021-03-14 | End: 2021-03-15

## 2021-03-14 RX ORDER — TRAZODONE HCL 50 MG
50 TABLET ORAL ONCE
Refills: 0 | Status: COMPLETED | OUTPATIENT
Start: 2021-03-14 | End: 2021-03-14

## 2021-03-14 RX ORDER — INSULIN LISPRO 100/ML
6 VIAL (ML) SUBCUTANEOUS
Refills: 0 | Status: DISCONTINUED | OUTPATIENT
Start: 2021-03-14 | End: 2021-03-15

## 2021-03-14 RX ORDER — GLYCERIN ADULT
1 SUPPOSITORY, RECTAL RECTAL ONCE
Refills: 0 | Status: COMPLETED | OUTPATIENT
Start: 2021-03-14 | End: 2021-03-14

## 2021-03-14 RX ORDER — MAGNESIUM HYDROXIDE 400 MG/1
30 TABLET, CHEWABLE ORAL ONCE
Refills: 0 | Status: COMPLETED | OUTPATIENT
Start: 2021-03-14 | End: 2021-03-14

## 2021-03-14 RX ORDER — FUROSEMIDE 40 MG
80 TABLET ORAL ONCE
Refills: 0 | Status: COMPLETED | OUTPATIENT
Start: 2021-03-14 | End: 2021-03-14

## 2021-03-14 RX ADMIN — Medication 1 SUPPOSITORY(S): at 10:43

## 2021-03-14 RX ADMIN — Medication 10 MILLIGRAM(S): at 17:19

## 2021-03-14 RX ADMIN — MAGNESIUM HYDROXIDE 30 MILLILITER(S): 400 TABLET, CHEWABLE ORAL at 10:43

## 2021-03-14 RX ADMIN — Medication 4: at 09:38

## 2021-03-14 RX ADMIN — CHLORHEXIDINE GLUCONATE 1 APPLICATION(S): 213 SOLUTION TOPICAL at 13:53

## 2021-03-14 RX ADMIN — SENNA PLUS 2 TABLET(S): 8.6 TABLET ORAL at 21:27

## 2021-03-14 RX ADMIN — Medication 1 TABLET(S): at 13:50

## 2021-03-14 RX ADMIN — CARVEDILOL PHOSPHATE 25 MILLIGRAM(S): 80 CAPSULE, EXTENDED RELEASE ORAL at 05:09

## 2021-03-14 RX ADMIN — SEVELAMER CARBONATE 800 MILLIGRAM(S): 2400 POWDER, FOR SUSPENSION ORAL at 13:53

## 2021-03-14 RX ADMIN — Medication 650 MILLIGRAM(S): at 20:15

## 2021-03-14 RX ADMIN — Medication 500000 UNIT(S): at 00:38

## 2021-03-14 RX ADMIN — CARVEDILOL PHOSPHATE 25 MILLIGRAM(S): 80 CAPSULE, EXTENDED RELEASE ORAL at 17:19

## 2021-03-14 RX ADMIN — Medication 60 MILLIGRAM(S): at 05:09

## 2021-03-14 RX ADMIN — Medication 500000 UNIT(S): at 13:49

## 2021-03-14 RX ADMIN — INSULIN GLARGINE 25 UNIT(S): 100 INJECTION, SOLUTION SUBCUTANEOUS at 21:43

## 2021-03-14 RX ADMIN — Medication 10 MILLIGRAM(S): at 05:09

## 2021-03-14 RX ADMIN — MYCOPHENOLATE MOFETIL 1000 MILLIGRAM(S): 250 CAPSULE ORAL at 19:43

## 2021-03-14 RX ADMIN — TACROLIMUS 7 MILLIGRAM(S): 5 CAPSULE ORAL at 08:41

## 2021-03-14 RX ADMIN — MYCOPHENOLATE MOFETIL 1000 MILLIGRAM(S): 250 CAPSULE ORAL at 08:41

## 2021-03-14 RX ADMIN — Medication 2: at 16:24

## 2021-03-14 RX ADMIN — FAMOTIDINE 20 MILLIGRAM(S): 10 INJECTION INTRAVENOUS at 13:49

## 2021-03-14 RX ADMIN — Medication 500000 UNIT(S): at 17:19

## 2021-03-14 RX ADMIN — Medication 6 UNIT(S): at 17:16

## 2021-03-14 RX ADMIN — SEVELAMER CARBONATE 800 MILLIGRAM(S): 2400 POWDER, FOR SUSPENSION ORAL at 17:18

## 2021-03-14 RX ADMIN — Medication 500000 UNIT(S): at 05:09

## 2021-03-14 RX ADMIN — Medication 500000 UNIT(S): at 23:09

## 2021-03-14 RX ADMIN — SEVELAMER CARBONATE 800 MILLIGRAM(S): 2400 POWDER, FOR SUSPENSION ORAL at 05:09

## 2021-03-14 RX ADMIN — Medication 650 MILLIGRAM(S): at 19:43

## 2021-03-14 RX ADMIN — Medication 6 UNIT(S): at 09:40

## 2021-03-14 RX ADMIN — Medication 50 MILLIGRAM(S): at 21:27

## 2021-03-14 RX ADMIN — Medication 80 MILLIGRAM(S): at 16:24

## 2021-03-14 NOTE — PROGRESS NOTE ADULT - ATTENDING COMMENTS
POD#5 s/p DDRT with delayed graft function  Making small amount of urine. had HD 2 days ago, UF yesterday  Will plan to start PD when abdomen less distended  Cont current immunosuppression with Envarsus daily, Solumedrol taper, Cellcept 1gm bid.   Will check tacrolimus level and adjust dose accordingly.  Transplant Prophylaxis with nystatin, bactrim, valcyte  GI prophylaxis due to steroids  monitor glucose levels and adjust sliding scare as necessary   OOB, physical therapy  medication teaching

## 2021-03-14 NOTE — PROGRESS NOTE ADULT - SUBJECTIVE AND OBJECTIVE BOX
Transplant Surgery - Multidisciplinary Rounds  --------------------------------------------------------------  DCD DDRT (1a/1v/1u--stented)   Date: 3/9/2021         POD# 4    Present: Patient seen and examined with multidisciplinary team including Transplant Surgeon: Dr. Floyd, Transplant Nephrologist: Dr. Beck Olmos, [facility], and bedside RN during am rounds. Disciplines not in attendance will be notified of the plan.     HPI: 72 y/o M with PMHx of HTN (age 43), DM on insulin (age 43), gout, anxiety, depression, OCD and CKD since 2016.  He started HD via L AVF  in April 2020 at [facility] and transitioned to PD about 5 months ago. He makes ~2 ounces of urine 4 times daily.      Underwent DCD DDRT 3/9/2021 on 3/9/2021 (1A/1V/1U stented). Post op course c/b DGF requiring HD    Interval Events:  - POD 4 s/p DCD DDRT. Afebrile, HTNsive into 180s yesterday, increased Nifedipine to 60 from 30. Remains on coreg 25 BID.  -Yesterday, recieved epogen 10,000U.  -Lasix was dced.  -UF yesterday, removed 2L.   - UOP improved this AM, currently making 30-60cc/hr.  -Cr still uptrending to 6.99 form 6.34.   -Na 125 this AM.    Potential Discharge date: pending clinical stability  Education:  Medications  Plan of care:  See Below      MEDICATIONS  (STANDING):  carvedilol 25 milliGRAM(s) Oral every 12 hours  chlorhexidine 2% Cloths 1 Application(s) Topical daily  dextrose 40% Gel 15 Gram(s) Oral once  dextrose 5%. 1000 milliLiter(s) (50 mL/Hr) IV Continuous <Continuous>  dextrose 5%. 1000 milliLiter(s) (100 mL/Hr) IV Continuous <Continuous>  dextrose 50% Injectable 25 Gram(s) IV Push once  dextrose 50% Injectable 12.5 Gram(s) IV Push once  dextrose 50% Injectable 25 Gram(s) IV Push once  famotidine    Tablet 20 milliGRAM(s) Oral daily  gentamicin 0.1% Ointment 1 Application(s) Topical <User Schedule>  glucagon  Injectable 1 milliGRAM(s) IntraMuscular once  insulin glargine Injectable (LANTUS) 25 Unit(s) SubCutaneous at bedtime  insulin lispro (ADMELOG) corrective regimen sliding scale   SubCutaneous three times a day before meals  insulin lispro (ADMELOG) corrective regimen sliding scale   SubCutaneous at bedtime  insulin lispro Injectable (ADMELOG) 6 Unit(s) SubCutaneous three times a day before meals  mycophenolate mofetil 1000 milliGRAM(s) Oral <User Schedule>  NIFEdipine XL 60 milliGRAM(s) Oral daily  nystatin    Suspension 512313 Unit(s) Swish and Swallow four times a day  polyethylene glycol 3350 17 Gram(s) Oral daily  predniSONE   Tablet 10 milliGRAM(s) Oral two times a day  senna 2 Tablet(s) Oral at bedtime  sevelamer carbonate 800 milliGRAM(s) Oral three times a day with meals  tacrolimus ER Tablet (ENVARSUS XR) 7 milliGRAM(s) Oral <User Schedule>  traZODone 50 milliGRAM(s) Oral once  trimethoprim   80 mG/sulfamethoxazole 400 mG 1 Tablet(s) Oral daily  valGANciclovir 450 milliGRAM(s) Oral <User Schedule>    MEDICATIONS  (PRN):  acetaminophen   Tablet .. 650 milliGRAM(s) Oral every 6 hours PRN Mild Pain (1 - 3)  traMADol 25 milliGRAM(s) Oral every 4 hours PRN Moderate Pain (4 - 6)  traMADol 50 milliGRAM(s) Oral every 4 hours PRN Severe Pain (7 - 10)      PAST MEDICAL & SURGICAL HISTORY:  DM (diabetes mellitus)    HTN (hypertension)    Chronic kidney disease (CKD)        Vital Signs Last 24 Hrs  T(C): 36.7 (14 Mar 2021 09:00), Max: 37.1 (14 Mar 2021 00:39)  T(F): 98 (14 Mar 2021 09:00), Max: 98.7 (14 Mar 2021 00:39)  HR: 69 (14 Mar 2021 09:00) (64 - 71)  BP: 165/83 (14 Mar 2021 09:00) (157/74 - 181/88)  BP(mean): 118 (14 Mar 2021 09:00) (106 - 127)  RR: 20 (14 Mar 2021 09:00) (18 - 20)  SpO2: 96% (14 Mar 2021 09:00) (96% - 100%)    I&O's Summary    13 Mar 2021 06:01  -  14 Mar 2021 07:00  --------------------------------------------------------  IN: 3120 mL / OUT: 2887 mL / NET: 233 mL    14 Mar 2021 07:01  -  14 Mar 2021 11:38  --------------------------------------------------------  IN: 120 mL / OUT: 160 mL / NET: -40 mL                              8.9    9.72  )-----------( 117      ( 14 Mar 2021 07:45 )             25.4     03-14    125<L>  |  84<L>  |  74<H>  ----------------------------<  188<H>  4.4   |  21<L>  |  6.99<H>    Ca    7.9<L>      14 Mar 2021 07:44  Phos  7.7     03-14  Mg     2.2     03-14    TPro  6.0  /  Alb  3.3  /  TBili  0.5  /  DBili  x   /  AST  33  /  ALT  31  /  AlkPhos  117  03-14    Tacrolimus (), Serum: 11.1 ng/mL (03-14 @ 09:11)      Review of systems  Gen: No weight changes, fatigue, fevers/chills, weakness  Skin: No rashes  Head/Eyes/Ears/Mouth: No headache; Normal hearing; Normal vision w/o blurriness; No sinus pain/discomfort, sore throat  Respiratory: No dyspnea, cough, wheezing, hemoptysis  CV: No chest pain, PND, orthopnea  GI: Mild abdominal pain at surgical incision site; denies diarrhea, constipation, nausea, vomiting, melena, hematochezia  : No increased frequency, dysuria, hematuria, nocturia  MSK: No joint pain/swelling; no back pain; no edema  Neuro: No dizziness/lightheadedness, weakness, seizures, numbness, tingling  Heme: No easy bruising or bleeding  Endo: No heat/cold intolerance  Psych: No significant nervousness, anxiety, stress, depression  All other systems were reviewed and are negative, except as noted.    PHYSICAL EXAM:  Constitutional: Well developed / well nourished  Eyes: Anicteric, PERRLA  ENMT: nc/at  Neck: central line RIJ  Respiratory: Decreased breath sounds on R lung base  Cardiovascular: RRR  Gastrointestinal: Soft, non distended, mild tenderness at the incision site; incision c/d/i; LORETTA serosanguinous  Genitourinary: Urinary catheter in place  Extremities: SCD's in place and working bilaterally, AVF palpablem + edema  Vascular: 1+ DP pulses b/l  Neurological: A&O x3  Skin: no rashes, ulcerations or lesions;  Musculoskeletal: Moving all extremities  Psychiatric: Responsive

## 2021-03-14 NOTE — PROGRESS NOTE ADULT - ASSESSMENT
Additional Notes: Patient consent was obtained to proceed with the visit and recommended plan of care after discussion of all risks and benefits, including the risks of COVID-19 exposure. Detail Level: Simple Patient is a 72 y/o M w PMH of ESRD on PD for 5 months, was on HD in 2020, with nephrologist Dr Novak, IDDM, HTN, gout, depression, anxiety and OCD. S/p DCD with JOJO DDRT on 3/9/21.     1. S/p DDRT on 3/9/21 - has ATN from ischemia reperfusion injury and ATN of donor. S/p Simulect induction. Urine output increased.  No dialysis today, reassess tomorrow.   Will need to be transitioned to PD over weekend or next week prior to being discharged home. Monitor labs and urine output.   Have not started PD yet due to abdominal distension.     2. Immunosupression - Simulect induction, currently on Envarsus, MMF 1g PO BID and steroid taper     3. Prophylaxis - bactrim/nystatin/valcyte     4. Hypertension - BP slightly high.  on Coreg to 25 mg PO BID and Nifedipine - increased to 60.  Monitor BP. Low salt diet.     5.  DM2 - increase lispro to 6 units daily.      6.  Hyponatremia - from poor clearance and fluid intake.  Restrict to 1 L

## 2021-03-14 NOTE — PROGRESS NOTE ADULT - SUBJECTIVE AND OBJECTIVE BOX
[facility] DIVISION OF KIDNEY DISEASES AND HYPERTENSION -- FOLLOW UP NOTE  --------------------------------------------------------------------------------  Chief Complaint:  kidney transplant    24 hour events/subjective:  Pt feels tired today.  Did not sleep last night.       PAST HISTORY  --------------------------------------------------------------------------------  No significant changes to PMH, PSH, FHx, SHx, unless otherwise noted    ALLERGIES & MEDICATIONS  --------------------------------------------------------------------------------  Allergies    ACE inhibitors (Angioedema)  Levaquin (Angioedema)    Intolerances      Standing Inpatient Medications  carvedilol 25 milliGRAM(s) Oral every 12 hours  chlorhexidine 2% Cloths 1 Application(s) Topical daily  dextrose 40% Gel 15 Gram(s) Oral once  dextrose 5%. 1000 milliLiter(s) IV Continuous <Continuous>  dextrose 5%. 1000 milliLiter(s) IV Continuous <Continuous>  dextrose 50% Injectable 25 Gram(s) IV Push once  dextrose 50% Injectable 12.5 Gram(s) IV Push once  dextrose 50% Injectable 25 Gram(s) IV Push once  famotidine    Tablet 20 milliGRAM(s) Oral daily  gentamicin 0.1% Ointment 1 Application(s) Topical <User Schedule>  glucagon  Injectable 1 milliGRAM(s) IntraMuscular once  glycerin Suppository - Adult 1 Suppository(s) Rectal once  insulin glargine Injectable (LANTUS) 25 Unit(s) SubCutaneous at bedtime  insulin lispro (ADMELOG) corrective regimen sliding scale   SubCutaneous three times a day before meals  insulin lispro (ADMELOG) corrective regimen sliding scale   SubCutaneous at bedtime  insulin lispro Injectable (ADMELOG) 6 Unit(s) SubCutaneous three times a day before meals  magnesium hydroxide Suspension 30 milliLiter(s) Oral once  mycophenolate mofetil 1000 milliGRAM(s) Oral <User Schedule>  NIFEdipine XL 60 milliGRAM(s) Oral daily  nystatin    Suspension 348261 Unit(s) Swish and Swallow four times a day  polyethylene glycol 3350 17 Gram(s) Oral daily  predniSONE   Tablet 10 milliGRAM(s) Oral two times a day  senna 2 Tablet(s) Oral at bedtime  sevelamer carbonate 800 milliGRAM(s) Oral three times a day with meals  sevelamer carbonate 800 milliGRAM(s) Oral two times a day  tacrolimus ER Tablet (ENVARSUS XR) 7 milliGRAM(s) Oral <User Schedule>  traZODone 50 milliGRAM(s) Oral once  trimethoprim   80 mG/sulfamethoxazole 400 mG 1 Tablet(s) Oral daily  valGANciclovir 450 milliGRAM(s) Oral <User Schedule>    PRN Inpatient Medications  acetaminophen   Tablet .. 650 milliGRAM(s) Oral every 6 hours PRN  traMADol 25 milliGRAM(s) Oral every 4 hours PRN  traMADol 50 milliGRAM(s) Oral every 4 hours PRN      REVIEW OF SYSTEMS  --------------------------------------------------------------------------------  Gen: fatigue  Skin: No rashes  Head/Eyes/Ears/Mouth: No headache;No sore throat  Respiratory: No dyspnea, cough,   CV: No chest pain, PND, orthopnea  GI: No abdominal pain, diarrhea, constipation, nausea, vomiting  Transplant: mild incisional pain.   : No increased frequency, dysuria, hematuria, nocturia  MSK: No joint pain/swelling; no back pain; no edema  Neuro: No dizziness/lightheadedness, weakness, seizures, numbness, tingling  Psych: No significant nervousness, anxiety, stress, depression    All other systems were reviewed and are negative, except as noted.    VITALS/PHYSICAL EXAM  --------------------------------------------------------------------------------  T(C): 36.7 (03-14-21 @ 09:00), Max: 37.1 (03-14-21 @ 00:39)  HR: 69 (03-14-21 @ 09:00) (64 - 71)  BP: 165/83 (03-14-21 @ 09:00) (157/74 - 181/88)  RR: 20 (03-14-21 @ 09:00) (18 - 20)  SpO2: 96% (03-14-21 @ 09:00) (96% - 100%)  Wt(kg): --        03-13-21 @ 06:01  -  03-14-21 @ 07:00  --------------------------------------------------------  IN: 3120 mL / OUT: 2887 mL / NET: 233 mL    03-14-21 @ 07:01  -  03-14-21 @ 09:31  --------------------------------------------------------  IN: 120 mL / OUT: 60 mL / NET: 60 mL      Physical Exam:  	Gen: NAD  	HEENT: PERRL, supple neck, clear oropharynx  	Pulm: CTA B/L  	CV: RRR, S1S2; no rub  	Back: No spinal or CVA tenderness; no sacral edema  	Abd: +BS, soft, nontender/nondistended                      Transplant: No tenderness, swelling, incision c/d/i  	: No suprapubic tenderness, alfaro in place  	LE: trace LE edema  	Neuro: No focal deficits  	Psych: Normal affect and mood  	Skin: Warm, without rashes      LABS/STUDIES  --------------------------------------------------------------------------------              8.9    9.72  >-----------<  117      [03-14-21 @ 07:45]              25.4     125  |  84  |  74  ----------------------------<  188      [03-14-21 @ 07:44]  4.4   |  21  |  6.99        Ca     7.9     [03-14-21 @ 07:44]      Mg     2.2     [03-14-21 @ 07:44]      Phos  7.7     [03-14-21 @ 07:44]    TPro  6.0  /  Alb  3.3  /  TBili  0.5  /  DBili  x   /  AST  33  /  ALT  31  /  AlkPhos  117  [03-14-21 @ 07:44]          Creatinine Trend:  SCr 6.99 [03-14 @ 07:44]  SCr 6.34 [03-13 @ 05:56]  SCr 9.00 [03-12 @ 06:30]  SCr 8.39 [03-11 @ 16:41]  SCr 8.06 [03-11 @ 06:53]    Tacrolimus (), Serum: 6.9 ng/mL (03-13 @ 08:17)  Tacrolimus (), Serum: 9.5 ng/mL (03-12 @ 07:28)  Tacrolimus (), Serum: 6.6 ng/mL (03-11 @ 07:25)                  HBsAb 56.4      [03-09-21 @ 17:13]  HBsAg Nonreact      [03-09-21 @ 17:13]  HBcAb Nonreact      [03-09-21 @ 17:13]  HCV 0.32, Nonreact      [03-09-21 @ 17:13]  HIV Nonreact      [03-09-21 @ 18:20]

## 2021-03-14 NOTE — PROGRESS NOTE ADULT - ASSESSMENT
71M with h/o HTN (age 43), IDDM (age 43), gout, anxiety, depression, OCD, CKD (since 2016) was on HD via L AVF (4/2020 Dr. Novak) with transition to PD in 9/2020 now s/p R DCD DDRT (3/9/21)    [] POD#4 s/p R DCD DDRT c/b DGF  - UOP improved today, Cr still uptrending. No HD today.   - Immuno: Env 7mg daily, MMF 1g bid, Pred taper, Simulect today  - PPx: valcyte/bactrim/nystatin  - Cont strict I&Os  - Diet: Renal   - Pain control  - Bowel regimen   - SCDs/IS/OO  - PT c/s  -start phos binder  -Fluid restrict to 1L for hyponatremia. Repeat BMP tonight.   -trazadone for insomnia tonight.  -glycerin suppository today.    [] DM  - Enrolled in CGM study (conventional group)   - Resume glargine 25u qhs, increase lispro to 6u tid with meals, ISS    [] HTN  - cont coreg 25mg bid and nifedipine 60mg daily

## 2021-03-15 LAB
A1C WITH ESTIMATED AVERAGE GLUCOSE RESULT: 6 % — HIGH (ref 4–5.6)
ALBUMIN SERPL ELPH-MCNC: 3 G/DL — LOW (ref 3.3–5)
ALP SERPL-CCNC: 102 U/L — SIGNIFICANT CHANGE UP (ref 40–120)
ALT FLD-CCNC: 17 U/L — SIGNIFICANT CHANGE UP (ref 10–45)
ANION GAP SERPL CALC-SCNC: 19 MMOL/L — HIGH (ref 5–17)
APTT BLD: 27.7 SEC — SIGNIFICANT CHANGE UP (ref 27.5–35.5)
AST SERPL-CCNC: 14 U/L — SIGNIFICANT CHANGE UP (ref 10–40)
BASOPHILS # BLD AUTO: 0.01 K/UL — SIGNIFICANT CHANGE UP (ref 0–0.2)
BASOPHILS NFR BLD AUTO: 0.1 % — SIGNIFICANT CHANGE UP (ref 0–2)
BILIRUB SERPL-MCNC: 0.5 MG/DL — SIGNIFICANT CHANGE UP (ref 0.2–1.2)
BUN SERPL-MCNC: 98 MG/DL — HIGH (ref 7–23)
CALCIUM SERPL-MCNC: 7.8 MG/DL — LOW (ref 8.4–10.5)
CHLORIDE SERPL-SCNC: 86 MMOL/L — LOW (ref 96–108)
CO2 SERPL-SCNC: 21 MMOL/L — LOW (ref 22–31)
CREAT SERPL-MCNC: 8.16 MG/DL — HIGH (ref 0.5–1.3)
EOSINOPHIL # BLD AUTO: 0.03 K/UL — SIGNIFICANT CHANGE UP (ref 0–0.5)
EOSINOPHIL NFR BLD AUTO: 0.4 % — SIGNIFICANT CHANGE UP (ref 0–6)
ESTIMATED AVERAGE GLUCOSE: 126 MG/DL — HIGH (ref 68–114)
GLUCOSE SERPL-MCNC: 124 MG/DL — HIGH (ref 70–99)
HCT VFR BLD CALC: 23.3 % — LOW (ref 39–50)
HGB BLD-MCNC: 8.2 G/DL — LOW (ref 13–17)
IMM GRANULOCYTES NFR BLD AUTO: 1.3 % — SIGNIFICANT CHANGE UP (ref 0–1.5)
INR BLD: 1.07 RATIO — SIGNIFICANT CHANGE UP (ref 0.88–1.16)
LYMPHOCYTES # BLD AUTO: 0.41 K/UL — LOW (ref 1–3.3)
LYMPHOCYTES # BLD AUTO: 4.9 % — LOW (ref 13–44)
MAGNESIUM SERPL-MCNC: 2.4 MG/DL — SIGNIFICANT CHANGE UP (ref 1.6–2.6)
MCHC RBC-ENTMCNC: 32.2 PG — SIGNIFICANT CHANGE UP (ref 27–34)
MCHC RBC-ENTMCNC: 35.2 GM/DL — SIGNIFICANT CHANGE UP (ref 32–36)
MCV RBC AUTO: 91.4 FL — SIGNIFICANT CHANGE UP (ref 80–100)
MONOCYTES # BLD AUTO: 0.7 K/UL — SIGNIFICANT CHANGE UP (ref 0–0.9)
MONOCYTES NFR BLD AUTO: 8.3 % — SIGNIFICANT CHANGE UP (ref 2–14)
NEUTROPHILS # BLD AUTO: 7.17 K/UL — SIGNIFICANT CHANGE UP (ref 1.8–7.4)
NEUTROPHILS NFR BLD AUTO: 85 % — HIGH (ref 43–77)
NRBC # BLD: 0 /100 WBCS — SIGNIFICANT CHANGE UP (ref 0–0)
PHOSPHATE SERPL-MCNC: 8.1 MG/DL — HIGH (ref 2.5–4.5)
PLATELET # BLD AUTO: 120 K/UL — LOW (ref 150–400)
POTASSIUM SERPL-MCNC: 4.7 MMOL/L — SIGNIFICANT CHANGE UP (ref 3.5–5.3)
POTASSIUM SERPL-SCNC: 4.7 MMOL/L — SIGNIFICANT CHANGE UP (ref 3.5–5.3)
PROT SERPL-MCNC: 5.6 G/DL — LOW (ref 6–8.3)
PROTHROM AB SERPL-ACNC: 12.8 SEC — SIGNIFICANT CHANGE UP (ref 10.6–13.6)
RBC # BLD: 2.55 M/UL — LOW (ref 4.2–5.8)
RBC # FLD: 14.5 % — SIGNIFICANT CHANGE UP (ref 10.3–14.5)
SODIUM SERPL-SCNC: 126 MMOL/L — LOW (ref 135–145)
TACROLIMUS SERPL-MCNC: 7.1 NG/ML — SIGNIFICANT CHANGE UP
WBC # BLD: 8.43 K/UL — SIGNIFICANT CHANGE UP (ref 3.8–10.5)
WBC # FLD AUTO: 8.43 K/UL — SIGNIFICANT CHANGE UP (ref 3.8–10.5)

## 2021-03-15 PROCEDURE — 99232 SBSQ HOSP IP/OBS MODERATE 35: CPT | Mod: GC

## 2021-03-15 RX ORDER — GENTAMICIN SULFATE 0.1 %
1 OINTMENT (GRAM) TOPICAL DAILY
Refills: 0 | Status: DISCONTINUED | OUTPATIENT
Start: 2021-03-15 | End: 2021-03-15

## 2021-03-15 RX ORDER — SEVELAMER CARBONATE 2400 MG/1
1600 POWDER, FOR SUSPENSION ORAL
Refills: 0 | Status: DISCONTINUED | OUTPATIENT
Start: 2021-03-15 | End: 2021-03-21

## 2021-03-15 RX ORDER — FUROSEMIDE 40 MG/1
40 TABLET ORAL
Qty: 120 | Refills: 3 | Status: DISCONTINUED | COMMUNITY
Start: 2021-03-11 | End: 2021-03-15

## 2021-03-15 RX ORDER — FUROSEMIDE 40 MG
80 TABLET ORAL ONCE
Refills: 0 | Status: COMPLETED | OUTPATIENT
Start: 2021-03-15 | End: 2021-03-15

## 2021-03-15 RX ORDER — TACROLIMUS 5 MG/1
1 CAPSULE ORAL ONCE
Refills: 0 | Status: COMPLETED | OUTPATIENT
Start: 2021-03-15 | End: 2021-03-15

## 2021-03-15 RX ORDER — HYDRALAZINE HCL 50 MG
10 TABLET ORAL ONCE
Refills: 0 | Status: COMPLETED | OUTPATIENT
Start: 2021-03-15 | End: 2021-03-15

## 2021-03-15 RX ORDER — FUROSEMIDE 40 MG/1
40 TABLET ORAL
Refills: 0 | Status: DISCONTINUED | COMMUNITY
End: 2021-03-15

## 2021-03-15 RX ORDER — ERYTHROPOIETIN 10000 [IU]/ML
10000 INJECTION, SOLUTION INTRAVENOUS; SUBCUTANEOUS ONCE
Refills: 0 | Status: COMPLETED | OUTPATIENT
Start: 2021-03-15 | End: 2021-03-15

## 2021-03-15 RX ORDER — TACROLIMUS 5 MG/1
8 CAPSULE ORAL
Refills: 0 | Status: DISCONTINUED | OUTPATIENT
Start: 2021-03-16 | End: 2021-03-17

## 2021-03-15 RX ORDER — INSULIN LISPRO 100/ML
4 VIAL (ML) SUBCUTANEOUS
Refills: 0 | Status: DISCONTINUED | OUTPATIENT
Start: 2021-03-15 | End: 2021-03-16

## 2021-03-15 RX ADMIN — TACROLIMUS 7 MILLIGRAM(S): 5 CAPSULE ORAL at 08:44

## 2021-03-15 RX ADMIN — POLYETHYLENE GLYCOL 3350 17 GRAM(S): 17 POWDER, FOR SOLUTION ORAL at 11:20

## 2021-03-15 RX ADMIN — Medication 80 MILLIGRAM(S): at 11:17

## 2021-03-15 RX ADMIN — Medication 500000 UNIT(S): at 18:15

## 2021-03-15 RX ADMIN — CHLORHEXIDINE GLUCONATE 1 APPLICATION(S): 213 SOLUTION TOPICAL at 11:17

## 2021-03-15 RX ADMIN — Medication 6 UNIT(S): at 08:43

## 2021-03-15 RX ADMIN — Medication 60 MILLIGRAM(S): at 06:07

## 2021-03-15 RX ADMIN — CARVEDILOL PHOSPHATE 25 MILLIGRAM(S): 80 CAPSULE, EXTENDED RELEASE ORAL at 22:20

## 2021-03-15 RX ADMIN — TACROLIMUS 1 MILLIGRAM(S): 5 CAPSULE ORAL at 11:17

## 2021-03-15 RX ADMIN — Medication 500000 UNIT(S): at 06:07

## 2021-03-15 RX ADMIN — TRAMADOL HYDROCHLORIDE 50 MILLIGRAM(S): 50 TABLET ORAL at 21:39

## 2021-03-15 RX ADMIN — Medication 1 TABLET(S): at 11:20

## 2021-03-15 RX ADMIN — SENNA PLUS 2 TABLET(S): 8.6 TABLET ORAL at 21:39

## 2021-03-15 RX ADMIN — SEVELAMER CARBONATE 1600 MILLIGRAM(S): 2400 POWDER, FOR SUSPENSION ORAL at 19:26

## 2021-03-15 RX ADMIN — FAMOTIDINE 20 MILLIGRAM(S): 10 INJECTION INTRAVENOUS at 11:20

## 2021-03-15 RX ADMIN — INSULIN GLARGINE 25 UNIT(S): 100 INJECTION, SOLUTION SUBCUTANEOUS at 21:39

## 2021-03-15 RX ADMIN — CARVEDILOL PHOSPHATE 25 MILLIGRAM(S): 80 CAPSULE, EXTENDED RELEASE ORAL at 06:07

## 2021-03-15 RX ADMIN — Medication 10 MILLIGRAM(S): at 07:55

## 2021-03-15 RX ADMIN — MYCOPHENOLATE MOFETIL 1000 MILLIGRAM(S): 250 CAPSULE ORAL at 08:43

## 2021-03-15 RX ADMIN — SEVELAMER CARBONATE 1600 MILLIGRAM(S): 2400 POWDER, FOR SUSPENSION ORAL at 15:18

## 2021-03-15 RX ADMIN — SEVELAMER CARBONATE 800 MILLIGRAM(S): 2400 POWDER, FOR SUSPENSION ORAL at 08:43

## 2021-03-15 RX ADMIN — ERYTHROPOIETIN 10000 UNIT(S): 10000 INJECTION, SOLUTION INTRAVENOUS; SUBCUTANEOUS at 18:48

## 2021-03-15 RX ADMIN — Medication 650 MILLIGRAM(S): at 22:20

## 2021-03-15 RX ADMIN — Medication 650 MILLIGRAM(S): at 23:00

## 2021-03-15 RX ADMIN — Medication 500000 UNIT(S): at 11:18

## 2021-03-15 RX ADMIN — Medication 5 MILLIGRAM(S): at 06:07

## 2021-03-15 RX ADMIN — Medication 4 UNIT(S): at 19:26

## 2021-03-15 RX ADMIN — Medication 5 MILLIGRAM(S): at 18:16

## 2021-03-15 RX ADMIN — Medication 500000 UNIT(S): at 22:21

## 2021-03-15 RX ADMIN — VALGANCICLOVIR 450 MILLIGRAM(S): 450 TABLET, FILM COATED ORAL at 08:43

## 2021-03-15 RX ADMIN — TRAMADOL HYDROCHLORIDE 50 MILLIGRAM(S): 50 TABLET ORAL at 22:30

## 2021-03-15 RX ADMIN — MYCOPHENOLATE MOFETIL 1000 MILLIGRAM(S): 250 CAPSULE ORAL at 19:28

## 2021-03-15 NOTE — PROGRESS NOTE ADULT - SUBJECTIVE AND OBJECTIVE BOX
[facility] DIVISION OF KIDNEY DISEASES AND HYPERTENSION -- FOLLOW UP NOTE  --------------------------------------------------------------------------------    24 hour events/subjective: Patient was seen and examined at bedside. Reported feeling well. Denies CP, SOB, fever, chills, nausea, vomiting, diarrhea, or dysuria      PAST HISTORY  --------------------------------------------------------------------------------  No significant changes to PMH, PSH, FHx, SHx, unless otherwise noted    ALLERGIES & MEDICATIONS  --------------------------------------------------------------------------------  Allergies    ACE inhibitors (Angioedema)  Levaquin (Angioedema)    Intolerances    Standing Inpatient Medications  carvedilol 25 milliGRAM(s) Oral every 12 hours  chlorhexidine 2% Cloths 1 Application(s) Topical daily  dextrose 40% Gel 15 Gram(s) Oral once  dextrose 5%. 1000 milliLiter(s) IV Continuous <Continuous>  dextrose 5%. 1000 milliLiter(s) IV Continuous <Continuous>  dextrose 50% Injectable 25 Gram(s) IV Push once  dextrose 50% Injectable 12.5 Gram(s) IV Push once  dextrose 50% Injectable 25 Gram(s) IV Push once  famotidine    Tablet 20 milliGRAM(s) Oral daily  furosemide   Injectable 80 milliGRAM(s) IV Push once  gentamicin 0.1% Ointment 1 Application(s) Topical <User Schedule>  glucagon  Injectable 1 milliGRAM(s) IntraMuscular once  insulin glargine Injectable (LANTUS) 25 Unit(s) SubCutaneous at bedtime  insulin lispro (ADMELOG) corrective regimen sliding scale   SubCutaneous three times a day before meals  insulin lispro (ADMELOG) corrective regimen sliding scale   SubCutaneous at bedtime  insulin lispro Injectable (ADMELOG) 4 Unit(s) SubCutaneous three times a day with meals  mycophenolate mofetil 1000 milliGRAM(s) Oral <User Schedule>  NIFEdipine XL 60 milliGRAM(s) Oral daily  nystatin    Suspension 378602 Unit(s) Swish and Swallow four times a day  polyethylene glycol 3350 17 Gram(s) Oral daily  predniSONE   Tablet 5 milliGRAM(s) Oral two times a day  senna 2 Tablet(s) Oral at bedtime  sevelamer carbonate 1600 milliGRAM(s) Oral three times a day with meals  tacrolimus ER Tablet (ENVARSUS XR) 1 milliGRAM(s) Oral once  trimethoprim   80 mG/sulfamethoxazole 400 mG 1 Tablet(s) Oral daily  valGANciclovir 450 milliGRAM(s) Oral <User Schedule>    PRN Inpatient Medications  acetaminophen   Tablet .. 650 milliGRAM(s) Oral every 6 hours PRN  traMADol 25 milliGRAM(s) Oral every 4 hours PRN  traMADol 50 milliGRAM(s) Oral every 4 hours PRN      REVIEW OF SYSTEMS  --------------------------------------------------------------------------------  Gen: No fevers/chills  Respiratory: No dyspnea, cough  CV: No chest pain  GI: No abdominal pain, diarrhea  : No dysuria, hematuria    All other systems were reviewed and are negative, except as noted.    VITALS/PHYSICAL EXAM  --------------------------------------------------------------------------------  T(C): 36.8 (03-15-21 @ 09:00), Max: 37.1 (03-14-21 @ 12:42)  HR: 68 (03-15-21 @ 09:19) (62 - 77)  BP: 150/71 (03-15-21 @ 09:19) (114/59 - 184/82)  RR: 18 (03-15-21 @ 09:00) (18 - 20)  SpO2: 100% (03-15-21 @ 09:19) (94% - 100%)  Wt(kg): --    03-14-21 @ 07:01  -  03-15-21 @ 07:00  --------------------------------------------------------  IN: 1140 mL / OUT: 768 mL / NET: 372 mL    03-15-21 @ 07:01  -  03-15-21 @ 11:06  --------------------------------------------------------  IN: 240 mL / OUT: 177.5 mL / NET: 62.5 mL        Physical Exam:  	Gen: NAD  	HEENT: MMM  	Pulm: CTA B/L, no crackles   	CV: S1S2  	Abd: Soft, +BS   	Ext: No LE edema B/L  	Neuro: Awake  	Skin: Warm and dry  	Vascular access: LUE AVF, PD catheter       LABS/STUDIES  --------------------------------------------------------------------------------              8.2    8.43  >-----------<  120      [03-15-21 @ 06:00]              23.3     126  |  86  |  98  ----------------------------<  124      [03-15-21 @ 05:59]  4.7   |  21  |  8.16        Ca     7.8     [03-15-21 @ 05:59]      Mg     2.4     [03-15-21 @ 05:59]      Phos  8.1     [03-15-21 @ 05:59]    TPro  5.6  /  Alb  3.0  /  TBili  0.5  /  DBili  x   /  AST  14  /  ALT  17  /  AlkPhos  102  [03-15-21 @ 05:59]    PT/INR: PT 12.8 , INR 1.07       [03-15-21 @ 06:00]  PTT: 27.7       [03-15-21 @ 06:00]      Creatinine Trend:  SCr 8.16 [03-15 @ 05:59]  SCr 7.51 [03-14 @ 17:46]  SCr 6.99 [03-14 @ 07:44]  SCr 6.34 [03-13 @ 05:56]  SCr 9.00 [03-12 @ 06:30]          HBsAb 56.4      [03-09-21 @ 17:13]  HBsAg Nonreact      [03-09-21 @ 17:13]  HBcAb Nonreact      [03-09-21 @ 17:13]  HCV 0.32, Nonreact      [03-09-21 @ 17:13]  HIV Nonreact      [03-09-21 @ 18:20]

## 2021-03-15 NOTE — PROGRESS NOTE ADULT - ASSESSMENT
Patient is a 72 y/o M w PMH of ESRD on PD for 5 months, was on HD in 2020, with nephrologist Dr Novak, IDDM, HTN, gout, depression, anxiety and OCD. S/p DCD with JOJO DDRT on 3/9/21.     1. S/p DDRT on 3/9/21 - has ATN from ischemia reperfusion injury and ATN of donor. S/p Simulect induction. Urine output increased. Last Scr 8.16 Will be for HD today with 2L UF. Will also do PD lavage today, if no issues then will restart PD overnight. Monitor labs and urine output.    2. Immunosupression - Simulect induction, currently on Envarsus, MMF 1g PO BID and steroid taper     3. Prophylaxis - bactrim/nystatin/valcyte     4. Hypertension - BP slightly high. On Coreg to 25 mg PO BID and Nifedipine 60 mg PO daily.  Monitor BP. Low salt diet.     5.  DM2 - increase lispro to 6 units daily.      6.  Hyponatremia - from poor clearance and fluid intake.  Restrict to 1 L.     7. Anemia - last Hb 8.2. Will give 1 dose of retacrit 10,000 unit today. Monitor CBC.     If any questions, please feel free to contact me     Jennifer Mcdowell  Nephrology Fellow  [facility] Pager: [phone]  [facility] Pager: [phone]

## 2021-03-15 NOTE — PROGRESS NOTE ADULT - SUBJECTIVE AND OBJECTIVE BOX
Transplant Surgery - Multidisciplinary Rounds  --------------------------------------------------------------  DCD DDRT (1a/1v/1u--stented)   Date: 3/9/2021         POD# 6    Present: Patient seen and examined with multidisciplinary team including Transplant Surgeon: Dr. Floyd, Transplant Nephrologist: Dr. Beck Olmos, ACPs Karlos/Lucius, and bedside RN during am rounds. Disciplines not in attendance will be notified of the plan.     HPI: 72 y/o M with PMHx of HTN (age 43), DM on insulin (age 43), gout, anxiety, depression, OCD and CKD since 2016.  He started HD via L AVF  in April 2020 at [facility] and transitioned to PD about 5 months ago. He makes ~2 ounces of urine 4 times daily.      Underwent DCD DDRT 3/9/2021 on 3/9/2021 (1A/1V/1U stented). Post op course c/b DGF requiring HD    Interval Events:  - POD#6 s/p DCD DDRT.  Slow improvement of graft function, now with ~675 UO overnight. HD 3/10 and 3/13  - Afebrile, VS stable  - OOB with assistance  - Insomnia now controlled with Trazodone prn    Potential Discharge date: pending clinical stability  Education:  Medications  Plan of care:  See Below        MEDICATIONS  (STANDING):  carvedilol 25 milliGRAM(s) Oral every 12 hours  chlorhexidine 2% Cloths 1 Application(s) Topical daily  dextrose 40% Gel 15 Gram(s) Oral once  dextrose 5%. 1000 milliLiter(s) (50 mL/Hr) IV Continuous <Continuous>  dextrose 5%. 1000 milliLiter(s) (100 mL/Hr) IV Continuous <Continuous>  dextrose 50% Injectable 25 Gram(s) IV Push once  dextrose 50% Injectable 12.5 Gram(s) IV Push once  dextrose 50% Injectable 25 Gram(s) IV Push once  famotidine    Tablet 20 milliGRAM(s) Oral daily  gentamicin 0.1% Ointment 1 Application(s) Topical <User Schedule>  glucagon  Injectable 1 milliGRAM(s) IntraMuscular once  insulin glargine Injectable (LANTUS) 25 Unit(s) SubCutaneous at bedtime  insulin lispro (ADMELOG) corrective regimen sliding scale   SubCutaneous three times a day before meals  insulin lispro (ADMELOG) corrective regimen sliding scale   SubCutaneous at bedtime  insulin lispro Injectable (ADMELOG) 6 Unit(s) SubCutaneous three times a day before meals  mycophenolate mofetil 1000 milliGRAM(s) Oral <User Schedule>  NIFEdipine XL 60 milliGRAM(s) Oral daily  nystatin    Suspension 018400 Unit(s) Swish and Swallow four times a day  polyethylene glycol 3350 17 Gram(s) Oral daily  predniSONE   Tablet 5 milliGRAM(s) Oral two times a day  senna 2 Tablet(s) Oral at bedtime  sevelamer carbonate 1600 milliGRAM(s) Oral three times a day with meals  tacrolimus ER Tablet (ENVARSUS XR) 7 milliGRAM(s) Oral <User Schedule>  trimethoprim   80 mG/sulfamethoxazole 400 mG 1 Tablet(s) Oral daily  valGANciclovir 450 milliGRAM(s) Oral <User Schedule>    MEDICATIONS  (PRN):  acetaminophen   Tablet .. 650 milliGRAM(s) Oral every 6 hours PRN Mild Pain (1 - 3)  traMADol 25 milliGRAM(s) Oral every 4 hours PRN Moderate Pain (4 - 6)  traMADol 50 milliGRAM(s) Oral every 4 hours PRN Severe Pain (7 - 10)      PAST MEDICAL & SURGICAL HISTORY:  DM (diabetes mellitus)    HTN (hypertension)    Chronic kidney disease (CKD)        Vital Signs Last 24 Hrs  T(C): 36.8 (15 Mar 2021 09:00), Max: 37.1 (14 Mar 2021 12:42)  T(F): 98.2 (15 Mar 2021 09:00), Max: 98.8 (14 Mar 2021 12:42)  HR: 68 (15 Mar 2021 09:19) (62 - 77)  BP: 150/71 (15 Mar 2021 09:19) (114/59 - 184/82)  BP(mean): 118 (15 Mar 2021 06:01) (81 - 118)  RR: 18 (15 Mar 2021 09:00) (18 - 20)  SpO2: 100% (15 Mar 2021 09:19) (94% - 100%)    I&O's Summary    14 Mar 2021 07:01  -  15 Mar 2021 07:00  --------------------------------------------------------  IN: 1140 mL / OUT: 768 mL / NET: 372 mL    15 Mar 2021 07:01  -  15 Mar 2021 10:14  --------------------------------------------------------  IN: 240 mL / OUT: 152.5 mL / NET: 87.5 mL                              8.2    8.43  )-----------( 120      ( 15 Mar 2021 06:00 )             23.3     03-15    126<L>  |  86<L>  |  98<H>  ----------------------------<  124<H>  4.7   |  21<L>  |  8.16<H>    Ca    7.8<L>      15 Mar 2021 05:59  Phos  8.1     03-15  Mg     2.4     03-15    TPro  5.6<L>  /  Alb  3.0<L>  /  TBili  0.5  /  DBili  x   /  AST  14  /  ALT  17  /  AlkPhos  102  03-15    Tacrolimus (), Serum: 7.1 ng/mL (03-15 @ 07:21)                  Review of systems  Gen: No weight changes, fatigue, fevers/chills, weakness  Skin: No rashes  Head/Eyes/Ears/Mouth: No headache; Normal hearing; Normal vision w/o blurriness; No sinus pain/discomfort, sore throat  Respiratory: No dyspnea, cough, wheezing, hemoptysis  CV: No chest pain, PND, orthopnea  GI: Mild abdominal pain at surgical incision site; denies diarrhea, constipation, nausea, vomiting, melena, hematochezia  : No increased frequency, dysuria, hematuria, nocturia  MSK: No joint pain/swelling; no back pain; no edema  Neuro: No dizziness/lightheadedness, weakness, seizures, numbness, tingling  Heme: No easy bruising or bleeding  Endo: No heat/cold intolerance  Psych: No significant nervousness, anxiety, stress, depression  All other systems were reviewed and are negative, except as noted.    PHYSICAL EXAM:  Constitutional: Well developed / well nourished  Eyes: Anicteric, PERRLA  ENMT: nc/at  Neck: central line RIJ  Respiratory: improving breath sounds on R lung base  Cardiovascular: RRR  Gastrointestinal: Soft, non distended, mild tenderness at the incision site; incision c/d/i; LORETTA serosanguinous  Genitourinary: Urinary catheter in place  Extremities: SCD's in place and working bilaterally, AVF palpable. 1+ edema b/l LE. no calf TTP  Vascular: 1+ DP pulses b/l  Neurological: A&O x3  Skin: no rashes, ulcerations or lesions;  Musculoskeletal: Moving all extremities  Psychiatric: Responsive     Transplant Surgery - Multidisciplinary Rounds  --------------------------------------------------------------  DCD DDRT (1a/1v/1u--stented)   Date: 3/9/2021    POD# 6    Present: Patient seen and examined with multidisciplinary team including Transplant Surgeon Dr. Floyd, Transplant Nephrologist Dr.Nair TIPTON, Transplant Pharmacist: Franki Fernandez, ACPs Karlos/Jaime, and bedside RN during am rounds. Disciplines not in attendance will be notified of plan.      HPI: 70 y/o M with PMHx of HTN (age 43), DM on insulin (age 43), gout, anxiety, depression, OCD and CKD since 2016.  He started HD via L AVF  in April 2020 at [facility] and transitioned to PD about 5 months ago. He makes ~2 ounces of urine 4 times daily.      Underwent DCD DDRT 3/9/2021 on 3/9/2021 (1A/1V/1U stented). Post op course c/b DGF requiring HD    Interval Events:  - POD#6 s/p DCD DDRT; u/o slowly improving ~675cc, LORETTA 25cc  - Trial of Lasix 80mg IVP x 1 dose  - Hyponatremic: ; plan for HD today    Potential Discharge date: pending clinical stability  Education:  Medications  Plan of care:  See Below      MEDICATIONS  (STANDING):  carvedilol 25 milliGRAM(s) Oral every 12 hours  chlorhexidine 2% Cloths 1 Application(s) Topical daily  dextrose 40% Gel 15 Gram(s) Oral once  dextrose 5%. 1000 milliLiter(s) (50 mL/Hr) IV Continuous <Continuous>  dextrose 5%. 1000 milliLiter(s) (100 mL/Hr) IV Continuous <Continuous>  dextrose 50% Injectable 25 Gram(s) IV Push once  dextrose 50% Injectable 12.5 Gram(s) IV Push once  dextrose 50% Injectable 25 Gram(s) IV Push once  epoetin goran-epbx (RETACRIT) Injectable 22709 Unit(s) IV Push once  famotidine    Tablet 20 milliGRAM(s) Oral daily  gentamicin 0.1% Ointment 1 Application(s) Topical <User Schedule>  glucagon  Injectable 1 milliGRAM(s) IntraMuscular once  insulin glargine Injectable (LANTUS) 25 Unit(s) SubCutaneous at bedtime  insulin lispro (ADMELOG) corrective regimen sliding scale   SubCutaneous three times a day before meals  insulin lispro (ADMELOG) corrective regimen sliding scale   SubCutaneous at bedtime  insulin lispro Injectable (ADMELOG) 4 Unit(s) SubCutaneous three times a day with meals  mycophenolate mofetil 1000 milliGRAM(s) Oral <User Schedule>  NIFEdipine XL 60 milliGRAM(s) Oral daily  nystatin    Suspension 476626 Unit(s) Swish and Swallow four times a day  polyethylene glycol 3350 17 Gram(s) Oral daily  predniSONE   Tablet 5 milliGRAM(s) Oral two times a day  senna 2 Tablet(s) Oral at bedtime  sevelamer carbonate 1600 milliGRAM(s) Oral three times a day with meals  trimethoprim   80 mG/sulfamethoxazole 400 mG 1 Tablet(s) Oral daily  valGANciclovir 450 milliGRAM(s) Oral <User Schedule>    MEDICATIONS  (PRN):  acetaminophen   Tablet .. 650 milliGRAM(s) Oral every 6 hours PRN Mild Pain (1 - 3)  traMADol 25 milliGRAM(s) Oral every 4 hours PRN Moderate Pain (4 - 6)  traMADol 50 milliGRAM(s) Oral every 4 hours PRN Severe Pain (7 - 10)      PAST MEDICAL & SURGICAL HISTORY:  DM (diabetes mellitus)  HTN (hypertension)  Chronic kidney disease (CKD)    Vital Signs Last 24 Hrs  T(C): 37 (15 Mar 2021 11:57), Max: 37 (15 Mar 2021 11:57)  T(F): 98.6 (15 Mar 2021 11:57), Max: 98.6 (15 Mar 2021 11:57)  HR: 68 (15 Mar 2021 11:57) (62 - 77)  BP: 154/72 (15 Mar 2021 11:57) (129/65 - 184/82)  BP(mean): 118 (15 Mar 2021 06:01) (92 - 118)  RR: 20 (15 Mar 2021 11:57) (18 - 20)  SpO2: 95% (15 Mar 2021 11:57) (94% - 100%)    I&O's Summary    14 Mar 2021 07:01  -  15 Mar 2021 07:00  --------------------------------------------------------  IN: 1140 mL / OUT: 768 mL / NET: 372 mL    15 Mar 2021 07:01  -  15 Mar 2021 12:46  --------------------------------------------------------  IN: 240 mL / OUT: 277.5 mL / NET: -37.5 mL                         8.2    8.43  )-----------( 120      ( 15 Mar 2021 06:00 )             23.3     03-15    126<L>  |  86<L>  |  98<H>  ----------------------------<  124<H>  4.7   |  21<L>  |  8.16<H>    Ca    7.8<L>      15 Mar 2021 05:59  Phos  8.1     03-15  Mg     2.4     03-15    TPro  5.6<L>  /  Alb  3.0<L>  /  TBili  0.5  /  DBili  x   /  AST  14  /  ALT  17  /  AlkPhos  102  03-15    Tacrolimus (), Serum: 7.1 ng/mL (03-15 @ 07:21)    Review of systems  Gen: No weight changes, fatigue, fevers/chills, weakness  Skin: No rashes  Head/Eyes/Ears/Mouth: No headache; Normal hearing; Normal vision w/o blurriness; No sinus pain/discomfort, sore throat  Respiratory: No dyspnea, cough, wheezing, hemoptysis  CV: No chest pain, PND, orthopnea  GI: Mild abdominal pain at surgical incision site; denies diarrhea, constipation, nausea, vomiting, melena, hematochezia  : No increased frequency, dysuria, hematuria, nocturia  MSK: No joint pain/swelling; no back pain; no edema  Neuro: No dizziness/lightheadedness, weakness, seizures, numbness, tingling  Heme: No easy bruising or bleeding  Endo: No heat/cold intolerance  Psych: No significant nervousness, anxiety, stress, depression  All other systems were reviewed and are negative, except as noted.    PHYSICAL EXAM:  Constitutional: Well developed / well nourished  Eyes: Anicteric, PERRLA  ENMT: nc/at  Neck: central line RIJ  Respiratory: improving breath sounds on R lung base  Cardiovascular: RRR  Gastrointestinal: Soft, non distended, mild tenderness at the incision site; incision c/d/i; LORETTA serosanguinous  Genitourinary: Urinary catheter in place  Extremities: SCD's in place and working bilaterally, AVF palpable. 1+ edema b/l LE. no calf TTP  Vascular: 1+ DP pulses b/l  Neurological: A&O x3  Skin: no rashes, ulcerations or lesions;  Musculoskeletal: Moving all extremities  Psychiatric: Responsive

## 2021-03-15 NOTE — PROGRESS NOTE ADULT - NSHPATTENDINGPLANDISCUSS_GEN_ALL_CORE
nephrologist, PA, nurse
Patient seen on multidisciplinary rounds with Transplant surgeons, nephrologist, NP/PA, pharmacist, and nurse.
nephrologist, PA, nurse
Patient seen on multidisciplinary rounds with Transplant surgeons, nephrologist, NP/PA, pharmacist, and nurse.
Dialysis team, transplant team
Transplant team
Transplant team, dialysis team

## 2021-03-15 NOTE — PROGRESS NOTE ADULT - ATTENDING COMMENTS
POD#6 s/p DDRT with delayed graft function. Increased urine output overnight, but not clearing yet  Start PD tonight until kidney function improves  Cont current immunosuppression with Envarsus daily, Solumedrol taper, Cellcept 1gm bid.   Will check tacrolimus level and adjust dose accordingly.  Transplant Prophylaxis with nystatin, bactrim, valcyte  GI prophylaxis due to steroids  monitor glucose levels and adjust sliding scare as necessary   OOB, physical therapy  medication teaching

## 2021-03-15 NOTE — PROGRESS NOTE ADULT - ATTENDING COMMENTS
UO improving.  Still with ATN  Will perform hemodialysis today to improve volume status and electrolytes then switch to PD with 1L exchanges   Plan for d/c in next day or two on PD.

## 2021-03-15 NOTE — PROGRESS NOTE ADULT - ASSESSMENT
71M with h/o HTN (age 43), IDDM (age 43), gout, anxiety, depression, OCD, CKD (since 2016) was on HD via L AVF (4/2020 Dr. Novak) with transition to PD in 9/2020 now s/p R DCD DDRT (3/9/21)    [] POD#6 s/p R DCD DDRT c/b DGF  - Remains oliguric, Plan for HD today with Epo, 1L PD flush after. Lasix 80x1  - Immuno: Env per level, MMF 1g bid, Pred taper, Simulect completed  - PPx: valcyte/bactrim/nystatin  - Cont strict I&Os  - Diet: Renal   - Pain control  - Bowel regimen   - SCDs/IS/OOB  - PT c/s    [] DM  - Enrolled in CGM study (conventional group)   - Lantus/Lispro; adjust accordingly    [] HTN  - cont coreg 25mg bid and nifedipine 30mg daily     [] Dispo  - expect home Wed with HD vs. PD 71M with h/o HTN (age 43), IDDM (age 43), gout, anxiety, depression, OCD, CKD (since 2016) was on HD via L AVF (4/2020 Dr. Novak) with transition to PD in 9/2020 now s/p R DCD DDRT (3/9/21)    [] POD#6 s/p R DCD DDRT c/b DGF  - Remains oliguric, Plan for HD today; PD flush after. Lasix 80x1  - Immuno: Env per level, MMF 1g bid, Pred taper, Simulect completed  - PPx: valcyte/bactrim/nystatin  - Cont strict I&Os  - Diet: Renal   - Pain control  - Bowel regimen   - SCDs/IS/OOB  - DC alfaro cath today  - DC TLC    [] DM  - Enrolled in CGM study (conventional group)/completed  - Lantus/Lispro; adjust accordingly    [] HTN  - cont coreg 25mg bid and nifedipine 30mg daily     [] Dispo  - expect home Wed with HD vs. PD

## 2021-03-15 NOTE — CHART NOTE - NSCHARTNOTEFT_GEN_A_CORE
Nutrition follow up     Pt seen for post kidney transplant recipient nutrition follow up per department protocol.     Hospital course as per chart: Pt 72 y/o M with PMH of HTN, DM on insulin, gout, anxiety, depression, OCD, CKD since (2016) previously on HD - transitioned to PD about 5 months ago, admitted for kidney transplant, S/P DDRT (03/09), with delayed graft function - requiring HD; slow improvement of graft function. Per nephrology - will need to be transitioned to PD prior to being discharged home.     Source: Patient [x]    Family [ ]     other [x]; Medical record    Pt reports good appetite and PO intake. Noted % PO intake as per flow sheets and 100% as per breakfast tray at bedside. Denies difficulty chewing/swallowing. Pt denies nausea, vomiting, diarrhea, or constipation, last BM yesterday (03/14). Urine output as per flow sheets (03/13) 832 ml -> (03/14) 740 ml -> (03/15) 150 ml. Pt received extensive education on DM and post transplant nutrition therapy and food safety guidelines for transplant recipients with nutrition package in previous RD visit - denies having questions/concerns about diet and nutrition education provided; able to teach back 2 points.     Diet: Consistent Carbohydrate renal with snack + no concentrated potassium + no concentrated phosphorus + low salt + 1000 ml fluid restriction     Enteral /Parenteral Nutrition: n/a    Weight as per flow sheets: (03/09) 184.3 pounds -> (03/12) 201.5 pounds -> (03/15) 206.7 pounds -?accuracy of weight trending up as pt with edema and S/P DDRT, will continue to monitor as able.   % Weight Change: n/a    Pertinent Medications: MEDICATIONS  (STANDING):  carvedilol 25 milliGRAM(s) Oral every 12 hours  chlorhexidine 2% Cloths 1 Application(s) Topical daily  dextrose 40% Gel 15 Gram(s) Oral once  dextrose 5%. 1000 milliLiter(s) (50 mL/Hr) IV Continuous <Continuous>  dextrose 5%. 1000 milliLiter(s) (100 mL/Hr) IV Continuous <Continuous>  dextrose 50% Injectable 25 Gram(s) IV Push once  dextrose 50% Injectable 12.5 Gram(s) IV Push once  dextrose 50% Injectable 25 Gram(s) IV Push once  famotidine    Tablet 20 milliGRAM(s) Oral daily  gentamicin 0.1% Ointment 1 Application(s) Topical <User Schedule>  glucagon  Injectable 1 milliGRAM(s) IntraMuscular once  insulin glargine Injectable (LANTUS) 25 Unit(s) SubCutaneous at bedtime  insulin lispro (ADMELOG) corrective regimen sliding scale   SubCutaneous three times a day before meals  insulin lispro (ADMELOG) corrective regimen sliding scale   SubCutaneous at bedtime  insulin lispro Injectable (ADMELOG) 6 Unit(s) SubCutaneous three times a day before meals  mycophenolate mofetil 1000 milliGRAM(s) Oral <User Schedule>  NIFEdipine XL 60 milliGRAM(s) Oral daily  nystatin    Suspension 232851 Unit(s) Swish and Swallow four times a day  polyethylene glycol 3350 17 Gram(s) Oral daily  predniSONE   Tablet 5 milliGRAM(s) Oral two times a day  senna 2 Tablet(s) Oral at bedtime  sevelamer carbonate 1600 milliGRAM(s) Oral three times a day with meals  tacrolimus ER Tablet (ENVARSUS XR) 7 milliGRAM(s) Oral <User Schedule>  trimethoprim   80 mG/sulfamethoxazole 400 mG 1 Tablet(s) Oral daily  valGANciclovir 450 milliGRAM(s) Oral <User Schedule>    MEDICATIONS  (PRN):  acetaminophen   Tablet .. 650 milliGRAM(s) Oral every 6 hours PRN Mild Pain (1 - 3)  traMADol 25 milliGRAM(s) Oral every 4 hours PRN Moderate Pain (4 - 6)  traMADol 50 milliGRAM(s) Oral every 4 hours PRN Severe Pain (7 - 10)    Pertinent Labs: (03/15) Na126 mmol/L<L> Glu 124 mg/dL<H> Cr  8.16 mg/dL<H> BUN 98 mg/dL<H> Phos 8.1 mg/dL<H>  Finger sticks: (03/15) 98 (03/14) 158 - 212   No HbA1c     Skin: no noted pressure injuries as per documentation.  Noted +1 valeria. ankle and foot edema as per flow sheets (previously with no edema).     Estimated Needs:   [x] no change since previous assessment  [ ] recalculated:     Previous Nutrition Diagnoses:   [x] Increased Nutrient Needs   [x] Food & Nutrition Related Knowledge Deficit     Nutrition Diagnoses are [x] ongoing - being addressed with PO intake encouragement and nutrition therapy education.  Goals: pt to meet >75% of estimated nutritional needs during hospital stay and able to teach back 3 points of nutrition education provided.     New Nutrition Diagnosis: [x] not applicable    Interventions:     1. Recommend Consistent Carbohydrate renal with snack diet while on HD + 1000 ml fluid restriction (defer fluids to team) upon discharge. Recommend follow up visit with Transplant MD and outpatient RD for dietary modifications as warranted.   2. Encourage PO intake and provide food preferences.   3. Review education on DM and post transplant nutrition therapy and food safety guidelines for transplant recipients as needed/requested before discharge.   4. Continue to obtain weights to identify changes if any.     Monitoring and Evaluation:     [x] PO intake [x] Tolerance to diet prescription [x] weights [x] follow up per protocol    [x] other: urine output     RD remains available.  Grecia Aden MS RDN CDN #[phone]. Nutrition follow up     Pt seen for post kidney transplant recipient nutrition follow up per department protocol.     Hospital course as per chart: Pt 72 y/o M with PMH of HTN, DM on insulin, gout, anxiety, depression, OCD, CKD since (2016) previously on HD - transitioned to PD about 5 months ago, admitted for kidney transplant, S/P DDRT (03/09), with delayed graft function - requiring HD; slow improvement of graft function. Per nephrology - will need to be transitioned to PD prior to being discharged home.     Source: Patient [x]    Family [ ]     other [x]; Medical record    Pt reports good appetite and PO intake. Noted % PO intake as per flow sheets and 100% as per breakfast tray at bedside. Denies difficulty chewing/swallowing regular texture. Pt denies nausea, vomiting, diarrhea, or constipation, last BM yesterday (03/14). Urine output as per flow sheets (03/13) 832 ml -> (03/14) 740 ml -> (03/15) 150 ml. Pt received extensive education on DM and post transplant nutrition therapy and food safety guidelines for transplant recipients with nutrition package in previous RD visit - denies having questions/concerns about diet and nutrition education provided; able to teach back 2 points.     Diet: Consistent Carbohydrate renal with snack + no concentrated potassium + no concentrated phosphorus + low salt + 1000 ml fluid restriction     Enteral /Parenteral Nutrition: n/a    Weight as per flow sheets: (03/09) 184.3 pounds -> (03/12) 201.5 pounds -> (03/15) 206.7 pounds -?accuracy of weight trending up as pt with edema and S/P DDRT, will continue to monitor as able.   % Weight Change: n/a    Pertinent Medications: MEDICATIONS  (STANDING):  carvedilol 25 milliGRAM(s) Oral every 12 hours  chlorhexidine 2% Cloths 1 Application(s) Topical daily  dextrose 40% Gel 15 Gram(s) Oral once  dextrose 5%. 1000 milliLiter(s) (50 mL/Hr) IV Continuous <Continuous>  dextrose 5%. 1000 milliLiter(s) (100 mL/Hr) IV Continuous <Continuous>  dextrose 50% Injectable 25 Gram(s) IV Push once  dextrose 50% Injectable 12.5 Gram(s) IV Push once  dextrose 50% Injectable 25 Gram(s) IV Push once  famotidine    Tablet 20 milliGRAM(s) Oral daily  gentamicin 0.1% Ointment 1 Application(s) Topical <User Schedule>  glucagon  Injectable 1 milliGRAM(s) IntraMuscular once  insulin glargine Injectable (LANTUS) 25 Unit(s) SubCutaneous at bedtime  insulin lispro (ADMELOG) corrective regimen sliding scale   SubCutaneous three times a day before meals  insulin lispro (ADMELOG) corrective regimen sliding scale   SubCutaneous at bedtime  insulin lispro Injectable (ADMELOG) 6 Unit(s) SubCutaneous three times a day before meals  mycophenolate mofetil 1000 milliGRAM(s) Oral <User Schedule>  NIFEdipine XL 60 milliGRAM(s) Oral daily  nystatin    Suspension 024008 Unit(s) Swish and Swallow four times a day  polyethylene glycol 3350 17 Gram(s) Oral daily  predniSONE   Tablet 5 milliGRAM(s) Oral two times a day  senna 2 Tablet(s) Oral at bedtime  sevelamer carbonate 1600 milliGRAM(s) Oral three times a day with meals  tacrolimus ER Tablet (ENVARSUS XR) 7 milliGRAM(s) Oral <User Schedule>  trimethoprim   80 mG/sulfamethoxazole 400 mG 1 Tablet(s) Oral daily  valGANciclovir 450 milliGRAM(s) Oral <User Schedule>    MEDICATIONS  (PRN):  acetaminophen   Tablet .. 650 milliGRAM(s) Oral every 6 hours PRN Mild Pain (1 - 3)  traMADol 25 milliGRAM(s) Oral every 4 hours PRN Moderate Pain (4 - 6)  traMADol 50 milliGRAM(s) Oral every 4 hours PRN Severe Pain (7 - 10)    Pertinent Labs: (03/15) Na126 mmol/L<L> Glu 124 mg/dL<H> Cr  8.16 mg/dL<H> BUN 98 mg/dL<H> Phos 8.1 mg/dL<H>  Finger sticks: (03/15) 98 (03/14) 158 - 212   (03/15) HbA1c 6.3% per pt in previous RD visit.     Skin: no noted pressure injuries as per documentation.  Noted +1 valeria. ankle and foot edema as per flow sheets (previously with no edema).     Estimated Needs:   [x] no change since previous assessment  [ ] recalculated:     Previous Nutrition Diagnoses:   [x] Increased Nutrient Needs   [x] Food & Nutrition Related Knowledge Deficit     Nutrition Diagnoses are [x] ongoing - being addressed with PO intake encouragement and nutrition therapy education.  Goals: pt to meet >75% of estimated nutritional needs during hospital stay and able to teach back 3 points of nutrition education provided.     New Nutrition Diagnosis: [x] not applicable    Interventions:     1. Recommend Consistent Carbohydrate renal with snack diet while on HD + 1000 ml fluid restriction (defer fluids to team) upon discharge. Recommend follow up visit with Transplant MD and outpatient RD for dietary modifications as warranted.   2. Encourage PO intake and provide food preferences.   3. Review education on DM and post transplant nutrition therapy and food safety guidelines for transplant recipients as needed/requested before discharge.   4. Continue to obtain weights to identify changes if any.     Monitoring and Evaluation:     [x] PO intake [x] Tolerance to diet prescription [x] weights [x] follow up per protocol    [x] other: urine output     RD remains available.  Grecia Aden MS RDN CDN #[phone]. Nutrition follow up     Pt seen for post kidney transplant recipient nutrition follow up per department protocol.     Hospital course as per chart: Pt 70 y/o M with PMH of HTN, DM on insulin, gout, anxiety, depression, OCD, CKD since (2016) previously on HD - transitioned to PD about 5 months ago, admitted for kidney transplant, S/P DDRT (03/09), with delayed graft function - requiring HD; slow improvement of graft function. Per nephrology - will need to be transitioned to PD prior to being discharged home.     Source: Patient [x]    Family [ ]     other [x]; Medical record    Pt reports good appetite and PO intake. Noted % PO intake as per flow sheets and 100% as per breakfast tray at bedside. Denies difficulty chewing/swallowing regular texture. Pt denies nausea, vomiting, diarrhea, or constipation, last BM yesterday (03/14). Urine output as per flow sheets (03/13) 832 ml -> (03/14) 740 ml -> (03/15) 150 ml. Pt received extensive education on DM and post transplant nutrition therapy and food safety guidelines for transplant recipients with nutrition package in previous RD visit - denies having questions/concerns about diet and nutrition education provided; able to teach back 2 points.     Diet: Consistent Carbohydrate renal with snack + no concentrated potassium + no concentrated phosphorus + low salt + 1000 ml fluid restriction     Enteral /Parenteral Nutrition: n/a    Weight as per flow sheets: (03/09) 184.3 pounds -> (03/12) 201.5 pounds -> (03/15) 206.7 pounds -?accuracy of weight trending up as pt with edema and S/P DDRT, will continue to monitor as able.   % Weight Change: n/a    Pertinent Medications: MEDICATIONS  (STANDING):  carvedilol 25 milliGRAM(s) Oral every 12 hours  chlorhexidine 2% Cloths 1 Application(s) Topical daily  dextrose 40% Gel 15 Gram(s) Oral once  dextrose 5%. 1000 milliLiter(s) (50 mL/Hr) IV Continuous <Continuous>  dextrose 5%. 1000 milliLiter(s) (100 mL/Hr) IV Continuous <Continuous>  dextrose 50% Injectable 25 Gram(s) IV Push once  dextrose 50% Injectable 12.5 Gram(s) IV Push once  dextrose 50% Injectable 25 Gram(s) IV Push once  famotidine    Tablet 20 milliGRAM(s) Oral daily  gentamicin 0.1% Ointment 1 Application(s) Topical <User Schedule>  glucagon  Injectable 1 milliGRAM(s) IntraMuscular once  insulin glargine Injectable (LANTUS) 25 Unit(s) SubCutaneous at bedtime  insulin lispro (ADMELOG) corrective regimen sliding scale   SubCutaneous three times a day before meals  insulin lispro (ADMELOG) corrective regimen sliding scale   SubCutaneous at bedtime  insulin lispro Injectable (ADMELOG) 6 Unit(s) SubCutaneous three times a day before meals  mycophenolate mofetil 1000 milliGRAM(s) Oral <User Schedule>  NIFEdipine XL 60 milliGRAM(s) Oral daily  nystatin    Suspension 722251 Unit(s) Swish and Swallow four times a day  polyethylene glycol 3350 17 Gram(s) Oral daily  predniSONE   Tablet 5 milliGRAM(s) Oral two times a day  senna 2 Tablet(s) Oral at bedtime  sevelamer carbonate 1600 milliGRAM(s) Oral three times a day with meals  tacrolimus ER Tablet (ENVARSUS XR) 7 milliGRAM(s) Oral <User Schedule>  trimethoprim   80 mG/sulfamethoxazole 400 mG 1 Tablet(s) Oral daily  valGANciclovir 450 milliGRAM(s) Oral <User Schedule>    MEDICATIONS  (PRN):  acetaminophen   Tablet .. 650 milliGRAM(s) Oral every 6 hours PRN Mild Pain (1 - 3)  traMADol 25 milliGRAM(s) Oral every 4 hours PRN Moderate Pain (4 - 6)  traMADol 50 milliGRAM(s) Oral every 4 hours PRN Severe Pain (7 - 10)    Pertinent Labs: (03/15) Na126 mmol/L<L> Glu 124 mg/dL<H> Cr  8.16 mg/dL<H> BUN 98 mg/dL<H> Phos 8.1 mg/dL<H>  Finger sticks: (03/15) 98 (03/14) 158 - 212   No HbA1c     Skin: no noted pressure injuries as per documentation.  Noted +1 valeria. ankle and foot edema as per flow sheets (previously with no edema).     Estimated Needs:   [x] no change since previous assessment  [ ] recalculated:     Previous Nutrition Diagnoses:   [x] Increased Nutrient Needs   [x] Food & Nutrition Related Knowledge Deficit     Nutrition Diagnoses are [x] ongoing - being addressed with PO intake encouragement and nutrition therapy education.  Goals: pt to meet >75% of estimated nutritional needs during hospital stay and able to teach back 3 points of nutrition education provided.     New Nutrition Diagnosis: [x] not applicable    Interventions:     1. Recommend Consistent Carbohydrate renal with snack diet while on HD + 1000 ml fluid restriction (defer fluids to team) upon discharge. Recommend follow up visit with Transplant MD and outpatient RD for dietary modifications as warranted.   2. Encourage PO intake and provide food preferences.   3. Review education on DM and post transplant nutrition therapy and food safety guidelines for transplant recipients as needed/requested before discharge.   4. Continue to obtain weights to identify changes if any.     Monitoring and Evaluation:     [x] PO intake [x] Tolerance to diet prescription [x] weights [x] follow up per protocol    [x] other: urine output     RD remains available.  Grecia Aden MS RDN CDN #[phone].

## 2021-03-16 LAB
ALBUMIN SERPL ELPH-MCNC: 2.9 G/DL — LOW (ref 3.3–5)
ALP SERPL-CCNC: 97 U/L — SIGNIFICANT CHANGE UP (ref 40–120)
ALT FLD-CCNC: 11 U/L — SIGNIFICANT CHANGE UP (ref 10–45)
ANION GAP SERPL CALC-SCNC: 15 MMOL/L — SIGNIFICANT CHANGE UP (ref 5–17)
ANION GAP SERPL CALC-SCNC: 17 MMOL/L — SIGNIFICANT CHANGE UP (ref 5–17)
APPEARANCE UR: ABNORMAL
AST SERPL-CCNC: 10 U/L — SIGNIFICANT CHANGE UP (ref 10–40)
BACTERIA # UR AUTO: NEGATIVE — SIGNIFICANT CHANGE UP
BASOPHILS # BLD AUTO: 0.01 K/UL — SIGNIFICANT CHANGE UP (ref 0–0.2)
BASOPHILS NFR BLD AUTO: 0.1 % — SIGNIFICANT CHANGE UP (ref 0–2)
BILIRUB SERPL-MCNC: 0.5 MG/DL — SIGNIFICANT CHANGE UP (ref 0.2–1.2)
BILIRUB UR-MCNC: NEGATIVE — SIGNIFICANT CHANGE UP
BUN SERPL-MCNC: 58 MG/DL — HIGH (ref 7–23)
BUN SERPL-MCNC: 61 MG/DL — HIGH (ref 7–23)
CALCIUM SERPL-MCNC: 8.3 MG/DL — LOW (ref 8.4–10.5)
CALCIUM SERPL-MCNC: 8.5 MG/DL — SIGNIFICANT CHANGE UP (ref 8.4–10.5)
CHLORIDE SERPL-SCNC: 89 MMOL/L — LOW (ref 96–108)
CHLORIDE SERPL-SCNC: 92 MMOL/L — LOW (ref 96–108)
CO2 SERPL-SCNC: 25 MMOL/L — SIGNIFICANT CHANGE UP (ref 22–31)
CO2 SERPL-SCNC: 25 MMOL/L — SIGNIFICANT CHANGE UP (ref 22–31)
COLOR SPEC: ABNORMAL
CREAT SERPL-MCNC: 5.87 MG/DL — HIGH (ref 0.5–1.3)
CREAT SERPL-MCNC: 6.05 MG/DL — HIGH (ref 0.5–1.3)
DIFF PNL FLD: ABNORMAL
EOSINOPHIL # BLD AUTO: 0.05 K/UL — SIGNIFICANT CHANGE UP (ref 0–0.5)
EOSINOPHIL NFR BLD AUTO: 0.6 % — SIGNIFICANT CHANGE UP (ref 0–6)
EPI CELLS # UR: 7 /HPF — HIGH
GLUCOSE SERPL-MCNC: 104 MG/DL — HIGH (ref 70–99)
GLUCOSE SERPL-MCNC: 77 MG/DL — SIGNIFICANT CHANGE UP (ref 70–99)
GLUCOSE UR QL: ABNORMAL
HCT VFR BLD CALC: 22 % — LOW (ref 39–50)
HCT VFR BLD CALC: 22.9 % — LOW (ref 39–50)
HGB BLD-MCNC: 7.7 G/DL — LOW (ref 13–17)
HGB BLD-MCNC: 8.1 G/DL — LOW (ref 13–17)
HYALINE CASTS # UR AUTO: 6 /LPF — HIGH (ref 0–2)
IMM GRANULOCYTES NFR BLD AUTO: 1.6 % — HIGH (ref 0–1.5)
KETONES UR-MCNC: NEGATIVE — SIGNIFICANT CHANGE UP
LEUKOCYTE ESTERASE UR-ACNC: ABNORMAL
LYMPHOCYTES # BLD AUTO: 0.36 K/UL — LOW (ref 1–3.3)
LYMPHOCYTES # BLD AUTO: 4.2 % — LOW (ref 13–44)
MAGNESIUM SERPL-MCNC: 2.2 MG/DL — SIGNIFICANT CHANGE UP (ref 1.6–2.6)
MAGNESIUM SERPL-MCNC: 2.3 MG/DL — SIGNIFICANT CHANGE UP (ref 1.6–2.6)
MCHC RBC-ENTMCNC: 32.2 PG — SIGNIFICANT CHANGE UP (ref 27–34)
MCHC RBC-ENTMCNC: 32.8 PG — SIGNIFICANT CHANGE UP (ref 27–34)
MCHC RBC-ENTMCNC: 35 GM/DL — SIGNIFICANT CHANGE UP (ref 32–36)
MCHC RBC-ENTMCNC: 35.4 GM/DL — SIGNIFICANT CHANGE UP (ref 32–36)
MCV RBC AUTO: 92.1 FL — SIGNIFICANT CHANGE UP (ref 80–100)
MCV RBC AUTO: 92.7 FL — SIGNIFICANT CHANGE UP (ref 80–100)
MONOCYTES # BLD AUTO: 0.93 K/UL — HIGH (ref 0–0.9)
MONOCYTES NFR BLD AUTO: 10.9 % — SIGNIFICANT CHANGE UP (ref 2–14)
NEUTROPHILS # BLD AUTO: 7.06 K/UL — SIGNIFICANT CHANGE UP (ref 1.8–7.4)
NEUTROPHILS NFR BLD AUTO: 82.6 % — HIGH (ref 43–77)
NITRITE UR-MCNC: NEGATIVE — SIGNIFICANT CHANGE UP
NRBC # BLD: 0 /100 WBCS — SIGNIFICANT CHANGE UP (ref 0–0)
NRBC # BLD: 0 /100 WBCS — SIGNIFICANT CHANGE UP (ref 0–0)
PH UR: 6.5 — SIGNIFICANT CHANGE UP (ref 5–8)
PHOSPHATE SERPL-MCNC: 5.6 MG/DL — HIGH (ref 2.5–4.5)
PHOSPHATE SERPL-MCNC: 6.2 MG/DL — HIGH (ref 2.5–4.5)
PLATELET # BLD AUTO: 122 K/UL — LOW (ref 150–400)
PLATELET # BLD AUTO: 131 K/UL — LOW (ref 150–400)
POTASSIUM SERPL-MCNC: 3.9 MMOL/L — SIGNIFICANT CHANGE UP (ref 3.5–5.3)
POTASSIUM SERPL-MCNC: 4.1 MMOL/L — SIGNIFICANT CHANGE UP (ref 3.5–5.3)
POTASSIUM SERPL-SCNC: 3.9 MMOL/L — SIGNIFICANT CHANGE UP (ref 3.5–5.3)
POTASSIUM SERPL-SCNC: 4.1 MMOL/L — SIGNIFICANT CHANGE UP (ref 3.5–5.3)
PROT SERPL-MCNC: 5.6 G/DL — LOW (ref 6–8.3)
PROT UR-MCNC: 100 — SIGNIFICANT CHANGE UP
RBC # BLD: 2.39 M/UL — LOW (ref 4.2–5.8)
RBC # BLD: 2.47 M/UL — LOW (ref 4.2–5.8)
RBC # FLD: 15 % — HIGH (ref 10.3–14.5)
RBC # FLD: 15.2 % — HIGH (ref 10.3–14.5)
RBC CASTS # UR COMP ASSIST: 643 /HPF — HIGH (ref 0–4)
SODIUM SERPL-SCNC: 131 MMOL/L — LOW (ref 135–145)
SODIUM SERPL-SCNC: 132 MMOL/L — LOW (ref 135–145)
SP GR SPEC: 1.01 — SIGNIFICANT CHANGE UP (ref 1.01–1.02)
TACROLIMUS SERPL-MCNC: 8.2 NG/ML — SIGNIFICANT CHANGE UP
UROBILINOGEN FLD QL: NEGATIVE — SIGNIFICANT CHANGE UP
WBC # BLD: 8.55 K/UL — SIGNIFICANT CHANGE UP (ref 3.8–10.5)
WBC # BLD: 9.67 K/UL — SIGNIFICANT CHANGE UP (ref 3.8–10.5)
WBC # FLD AUTO: 8.55 K/UL — SIGNIFICANT CHANGE UP (ref 3.8–10.5)
WBC # FLD AUTO: 9.67 K/UL — SIGNIFICANT CHANGE UP (ref 3.8–10.5)
WBC UR QL: 14 /HPF — HIGH (ref 0–5)

## 2021-03-16 PROCEDURE — 99232 SBSQ HOSP IP/OBS MODERATE 35: CPT | Mod: GC

## 2021-03-16 RX ORDER — DIPHENHYDRAMINE HCL 50 MG
25 CAPSULE ORAL ONCE
Refills: 0 | Status: COMPLETED | OUTPATIENT
Start: 2021-03-16 | End: 2021-03-16

## 2021-03-16 RX ORDER — INSULIN LISPRO 100/ML
VIAL (ML) SUBCUTANEOUS ONCE
Refills: 0 | Status: DISCONTINUED | OUTPATIENT
Start: 2021-03-16 | End: 2021-03-16

## 2021-03-16 RX ORDER — HYDRALAZINE HCL 50 MG
10 TABLET ORAL ONCE
Refills: 0 | Status: COMPLETED | OUTPATIENT
Start: 2021-03-16 | End: 2021-03-16

## 2021-03-16 RX ORDER — INSULIN GLARGINE 100 [IU]/ML
15 INJECTION, SOLUTION SUBCUTANEOUS AT BEDTIME
Refills: 0 | Status: DISCONTINUED | OUTPATIENT
Start: 2021-03-16 | End: 2021-03-18

## 2021-03-16 RX ORDER — TAMSULOSIN HYDROCHLORIDE 0.4 MG/1
0.4 CAPSULE ORAL AT BEDTIME
Refills: 0 | Status: DISCONTINUED | OUTPATIENT
Start: 2021-03-16 | End: 2021-03-21

## 2021-03-16 RX ORDER — TAMSULOSIN HYDROCHLORIDE 0.4 MG/1
0.4 CAPSULE ORAL AT BEDTIME
Refills: 0 | Status: DISCONTINUED | OUTPATIENT
Start: 2021-03-16 | End: 2021-03-16

## 2021-03-16 RX ORDER — NIFEDIPINE 30 MG
90 TABLET, EXTENDED RELEASE 24 HR ORAL DAILY
Refills: 0 | Status: DISCONTINUED | OUTPATIENT
Start: 2021-03-17 | End: 2021-03-21

## 2021-03-16 RX ORDER — NIFEDIPINE 30 MG
30 TABLET, EXTENDED RELEASE 24 HR ORAL ONCE
Refills: 0 | Status: COMPLETED | OUTPATIENT
Start: 2021-03-16 | End: 2021-03-16

## 2021-03-16 RX ORDER — SODIUM CHLORIDE 9 MG/ML
1000 INJECTION, SOLUTION INTRAVENOUS
Refills: 0 | Status: DISCONTINUED | OUTPATIENT
Start: 2021-03-16 | End: 2021-03-17

## 2021-03-16 RX ADMIN — SENNA PLUS 2 TABLET(S): 8.6 TABLET ORAL at 21:14

## 2021-03-16 RX ADMIN — CARVEDILOL PHOSPHATE 25 MILLIGRAM(S): 80 CAPSULE, EXTENDED RELEASE ORAL at 05:05

## 2021-03-16 RX ADMIN — TAMSULOSIN HYDROCHLORIDE 0.4 MILLIGRAM(S): 0.4 CAPSULE ORAL at 21:19

## 2021-03-16 RX ADMIN — SODIUM CHLORIDE 30 MILLILITER(S): 9 INJECTION, SOLUTION INTRAVENOUS at 17:38

## 2021-03-16 RX ADMIN — Medication 30 MILLIGRAM(S): at 10:14

## 2021-03-16 RX ADMIN — MYCOPHENOLATE MOFETIL 1000 MILLIGRAM(S): 250 CAPSULE ORAL at 21:14

## 2021-03-16 RX ADMIN — Medication 500000 UNIT(S): at 23:21

## 2021-03-16 RX ADMIN — Medication 5 MILLIGRAM(S): at 05:05

## 2021-03-16 RX ADMIN — TRAMADOL HYDROCHLORIDE 50 MILLIGRAM(S): 50 TABLET ORAL at 05:06

## 2021-03-16 RX ADMIN — Medication 1 APPLICATION(S): at 05:05

## 2021-03-16 RX ADMIN — Medication 60 MILLIGRAM(S): at 05:05

## 2021-03-16 RX ADMIN — TRAMADOL HYDROCHLORIDE 50 MILLIGRAM(S): 50 TABLET ORAL at 06:00

## 2021-03-16 RX ADMIN — Medication 10 MILLIGRAM(S): at 05:56

## 2021-03-16 RX ADMIN — TRAMADOL HYDROCHLORIDE 50 MILLIGRAM(S): 50 TABLET ORAL at 23:21

## 2021-03-16 RX ADMIN — Medication 500000 UNIT(S): at 17:37

## 2021-03-16 RX ADMIN — MYCOPHENOLATE MOFETIL 1000 MILLIGRAM(S): 250 CAPSULE ORAL at 08:01

## 2021-03-16 RX ADMIN — Medication 25 MILLIGRAM(S): at 00:59

## 2021-03-16 RX ADMIN — SEVELAMER CARBONATE 1600 MILLIGRAM(S): 2400 POWDER, FOR SUSPENSION ORAL at 13:36

## 2021-03-16 RX ADMIN — Medication 1 TABLET(S): at 13:37

## 2021-03-16 RX ADMIN — SEVELAMER CARBONATE 1600 MILLIGRAM(S): 2400 POWDER, FOR SUSPENSION ORAL at 09:18

## 2021-03-16 RX ADMIN — CHLORHEXIDINE GLUCONATE 1 APPLICATION(S): 213 SOLUTION TOPICAL at 13:25

## 2021-03-16 RX ADMIN — SEVELAMER CARBONATE 1600 MILLIGRAM(S): 2400 POWDER, FOR SUSPENSION ORAL at 17:38

## 2021-03-16 RX ADMIN — TACROLIMUS 8 MILLIGRAM(S): 5 CAPSULE ORAL at 08:01

## 2021-03-16 RX ADMIN — POLYETHYLENE GLYCOL 3350 17 GRAM(S): 17 POWDER, FOR SOLUTION ORAL at 13:36

## 2021-03-16 RX ADMIN — Medication 500000 UNIT(S): at 13:36

## 2021-03-16 RX ADMIN — FAMOTIDINE 20 MILLIGRAM(S): 10 INJECTION INTRAVENOUS at 13:36

## 2021-03-16 RX ADMIN — TRAMADOL HYDROCHLORIDE 25 MILLIGRAM(S): 50 TABLET ORAL at 08:38

## 2021-03-16 RX ADMIN — TRAMADOL HYDROCHLORIDE 25 MILLIGRAM(S): 50 TABLET ORAL at 09:15

## 2021-03-16 RX ADMIN — INSULIN GLARGINE 15 UNIT(S): 100 INJECTION, SOLUTION SUBCUTANEOUS at 21:19

## 2021-03-16 RX ADMIN — CARVEDILOL PHOSPHATE 25 MILLIGRAM(S): 80 CAPSULE, EXTENDED RELEASE ORAL at 17:38

## 2021-03-16 RX ADMIN — Medication 500000 UNIT(S): at 05:05

## 2021-03-16 NOTE — PROGRESS NOTE ADULT - ASSESSMENT
Patient is a 70 y/o M w PMH of ESRD on PD for 5 months, was on HD in 2020, with nephrologist Dr Novak, IDDM, HTN, gout, depression, anxiety and OCD. S/p DCD with JOJO DDRT on 3/9/21.     1. S/p DDRT on 3/9/21 - has ATN from ischemia reperfusion injury and ATN of donor. S/p Simulect induction. Urine output increased. got HD yesterday with 1.5L UF and PD resumed yesterday night. Will continue PD today. Monitor labs and urine output.    2. Immunosupression - Simulect induction, currently on Envarsus, MMF 1g PO BID and steroid taper     3. Prophylaxis - bactrim/nystatin/valcyte     4. Hypertension - BP above target range. On Coreg to 25 mg PO BID and Nifedipine 60 mg PO daily. Increase Nifedipine to 90 mg PO daily. Monitor BP. Low salt diet.     5.  DM2 - finger stick in AM was boarderline low of 68, pre-meal was d/ame and on Lantus 15 unit at bedtime, with corrective sliding scale. Monitor f/s.     6.  Hyponatremia - from poor clearance and fluid intake.  Restrict to 1 L.     7. Anemia - last Hb 8.1. Got retacrit 10,000 unit 3/15/21. Monitor CBC.     If any questions, please feel free to contact me     Jennifer Mcdowell  Nephrology Fellow  [facility] Pager: [phone]  [facility] Pager: [phone]

## 2021-03-16 NOTE — PROVIDER CONTACT NOTE (CHANGE IN STATUS NOTIFICATION) - ACTION/TREATMENT ORDERED:
per transplant team start D5 @30mL/hr and recheck fingerstick in 1hr, call bell is within reach will continue to monitor pt.

## 2021-03-16 NOTE — PROGRESS NOTE ADULT - ATTENDING COMMENTS
Pt with AMS today, more lethargic  BG was low and pt not eating, will hold insulin and start D51/2NS @40cc/hr  Check cultures, repeat CBC and chemistry  Not voiding, will replace alfaro catheter.

## 2021-03-16 NOTE — PROGRESS NOTE ADULT - ASSESSMENT
71M with h/o HTN (age 43), IDDM (age 43), gout, anxiety, depression, OCD, CKD (since 2016) was on HD via L AVF (4/2020 Dr. Novak) with transition to PD in 9/2020 now s/p R DCD DDRT (3/9/21)    [] POD#6 s/p R DCD DDRT c/b DGF  - Remains oliguric, s/p HD one 3/15 and PD   - started flomax for retention, will monitor UOP and re check a bladder scan   - Will remove LORETTA today   - Nifedipine adjusted   - Immuno: Env per level, MMF 1g bid, Pred taper, Simulect completed  - PPx: valcyte/bactrim/nystatin  - Cont strict I&Os  - Diet: Renal   - Pain control  - Bowel regimen   - SCDs/IS/OOB  - DC alfaro cath today  - DC TLC    [] DM  - Enrolled in CGM study (conventional group)/completed  - Lantus/Lispro; adjust accordingly    [] HTN  - cont coreg 25mg bid and nifedipine 30mg daily     [] Dispo  - expect home Wed with HD vs. PD

## 2021-03-16 NOTE — PROGRESS NOTE ADULT - SUBJECTIVE AND OBJECTIVE BOX
Transplant Surgery - Multidisciplinary Rounds  --------------------------------------------------------------  DCD DDRT (1a/1v/1u--stented)   Date: 3/9/2021    POD# 7    Present: Patient seen and examined with multidisciplinary team including Transplant Surgeon, Transplant Nephrologist, Transplant Pharmacist: Franki Fernandez, Khadijah, and bedside RN during am rounds. Disciplines not in attendance will be notified of plan.      HPI: 72 y/o M with PMHx of HTN (age 43), DM on insulin (age 43), gout, anxiety, depression, OCD and CKD since 2016.  He started HD via L AVF  in April 2020 at [facility] and transitioned to PD about 5 months ago. He makes ~2 ounces of urine 4 times daily.      Underwent DCD DDRT 3/9/2021 on 3/9/2021 (1A/1V/1U stented). Post op course c/b DGF requiring HD    Interval Events:  - POD#7 s/p DCD DDRT; u/o slowly improving  - s/p HD 3/15 and PD overnight  3/15: FK 7.1, ENV incr to 8 from 7 with STAT 1mg.  PD cath flush 1L-->tolerated. HD with Epo. Lasix 80x1.  alfaro removed. off CGM. SMOG x1. Lispro decreased  3/16: HTN overnight 10mg hydralizine,       Potential Discharge date: pending clinical stability  Education:  Medications  Plan of care:  See Below      MEDICATIONS  (STANDING):  carvedilol 25 milliGRAM(s) Oral every 12 hours  chlorhexidine 2% Cloths 1 Application(s) Topical daily  dextrose 40% Gel 15 Gram(s) Oral once  dextrose 5%. 1000 milliLiter(s) (50 mL/Hr) IV Continuous <Continuous>  dextrose 5%. 1000 milliLiter(s) (100 mL/Hr) IV Continuous <Continuous>  dextrose 50% Injectable 25 Gram(s) IV Push once  dextrose 50% Injectable 12.5 Gram(s) IV Push once  dextrose 50% Injectable 25 Gram(s) IV Push once  epoetin goran-epbx (RETACRIT) Injectable 18814 Unit(s) IV Push once  famotidine    Tablet 20 milliGRAM(s) Oral daily  gentamicin 0.1% Ointment 1 Application(s) Topical <User Schedule>  glucagon  Injectable 1 milliGRAM(s) IntraMuscular once  insulin glargine Injectable (LANTUS) 25 Unit(s) SubCutaneous at bedtime  insulin lispro (ADMELOG) corrective regimen sliding scale   SubCutaneous three times a day before meals  insulin lispro (ADMELOG) corrective regimen sliding scale   SubCutaneous at bedtime  insulin lispro Injectable (ADMELOG) 4 Unit(s) SubCutaneous three times a day with meals  mycophenolate mofetil 1000 milliGRAM(s) Oral <User Schedule>  NIFEdipine XL 60 milliGRAM(s) Oral daily  nystatin    Suspension 544924 Unit(s) Swish and Swallow four times a day  polyethylene glycol 3350 17 Gram(s) Oral daily  predniSONE   Tablet 5 milliGRAM(s) Oral two times a day  senna 2 Tablet(s) Oral at bedtime  sevelamer carbonate 1600 milliGRAM(s) Oral three times a day with meals  trimethoprim   80 mG/sulfamethoxazole 400 mG 1 Tablet(s) Oral daily  valGANciclovir 450 milliGRAM(s) Oral <User Schedule>    MEDICATIONS  (PRN):  acetaminophen   Tablet .. 650 milliGRAM(s) Oral every 6 hours PRN Mild Pain (1 - 3)  traMADol 25 milliGRAM(s) Oral every 4 hours PRN Moderate Pain (4 - 6)  traMADol 50 milliGRAM(s) Oral every 4 hours PRN Severe Pain (7 - 10)      PAST MEDICAL & SURGICAL HISTORY:  DM (diabetes mellitus)  HTN (hypertension)  Chronic kidney disease (CKD)    Vital Signs Last 24 Hrs  Vital Signs Last 24 Hrs  T(C): 37.3 (16 Mar 2021 08:52), Max: 37.4 (16 Mar 2021 05:00)  T(F): 99.1 (16 Mar 2021 08:52), Max: 99.4 (16 Mar 2021 05:00)  HR: 68 (16 Mar 2021 08:52) (63 - 76)  BP: 174/84 (16 Mar 2021 08:52) (140/65 - 183/88)  BP(mean): 120 (16 Mar 2021 05:00) (95 - 120)  RR: 20 (16 Mar 2021 08:52) (18 - 20)  SpO2: 96% (16 Mar 2021 08:52) (93% - 96%)      I&O's Summary  I&O's Summary    15 Mar 2021 07:01  -  16 Mar 2021 07:00  --------------------------------------------------------  IN: 1400 mL / OUT: 3146.5 mL / NET: -1746.5 mL    16 Mar 2021 07:01  -  16 Mar 2021 11:07  --------------------------------------------------------  IN: 120 mL / OUT: 6 mL / NET: 114 mL                            8.1    9.67  )-----------( 131      ( 16 Mar 2021 09:55 )             22.9       03-16    132<L>  |  92<L>  |  58<H>  ----------------------------<  77  3.9   |  25  |  5.87<H>    Ca    8.3<L>      16 Mar 2021 06:08  Phos  5.6     03-16  Mg     2.3     03-16    TPro  5.6<L>  /  Alb  2.9<L>  /  TBili  0.5  /  DBili  x   /  AST  10  /  ALT  11  /  AlkPhos  97  03-16        Review of systems  Gen: No weight changes, fatigue, fevers/chills, weakness  Skin: No rashes  Head/Eyes/Ears/Mouth: No headache; Normal hearing; Normal vision w/o blurriness; No sinus pain/discomfort, sore throat  Respiratory: No dyspnea, cough, wheezing, hemoptysis  CV: No chest pain, PND, orthopnea  GI: Mild abdominal pain at surgical incision site; denies diarrhea, constipation, nausea, vomiting, melena, hematochezia  : No increased frequency, dysuria, hematuria, nocturia  MSK: No joint pain/swelling; no back pain; no edema  Neuro: No dizziness/lightheadedness, weakness, seizures, numbness, tingling  Heme: No easy bruising or bleeding  Endo: No heat/cold intolerance  Psych: No significant nervousness, anxiety, stress, depression  All other systems were reviewed and are negative, except as noted.    PHYSICAL EXAM:  Constitutional: Well developed / well nourished  Eyes: Anicteric, PERRLA  ENMT: nc/at  Neck: central line RIJ  Respiratory: improving breath sounds on R lung base  Cardiovascular: RRR  Gastrointestinal: Soft, non distended, mild tenderness at the incision site; incision c/d/i; LORETTA serosanguinous  Genitourinary: Urinary catheter in place  Extremities: SCD's in place and working bilaterally, AVF palpable. 1+ edema b/l LE. no calf TTP  Vascular: 1+ DP pulses b/l  Neurological: A&O x3  Skin: no rashes, ulcerations or lesions;  Musculoskeletal: Moving all extremities  Psychiatric: Responsive

## 2021-03-16 NOTE — PROGRESS NOTE ADULT - SUBJECTIVE AND OBJECTIVE BOX
[facility] DIVISION OF KIDNEY DISEASES AND HYPERTENSION -- FOLLOW UP NOTE  --------------------------------------------------------------------------------    24 hour events/subjective: Got HD yesterday with 1.5L UF, urine output 813 cc over 24h. Got PD overnight w/o any issues. Patient denies CP, SOB, nausea, vomitting.         PAST HISTORY  --------------------------------------------------------------------------------  No significant changes to PMH, PSH, FHx, SHx, unless otherwise noted    ALLERGIES & MEDICATIONS  --------------------------------------------------------------------------------  Allergies    ACE inhibitors (Angioedema)  Levaquin (Angioedema)    Intolerances      Standing Inpatient Medications  carvedilol 25 milliGRAM(s) Oral every 12 hours  chlorhexidine 2% Cloths 1 Application(s) Topical daily  dextrose 40% Gel 15 Gram(s) Oral once  dextrose 5%. 1000 milliLiter(s) IV Continuous <Continuous>  dextrose 5%. 1000 milliLiter(s) IV Continuous <Continuous>  dextrose 50% Injectable 25 Gram(s) IV Push once  dextrose 50% Injectable 12.5 Gram(s) IV Push once  dextrose 50% Injectable 25 Gram(s) IV Push once  famotidine    Tablet 20 milliGRAM(s) Oral daily  gentamicin 0.1% Ointment 1 Application(s) Topical <User Schedule>  glucagon  Injectable 1 milliGRAM(s) IntraMuscular once  insulin glargine Injectable (LANTUS) 15 Unit(s) SubCutaneous at bedtime  insulin lispro (ADMELOG) corrective regimen sliding scale   SubCutaneous three times a day before meals  insulin lispro (ADMELOG) corrective regimen sliding scale   SubCutaneous at bedtime  mycophenolate mofetil 1000 milliGRAM(s) Oral <User Schedule>  nystatin    Suspension 916598 Unit(s) Swish and Swallow four times a day  polyethylene glycol 3350 17 Gram(s) Oral daily  predniSONE   Tablet 5 milliGRAM(s) Oral daily  senna 2 Tablet(s) Oral at bedtime  sevelamer carbonate 1600 milliGRAM(s) Oral three times a day with meals  tacrolimus ER Tablet (ENVARSUS XR) 8 milliGRAM(s) Oral <User Schedule>  tamsulosin 0.4 milliGRAM(s) Oral at bedtime  trimethoprim   80 mG/sulfamethoxazole 400 mG 1 Tablet(s) Oral daily  valGANciclovir 450 milliGRAM(s) Oral <User Schedule>    PRN Inpatient Medications  acetaminophen   Tablet .. 650 milliGRAM(s) Oral every 6 hours PRN  traMADol 25 milliGRAM(s) Oral every 4 hours PRN  traMADol 50 milliGRAM(s) Oral every 4 hours PRN      REVIEW OF SYSTEMS  --------------------------------------------------------------------------------  Gen: No fevers/chills  Respiratory: No dyspnea, cough  CV: No chest pain  GI: No abdominal pain, diarrhea  MSK: No  edema    All other systems were reviewed and are negative, except as noted.    VITALS/PHYSICAL EXAM  --------------------------------------------------------------------------------  T(C): 37.3 (03-16-21 @ 08:52), Max: 37.4 (03-16-21 @ 05:00)  HR: 68 (03-16-21 @ 08:52) (63 - 76)  BP: 174/84 (03-16-21 @ 08:52) (140/65 - 183/88)  RR: 20 (03-16-21 @ 08:52) (18 - 20)  SpO2: 96% (03-16-21 @ 08:52) (93% - 96%)  Wt(kg): --    03-15-21 @ 07:01  -  03-16-21 @ 07:00  --------------------------------------------------------  IN: 1400 mL / OUT: 3146.5 mL / NET: -1746.5 mL    03-16-21 @ 07:01  -  03-16-21 @ 11:41  --------------------------------------------------------  IN: 120 mL / OUT: 6 mL / NET: 114 mL    Physical Exam:  	Gen: NAD  	HEENT: MMM  	Pulm: CTA B/L  	CV: S1S2  	Abd: Soft, +BS   	Ext: No LE edema B/L  	Neuro: Awake  	Skin: Warm and dry  	Vascular access: LUE AVF, PD catheter       LABS/STUDIES  --------------------------------------------------------------------------------              8.1    9.67  >-----------<  131      [03-16-21 @ 09:55]              22.9     132  |  92  |  58  ----------------------------<  77      [03-16-21 @ 06:08]  3.9   |  25  |  5.87        Ca     8.3     [03-16-21 @ 06:08]      Mg     2.3     [03-16-21 @ 06:08]      Phos  5.6     [03-16-21 @ 06:08]    TPro  5.6  /  Alb  2.9  /  TBili  0.5  /  DBili  x   /  AST  10  /  ALT  11  /  AlkPhos  97  [03-16-21 @ 06:08]    PT/INR: PT 12.8 , INR 1.07       [03-15-21 @ 06:00]  PTT: 27.7       [03-15-21 @ 06:00]      Creatinine Trend:  SCr 5.87 [03-16 @ 06:08]  SCr 8.16 [03-15 @ 05:59]  SCr 7.51 [03-14 @ 17:46]  SCr 6.99 [03-14 @ 07:44]  SCr 6.34 [03-13 @ 05:56]          HBsAb 56.4      [03-09-21 @ 17:13]  HBsAg Nonreact      [03-09-21 @ 17:13]  HBcAb Nonreact      [03-09-21 @ 17:13]  HCV 0.32, Nonreact      [03-09-21 @ 17:13]  HIV Nonreact      [03-09-21 @ 18:20]

## 2021-03-17 LAB
ALBUMIN SERPL ELPH-MCNC: 3.1 G/DL — LOW (ref 3.3–5)
ALP SERPL-CCNC: 100 U/L — SIGNIFICANT CHANGE UP (ref 40–120)
ALT FLD-CCNC: 7 U/L — LOW (ref 10–45)
ANION GAP SERPL CALC-SCNC: 18 MMOL/L — HIGH (ref 5–17)
AST SERPL-CCNC: 10 U/L — SIGNIFICANT CHANGE UP (ref 10–40)
BASOPHILS # BLD AUTO: 0.01 K/UL — SIGNIFICANT CHANGE UP (ref 0–0.2)
BASOPHILS NFR BLD AUTO: 0.1 % — SIGNIFICANT CHANGE UP (ref 0–2)
BILIRUB SERPL-MCNC: 0.5 MG/DL — SIGNIFICANT CHANGE UP (ref 0.2–1.2)
BUN SERPL-MCNC: 66 MG/DL — HIGH (ref 7–23)
CALCIUM SERPL-MCNC: 8.4 MG/DL — SIGNIFICANT CHANGE UP (ref 8.4–10.5)
CHLORIDE SERPL-SCNC: 89 MMOL/L — LOW (ref 96–108)
CO2 SERPL-SCNC: 23 MMOL/L — SIGNIFICANT CHANGE UP (ref 22–31)
CREAT SERPL-MCNC: 6.79 MG/DL — HIGH (ref 0.5–1.3)
EOSINOPHIL # BLD AUTO: 0.14 K/UL — SIGNIFICANT CHANGE UP (ref 0–0.5)
EOSINOPHIL NFR BLD AUTO: 1.4 % — SIGNIFICANT CHANGE UP (ref 0–6)
GLUCOSE SERPL-MCNC: 148 MG/DL — HIGH (ref 70–99)
HCT VFR BLD CALC: 23.7 % — LOW (ref 39–50)
HGB BLD-MCNC: 8 G/DL — LOW (ref 13–17)
IMM GRANULOCYTES NFR BLD AUTO: 1.5 % — SIGNIFICANT CHANGE UP (ref 0–1.5)
LYMPHOCYTES # BLD AUTO: 0.53 K/UL — LOW (ref 1–3.3)
LYMPHOCYTES # BLD AUTO: 5.3 % — LOW (ref 13–44)
MAGNESIUM SERPL-MCNC: 2.3 MG/DL — SIGNIFICANT CHANGE UP (ref 1.6–2.6)
MCHC RBC-ENTMCNC: 32 PG — SIGNIFICANT CHANGE UP (ref 27–34)
MCHC RBC-ENTMCNC: 33.8 GM/DL — SIGNIFICANT CHANGE UP (ref 32–36)
MCV RBC AUTO: 94.8 FL — SIGNIFICANT CHANGE UP (ref 80–100)
MONOCYTES # BLD AUTO: 1.17 K/UL — HIGH (ref 0–0.9)
MONOCYTES NFR BLD AUTO: 11.7 % — SIGNIFICANT CHANGE UP (ref 2–14)
NEUTROPHILS # BLD AUTO: 8.01 K/UL — HIGH (ref 1.8–7.4)
NEUTROPHILS NFR BLD AUTO: 80 % — HIGH (ref 43–77)
NRBC # BLD: 0 /100 WBCS — SIGNIFICANT CHANGE UP (ref 0–0)
PHOSPHATE SERPL-MCNC: 6.3 MG/DL — HIGH (ref 2.5–4.5)
PLATELET # BLD AUTO: 144 K/UL — LOW (ref 150–400)
POTASSIUM SERPL-MCNC: 3.9 MMOL/L — SIGNIFICANT CHANGE UP (ref 3.5–5.3)
POTASSIUM SERPL-SCNC: 3.9 MMOL/L — SIGNIFICANT CHANGE UP (ref 3.5–5.3)
PROT SERPL-MCNC: 5.6 G/DL — LOW (ref 6–8.3)
RBC # BLD: 2.5 M/UL — LOW (ref 4.2–5.8)
RBC # FLD: 15.5 % — HIGH (ref 10.3–14.5)
SARS-COV-2 RNA SPEC QL NAA+PROBE: SIGNIFICANT CHANGE UP
SODIUM SERPL-SCNC: 130 MMOL/L — LOW (ref 135–145)
TACROLIMUS SERPL-MCNC: 10.5 NG/ML — SIGNIFICANT CHANGE UP
WBC # BLD: 10.01 K/UL — SIGNIFICANT CHANGE UP (ref 3.8–10.5)
WBC # FLD AUTO: 10.01 K/UL — SIGNIFICANT CHANGE UP (ref 3.8–10.5)

## 2021-03-17 PROCEDURE — 71045 X-RAY EXAM CHEST 1 VIEW: CPT | Mod: 26

## 2021-03-17 PROCEDURE — 93970 EXTREMITY STUDY: CPT | Mod: 26

## 2021-03-17 PROCEDURE — 99232 SBSQ HOSP IP/OBS MODERATE 35: CPT | Mod: GC

## 2021-03-17 RX ORDER — INSULIN LISPRO 100/ML
7 VIAL (ML) SUBCUTANEOUS
Qty: 0 | Refills: 0 | DISCHARGE
Start: 2021-03-17

## 2021-03-17 RX ORDER — FUROSEMIDE 40 MG
10 TABLET ORAL
Qty: 500 | Refills: 0 | Status: DISCONTINUED | OUTPATIENT
Start: 2021-03-17 | End: 2021-03-21

## 2021-03-17 RX ORDER — INSULIN LISPRO 100/ML
3 VIAL (ML) SUBCUTANEOUS
Qty: 0 | Refills: 0 | DISCHARGE
Start: 2021-03-17

## 2021-03-17 RX ORDER — FUROSEMIDE 40 MG
80 TABLET ORAL ONCE
Refills: 0 | Status: COMPLETED | OUTPATIENT
Start: 2021-03-17 | End: 2021-03-17

## 2021-03-17 RX ORDER — INSULIN LISPRO 100/ML
8 VIAL (ML) SUBCUTANEOUS
Qty: 0 | Refills: 0 | DISCHARGE
Start: 2021-03-17

## 2021-03-17 RX ORDER — INSULIN LISPRO 100/ML
5 VIAL (ML) SUBCUTANEOUS
Qty: 0 | Refills: 0 | DISCHARGE
Start: 2021-03-17

## 2021-03-17 RX ADMIN — TRAMADOL HYDROCHLORIDE 50 MILLIGRAM(S): 50 TABLET ORAL at 00:29

## 2021-03-17 RX ADMIN — TRAMADOL HYDROCHLORIDE 50 MILLIGRAM(S): 50 TABLET ORAL at 05:02

## 2021-03-17 RX ADMIN — INSULIN GLARGINE 15 UNIT(S): 100 INJECTION, SOLUTION SUBCUTANEOUS at 22:34

## 2021-03-17 RX ADMIN — CHLORHEXIDINE GLUCONATE 1 APPLICATION(S): 213 SOLUTION TOPICAL at 13:21

## 2021-03-17 RX ADMIN — TAMSULOSIN HYDROCHLORIDE 0.4 MILLIGRAM(S): 0.4 CAPSULE ORAL at 22:34

## 2021-03-17 RX ADMIN — TACROLIMUS 8 MILLIGRAM(S): 5 CAPSULE ORAL at 08:58

## 2021-03-17 RX ADMIN — MYCOPHENOLATE MOFETIL 1000 MILLIGRAM(S): 250 CAPSULE ORAL at 08:58

## 2021-03-17 RX ADMIN — TRAMADOL HYDROCHLORIDE 50 MILLIGRAM(S): 50 TABLET ORAL at 05:45

## 2021-03-17 RX ADMIN — CARVEDILOL PHOSPHATE 25 MILLIGRAM(S): 80 CAPSULE, EXTENDED RELEASE ORAL at 05:02

## 2021-03-17 RX ADMIN — VALGANCICLOVIR 450 MILLIGRAM(S): 450 TABLET, FILM COATED ORAL at 08:58

## 2021-03-17 RX ADMIN — Medication 2: at 13:21

## 2021-03-17 RX ADMIN — SEVELAMER CARBONATE 1600 MILLIGRAM(S): 2400 POWDER, FOR SUSPENSION ORAL at 19:53

## 2021-03-17 RX ADMIN — Medication 500000 UNIT(S): at 05:02

## 2021-03-17 RX ADMIN — Medication 90 MILLIGRAM(S): at 05:02

## 2021-03-17 RX ADMIN — Medication 80 MILLIGRAM(S): at 09:13

## 2021-03-17 RX ADMIN — SEVELAMER CARBONATE 1600 MILLIGRAM(S): 2400 POWDER, FOR SUSPENSION ORAL at 13:21

## 2021-03-17 RX ADMIN — SEVELAMER CARBONATE 1600 MILLIGRAM(S): 2400 POWDER, FOR SUSPENSION ORAL at 08:58

## 2021-03-17 RX ADMIN — Medication 2: at 08:58

## 2021-03-17 RX ADMIN — FAMOTIDINE 20 MILLIGRAM(S): 10 INJECTION INTRAVENOUS at 13:21

## 2021-03-17 RX ADMIN — Medication 500000 UNIT(S): at 17:27

## 2021-03-17 RX ADMIN — Medication 5 MG/HR: at 17:27

## 2021-03-17 RX ADMIN — POLYETHYLENE GLYCOL 3350 17 GRAM(S): 17 POWDER, FOR SOLUTION ORAL at 13:21

## 2021-03-17 RX ADMIN — Medication 1 TABLET(S): at 13:21

## 2021-03-17 RX ADMIN — SENNA PLUS 2 TABLET(S): 8.6 TABLET ORAL at 22:34

## 2021-03-17 RX ADMIN — MYCOPHENOLATE MOFETIL 1000 MILLIGRAM(S): 250 CAPSULE ORAL at 20:02

## 2021-03-17 RX ADMIN — Medication 500000 UNIT(S): at 13:21

## 2021-03-17 RX ADMIN — CARVEDILOL PHOSPHATE 25 MILLIGRAM(S): 80 CAPSULE, EXTENDED RELEASE ORAL at 17:27

## 2021-03-17 RX ADMIN — Medication 5 MILLIGRAM(S): at 05:03

## 2021-03-17 RX ADMIN — Medication 2: at 19:53

## 2021-03-17 NOTE — PROGRESS NOTE ADULT - ATTENDING COMMENTS
Pt feels much better today  Stop D5  Plan PD tonight again with 1 L exchanges.    If not ultrafiltrating may need to change PD rx or add session of UF Pt feels much better today  Stop D5  Plan PD tonight again with 1 L exchanges.    If not ultrafiltrating may need to change PD rx or add session of UF  Will also add lasix drip as he is making more urine.

## 2021-03-17 NOTE — PROGRESS NOTE ADULT - SUBJECTIVE AND OBJECTIVE BOX
[facility] DIVISION OF KIDNEY DISEASES AND HYPERTENSION -- FOLLOW UP NOTE  --------------------------------------------------------------------------------    24 hour events/subjective: urine output 770 cc over 24h period. Patient was seen and examined at bedside. Reported feeling well. Denies CP, SOB, fever, chills, nausea, vomiting, diarrhea or LE edema.       PAST HISTORY  --------------------------------------------------------------------------------  No significant changes to PMH, PSH, FHx, SHx, unless otherwise noted    ALLERGIES & MEDICATIONS  --------------------------------------------------------------------------------  Allergies    ACE inhibitors (Angioedema)  Levaquin (Angioedema)    Intolerances      Standing Inpatient Medications  carvedilol 25 milliGRAM(s) Oral every 12 hours  chlorhexidine 2% Cloths 1 Application(s) Topical daily  dextrose 40% Gel 15 Gram(s) Oral once  dextrose 5%. 1000 milliLiter(s) IV Continuous <Continuous>  dextrose 5%. 1000 milliLiter(s) IV Continuous <Continuous>  dextrose 50% Injectable 25 Gram(s) IV Push once  dextrose 50% Injectable 12.5 Gram(s) IV Push once  dextrose 50% Injectable 25 Gram(s) IV Push once  famotidine    Tablet 20 milliGRAM(s) Oral daily  gentamicin 0.1% Ointment 1 Application(s) Topical <User Schedule>  glucagon  Injectable 1 milliGRAM(s) IntraMuscular once  insulin glargine Injectable (LANTUS) 15 Unit(s) SubCutaneous at bedtime  insulin lispro (ADMELOG) corrective regimen sliding scale   SubCutaneous three times a day before meals  insulin lispro (ADMELOG) corrective regimen sliding scale   SubCutaneous at bedtime  mycophenolate mofetil 1000 milliGRAM(s) Oral <User Schedule>  NIFEdipine XL 90 milliGRAM(s) Oral daily  nystatin    Suspension 201128 Unit(s) Swish and Swallow four times a day  polyethylene glycol 3350 17 Gram(s) Oral daily  predniSONE   Tablet 5 milliGRAM(s) Oral daily  senna 2 Tablet(s) Oral at bedtime  sevelamer carbonate 1600 milliGRAM(s) Oral three times a day with meals  tacrolimus ER Tablet (ENVARSUS XR) 8 milliGRAM(s) Oral <User Schedule>  tamsulosin 0.4 milliGRAM(s) Oral at bedtime  trimethoprim   80 mG/sulfamethoxazole 400 mG 1 Tablet(s) Oral daily  valGANciclovir 450 milliGRAM(s) Oral <User Schedule>    PRN Inpatient Medications  acetaminophen   Tablet .. 650 milliGRAM(s) Oral every 6 hours PRN  traMADol 25 milliGRAM(s) Oral every 4 hours PRN  traMADol 50 milliGRAM(s) Oral every 4 hours PRN      REVIEW OF SYSTEMS  --------------------------------------------------------------------------------  Gen: No fevers/chills  Skin: No rashes  Respiratory: No dyspnea, cough  CV: No chest pain  GI: No abdominal pain, diarrhea  MSK: No  edema      All other systems were reviewed and are negative, except as noted.    VITALS/PHYSICAL EXAM  --------------------------------------------------------------------------------  T(C): 37.2 (03-17-21 @ 09:00), Max: 37.4 (03-16-21 @ 23:24)  HR: 64 (03-17-21 @ 09:00) (64 - 69)  BP: 145/68 (03-17-21 @ 09:00) (140/69 - 170/80)  RR: 20 (03-17-21 @ 09:24) (18 - 20)  SpO2: 84% (03-17-21 @ 09:24) (84% - 99%)  Wt(kg): --    03-16-21 @ 07:01  -  03-17-21 @ 07:00  --------------------------------------------------------  IN: 1765 mL / OUT: 799 mL / NET: 966 mL    03-17-21 @ 07:01  -  03-17-21 @ 10:20  --------------------------------------------------------  IN: 30 mL / OUT: 110 mL / NET: -80 mL    Physical Exam:  	Gen: NAD  	HEENT: MMM  	Pulm: CTA B/L  	CV: S1S2  	Abd: Soft, +BS   	Ext: No LE edema B/L  	Neuro: Awake  	Skin: Warm and dry  	Vascular access: LUE AVF, PD Catheter       LABS/STUDIES  --------------------------------------------------------------------------------              8.0    10.01 >-----------<  144      [03-17-21 @ 06:17]              23.7     130  |  89  |  66  ----------------------------<  148      [03-17-21 @ 06:17]  3.9   |  23  |  6.79        Ca     8.4     [03-17-21 @ 06:17]      Mg     2.3     [03-17-21 @ 06:17]      Phos  6.3     [03-17-21 @ 06:17]    TPro  5.6  /  Alb  3.1  /  TBili  0.5  /  DBili  x   /  AST  10  /  ALT  7   /  AlkPhos  100  [03-17-21 @ 06:17]          Creatinine Trend:  SCr 6.79 [03-17 @ 06:17]  SCr 6.05 [03-16 @ 16:48]  SCr 5.87 [03-16 @ 06:08]  SCr 8.16 [03-15 @ 05:59]  SCr 7.51 [03-14 @ 17:46]    Urinalysis - [03-16-21 @ 15:47]      Color Light Orange / Appearance Slightly Turbid / SG 1.010 / pH 6.5      Gluc Trace / Ketone Negative  / Bili Negative / Urobili Negative       Blood Large / Protein 100 / Leuk Est Small / Nitrite Negative       / WBC 14 / Hyaline 6 / Gran  / Sq Epi  / Non Sq Epi 7 / Bacteria Negative        HBsAb 56.4      [03-09-21 @ 17:13]  HBsAg Nonreact      [03-09-21 @ 17:13]  HBcAb Nonreact      [03-09-21 @ 17:13]  HCV 0.32, Nonreact      [03-09-21 @ 17:13]  HIV Nonreact      [03-09-21 @ 18:20]

## 2021-03-17 NOTE — PROGRESS NOTE ADULT - ASSESSMENT
71M with h/o HTN (age 43), IDDM (age 43), gout, anxiety, depression, OCD, CKD (since 2016) was on HD via L AVF (4/2020 Dr. Novak) with transition to PD in 9/2020 now s/p R DCD DDRT (3/9/21)    [] POD#6 s/p R DCD DDRT c/b DGF  - Remains oliguric, s/p PD overnight   - started flomax for retention, will monitor UOP and re check a bladder scan   - Will remove LORETTA today   - Nifedipine adjusted   - Immuno: Env per level, MMF 1g bid, Pred taper, Simulect completed  - PPx: valcyte/bactrim/nystatin  - Cont strict I&Os  - Diet: Renal   - Pain control  - Bowel regimen   - SCDs/IS/OOB    [] DM  - Enrolled in CGM study (conventional group)/completed  - Lantus/Lispro; adjust accordingly    [] HTN  - cont coreg 25mg bid and nifedipine 30mg daily     [] Dispo  - expect home today

## 2021-03-17 NOTE — PROGRESS NOTE ADULT - ASSESSMENT
Patient is a 72 y/o M w PMH of ESRD on PD for 5 months, was on HD in 2020, with nephrologist Dr Novak, IDDM, HTN, gout, depression, anxiety and OCD. S/p DCD with JOJO DDRT on 3/9/21.     1. S/p DDRT on 3/9/21 - has ATN from ischemia reperfusion injury and ATN of donor. S/p Simulect induction. Urine output increased. Last HD was 3/15/21 with 1.5L UF and currently on PD. Will continue PD today. Monitor labs and urine output.    2. Immunosupression - Simulect induction, currently on Envarsus, MMF 1g PO BID and steroid taper     3. Prophylaxis - bactrim/nystatin/valcyte     4. Hypertension - BP at target range. On Coreg to 25 mg PO BID and Nifedipine to 90 mg PO daily. Monitor BP. Low salt diet.     5.  DM2 - finger stick in AM was boarderline low of yesterday. Was on D5 drip at 30 cc/hr. Discontinued today. Now on Lantus 15 unit at bedtime, with corrective sliding scale. Monitor f/s.     6.  Hyponatremia - from poor clearance and fluid intake.  Restrict to 1 L.     7. Anemia - last Hb 8. Got retacrit 10,000 unit 3/15/21. Monitor CBC.     If any questions, please feel free to contact me     Jennifer Mcdowell  Nephrology Fellow  [facility] Pager: [phone]  [facility] Pager: [phone]

## 2021-03-17 NOTE — PROGRESS NOTE ADULT - SUBJECTIVE AND OBJECTIVE BOX
Transplant Surgery - Multidisciplinary Rounds  --------------------------------------------------------------  DCD DDRT (1a/1v/1u--stented)   Date: 3/9/2021    POD# 8    Present: Patient seen and examined with multidisciplinary team including Transplant Surgeon, Transplant Nephrologist, Transplant Pharmacist: Franki Fernandez, EVANs, and bedside RN during am rounds. Disciplines not in attendance will be notified of plan.      HPI: 70 y/o M with PMHx of HTN (age 43), DM on insulin (age 43), gout, anxiety, depression, OCD and CKD since 2016.  He started HD via L AVF  in April 2020 at [facility] and transitioned to PD about 5 months ago. He makes ~2 ounces of urine 4 times daily.      Underwent DCD DDRT 3/9/2021 on 3/9/2021 (1A/1V/1U stented). Post op course c/b DGF requiring HD    Interval Events:  - POD#8 s/p DCD DDRT; u/o slowly improving  - s/p PD overnight      3/16: HTN overnight 10mg hydralizine, straight cath x 1 for urine retent.  AM sleepy confused/ hypoglycemic FS 70s- placed on D5 @ 30 ml, premeal/ pm lantus held, FK 8.2 no change, nifedepine inc 90, Urinary retent- alfaro reinserted U/A sent, flomax started, plan to use PD cath ON 4 xchange of 1 L each. BCx sent, repeat bmp sent     Potential Discharge date: pending clinical stability  Education:  Medications  Plan of care:  See Below    MEDICATIONS  (STANDING):  carvedilol 25 milliGRAM(s) Oral every 12 hours  chlorhexidine 2% Cloths 1 Application(s) Topical daily  dextrose 40% Gel 15 Gram(s) Oral once  dextrose 5%. 1000 milliLiter(s) (50 mL/Hr) IV Continuous <Continuous>  dextrose 5%. 1000 milliLiter(s) (100 mL/Hr) IV Continuous <Continuous>  dextrose 50% Injectable 25 Gram(s) IV Push once  dextrose 50% Injectable 12.5 Gram(s) IV Push once  dextrose 50% Injectable 25 Gram(s) IV Push once  epoetin goran-epbx (RETACRIT) Injectable 33312 Unit(s) IV Push once  famotidine    Tablet 20 milliGRAM(s) Oral daily  gentamicin 0.1% Ointment 1 Application(s) Topical <User Schedule>  glucagon  Injectable 1 milliGRAM(s) IntraMuscular once  insulin glargine Injectable (LANTUS) 25 Unit(s) SubCutaneous at bedtime  insulin lispro (ADMELOG) corrective regimen sliding scale   SubCutaneous three times a day before meals  insulin lispro (ADMELOG) corrective regimen sliding scale   SubCutaneous at bedtime  insulin lispro Injectable (ADMELOG) 4 Unit(s) SubCutaneous three times a day with meals  mycophenolate mofetil 1000 milliGRAM(s) Oral <User Schedule>  NIFEdipine XL 60 milliGRAM(s) Oral daily  nystatin    Suspension 326083 Unit(s) Swish and Swallow four times a day  polyethylene glycol 3350 17 Gram(s) Oral daily  predniSONE   Tablet 5 milliGRAM(s) Oral two times a day  senna 2 Tablet(s) Oral at bedtime  sevelamer carbonate 1600 milliGRAM(s) Oral three times a day with meals  trimethoprim   80 mG/sulfamethoxazole 400 mG 1 Tablet(s) Oral daily  valGANciclovir 450 milliGRAM(s) Oral <User Schedule>    MEDICATIONS  (PRN):  acetaminophen   Tablet .. 650 milliGRAM(s) Oral every 6 hours PRN Mild Pain (1 - 3)  traMADol 25 milliGRAM(s) Oral every 4 hours PRN Moderate Pain (4 - 6)  traMADol 50 milliGRAM(s) Oral every 4 hours PRN Severe Pain (7 - 10)    PAST MEDICAL & SURGICAL HISTORY:  DM (diabetes mellitus)  HTN (hypertension)  Chronic kidney disease (CKD)    Vital Signs Last 24 Hrs  Vital Signs Last 24 Hrs  T(C): 37.2 (17 Mar 2021 09:00), Max: 37.4 (16 Mar 2021 23:24)  T(F): 98.9 (17 Mar 2021 09:00), Max: 99.3 (16 Mar 2021 23:24)  HR: 64 (17 Mar 2021 09:00) (64 - 69)  BP: 145/68 (17 Mar 2021 09:00) (140/69 - 170/80)  BP(mean): 115 (17 Mar 2021 04:23) (112 - 115)  RR: 20 (17 Mar 2021 09:24) (18 - 20)  SpO2: 84% (17 Mar 2021 09:24) (84% - 99%)    I&O's Summary  I&O's Summary    16 Mar 2021 07:01  -  17 Mar 2021 07:00  --------------------------------------------------------  IN: 1765 mL / OUT: 799 mL / NET: 966 mL    17 Mar 2021 07:01  -  17 Mar 2021 09:57  --------------------------------------------------------  IN: 30 mL / OUT: 70 mL / NET: -40 mL    Review of systems  Gen: No weight changes, fatigue, fevers/chills, weakness  Skin: No rashes  Head/Eyes/Ears/Mouth: No headache; Normal hearing; Normal vision w/o blurriness; No sinus pain/discomfort, sore throat  Respiratory: No dyspnea, cough, wheezing, hemoptysis  CV: No chest pain, PND, orthopnea  GI: Mild abdominal pain at surgical incision site; denies diarrhea, constipation, nausea, vomiting, melena, hematochezia  : No increased frequency, dysuria, hematuria, nocturia  MSK: No joint pain/swelling; no back pain; no edema  Neuro: No dizziness/lightheadedness, weakness, seizures, numbness, tingling  Heme: No easy bruising or bleeding  Endo: No heat/cold intolerance  Psych: No significant nervousness, anxiety, stress, depression  All other systems were reviewed and are negative, except as noted.    PHYSICAL EXAM:  Constitutional: Well developed / well nourished  Eyes: Anicteric, PERRLA  ENMT: nc/at  Neck: central line RIJ  Respiratory: improving breath sounds on R lung base  Cardiovascular: RRR  Gastrointestinal: Soft, non distended, mild tenderness at the incision site; incision c/d/i; LORETTA serosanguinous  Genitourinary: Urinary catheter in place  Extremities: SCD's in place and working bilaterally, AVF palpable. 1+ edema b/l LE. no calf TTP  Vascular: 1+ DP pulses b/l  Neurological: A&O x3  Skin: no rashes, ulcerations or lesions;  Musculoskeletal: Moving all extremities  Psychiatric: Responsive

## 2021-03-17 NOTE — PROGRESS NOTE ADULT - PROVIDER SPECIALTY LIST ADULT
----- Message from Kristyn Wilson MD sent at 12/13/2019  6:01 PM EST -----  Approved  ----- Message -----  From: Avani Dodd: 12/12/2019   1:17 PM EST  To: Sleep Medicine [facility] [facility], #    PLEASE REVIEW FOR APPROVAL OR DENIAL AND WHY Transplant Surgery

## 2021-03-18 LAB
ALBUMIN SERPL ELPH-MCNC: 3.2 G/DL — LOW (ref 3.3–5)
ALP SERPL-CCNC: 101 U/L — SIGNIFICANT CHANGE UP (ref 40–120)
ALT FLD-CCNC: 7 U/L — LOW (ref 10–45)
ANION GAP SERPL CALC-SCNC: 17 MMOL/L — SIGNIFICANT CHANGE UP (ref 5–17)
AST SERPL-CCNC: 9 U/L — LOW (ref 10–40)
BASOPHILS # BLD AUTO: 0.01 K/UL — SIGNIFICANT CHANGE UP (ref 0–0.2)
BASOPHILS NFR BLD AUTO: 0.1 % — SIGNIFICANT CHANGE UP (ref 0–2)
BILIRUB SERPL-MCNC: 0.5 MG/DL — SIGNIFICANT CHANGE UP (ref 0.2–1.2)
BUN SERPL-MCNC: 67 MG/DL — HIGH (ref 7–23)
CALCIUM SERPL-MCNC: 8.2 MG/DL — LOW (ref 8.4–10.5)
CHLORIDE SERPL-SCNC: 88 MMOL/L — LOW (ref 96–108)
CO2 SERPL-SCNC: 24 MMOL/L — SIGNIFICANT CHANGE UP (ref 22–31)
CREAT SERPL-MCNC: 7.18 MG/DL — HIGH (ref 0.5–1.3)
EOSINOPHIL # BLD AUTO: 0.16 K/UL — SIGNIFICANT CHANGE UP (ref 0–0.5)
EOSINOPHIL NFR BLD AUTO: 2.2 % — SIGNIFICANT CHANGE UP (ref 0–6)
GLUCOSE SERPL-MCNC: 277 MG/DL — HIGH (ref 70–99)
HCT VFR BLD CALC: 22 % — LOW (ref 39–50)
HGB BLD-MCNC: 7.6 G/DL — LOW (ref 13–17)
IMM GRANULOCYTES NFR BLD AUTO: 1.7 % — HIGH (ref 0–1.5)
LYMPHOCYTES # BLD AUTO: 0.4 K/UL — LOW (ref 1–3.3)
LYMPHOCYTES # BLD AUTO: 5.5 % — LOW (ref 13–44)
MAGNESIUM SERPL-MCNC: 2.3 MG/DL — SIGNIFICANT CHANGE UP (ref 1.6–2.6)
MCHC RBC-ENTMCNC: 32.9 PG — SIGNIFICANT CHANGE UP (ref 27–34)
MCHC RBC-ENTMCNC: 34.5 GM/DL — SIGNIFICANT CHANGE UP (ref 32–36)
MCV RBC AUTO: 95.2 FL — SIGNIFICANT CHANGE UP (ref 80–100)
MONOCYTES # BLD AUTO: 0.9 K/UL — SIGNIFICANT CHANGE UP (ref 0–0.9)
MONOCYTES NFR BLD AUTO: 12.4 % — SIGNIFICANT CHANGE UP (ref 2–14)
NEUTROPHILS # BLD AUTO: 5.65 K/UL — SIGNIFICANT CHANGE UP (ref 1.8–7.4)
NEUTROPHILS NFR BLD AUTO: 78.1 % — HIGH (ref 43–77)
NRBC # BLD: 0 /100 WBCS — SIGNIFICANT CHANGE UP (ref 0–0)
PHOSPHATE SERPL-MCNC: 7.1 MG/DL — HIGH (ref 2.5–4.5)
PLATELET # BLD AUTO: 175 K/UL — SIGNIFICANT CHANGE UP (ref 150–400)
POTASSIUM SERPL-MCNC: 4.1 MMOL/L — SIGNIFICANT CHANGE UP (ref 3.5–5.3)
POTASSIUM SERPL-SCNC: 4.1 MMOL/L — SIGNIFICANT CHANGE UP (ref 3.5–5.3)
PROT SERPL-MCNC: 5.9 G/DL — LOW (ref 6–8.3)
RBC # BLD: 2.31 M/UL — LOW (ref 4.2–5.8)
RBC # FLD: 15.4 % — HIGH (ref 10.3–14.5)
SODIUM SERPL-SCNC: 129 MMOL/L — LOW (ref 135–145)
TACROLIMUS SERPL-MCNC: 12.4 NG/ML — SIGNIFICANT CHANGE UP
WBC # BLD: 7.24 K/UL — SIGNIFICANT CHANGE UP (ref 3.8–10.5)
WBC # FLD AUTO: 7.24 K/UL — SIGNIFICANT CHANGE UP (ref 3.8–10.5)

## 2021-03-18 PROCEDURE — 71045 X-RAY EXAM CHEST 1 VIEW: CPT | Mod: 26

## 2021-03-18 PROCEDURE — 99232 SBSQ HOSP IP/OBS MODERATE 35: CPT | Mod: GC

## 2021-03-18 RX ORDER — INSULIN LISPRO 100/ML
3 VIAL (ML) SUBCUTANEOUS
Refills: 0 | Status: DISCONTINUED | OUTPATIENT
Start: 2021-03-18 | End: 2021-03-21

## 2021-03-18 RX ORDER — TACROLIMUS 5 MG/1
6 CAPSULE ORAL
Refills: 0 | Status: DISCONTINUED | OUTPATIENT
Start: 2021-03-19 | End: 2021-03-19

## 2021-03-18 RX ORDER — TACROLIMUS 5 MG/1
6 CAPSULE ORAL ONCE
Refills: 0 | Status: COMPLETED | OUTPATIENT
Start: 2021-03-18 | End: 2021-03-18

## 2021-03-18 RX ORDER — TRAMADOL HYDROCHLORIDE 50 MG/1
50 TABLET ORAL EVERY 4 HOURS
Refills: 0 | Status: DISCONTINUED | OUTPATIENT
Start: 2021-03-18 | End: 2021-03-21

## 2021-03-18 RX ORDER — TRAMADOL HYDROCHLORIDE 50 MG/1
25 TABLET ORAL EVERY 4 HOURS
Refills: 0 | Status: DISCONTINUED | OUTPATIENT
Start: 2021-03-18 | End: 2021-03-21

## 2021-03-18 RX ORDER — CALCITRIOL 0.5 UG/1
0.25 CAPSULE ORAL DAILY
Refills: 0 | Status: DISCONTINUED | OUTPATIENT
Start: 2021-03-18 | End: 2021-03-21

## 2021-03-18 RX ORDER — ERYTHROPOIETIN 10000 [IU]/ML
10000 INJECTION, SOLUTION INTRAVENOUS; SUBCUTANEOUS
Refills: 0 | Status: DISCONTINUED | OUTPATIENT
Start: 2021-03-18 | End: 2021-03-21

## 2021-03-18 RX ORDER — INSULIN GLARGINE 100 [IU]/ML
20 INJECTION, SOLUTION SUBCUTANEOUS AT BEDTIME
Refills: 0 | Status: DISCONTINUED | OUTPATIENT
Start: 2021-03-18 | End: 2021-03-21

## 2021-03-18 RX ADMIN — CALCITRIOL 0.25 MICROGRAM(S): 0.5 CAPSULE ORAL at 13:02

## 2021-03-18 RX ADMIN — CARVEDILOL PHOSPHATE 25 MILLIGRAM(S): 80 CAPSULE, EXTENDED RELEASE ORAL at 06:03

## 2021-03-18 RX ADMIN — Medication 500000 UNIT(S): at 23:28

## 2021-03-18 RX ADMIN — SENNA PLUS 2 TABLET(S): 8.6 TABLET ORAL at 21:53

## 2021-03-18 RX ADMIN — CARVEDILOL PHOSPHATE 25 MILLIGRAM(S): 80 CAPSULE, EXTENDED RELEASE ORAL at 20:00

## 2021-03-18 RX ADMIN — Medication 650 MILLIGRAM(S): at 06:06

## 2021-03-18 RX ADMIN — SEVELAMER CARBONATE 1600 MILLIGRAM(S): 2400 POWDER, FOR SUSPENSION ORAL at 08:43

## 2021-03-18 RX ADMIN — FAMOTIDINE 20 MILLIGRAM(S): 10 INJECTION INTRAVENOUS at 13:04

## 2021-03-18 RX ADMIN — TAMSULOSIN HYDROCHLORIDE 0.4 MILLIGRAM(S): 0.4 CAPSULE ORAL at 21:53

## 2021-03-18 RX ADMIN — Medication 1 APPLICATION(S): at 05:54

## 2021-03-18 RX ADMIN — Medication 5 MILLIGRAM(S): at 06:03

## 2021-03-18 RX ADMIN — SEVELAMER CARBONATE 1600 MILLIGRAM(S): 2400 POWDER, FOR SUSPENSION ORAL at 13:05

## 2021-03-18 RX ADMIN — INSULIN GLARGINE 20 UNIT(S): 100 INJECTION, SOLUTION SUBCUTANEOUS at 21:54

## 2021-03-18 RX ADMIN — MYCOPHENOLATE MOFETIL 1000 MILLIGRAM(S): 250 CAPSULE ORAL at 08:43

## 2021-03-18 RX ADMIN — Medication 500000 UNIT(S): at 06:03

## 2021-03-18 RX ADMIN — Medication 8: at 08:43

## 2021-03-18 RX ADMIN — Medication 650 MILLIGRAM(S): at 22:49

## 2021-03-18 RX ADMIN — Medication 650 MILLIGRAM(S): at 06:36

## 2021-03-18 RX ADMIN — Medication 1 TABLET(S): at 13:05

## 2021-03-18 RX ADMIN — MYCOPHENOLATE MOFETIL 1000 MILLIGRAM(S): 250 CAPSULE ORAL at 20:00

## 2021-03-18 RX ADMIN — Medication 650 MILLIGRAM(S): at 21:53

## 2021-03-18 RX ADMIN — TACROLIMUS 6 MILLIGRAM(S): 5 CAPSULE ORAL at 13:43

## 2021-03-18 RX ADMIN — Medication 500000 UNIT(S): at 00:18

## 2021-03-18 RX ADMIN — ERYTHROPOIETIN 10000 UNIT(S): 10000 INJECTION, SOLUTION INTRAVENOUS; SUBCUTANEOUS at 18:07

## 2021-03-18 RX ADMIN — Medication 90 MILLIGRAM(S): at 06:03

## 2021-03-18 RX ADMIN — TRAMADOL HYDROCHLORIDE 25 MILLIGRAM(S): 50 TABLET ORAL at 01:30

## 2021-03-18 RX ADMIN — Medication 3 UNIT(S): at 19:58

## 2021-03-18 RX ADMIN — Medication 500000 UNIT(S): at 13:06

## 2021-03-18 RX ADMIN — Medication 500000 UNIT(S): at 18:37

## 2021-03-18 RX ADMIN — POLYETHYLENE GLYCOL 3350 17 GRAM(S): 17 POWDER, FOR SOLUTION ORAL at 13:04

## 2021-03-18 RX ADMIN — TRAMADOL HYDROCHLORIDE 25 MILLIGRAM(S): 50 TABLET ORAL at 00:45

## 2021-03-18 RX ADMIN — CHLORHEXIDINE GLUCONATE 1 APPLICATION(S): 213 SOLUTION TOPICAL at 13:04

## 2021-03-18 RX ADMIN — Medication 6: at 13:03

## 2021-03-18 RX ADMIN — Medication 2: at 19:59

## 2021-03-18 RX ADMIN — SEVELAMER CARBONATE 1600 MILLIGRAM(S): 2400 POWDER, FOR SUSPENSION ORAL at 20:00

## 2021-03-18 NOTE — PROGRESS NOTE ADULT - SUBJECTIVE AND OBJECTIVE BOX
[facility] DIVISION OF KIDNEY DISEASES AND HYPERTENSION -- FOLLOW UP NOTE  --------------------------------------------------------------------------------    24 hour events/subjective: Patient was SOB and desaturated overnight, requiring supplemental NC. Started on Lasix drip at 10 mg/hr overnight. PD overnight without any issues with ~2.1L UF. Patient was seen and examined at bedside. Reported feeling well. Denies CP, fever, chills, nausea, vomiting or diarrhea.     PAST HISTORY  --------------------------------------------------------------------------------  No significant changes to PMH, PSH, FHx, SHx, unless otherwise noted    ALLERGIES & MEDICATIONS  --------------------------------------------------------------------------------  Allergies    ACE inhibitors (Angioedema)  Levaquin (Angioedema)    Intolerances    Standing Inpatient Medications  carvedilol 25 milliGRAM(s) Oral every 12 hours  chlorhexidine 2% Cloths 1 Application(s) Topical daily  dextrose 40% Gel 15 Gram(s) Oral once  dextrose 5%. 1000 milliLiter(s) IV Continuous <Continuous>  dextrose 5%. 1000 milliLiter(s) IV Continuous <Continuous>  dextrose 50% Injectable 25 Gram(s) IV Push once  dextrose 50% Injectable 12.5 Gram(s) IV Push once  dextrose 50% Injectable 25 Gram(s) IV Push once  epoetin goran-epbx (RETACRIT) Injectable 18960 Unit(s) IV Push <User Schedule>  famotidine    Tablet 20 milliGRAM(s) Oral daily  furosemide Infusion 10 mG/Hr IV Continuous <Continuous>  gentamicin 0.1% Ointment 1 Application(s) Topical <User Schedule>  glucagon  Injectable 1 milliGRAM(s) IntraMuscular once  insulin glargine Injectable (LANTUS) 15 Unit(s) SubCutaneous at bedtime  insulin lispro (ADMELOG) corrective regimen sliding scale   SubCutaneous three times a day before meals  insulin lispro (ADMELOG) corrective regimen sliding scale   SubCutaneous at bedtime  mycophenolate mofetil 1000 milliGRAM(s) Oral <User Schedule>  NIFEdipine XL 90 milliGRAM(s) Oral daily  nystatin    Suspension 359986 Unit(s) Swish and Swallow four times a day  polyethylene glycol 3350 17 Gram(s) Oral daily  predniSONE   Tablet 5 milliGRAM(s) Oral daily  senna 2 Tablet(s) Oral at bedtime  sevelamer carbonate 1600 milliGRAM(s) Oral three times a day with meals  tamsulosin 0.4 milliGRAM(s) Oral at bedtime  trimethoprim   80 mG/sulfamethoxazole 400 mG 1 Tablet(s) Oral daily  valGANciclovir 450 milliGRAM(s) Oral <User Schedule>    PRN Inpatient Medications  acetaminophen   Tablet .. 650 milliGRAM(s) Oral every 6 hours PRN      REVIEW OF SYSTEMS  --------------------------------------------------------------------------------  Gen: No fevers/chills   Respiratory: + dyspnea   CV: No chest pain  GI: No abdominal pain, diarrhea  MSK: No  edema    All other systems were reviewed and are negative, except as noted.    VITALS/PHYSICAL EXAM  --------------------------------------------------------------------------------  T(C): 36.7 (03-18-21 @ 09:00), Max: 37.1 (03-17-21 @ 13:00)  HR: 61 (03-18-21 @ 09:00) (59 - 66)  BP: 159/74 (03-18-21 @ 09:00) (128/62 - 166/80)  RR: 18 (03-18-21 @ 09:00) (18 - 20)  SpO2: 95% (03-18-21 @ 09:00) (95% - 100%)  Wt(kg): --    03-17-21 @ 07:01  -  03-18-21 @ 07:00  --------------------------------------------------------  IN: 340 mL / OUT: 1300 mL / NET: -960 mL    03-18-21 @ 07:01  -  03-18-21 @ 10:53  --------------------------------------------------------  IN: 250 mL / OUT: 225 mL / NET: 25 mL    Physical Exam:  	Gen: NAD  	HEENT: MMM  	Pulm: b/l rales   	CV: S1S2  	Abd: Soft, +BS   	Ext: mild LE edema B/L  	Neuro: Awake  	Skin: Warm and dry  	Vascular access: LUE AVF, PD catheter       LABS/STUDIES  --------------------------------------------------------------------------------              7.6    7.24  >-----------<  175      [03-18-21 @ 06:58]              22.0     129  |  88  |  67  ----------------------------<  277      [03-18-21 @ 06:56]  4.1   |  24  |  7.18        Ca     8.2     [03-18-21 @ 06:56]      Mg     2.3     [03-18-21 @ 06:56]      Phos  7.1     [03-18-21 @ 06:56]    TPro  5.9  /  Alb  3.2  /  TBili  0.5  /  DBili  x   /  AST  9   /  ALT  7   /  AlkPhos  101  [03-18-21 @ 06:56]          Creatinine Trend:  SCr 7.18 [03-18 @ 06:56]  SCr 6.79 [03-17 @ 06:17]  SCr 6.05 [03-16 @ 16:48]  SCr 5.87 [03-16 @ 06:08]  SCr 8.16 [03-15 @ 05:59]    Urinalysis - [03-16-21 @ 15:47]      Color Light Orange / Appearance Slightly Turbid / SG 1.010 / pH 6.5      Gluc Trace / Ketone Negative  / Bili Negative / Urobili Negative       Blood Large / Protein 100 / Leuk Est Small / Nitrite Negative       / WBC 14 / Hyaline 6 / Gran  / Sq Epi  / Non Sq Epi 7 / Bacteria Negative        HBsAb 56.4      [03-09-21 @ 17:13]  HBsAg Nonreact      [03-09-21 @ 17:13]  HBcAb Nonreact      [03-09-21 @ 17:13]  HCV 0.32, Nonreact      [03-09-21 @ 17:13]  HIV Nonreact      [03-09-21 @ 18:20]

## 2021-03-18 NOTE — PROGRESS NOTE ADULT - SUBJECTIVE AND OBJECTIVE BOX
Transplant Surgery - Multidisciplinary Rounds  --------------------------------------------------------------  DCD DDRT (1a/1v/1u--stented)   Date: 3/9/2021    POD# 9    Present: Patient seen and examined with multidisciplinary team including Transplant Surgeon Dr. Pringle, Transplant Nephrologist Dr Beck Olmos, Transplant Pharmacist: Franki Fernandez, ACPs Osbaldo and Karlos, and bedside RN during am rounds. Disciplines not in attendance will be notified of plan.      HPI: 72 y/o M with PMHx of HTN (age 43), DM on insulin (age 43), gout, anxiety, depression, OCD and CKD since 2016.  He started HD via L AVF  in April 2020 at [facility] and transitioned to PD about 5 months ago. He makes ~2 ounces of urine 4 times daily.      Underwent DCD DDRT 3/9/2021 on 3/9/2021 (1A/1V/1U stented). Post op course c/b DGF requiring HD    Interval Events:  - O2 sats 80%, rales on exam. Lasix 80mg IV x1, Metolazone 5mg and Lasix gtt 10mg/hour started  - PD overnight   SCr 7.18    UOP 1.2L (700ml day previous)  - BLE dopplers negative   - LORETTA removed  - Lantus restarted      Potential Discharge date: pending clinical stability  Education:  Medications  Plan of care:  See Below      MEDICATIONS  (STANDING):  calcitriol   Capsule 0.25 MICROGram(s) Oral daily  carvedilol 25 milliGRAM(s) Oral every 12 hours  chlorhexidine 2% Cloths 1 Application(s) Topical daily  dextrose 40% Gel 15 Gram(s) Oral once  dextrose 5%. 1000 milliLiter(s) (50 mL/Hr) IV Continuous <Continuous>  dextrose 5%. 1000 milliLiter(s) (100 mL/Hr) IV Continuous <Continuous>  dextrose 50% Injectable 25 Gram(s) IV Push once  dextrose 50% Injectable 12.5 Gram(s) IV Push once  dextrose 50% Injectable 25 Gram(s) IV Push once  epoetin goran-epbx (RETACRIT) Injectable 83492 Unit(s) IV Push <User Schedule>  famotidine    Tablet 20 milliGRAM(s) Oral daily  furosemide Infusion 10 mG/Hr (5 mL/Hr) IV Continuous <Continuous>  gentamicin 0.1% Ointment 1 Application(s) Topical <User Schedule>  glucagon  Injectable 1 milliGRAM(s) IntraMuscular once  insulin glargine Injectable (LANTUS) 20 Unit(s) SubCutaneous at bedtime  insulin lispro (ADMELOG) corrective regimen sliding scale   SubCutaneous three times a day before meals  insulin lispro (ADMELOG) corrective regimen sliding scale   SubCutaneous at bedtime  insulin lispro Injectable (ADMELOG) 3 Unit(s) SubCutaneous three times a day before meals  mycophenolate mofetil 1000 milliGRAM(s) Oral <User Schedule>  NIFEdipine XL 90 milliGRAM(s) Oral daily  nystatin    Suspension 539799 Unit(s) Swish and Swallow four times a day  polyethylene glycol 3350 17 Gram(s) Oral daily  predniSONE   Tablet 5 milliGRAM(s) Oral daily  senna 2 Tablet(s) Oral at bedtime  sevelamer carbonate 1600 milliGRAM(s) Oral three times a day with meals  tacrolimus ER Tablet (ENVARSUS XR) 6 milliGRAM(s) Oral once  tamsulosin 0.4 milliGRAM(s) Oral at bedtime  trimethoprim   80 mG/sulfamethoxazole 400 mG 1 Tablet(s) Oral daily  valGANciclovir 450 milliGRAM(s) Oral <User Schedule>    MEDICATIONS  (PRN):  acetaminophen   Tablet .. 650 milliGRAM(s) Oral every 6 hours PRN Mild Pain (1 - 3)      PAST MEDICAL & SURGICAL HISTORY:  DM (diabetes mellitus)    HTN (hypertension)    Chronic kidney disease (CKD)        Vital Signs Last 24 Hrs  T(C): 36.8 (18 Mar 2021 13:00), Max: 36.9 (17 Mar 2021 21:15)  T(F): 98.3 (18 Mar 2021 13:00), Max: 98.4 (17 Mar 2021 21:15)  HR: 57 (18 Mar 2021 13:00) (57 - 66)  BP: 132/63 (18 Mar 2021 13:00) (128/62 - 166/80)  BP(mean): 104 (18 Mar 2021 00:39) (104 - 104)  RR: 18 (18 Mar 2021 13:00) (18 - 20)  SpO2: 95% (18 Mar 2021 13:00) (95% - 100%)    I&O's Summary    17 Mar 2021 07:01  -  18 Mar 2021 07:00  --------------------------------------------------------  IN: 340 mL / OUT: 1300 mL / NET: -960 mL    18 Mar 2021 07:01  -  18 Mar 2021 13:37  --------------------------------------------------------  IN: 250 mL / OUT: 340 mL / NET: -90 mL                              7.6    7.24  )-----------( 175      ( 18 Mar 2021 06:58 )             22.0     03-18    129<L>  |  88<L>  |  67<H>  ----------------------------<  277<H>  4.1   |  24  |  7.18<H>    Ca    8.2<L>      18 Mar 2021 06:56  Phos  7.1     03-18  Mg     2.3     03-18    TPro  5.9<L>  /  Alb  3.2<L>  /  TBili  0.5  /  DBili  x   /  AST  9<L>  /  ALT  7<L>  /  AlkPhos  101  03-18    Tacrolimus (), Serum: 12.4 ng/mL (03-18 @ 08:25)        Culture - Blood (collected 03-16-21 @ 22:13)  Source: .Blood Blood-Peripheral  Preliminary Report (03-17-21 @ 23:01):    No growth to date.        Review of systems  Gen: No weight changes, fatigue, fevers/chills, weakness  Skin: No rashes  Head/Eyes/Ears/Mouth: No headache; Normal hearing; Normal vision w/o blurriness; No sinus pain/discomfort, sore throat  Respiratory: No dyspnea, cough, wheezing, hemoptysis  CV: No chest pain, PND, orthopnea  GI: Mild abdominal pain at surgical incision site; denies diarrhea, constipation, nausea, vomiting, melena, hematochezia  : No increased frequency, dysuria, hematuria, nocturia  MSK: No joint pain/swelling; no back pain; no edema  Neuro: No dizziness/lightheadedness, weakness, seizures, numbness, tingling  Heme: No easy bruising or bleeding  Endo: No heat/cold intolerance  Psych: No significant nervousness, anxiety, stress, depression  All other systems were reviewed and are negative, except as noted.    PHYSICAL EXAM:  Constitutional: Well developed / well nourished  Eyes: Anicteric, PERRLA  ENMT: nc/at  Neck: central line RIJ  Respiratory: improving breath sounds on R lung base  Cardiovascular: RRR  Gastrointestinal: Soft, non distended, mild tenderness at the incision site; incision c/d/i;   Genitourinary: Urinary catheter in place  Extremities: SCD's in place and working bilaterally, AVF palpable. 1+ edema b/l LE. no calf TTP  Vascular: 1+ DP pulses b/l  Neurological: A&O x3  Skin: no rashes, ulcerations or lesions  Musculoskeletal: Moving all extremities  Psychiatric: Responsive

## 2021-03-18 NOTE — PHARMACY EDUCATION NOTE - MEDICATION SAFETY
Adherence/Allergies/Herbals/Interactions/Medication precautions/Miss dose instruction/Purpose/Side effects/Signs and symptoms to report/Storage and handling
Adherence/Allergies/Herbals/Interactions/Medication precautions/Miss dose instruction/Purpose/Side effects/Signs and symptoms to report/Storage and handling

## 2021-03-18 NOTE — PHARMACY EDUCATION NOTE - EDUCATION SUMMARY
Discharge immunosuppressant medications and prophylatic anti-infective agents reviewed with the patient. Outpatient medication schedule was discussed in detail including: medication name, indication, dose, administration times, treatment duration, side effects, drug interactions, and special instructions.     Patient questions and concerns were answered and addressed. Patient demonstrated understanding. 
Discharge immunosuppressant medications and prophylatic anti-infective agents reviewed with the patient. Outpatient medication schedule was discussed in detail including: medication name, indication, dose, administration times, treatment duration, side effects, drug interactions, and special instructions. Patient questions and concerns were answered and addressed. Patient demonstrated understanding.

## 2021-03-18 NOTE — PROGRESS NOTE ADULT - ASSESSMENT
Patient is a 70 y/o M w PMH of ESRD on PD for 5 months, was on HD in 2020, with nephrologist Dr Novak, IDDM, HTN, gout, depression, anxiety and OCD. S/p DCD with JOJO DDRT on 3/9/21.     1. S/p DDRT on 3/9/21 - has ATN from ischemia reperfusion injury and ATN of donor. S/p Simulect induction. Urine output increased. Last HD was 3/15/21 with 1.5L UF and currently on PD. Last PD also with ~2.1L UF. Will do HD today with 2L UF. Will continue PD today as well. Monitor labs and urine output.    2. Immunosupression - Simulect induction, currently on Envarsus, MMF 1g PO BID and steroid taper     3. Prophylaxis - bactrim/nystatin/valcyte     4. Hypertension - BP at target range. On Coreg to 25 mg PO BID and Nifedipine to 90 mg PO daily. Monitor BP. Low salt diet.     5.  DM2 - Now on Lantus 15 unit at bedtime, with corrective sliding scale. Monitor f/s.     6.  Hyponatremia - from poor clearance and fluid intake.  Restrict to 1 L.     7. Anemia - last Hb 7.6. Now on retacrit 10,000 unit TTS. Suggest to send iron work up including ferritin, TIBC and iron level. Monitor CBC.     If any questions, please feel free to contact me     Jennifer Mcdowell  Nephrology Fellow  [facility] Pager: [phone]  [facility] Pager: [phone]

## 2021-03-18 NOTE — PROGRESS NOTE ADULT - ASSESSMENT
71M with h/o HTN (age 43), IDDM (age 43), gout, anxiety, depression, OCD, CKD (since 2016) was on HD via L AVF (4/2020 Dr. Novak) with transition to PD in 9/2020 now s/p R DCD DDRT (3/9/21)    [] R DCD DDRT c/b DGF  - delayed graft function with improving UOP  - Continue lasix gtt 10mg/hour  - Continue PD as per nephrology  - DC alfaro today. Encourage hourly voiding  - Continue Flomax, started 3/16 for urinary retention  - Immuno: Env per level, MMF 1g bid, Pred taper, Simulect completed  - PPx: valcyte/bactrim/nystatin  - Strict I&Os  - Diet: Renal   - Pain control  - Bowel regimen   - SCDs/IS/OOB  - Start Epoetin 10,000U BIW  - Start Calcitriol 0.25mg daily  - PT consult    [] DM  - Enrolled in CGM study (conventional group)/completed  - Hyperglycemia  - Fingersticks ac and qhs with SSI coverage  - Increase Lantus to 20u qpm and increase Admelog to 3u qmeal      [] HTN  - cont coreg 25mg bid and nifedipine 30mg daily

## 2021-03-18 NOTE — PROGRESS NOTE ADULT - ATTENDING COMMENTS
Pt still on oxygen  Hyponatremic, high phos  Improving UO  Poor clearance with PD but ultrafiltrating  Cont current PD Rx  Hemodialysis today  Cont lasix drip  Encourage ambulation and PT.    Increase insulin

## 2021-03-19 LAB
ALBUMIN SERPL ELPH-MCNC: 3.5 G/DL — SIGNIFICANT CHANGE UP (ref 3.3–5)
ALP SERPL-CCNC: 121 U/L — HIGH (ref 40–120)
ALT FLD-CCNC: 8 U/L — LOW (ref 10–45)
ANION GAP SERPL CALC-SCNC: 15 MMOL/L — SIGNIFICANT CHANGE UP (ref 5–17)
AST SERPL-CCNC: 11 U/L — SIGNIFICANT CHANGE UP (ref 10–40)
BASOPHILS # BLD AUTO: 0.01 K/UL — SIGNIFICANT CHANGE UP (ref 0–0.2)
BASOPHILS NFR BLD AUTO: 0.1 % — SIGNIFICANT CHANGE UP (ref 0–2)
BILIRUB SERPL-MCNC: 0.5 MG/DL — SIGNIFICANT CHANGE UP (ref 0.2–1.2)
BUN SERPL-MCNC: 45 MG/DL — HIGH (ref 7–23)
CALCIUM SERPL-MCNC: 8.9 MG/DL — SIGNIFICANT CHANGE UP (ref 8.4–10.5)
CHLORIDE SERPL-SCNC: 93 MMOL/L — LOW (ref 96–108)
CO2 SERPL-SCNC: 27 MMOL/L — SIGNIFICANT CHANGE UP (ref 22–31)
CREAT SERPL-MCNC: 5.5 MG/DL — HIGH (ref 0.5–1.3)
EOSINOPHIL # BLD AUTO: 0.2 K/UL — SIGNIFICANT CHANGE UP (ref 0–0.5)
EOSINOPHIL NFR BLD AUTO: 2.9 % — SIGNIFICANT CHANGE UP (ref 0–6)
FERRITIN SERPL-MCNC: 624 NG/ML — HIGH (ref 30–400)
FOLATE SERPL-MCNC: 4.1 NG/ML — LOW
GLUCOSE SERPL-MCNC: 178 MG/DL — HIGH (ref 70–99)
HCT VFR BLD CALC: 25.2 % — LOW (ref 39–50)
HGB BLD-MCNC: 8.4 G/DL — LOW (ref 13–17)
IMM GRANULOCYTES NFR BLD AUTO: 1.9 % — HIGH (ref 0–1.5)
IRON SATN MFR SERPL: 12 % — LOW (ref 16–55)
IRON SATN MFR SERPL: 37 UG/DL — LOW (ref 45–165)
LYMPHOCYTES # BLD AUTO: 0.47 K/UL — LOW (ref 1–3.3)
LYMPHOCYTES # BLD AUTO: 6.9 % — LOW (ref 13–44)
MAGNESIUM SERPL-MCNC: 2.2 MG/DL — SIGNIFICANT CHANGE UP (ref 1.6–2.6)
MCHC RBC-ENTMCNC: 31.9 PG — SIGNIFICANT CHANGE UP (ref 27–34)
MCHC RBC-ENTMCNC: 33.3 GM/DL — SIGNIFICANT CHANGE UP (ref 32–36)
MCV RBC AUTO: 95.8 FL — SIGNIFICANT CHANGE UP (ref 80–100)
MONOCYTES # BLD AUTO: 0.8 K/UL — SIGNIFICANT CHANGE UP (ref 0–0.9)
MONOCYTES NFR BLD AUTO: 11.8 % — SIGNIFICANT CHANGE UP (ref 2–14)
NEUTROPHILS # BLD AUTO: 5.19 K/UL — SIGNIFICANT CHANGE UP (ref 1.8–7.4)
NEUTROPHILS NFR BLD AUTO: 76.4 % — SIGNIFICANT CHANGE UP (ref 43–77)
NRBC # BLD: 0 /100 WBCS — SIGNIFICANT CHANGE UP (ref 0–0)
PHOSPHATE SERPL-MCNC: 5.1 MG/DL — HIGH (ref 2.5–4.5)
PLATELET # BLD AUTO: 201 K/UL — SIGNIFICANT CHANGE UP (ref 150–400)
POTASSIUM SERPL-MCNC: 3.9 MMOL/L — SIGNIFICANT CHANGE UP (ref 3.5–5.3)
POTASSIUM SERPL-SCNC: 3.9 MMOL/L — SIGNIFICANT CHANGE UP (ref 3.5–5.3)
PROT SERPL-MCNC: 6.7 G/DL — SIGNIFICANT CHANGE UP (ref 6–8.3)
RBC # BLD: 2.63 M/UL — LOW (ref 4.2–5.8)
RBC # FLD: 15.8 % — HIGH (ref 10.3–14.5)
SODIUM SERPL-SCNC: 135 MMOL/L — SIGNIFICANT CHANGE UP (ref 135–145)
TACROLIMUS SERPL-MCNC: 12.6 NG/ML — SIGNIFICANT CHANGE UP
TIBC SERPL-MCNC: 303 UG/DL — SIGNIFICANT CHANGE UP (ref 220–430)
UIBC SERPL-MCNC: 266 UG/DL — SIGNIFICANT CHANGE UP (ref 110–370)
VIT B12 SERPL-MCNC: >2000 PG/ML — HIGH (ref 232–1245)
WBC # BLD: 6.8 K/UL — SIGNIFICANT CHANGE UP (ref 3.8–10.5)
WBC # FLD AUTO: 6.8 K/UL — SIGNIFICANT CHANGE UP (ref 3.8–10.5)

## 2021-03-19 PROCEDURE — 99232 SBSQ HOSP IP/OBS MODERATE 35: CPT | Mod: GC

## 2021-03-19 RX ORDER — TACROLIMUS 5 MG/1
5 CAPSULE ORAL
Refills: 0 | Status: DISCONTINUED | OUTPATIENT
Start: 2021-03-19 | End: 2021-03-21

## 2021-03-19 RX ORDER — GLYCERIN ADULT
1 SUPPOSITORY, RECTAL RECTAL ONCE
Refills: 0 | Status: COMPLETED | OUTPATIENT
Start: 2021-03-19 | End: 2021-03-19

## 2021-03-19 RX ADMIN — Medication 500000 UNIT(S): at 12:41

## 2021-03-19 RX ADMIN — Medication 1 TABLET(S): at 12:41

## 2021-03-19 RX ADMIN — INSULIN GLARGINE 20 UNIT(S): 100 INJECTION, SOLUTION SUBCUTANEOUS at 21:09

## 2021-03-19 RX ADMIN — Medication 500000 UNIT(S): at 05:05

## 2021-03-19 RX ADMIN — FAMOTIDINE 20 MILLIGRAM(S): 10 INJECTION INTRAVENOUS at 12:41

## 2021-03-19 RX ADMIN — VALGANCICLOVIR 450 MILLIGRAM(S): 450 TABLET, FILM COATED ORAL at 07:53

## 2021-03-19 RX ADMIN — CHLORHEXIDINE GLUCONATE 1 APPLICATION(S): 213 SOLUTION TOPICAL at 12:40

## 2021-03-19 RX ADMIN — SEVELAMER CARBONATE 1600 MILLIGRAM(S): 2400 POWDER, FOR SUSPENSION ORAL at 09:32

## 2021-03-19 RX ADMIN — Medication 3 UNIT(S): at 19:33

## 2021-03-19 RX ADMIN — SEVELAMER CARBONATE 1600 MILLIGRAM(S): 2400 POWDER, FOR SUSPENSION ORAL at 19:34

## 2021-03-19 RX ADMIN — POLYETHYLENE GLYCOL 3350 17 GRAM(S): 17 POWDER, FOR SOLUTION ORAL at 12:41

## 2021-03-19 RX ADMIN — Medication 5 MG/HR: at 05:05

## 2021-03-19 RX ADMIN — MYCOPHENOLATE MOFETIL 1000 MILLIGRAM(S): 250 CAPSULE ORAL at 19:35

## 2021-03-19 RX ADMIN — Medication 6: at 19:32

## 2021-03-19 RX ADMIN — SENNA PLUS 2 TABLET(S): 8.6 TABLET ORAL at 21:10

## 2021-03-19 RX ADMIN — CARVEDILOL PHOSPHATE 25 MILLIGRAM(S): 80 CAPSULE, EXTENDED RELEASE ORAL at 05:05

## 2021-03-19 RX ADMIN — SEVELAMER CARBONATE 1600 MILLIGRAM(S): 2400 POWDER, FOR SUSPENSION ORAL at 12:41

## 2021-03-19 RX ADMIN — Medication 90 MILLIGRAM(S): at 05:05

## 2021-03-19 RX ADMIN — Medication 5 MILLIGRAM(S): at 05:05

## 2021-03-19 RX ADMIN — Medication 500000 UNIT(S): at 17:52

## 2021-03-19 RX ADMIN — MYCOPHENOLATE MOFETIL 1000 MILLIGRAM(S): 250 CAPSULE ORAL at 07:51

## 2021-03-19 RX ADMIN — Medication 3 UNIT(S): at 08:36

## 2021-03-19 RX ADMIN — TAMSULOSIN HYDROCHLORIDE 0.4 MILLIGRAM(S): 0.4 CAPSULE ORAL at 21:10

## 2021-03-19 RX ADMIN — Medication 4: at 08:35

## 2021-03-19 RX ADMIN — CALCITRIOL 0.25 MICROGRAM(S): 0.5 CAPSULE ORAL at 12:42

## 2021-03-19 RX ADMIN — TACROLIMUS 6 MILLIGRAM(S): 5 CAPSULE ORAL at 07:51

## 2021-03-19 RX ADMIN — CARVEDILOL PHOSPHATE 25 MILLIGRAM(S): 80 CAPSULE, EXTENDED RELEASE ORAL at 17:53

## 2021-03-19 RX ADMIN — Medication 1 APPLICATION(S): at 22:00

## 2021-03-19 NOTE — PROGRESS NOTE ADULT - SUBJECTIVE AND OBJECTIVE BOX
[facility] DIVISION OF KIDNEY DISEASES AND HYPERTENSION -- FOLLOW UP NOTE  --------------------------------------------------------------------------------    24 hour events/subjective: Patient was seen and examined at bedside. Reported feeling well. Denies CP, SOB, fever, chills, nausea, vomiting, diarrhea or dysuria    PAST HISTORY  --------------------------------------------------------------------------------  No significant changes to PMH, PSH, FHx, SHx, unless otherwise noted    ALLERGIES & MEDICATIONS  --------------------------------------------------------------------------------  Allergies    ACE inhibitors (Angioedema)  Levaquin (Angioedema)    Intolerances      Standing Inpatient Medications  calcitriol   Capsule 0.25 MICROGram(s) Oral daily  carvedilol 25 milliGRAM(s) Oral every 12 hours  chlorhexidine 2% Cloths 1 Application(s) Topical daily  dextrose 40% Gel 15 Gram(s) Oral once  dextrose 5%. 1000 milliLiter(s) IV Continuous <Continuous>  dextrose 5%. 1000 milliLiter(s) IV Continuous <Continuous>  dextrose 50% Injectable 25 Gram(s) IV Push once  dextrose 50% Injectable 12.5 Gram(s) IV Push once  dextrose 50% Injectable 25 Gram(s) IV Push once  epoetin gorna-epbx (RETACRIT) Injectable 65119 Unit(s) IV Push <User Schedule>  famotidine    Tablet 20 milliGRAM(s) Oral daily  furosemide Infusion 10 mG/Hr IV Continuous <Continuous>  gentamicin 0.1% Ointment 1 Application(s) Topical <User Schedule>  glucagon  Injectable 1 milliGRAM(s) IntraMuscular once  insulin glargine Injectable (LANTUS) 20 Unit(s) SubCutaneous at bedtime  insulin lispro (ADMELOG) corrective regimen sliding scale   SubCutaneous three times a day before meals  insulin lispro (ADMELOG) corrective regimen sliding scale   SubCutaneous at bedtime  insulin lispro Injectable (ADMELOG) 3 Unit(s) SubCutaneous three times a day before meals  mycophenolate mofetil 1000 milliGRAM(s) Oral <User Schedule>  NIFEdipine XL 90 milliGRAM(s) Oral daily  nystatin    Suspension 711922 Unit(s) Swish and Swallow four times a day  polyethylene glycol 3350 17 Gram(s) Oral daily  predniSONE   Tablet 5 milliGRAM(s) Oral daily  senna 2 Tablet(s) Oral at bedtime  sevelamer carbonate 1600 milliGRAM(s) Oral three times a day with meals  tamsulosin 0.4 milliGRAM(s) Oral at bedtime  trimethoprim   80 mG/sulfamethoxazole 400 mG 1 Tablet(s) Oral daily  valGANciclovir 450 milliGRAM(s) Oral <User Schedule>    PRN Inpatient Medications  acetaminophen   Tablet .. 650 milliGRAM(s) Oral every 6 hours PRN  traMADol 50 milliGRAM(s) Oral every 4 hours PRN  traMADol 25 milliGRAM(s) Oral every 4 hours PRN    REVIEW OF SYSTEMS  --------------------------------------------------------------------------------  Gen: No fevers/chills  Skin: No rashes  Respiratory: No dyspnea, cough  CV: No chest pain  GI: No abdominal pain, diarrhea  : No dysuria, hematuria  MSK: No  edema    All other systems were reviewed and are negative, except as noted.    VITALS/PHYSICAL EXAM  --------------------------------------------------------------------------------  T(C): 36.7 (03-19-21 @ 09:00), Max: 37.6 (03-18-21 @ 23:27)  HR: 62 (03-19-21 @ 09:00) (57 - 84)  BP: 151/69 (03-19-21 @ 09:00) (132/63 - 163/75)  RR: 18 (03-19-21 @ 09:00) (18 - 18)  SpO2: 99% (03-19-21 @ 09:00) (90% - 100%)  Wt(kg): --    03-18-21 @ 07:01  -  03-19-21 @ 07:00  --------------------------------------------------------  IN: 710 mL / OUT: 3515 mL / NET: -2805 mL    03-19-21 @ 07:01  -  03-19-21 @ 10:54  --------------------------------------------------------  IN: 220 mL / OUT: 175 mL / NET: 45 mL    Physical Exam:  	Gen: NAD  	HEENT: MMM  	Pulm: CTA B/L, minimal rales   	CV: S1S2  	Abd: Soft, +BS   	Ext: No LE edema B/L  	Neuro: Awake  	Skin: Warm and dry  	Vascular access: LUE AVF, PD catheter       LABS/STUDIES  --------------------------------------------------------------------------------              8.4    6.80  >-----------<  201      [03-19-21 @ 06:10]              25.2     135  |  93  |  45  ----------------------------<  178      [03-19-21 @ 06:10]  3.9   |  27  |  5.50        Ca     8.9     [03-19-21 @ 06:10]      Mg     2.2     [03-19-21 @ 06:10]      Phos  5.1     [03-19-21 @ 06:10]    TPro  6.7  /  Alb  3.5  /  TBili  0.5  /  DBili  x   /  AST  11  /  ALT  8   /  AlkPhos  121  [03-19-21 @ 06:10]          Creatinine Trend:  SCr 5.50 [03-19 @ 06:10]  SCr 7.18 [03-18 @ 06:56]  SCr 6.79 [03-17 @ 06:17]  SCr 6.05 [03-16 @ 16:48]  SCr 5.87 [03-16 @ 06:08]    Urinalysis - [03-16-21 @ 15:47]      Color Light Orange / Appearance Slightly Turbid / SG 1.010 / pH 6.5      Gluc Trace / Ketone Negative  / Bili Negative / Urobili Negative       Blood Large / Protein 100 / Leuk Est Small / Nitrite Negative       / WBC 14 / Hyaline 6 / Gran  / Sq Epi  / Non Sq Epi 7 / Bacteria Negative        HBsAb 56.4      [03-09-21 @ 17:13]  HBsAg Nonreact      [03-09-21 @ 17:13]  HBcAb Nonreact      [03-09-21 @ 17:13]  HCV 0.32, Nonreact      [03-09-21 @ 17:13]  HIV Nonreact      [03-09-21 @ 18:20]

## 2021-03-19 NOTE — PROGRESS NOTE ADULT - ATTENDING COMMENTS
Pt feeling better  Still on oxygen but weight decreasing  Passed TOV  No hemodialysis today, will increase PD to 5 exchanges at night.   Cont lasix drip for non oliguric ATN  Cont current immunosuppression.  Add lispro for hyperglycemia

## 2021-03-19 NOTE — PROGRESS NOTE ADULT - ASSESSMENT
71M with h/o HTN (age 43), IDDM (age 43), gout, anxiety, depression, OCD, CKD (since 2016) was on HD via L AVF (4/2020 Dr. Novak) with transition to PD in 9/2020 now s/p R DCD DDRT (3/9/21)    [] R DCD DDRT c/b DGF  - delayed graft function with improving UOP  - Continue lasix gtt 10mg/hour  - Continue PD as per nephrology  - Continue Flomax, started 3/16 for urinary retention  - Immuno: Env per level, MMF 1g bid, Pred taper, Simulect completed  - PPx: valcyte/bactrim/nystatin  - Strict I&Os  - Diet: Renal   - Pain control  - Bowel regimen   - SCDs/IS/OOB  - Epoetin 10,000U BIW, Calcitriol 0.25mg daily  - PT consult  - Wean supplemental O2 as tolerated    [] DM  - Enrolled in CGM study (conventional group)/completed  - Hyperglycemia  - Fingersticks ac and qhs with SSI coverage  - Increase Lantus to 20u qpm and increase Admelog to 3u qmeal      [] HTN  - cont coreg 25mg bid and nifedipine 30mg daily

## 2021-03-19 NOTE — PROGRESS NOTE ADULT - SUBJECTIVE AND OBJECTIVE BOX
Transplant Surgery - Multidisciplinary Rounds  --------------------------------------------------------------  DCD DDRT (1a/1v/1u--stented)   Date: 3/9/2021    POD# 9    Present: Patient seen and examined with multidisciplinary team including Transplant Surgeon Dr. Moore, Transplant Nephrologist Dr Beck Olmos, Transplant Pharmacist: Franki Fernandez, Khadijah Donaldson, and bedside RN during am rounds. Disciplines not in attendance will be notified of plan.      HPI: 70 y/o M with PMHx of HTN (age 43), DM on insulin (age 43), gout, anxiety, depression, OCD and CKD since 2016.  He started HD via L AVF  in April 2020 at [facility] [place] and transitioned to PD about 5 months ago. He makes ~2 ounces of urine 4 times daily.      Underwent DCD DDRT 3/9/2021 on 3/9/2021 (1A/1V/1U stented). Post op course c/b DGF requiring HD    Interval Events:  - Lantus increased to 20 with lispro 3 units before meals  - Started calcitriol  - John removed, voided  - Underwent PD with 2L of fluid removed      Potential Discharge date: pending clinical stability  Education:  Medications  Plan of care:  See Below      MEDICATIONS  (STANDING):  calcitriol   Capsule 0.25 MICROGram(s) Oral daily  carvedilol 25 milliGRAM(s) Oral every 12 hours  chlorhexidine 2% Cloths 1 Application(s) Topical daily  dextrose 40% Gel 15 Gram(s) Oral once  dextrose 5%. 1000 milliLiter(s) (50 mL/Hr) IV Continuous <Continuous>  dextrose 5%. 1000 milliLiter(s) (100 mL/Hr) IV Continuous <Continuous>  dextrose 50% Injectable 25 Gram(s) IV Push once  dextrose 50% Injectable 12.5 Gram(s) IV Push once  dextrose 50% Injectable 25 Gram(s) IV Push once  epoetin goran-epbx (RETACRIT) Injectable 27741 Unit(s) IV Push <User Schedule>  famotidine    Tablet 20 milliGRAM(s) Oral daily  furosemide Infusion 10 mG/Hr (5 mL/Hr) IV Continuous <Continuous>  gentamicin 0.1% Ointment 1 Application(s) Topical <User Schedule>  glucagon  Injectable 1 milliGRAM(s) IntraMuscular once  insulin glargine Injectable (LANTUS) 20 Unit(s) SubCutaneous at bedtime  insulin lispro (ADMELOG) corrective regimen sliding scale   SubCutaneous three times a day before meals  insulin lispro (ADMELOG) corrective regimen sliding scale   SubCutaneous at bedtime  insulin lispro Injectable (ADMELOG) 3 Unit(s) SubCutaneous three times a day before meals  mycophenolate mofetil 1000 milliGRAM(s) Oral <User Schedule>  NIFEdipine XL 90 milliGRAM(s) Oral daily  nystatin    Suspension 633945 Unit(s) Swish and Swallow four times a day  polyethylene glycol 3350 17 Gram(s) Oral daily  predniSONE   Tablet 5 milliGRAM(s) Oral daily  senna 2 Tablet(s) Oral at bedtime  sevelamer carbonate 1600 milliGRAM(s) Oral three times a day with meals  tacrolimus ER Tablet (ENVARSUS XR) 6 milliGRAM(s) Oral <User Schedule>  tamsulosin 0.4 milliGRAM(s) Oral at bedtime  trimethoprim   80 mG/sulfamethoxazole 400 mG 1 Tablet(s) Oral daily  valGANciclovir 450 milliGRAM(s) Oral <User Schedule>    MEDICATIONS  (PRN):  acetaminophen   Tablet .. 650 milliGRAM(s) Oral every 6 hours PRN Mild Pain (1 - 3)  traMADol 50 milliGRAM(s) Oral every 4 hours PRN Moderate Pain (4 - 6)  traMADol 25 milliGRAM(s) Oral every 4 hours PRN Mild Pain (1 - 3)      PAST MEDICAL & SURGICAL HISTORY:  DM (diabetes mellitus)    HTN (hypertension)    Chronic kidney disease (CKD)        Vital Signs Last 24 Hrs  T(C): 36.7 (19 Mar 2021 09:00), Max: 37.6 (18 Mar 2021 23:27)  T(F): 98.1 (19 Mar 2021 09:00), Max: 99.6 (18 Mar 2021 23:27)  HR: 62 (19 Mar 2021 09:00) (57 - 84)  BP: 151/69 (19 Mar 2021 09:00) (132/63 - 163/75)  BP(mean): 108 (19 Mar 2021 05:00) (106 - 108)  RR: 18 (19 Mar 2021 09:00) (18 - 18)  SpO2: 99% (19 Mar 2021 09:00) (90% - 100%)    I&O's Summary    18 Mar 2021 07:01  -  19 Mar 2021 07:00  --------------------------------------------------------  IN: 710 mL / OUT: 3515 mL / NET: -2805 mL    19 Mar 2021 07:01  -  19 Mar 2021 10:53  --------------------------------------------------------  IN: 220 mL / OUT: 175 mL / NET: 45 mL                              8.4    6.80  )-----------( 201      ( 19 Mar 2021 06:10 )             25.2     03-19    135  |  93<L>  |  45<H>  ----------------------------<  178<H>  3.9   |  27  |  5.50<H>    Ca    8.9      19 Mar 2021 06:10  Phos  5.1     03-19  Mg     2.2     03-19    TPro  6.7  /  Alb  3.5  /  TBili  0.5  /  DBili  x   /  AST  11  /  ALT  8<L>  /  AlkPhos  121<H>  03-19    Tacrolimus (), Serum: 12.6 ng/mL (03-19 @ 07:34)            Review of systems  Gen: No weight changes, fatigue, fevers/chills, weakness  Skin: No rashes  Head/Eyes/Ears/Mouth: No headache; Normal hearing; Normal vision w/o blurriness; No sinus pain/discomfort, sore throat  Respiratory: No dyspnea, cough, wheezing, hemoptysis  CV: No chest pain, PND, orthopnea  GI: Mild abdominal pain at surgical incision site; denies diarrhea, constipation, nausea, vomiting, melena, hematochezia  : No increased frequency, dysuria, hematuria, nocturia  MSK: No joint pain/swelling; no back pain; no edema  Neuro: No dizziness/lightheadedness, weakness, seizures, numbness, tingling  Heme: No easy bruising or bleeding  Endo: No heat/cold intolerance  Psych: No significant nervousness, anxiety, stress, depression  All other systems were reviewed and are negative, except as noted.    PHYSICAL EXAM:  Constitutional: Well developed / well nourished  Eyes: Anicteric, PERRLA  ENMT: nc/at  Neck: central line RIJ  Respiratory: improving breath sounds on R lung base  Cardiovascular: RRR  Gastrointestinal: Soft, non distended, mild tenderness at the incision site; incision c/d/i;   Genitourinary: Urinary catheter in place  Extremities: SCD's in place and working bilaterally, AVF palpable. 1+ edema b/l LE. no calf TTP  Vascular: 1+ DP pulses b/l  Neurological: A&O x3  Skin: no rashes, ulcerations or lesions  Musculoskeletal: Moving all extremities  Psychiatric: Responsive       Transplant Surgery - Multidisciplinary Rounds  --------------------------------------------------------------  DCD DDRT (1a/1v/1u--stented)   Date: 3/9/2021    POD# 10    Present: Patient seen and examined with multidisciplinary team including Transplant Surgeon Dr. Moore, Transplant Nephrologist Dr Beck Olmos, Transplant Pharmacist: Franki Fernandez, Khadijah Donaldson, and bedside RN during am rounds. Disciplines not in attendance will be notified of plan.      HPI: 70 y/o M with PMHx of HTN (age 43), DM on insulin (age 43), gout, anxiety, depression, OCD and CKD since 2016.  He started HD via L AVF  in April 2020 at [facility] [place] and transitioned to PD about 5 months ago. He makes ~2 ounces of urine 4 times daily.      Underwent DCD DDRT 3/9/2021 on 3/9/2021 (1A/1V/1U stented). Post op course c/b DGF requiring HD    Interval Events:  - Lantus increased to 20 with lispro 3 units before meals  - Started calcitriol  - John removed, voided  - Underwent PD with 2L of fluid removed      Potential Discharge date: pending clinical stability  Education:  Medications  Plan of care:  See Below      MEDICATIONS  (STANDING):  calcitriol   Capsule 0.25 MICROGram(s) Oral daily  carvedilol 25 milliGRAM(s) Oral every 12 hours  chlorhexidine 2% Cloths 1 Application(s) Topical daily  dextrose 40% Gel 15 Gram(s) Oral once  dextrose 5%. 1000 milliLiter(s) (50 mL/Hr) IV Continuous <Continuous>  dextrose 5%. 1000 milliLiter(s) (100 mL/Hr) IV Continuous <Continuous>  dextrose 50% Injectable 25 Gram(s) IV Push once  dextrose 50% Injectable 12.5 Gram(s) IV Push once  dextrose 50% Injectable 25 Gram(s) IV Push once  epoetin goran-epbx (RETACRIT) Injectable 61252 Unit(s) IV Push <User Schedule>  famotidine    Tablet 20 milliGRAM(s) Oral daily  furosemide Infusion 10 mG/Hr (5 mL/Hr) IV Continuous <Continuous>  gentamicin 0.1% Ointment 1 Application(s) Topical <User Schedule>  glucagon  Injectable 1 milliGRAM(s) IntraMuscular once  insulin glargine Injectable (LANTUS) 20 Unit(s) SubCutaneous at bedtime  insulin lispro (ADMELOG) corrective regimen sliding scale   SubCutaneous three times a day before meals  insulin lispro (ADMELOG) corrective regimen sliding scale   SubCutaneous at bedtime  insulin lispro Injectable (ADMELOG) 3 Unit(s) SubCutaneous three times a day before meals  mycophenolate mofetil 1000 milliGRAM(s) Oral <User Schedule>  NIFEdipine XL 90 milliGRAM(s) Oral daily  nystatin    Suspension 778287 Unit(s) Swish and Swallow four times a day  polyethylene glycol 3350 17 Gram(s) Oral daily  predniSONE   Tablet 5 milliGRAM(s) Oral daily  senna 2 Tablet(s) Oral at bedtime  sevelamer carbonate 1600 milliGRAM(s) Oral three times a day with meals  tacrolimus ER Tablet (ENVARSUS XR) 6 milliGRAM(s) Oral <User Schedule>  tamsulosin 0.4 milliGRAM(s) Oral at bedtime  trimethoprim   80 mG/sulfamethoxazole 400 mG 1 Tablet(s) Oral daily  valGANciclovir 450 milliGRAM(s) Oral <User Schedule>    MEDICATIONS  (PRN):  acetaminophen   Tablet .. 650 milliGRAM(s) Oral every 6 hours PRN Mild Pain (1 - 3)  traMADol 50 milliGRAM(s) Oral every 4 hours PRN Moderate Pain (4 - 6)  traMADol 25 milliGRAM(s) Oral every 4 hours PRN Mild Pain (1 - 3)      PAST MEDICAL & SURGICAL HISTORY:  DM (diabetes mellitus)    HTN (hypertension)    Chronic kidney disease (CKD)        Vital Signs Last 24 Hrs  T(C): 36.7 (19 Mar 2021 09:00), Max: 37.6 (18 Mar 2021 23:27)  T(F): 98.1 (19 Mar 2021 09:00), Max: 99.6 (18 Mar 2021 23:27)  HR: 62 (19 Mar 2021 09:00) (57 - 84)  BP: 151/69 (19 Mar 2021 09:00) (132/63 - 163/75)  BP(mean): 108 (19 Mar 2021 05:00) (106 - 108)  RR: 18 (19 Mar 2021 09:00) (18 - 18)  SpO2: 99% (19 Mar 2021 09:00) (90% - 100%)    I&O's Summary    18 Mar 2021 07:01  -  19 Mar 2021 07:00  --------------------------------------------------------  IN: 710 mL / OUT: 3515 mL / NET: -2805 mL    19 Mar 2021 07:01  -  19 Mar 2021 10:53  --------------------------------------------------------  IN: 220 mL / OUT: 175 mL / NET: 45 mL                              8.4    6.80  )-----------( 201      ( 19 Mar 2021 06:10 )             25.2     03-19    135  |  93<L>  |  45<H>  ----------------------------<  178<H>  3.9   |  27  |  5.50<H>    Ca    8.9      19 Mar 2021 06:10  Phos  5.1     03-19  Mg     2.2     03-19    TPro  6.7  /  Alb  3.5  /  TBili  0.5  /  DBili  x   /  AST  11  /  ALT  8<L>  /  AlkPhos  121<H>  03-19    Tacrolimus (), Serum: 12.6 ng/mL (03-19 @ 07:34)            Review of systems  Gen: No weight changes, fatigue, fevers/chills, weakness  Skin: No rashes  Head/Eyes/Ears/Mouth: No headache; Normal hearing; Normal vision w/o blurriness; No sinus pain/discomfort, sore throat  Respiratory: No dyspnea, cough, wheezing, hemoptysis  CV: No chest pain, PND, orthopnea  GI: Mild abdominal pain at surgical incision site; denies diarrhea, constipation, nausea, vomiting, melena, hematochezia  : No increased frequency, dysuria, hematuria, nocturia  MSK: No joint pain/swelling; no back pain; no edema  Neuro: No dizziness/lightheadedness, weakness, seizures, numbness, tingling  Heme: No easy bruising or bleeding  Endo: No heat/cold intolerance  Psych: No significant nervousness, anxiety, stress, depression  All other systems were reviewed and are negative, except as noted.    PHYSICAL EXAM:  Constitutional: Well developed / well nourished  Eyes: Anicteric, PERRLA  ENMT: nc/at  Neck: central line RIJ  Respiratory: improving breath sounds on R lung base  Cardiovascular: RRR  Gastrointestinal: Soft, non distended, mild tenderness at the incision site; incision c/d/i;   Genitourinary: Urinary catheter in place  Extremities: SCD's in place and working bilaterally, AVF palpable. 1+ edema b/l LE. no calf TTP  Vascular: 1+ DP pulses b/l  Neurological: A&O x3  Skin: no rashes, ulcerations or lesions  Musculoskeletal: Moving all extremities  Psychiatric: Responsive

## 2021-03-19 NOTE — PROGRESS NOTE ADULT - ATTENDING COMMENTS
agree with above  DGF; uop improving but still needs dialysis  immunosuppression tacro, mmf and steroids  will continue lasix drip today and two cycles of PD  possible discharge home over coming 48hrs

## 2021-03-20 LAB
ALBUMIN SERPL ELPH-MCNC: 3.3 G/DL — SIGNIFICANT CHANGE UP (ref 3.3–5)
ALP SERPL-CCNC: 114 U/L — SIGNIFICANT CHANGE UP (ref 40–120)
ALT FLD-CCNC: 7 U/L — LOW (ref 10–45)
ANION GAP SERPL CALC-SCNC: 14 MMOL/L — SIGNIFICANT CHANGE UP (ref 5–17)
AST SERPL-CCNC: 10 U/L — SIGNIFICANT CHANGE UP (ref 10–40)
BASOPHILS # BLD AUTO: 0.02 K/UL — SIGNIFICANT CHANGE UP (ref 0–0.2)
BASOPHILS NFR BLD AUTO: 0.3 % — SIGNIFICANT CHANGE UP (ref 0–2)
BILIRUB SERPL-MCNC: 0.4 MG/DL — SIGNIFICANT CHANGE UP (ref 0.2–1.2)
BUN SERPL-MCNC: 49 MG/DL — HIGH (ref 7–23)
CALCIUM SERPL-MCNC: 8.6 MG/DL — SIGNIFICANT CHANGE UP (ref 8.4–10.5)
CHLORIDE SERPL-SCNC: 93 MMOL/L — LOW (ref 96–108)
CO2 SERPL-SCNC: 27 MMOL/L — SIGNIFICANT CHANGE UP (ref 22–31)
CREAT SERPL-MCNC: 6.25 MG/DL — HIGH (ref 0.5–1.3)
EOSINOPHIL # BLD AUTO: 0.14 K/UL — SIGNIFICANT CHANGE UP (ref 0–0.5)
EOSINOPHIL NFR BLD AUTO: 2 % — SIGNIFICANT CHANGE UP (ref 0–6)
GLUCOSE SERPL-MCNC: 287 MG/DL — HIGH (ref 70–99)
HCT VFR BLD CALC: 24.2 % — LOW (ref 39–50)
HGB BLD-MCNC: 8.1 G/DL — LOW (ref 13–17)
IMM GRANULOCYTES NFR BLD AUTO: 1.6 % — HIGH (ref 0–1.5)
LYMPHOCYTES # BLD AUTO: 0.57 K/UL — LOW (ref 1–3.3)
LYMPHOCYTES # BLD AUTO: 8.3 % — LOW (ref 13–44)
MAGNESIUM SERPL-MCNC: 2.3 MG/DL — SIGNIFICANT CHANGE UP (ref 1.6–2.6)
MCHC RBC-ENTMCNC: 32.8 PG — SIGNIFICANT CHANGE UP (ref 27–34)
MCHC RBC-ENTMCNC: 33.5 GM/DL — SIGNIFICANT CHANGE UP (ref 32–36)
MCV RBC AUTO: 98 FL — SIGNIFICANT CHANGE UP (ref 80–100)
MONOCYTES # BLD AUTO: 0.85 K/UL — SIGNIFICANT CHANGE UP (ref 0–0.9)
MONOCYTES NFR BLD AUTO: 12.4 % — SIGNIFICANT CHANGE UP (ref 2–14)
NEUTROPHILS # BLD AUTO: 5.14 K/UL — SIGNIFICANT CHANGE UP (ref 1.8–7.4)
NEUTROPHILS NFR BLD AUTO: 75.4 % — SIGNIFICANT CHANGE UP (ref 43–77)
NRBC # BLD: 0 /100 WBCS — SIGNIFICANT CHANGE UP (ref 0–0)
PHOSPHATE SERPL-MCNC: 5.7 MG/DL — HIGH (ref 2.5–4.5)
PLATELET # BLD AUTO: 188 K/UL — SIGNIFICANT CHANGE UP (ref 150–400)
POTASSIUM SERPL-MCNC: 4.2 MMOL/L — SIGNIFICANT CHANGE UP (ref 3.5–5.3)
POTASSIUM SERPL-SCNC: 4.2 MMOL/L — SIGNIFICANT CHANGE UP (ref 3.5–5.3)
PROT SERPL-MCNC: 5.8 G/DL — LOW (ref 6–8.3)
RBC # BLD: 2.47 M/UL — LOW (ref 4.2–5.8)
RBC # FLD: 15.9 % — HIGH (ref 10.3–14.5)
SODIUM SERPL-SCNC: 134 MMOL/L — LOW (ref 135–145)
TACROLIMUS SERPL-MCNC: 9.6 NG/ML — SIGNIFICANT CHANGE UP
WBC # BLD: 6.83 K/UL — SIGNIFICANT CHANGE UP (ref 3.8–10.5)
WBC # FLD AUTO: 6.83 K/UL — SIGNIFICANT CHANGE UP (ref 3.8–10.5)

## 2021-03-20 PROCEDURE — 99232 SBSQ HOSP IP/OBS MODERATE 35: CPT

## 2021-03-20 RX ADMIN — Medication 3 UNIT(S): at 12:16

## 2021-03-20 RX ADMIN — Medication 90 MILLIGRAM(S): at 05:00

## 2021-03-20 RX ADMIN — TACROLIMUS 5 MILLIGRAM(S): 5 CAPSULE ORAL at 08:15

## 2021-03-20 RX ADMIN — MYCOPHENOLATE MOFETIL 1000 MILLIGRAM(S): 250 CAPSULE ORAL at 21:08

## 2021-03-20 RX ADMIN — CALCITRIOL 0.25 MICROGRAM(S): 0.5 CAPSULE ORAL at 12:10

## 2021-03-20 RX ADMIN — INSULIN GLARGINE 20 UNIT(S): 100 INJECTION, SOLUTION SUBCUTANEOUS at 21:11

## 2021-03-20 RX ADMIN — SEVELAMER CARBONATE 1600 MILLIGRAM(S): 2400 POWDER, FOR SUSPENSION ORAL at 12:09

## 2021-03-20 RX ADMIN — Medication 500000 UNIT(S): at 17:14

## 2021-03-20 RX ADMIN — MYCOPHENOLATE MOFETIL 1000 MILLIGRAM(S): 250 CAPSULE ORAL at 08:15

## 2021-03-20 RX ADMIN — SEVELAMER CARBONATE 1600 MILLIGRAM(S): 2400 POWDER, FOR SUSPENSION ORAL at 17:14

## 2021-03-20 RX ADMIN — Medication 3 UNIT(S): at 17:14

## 2021-03-20 RX ADMIN — POLYETHYLENE GLYCOL 3350 17 GRAM(S): 17 POWDER, FOR SOLUTION ORAL at 12:10

## 2021-03-20 RX ADMIN — Medication 500000 UNIT(S): at 21:08

## 2021-03-20 RX ADMIN — Medication 5 MILLIGRAM(S): at 05:00

## 2021-03-20 RX ADMIN — TAMSULOSIN HYDROCHLORIDE 0.4 MILLIGRAM(S): 0.4 CAPSULE ORAL at 21:08

## 2021-03-20 RX ADMIN — CHLORHEXIDINE GLUCONATE 1 APPLICATION(S): 213 SOLUTION TOPICAL at 12:09

## 2021-03-20 RX ADMIN — Medication 6: at 17:14

## 2021-03-20 RX ADMIN — CARVEDILOL PHOSPHATE 25 MILLIGRAM(S): 80 CAPSULE, EXTENDED RELEASE ORAL at 05:00

## 2021-03-20 RX ADMIN — Medication 6: at 08:33

## 2021-03-20 RX ADMIN — Medication 500000 UNIT(S): at 12:09

## 2021-03-20 RX ADMIN — Medication 3 UNIT(S): at 08:34

## 2021-03-20 RX ADMIN — SEVELAMER CARBONATE 1600 MILLIGRAM(S): 2400 POWDER, FOR SUSPENSION ORAL at 08:33

## 2021-03-20 RX ADMIN — CARVEDILOL PHOSPHATE 25 MILLIGRAM(S): 80 CAPSULE, EXTENDED RELEASE ORAL at 17:15

## 2021-03-20 RX ADMIN — FAMOTIDINE 20 MILLIGRAM(S): 10 INJECTION INTRAVENOUS at 12:09

## 2021-03-20 RX ADMIN — SENNA PLUS 2 TABLET(S): 8.6 TABLET ORAL at 21:07

## 2021-03-20 RX ADMIN — Medication 500000 UNIT(S): at 05:00

## 2021-03-20 RX ADMIN — Medication 500000 UNIT(S): at 00:13

## 2021-03-20 RX ADMIN — Medication 2: at 12:15

## 2021-03-20 RX ADMIN — Medication 1 TABLET(S): at 12:10

## 2021-03-20 NOTE — PROGRESS NOTE ADULT - ASSESSMENT
71M with h/o HTN (age 43), IDDM (age 43), gout, anxiety, depression, OCD, CKD (since 2016) was on HD via L AVF (4/2020 Dr. Novak) with transition to PD in 9/2020 now s/p R DCD DDRT (3/9/21)    [] R DCD DDRT c/b DGF  - delayed graft function with improving UOP  - Continue lasix gtt 10mg/hour  - Continue PD as per nephrology  - Continue Flomax, started 3/16 for urinary retention  - Immuno: Env per level, MMF 1g bid, Pred taper, Simulect completed  - PPx: valcyte/bactrim/nystatin  - Strict I&Os  - Diet: Renal   - Pain control  - Bowel regimen   - SCDs/IS/OOB  - Epoetin 10,000U BIW, Calcitriol 0.25mg daily  - PT consult    [] DM  - Enrolled in CGM study (conventional group)/completed  - Hyperglycemia  - Fingersticks ac and qhs with SSI coverage  - Cont Lantus 20, premeal lispro 3      [] HTN  - cont coreg 25mg bid and nifedipine 30mg daily  71M with h/o HTN (age 43), IDDM (age 43), gout, anxiety, depression, OCD, CKD (since 2016) was on HD via L AVF (4/2020 Dr. Novak) with transition to PD in 9/2020 now s/p R DCD DDRT (3/9/21)    [] R DCD DDRT c/b DGF  - delayed graft function with improving UOP  - Continue lasix gtt 7.5mg/hr   - Continue PD as per nephrology  - Continue Flomax, started 3/16 for urinary retention  - Immuno: Env 5mg, MMF 1g bid, Pred taper, Simulect completed  - PPx: valcyte/bactrim/nystatin  - Strict I&Os  - Diet: Renal   - Pain control  - Bowel regimen   - SCDs/IS/OOB  - Epoetin 10,000U BIW, Calcitriol 0.25mg daily  - PT consult    [] DM  - Enrolled in CGM study (conventional group)/completed  - Hyperglycemia  - Fingersticks ac and qhs with SSI coverage  - Cont Lantus 20, premeal lispro 3      [] HTN  - cont coreg 25mg bid and nifedipine 30mg daily    71M with h/o HTN (age 43), IDDM (age 43), gout, anxiety, depression, OCD, CKD (since 2016) was on HD via L AVF (4/2020 Dr. Novak) with transition to PD in 9/2020 now s/p R DCD DDRT (3/9/21)    [] R DCD DDRT c/b DGF  - delayed graft function with improving UOP  - Continue lasix gtt 10mg/hr   - Continue PD as per nephrology  - Continue Flomax, started 3/16 for urinary retention  - Immuno: Env 5mg, MMF 1g bid, Pred taper, Simulect completed  - PPx: valcyte/bactrim/nystatin  - Strict I&Os  - Diet: Renal   - Pain control  - Bowel regimen   - SCDs/IS/OOB  - Epoetin 10,000U BIW, Calcitriol 0.25mg daily  - PT consult    [] DM  - Enrolled in CGM study (conventional group)/completed  - Hyperglycemia  - Fingersticks ac and qhs with SSI coverage  - Cont Lantus 20, premeal lispro 3      [] HTN  - cont coreg 25mg bid and nifedipine 30mg daily

## 2021-03-20 NOTE — PROGRESS NOTE ADULT - ASSESSMENT
Patient is a 72 y/o M w PMH of ESRD on PD for 5 months, was on HD in 2020, with nephrologist Dr Novak, IDDM, HTN, gout, depression, anxiety and OCD. S/p DCD with JOJO DDRT on 3/9/21.     1. S/p DDRT on 3/9/21 -Allograft function with DGF due to ATN - Urine output has increased but solutes are still high   Last HD was 3/18/21 with 2L UF and currently on PD. Last PD also with ~2L UF. Will continue PD tonight  2. Immunosupression - Simulect induction, currently on Envarsus ( goal 8-10) , MMF 1g PO BID and Prednisone 5 mg po daily   3. Prophylaxis - bactrim/nystatin/valcyte   4. Hypertension - On Coreg to 25 mg PO BID and Nifedipine 90 mg PO daily.  5.  DM2 -on Lantus 20 unit at bedtime and 3 unit lispro pre-meal, with corrective sliding scale.   6. Anemia - on retacrit 10,000 unit TTS.

## 2021-03-20 NOTE — PROGRESS NOTE ADULT - ATTENDING COMMENTS
agree with above  DGF  uop improving  still requiring dialysis; PD daily  immunosuppression tacro, mmf nad steroids

## 2021-03-20 NOTE — PROGRESS NOTE ADULT - SUBJECTIVE AND OBJECTIVE BOX
Transplant Surgery - Multidisciplinary Rounds  --------------------------------------------------------------  Present: Patient seen and examined with multidisciplinary team including Transplant Surgeon Dr. Moore, Transplant Nephrologist Dr Beck Olmos, Transplant Pharmacist: Franki Fernandez, Khadijah Hall, and bedside RN during am rounds. Disciplines not in attendance will be notified of plan.      HPI: 72 y/o M with PMHx of HTN (age 43), DM on insulin (age 43), gout, anxiety, depression, OCD and CKD since 2016.  He started HD via L AVF  in April 2020 at [facility] and transitioned to PD about 5 months ago. He makes ~2 ounces of urine 4 times daily.      Underwent DCD DDRT 3/9/2021 on 3/9/2021 (1A/1V/1U stented). Post op course c/b DGF requiring HD    Interval Events:  - Increasing UOP on Lasix gtt  - Adjusted Envarsus dose  - Weaned off supplemental O2      Potential Discharge date: pending clinical stability  Education:  Medications  Plan of care:  See Below      MEDICATIONS  (STANDING):  calcitriol   Capsule 0.25 MICROGram(s) Oral daily  carvedilol 25 milliGRAM(s) Oral every 12 hours  chlorhexidine 2% Cloths 1 Application(s) Topical daily  dextrose 40% Gel 15 Gram(s) Oral once  dextrose 5%. 1000 milliLiter(s) (50 mL/Hr) IV Continuous <Continuous>  dextrose 5%. 1000 milliLiter(s) (100 mL/Hr) IV Continuous <Continuous>  dextrose 50% Injectable 25 Gram(s) IV Push once  dextrose 50% Injectable 12.5 Gram(s) IV Push once  dextrose 50% Injectable 25 Gram(s) IV Push once  epoetin goran-epbx (RETACRIT) Injectable 27430 Unit(s) IV Push <User Schedule>  famotidine    Tablet 20 milliGRAM(s) Oral daily  furosemide Infusion 10 mG/Hr (5 mL/Hr) IV Continuous <Continuous>  gentamicin 0.1% Ointment 1 Application(s) Topical <User Schedule>  glucagon  Injectable 1 milliGRAM(s) IntraMuscular once  insulin glargine Injectable (LANTUS) 20 Unit(s) SubCutaneous at bedtime  insulin lispro (ADMELOG) corrective regimen sliding scale   SubCutaneous three times a day before meals  insulin lispro (ADMELOG) corrective regimen sliding scale   SubCutaneous at bedtime  insulin lispro Injectable (ADMELOG) 3 Unit(s) SubCutaneous three times a day before meals  mycophenolate mofetil 1000 milliGRAM(s) Oral <User Schedule>  NIFEdipine XL 90 milliGRAM(s) Oral daily  nystatin    Suspension 251571 Unit(s) Swish and Swallow four times a day  polyethylene glycol 3350 17 Gram(s) Oral daily  predniSONE   Tablet 5 milliGRAM(s) Oral daily  senna 2 Tablet(s) Oral at bedtime  sevelamer carbonate 1600 milliGRAM(s) Oral three times a day with meals  tacrolimus ER Tablet (ENVARSUS XR) 5 milliGRAM(s) Oral <User Schedule>  tamsulosin 0.4 milliGRAM(s) Oral at bedtime  trimethoprim   80 mG/sulfamethoxazole 400 mG 1 Tablet(s) Oral daily  valGANciclovir 450 milliGRAM(s) Oral <User Schedule>    MEDICATIONS  (PRN):  acetaminophen   Tablet .. 650 milliGRAM(s) Oral every 6 hours PRN Mild Pain (1 - 3)  traMADol 25 milliGRAM(s) Oral every 4 hours PRN Mild Pain (1 - 3)  traMADol 50 milliGRAM(s) Oral every 4 hours PRN Moderate Pain (4 - 6)      PAST MEDICAL & SURGICAL HISTORY:  DM (diabetes mellitus)    HTN (hypertension)    Chronic kidney disease (CKD)        Vital Signs Last 24 Hrs  T(C): 37.2 (20 Mar 2021 09:00), Max: 37.4 (19 Mar 2021 21:00)  T(F): 98.9 (20 Mar 2021 09:00), Max: 99.3 (19 Mar 2021 21:00)  HR: 67 (20 Mar 2021 09:00) (61 - 69)  BP: 149/76 (20 Mar 2021 09:00) (110/55 - 153/72)  BP(mean): 104 (20 Mar 2021 01:19) (83 - 104)  RR: 18 (20 Mar 2021 09:00) (18 - 19)  SpO2: 96% (20 Mar 2021 09:00) (88% - 98%)    I&O's Summary    19 Mar 2021 07:01  -  20 Mar 2021 07:00  --------------------------------------------------------  IN: 1040 mL / OUT: 1370 mL / NET: -330 mL    20 Mar 2021 07:01  -  20 Mar 2021 11:01  --------------------------------------------------------  IN: 165 mL / OUT: 300 mL / NET: -135 mL                              8.1    6.83  )-----------( 188      ( 20 Mar 2021 06:08 )             24.2     03-20    134<L>  |  93<L>  |  49<H>  ----------------------------<  287<H>  4.2   |  27  |  6.25<H>    Ca    8.6      20 Mar 2021 06:08  Phos  5.7     03-20  Mg     2.3     03-20    TPro  5.8<L>  /  Alb  3.3  /  TBili  0.4  /  DBili  x   /  AST  10  /  ALT  7<L>  /  AlkPhos  114  03-20    Tacrolimus (), Serum: 9.6 ng/mL (03-20 @ 07:36)          Review of systems  Gen: No weight changes, fatigue, fevers/chills, weakness  Skin: No rashes  Head/Eyes/Ears/Mouth: No headache; Normal hearing; Normal vision w/o blurriness; No sinus pain/discomfort, sore throat  Respiratory: No dyspnea, cough, wheezing, hemoptysis  CV: No chest pain, PND, orthopnea  GI: Mild abdominal pain at surgical incision site; denies diarrhea, constipation, nausea, vomiting, melena, hematochezia  : No increased frequency, dysuria, hematuria, nocturia  MSK: No joint pain/swelling; no back pain; no edema  Neuro: No dizziness/lightheadedness, weakness, seizures, numbness, tingling  Heme: No easy bruising or bleeding  Endo: No heat/cold intolerance  Psych: No significant nervousness, anxiety, stress, depression  All other systems were reviewed and are negative, except as noted.    PHYSICAL EXAM:  Constitutional: Well developed / well nourished  Eyes: Anicteric, PERRLA  ENMT: nc/at  Neck: central line RIJ  Respiratory: improving breath sounds on R lung base  Cardiovascular: RRR  Gastrointestinal: Soft, non distended, mild tenderness at the incision site; incision c/d/i;   Genitourinary: Urinary catheter in place  Extremities: SCD's in place and working bilaterally, AVF palpable. 1+ edema b/l LE. no calf TTP  Vascular: 1+ DP pulses b/l  Neurological: A&O x3  Skin: no rashes, ulcerations or lesions  Musculoskeletal: Moving all extremities  Psychiatric: Responsive       Transplant Surgery - Multidisciplinary Rounds  --------------------------------------------------------------  Present: Patient seen and examined with multidisciplinary team including Transplant Surgeon Dr. Moore, Transplant Nephrologist Dr Olmos, ACPs Osbaldo and Shahid, and bedside RN during am rounds. Disciplines not in attendance will be notified of plan.      HPI: 72 y/o M with PMHx of HTN (age 43), DM on insulin (age 43), gout, anxiety, depression, OCD and CKD since 2016.  He started HD via L AVF  in April 2020 at [facility] and transitioned to PD about 5 months ago. He makes ~2 ounces of urine 4 times daily.      Underwent DCD DDRT 3/9/2021 on 3/9/2021 (1A/1V/1U stented). Post op course c/b DGF requiring HD    Interval Events:  - Increasing UOP on Lasix gtt  - Adjusted Envarsus dose  - Weaned off supplemental O2      Potential Discharge date: pending clinical stability  Education:  Medications  Plan of care:  See Below      MEDICATIONS  (STANDING):  calcitriol   Capsule 0.25 MICROGram(s) Oral daily  carvedilol 25 milliGRAM(s) Oral every 12 hours  chlorhexidine 2% Cloths 1 Application(s) Topical daily  dextrose 40% Gel 15 Gram(s) Oral once  dextrose 5%. 1000 milliLiter(s) (50 mL/Hr) IV Continuous <Continuous>  dextrose 5%. 1000 milliLiter(s) (100 mL/Hr) IV Continuous <Continuous>  dextrose 50% Injectable 25 Gram(s) IV Push once  dextrose 50% Injectable 12.5 Gram(s) IV Push once  dextrose 50% Injectable 25 Gram(s) IV Push once  epoetin goran-epbx (RETACRIT) Injectable 02168 Unit(s) IV Push <User Schedule>  famotidine    Tablet 20 milliGRAM(s) Oral daily  furosemide Infusion 10 mG/Hr (5 mL/Hr) IV Continuous <Continuous>  gentamicin 0.1% Ointment 1 Application(s) Topical <User Schedule>  glucagon  Injectable 1 milliGRAM(s) IntraMuscular once  insulin glargine Injectable (LANTUS) 20 Unit(s) SubCutaneous at bedtime  insulin lispro (ADMELOG) corrective regimen sliding scale   SubCutaneous three times a day before meals  insulin lispro (ADMELOG) corrective regimen sliding scale   SubCutaneous at bedtime  insulin lispro Injectable (ADMELOG) 3 Unit(s) SubCutaneous three times a day before meals  mycophenolate mofetil 1000 milliGRAM(s) Oral <User Schedule>  NIFEdipine XL 90 milliGRAM(s) Oral daily  nystatin    Suspension 190068 Unit(s) Swish and Swallow four times a day  polyethylene glycol 3350 17 Gram(s) Oral daily  predniSONE   Tablet 5 milliGRAM(s) Oral daily  senna 2 Tablet(s) Oral at bedtime  sevelamer carbonate 1600 milliGRAM(s) Oral three times a day with meals  tacrolimus ER Tablet (ENVARSUS XR) 5 milliGRAM(s) Oral <User Schedule>  tamsulosin 0.4 milliGRAM(s) Oral at bedtime  trimethoprim   80 mG/sulfamethoxazole 400 mG 1 Tablet(s) Oral daily  valGANciclovir 450 milliGRAM(s) Oral <User Schedule>    MEDICATIONS  (PRN):  acetaminophen   Tablet .. 650 milliGRAM(s) Oral every 6 hours PRN Mild Pain (1 - 3)  traMADol 25 milliGRAM(s) Oral every 4 hours PRN Mild Pain (1 - 3)  traMADol 50 milliGRAM(s) Oral every 4 hours PRN Moderate Pain (4 - 6)      PAST MEDICAL & SURGICAL HISTORY:  DM (diabetes mellitus)    HTN (hypertension)    Chronic kidney disease (CKD)        Vital Signs Last 24 Hrs  T(C): 37.2 (20 Mar 2021 09:00), Max: 37.4 (19 Mar 2021 21:00)  T(F): 98.9 (20 Mar 2021 09:00), Max: 99.3 (19 Mar 2021 21:00)  HR: 67 (20 Mar 2021 09:00) (61 - 69)  BP: 149/76 (20 Mar 2021 09:00) (110/55 - 153/72)  BP(mean): 104 (20 Mar 2021 01:19) (83 - 104)  RR: 18 (20 Mar 2021 09:00) (18 - 19)  SpO2: 96% (20 Mar 2021 09:00) (88% - 98%)    I&O's Summary    19 Mar 2021 07:01  -  20 Mar 2021 07:00  --------------------------------------------------------  IN: 1040 mL / OUT: 1370 mL / NET: -330 mL    20 Mar 2021 07:01  -  20 Mar 2021 11:01  --------------------------------------------------------  IN: 165 mL / OUT: 300 mL / NET: -135 mL                              8.1    6.83  )-----------( 188      ( 20 Mar 2021 06:08 )             24.2     03-20    134<L>  |  93<L>  |  49<H>  ----------------------------<  287<H>  4.2   |  27  |  6.25<H>    Ca    8.6      20 Mar 2021 06:08  Phos  5.7     03-20  Mg     2.3     03-20    TPro  5.8<L>  /  Alb  3.3  /  TBili  0.4  /  DBili  x   /  AST  10  /  ALT  7<L>  /  AlkPhos  114  03-20    Tacrolimus (), Serum: 9.6 ng/mL (03-20 @ 07:36)          Review of systems  Gen: No weight changes, fatigue, fevers/chills, weakness  Skin: No rashes  Head/Eyes/Ears/Mouth: No headache; Normal hearing; Normal vision w/o blurriness; No sinus pain/discomfort, sore throat  Respiratory: No dyspnea, cough, wheezing, hemoptysis  CV: No chest pain, PND, orthopnea  GI: Mild abdominal pain at surgical incision site; denies diarrhea, constipation, nausea, vomiting, melena, hematochezia  : No increased frequency, dysuria, hematuria, nocturia  MSK: No joint pain/swelling; no back pain; no edema  Neuro: No dizziness/lightheadedness, weakness, seizures, numbness, tingling  Heme: No easy bruising or bleeding  Endo: No heat/cold intolerance  Psych: No significant nervousness, anxiety, stress, depression  All other systems were reviewed and are negative, except as noted.    PHYSICAL EXAM:  Constitutional: Well developed / well nourished  Eyes: Anicteric, PERRLA  ENMT: nc/at  Neck: central line RIJ  Respiratory: improving breath sounds on R lung base  Cardiovascular: RRR  Gastrointestinal: Soft, non distended, mild tenderness at the incision site; incision c/d/i;   Genitourinary: Urinary catheter in place  Extremities: SCD's in place and working bilaterally, AVF palpable. 1+ edema b/l LE. no calf TTP  Vascular: 1+ DP pulses b/l  Neurological: A&O x3  Skin: no rashes, ulcerations or lesions  Musculoskeletal: Moving all extremities  Psychiatric: Responsive       Transplant Surgery - Multidisciplinary Rounds  --------------------------------------------------------------  DCD DDRT (1a/1v/1u--stented)   Date: 3/9/2021    POD# 11    Present: Patient seen and examined with multidisciplinary team including Transplant Surgeon Dr. Moore, Transplant Nephrologist Dr Olmos, ACPs Rafael, and bedside RN during am rounds. Disciplines not in attendance will be notified of plan.      HPI: 72 y/o M with PMHx of HTN (age 43), DM on insulin (age 43), gout, anxiety, depression, OCD and CKD since 2016.  He started HD via L AVF  in April 2020 at [facility] and transitioned to PD about 5 months ago. He makes ~2 ounces of urine 4 times daily.      Underwent DCD DDRT 3/9/2021 on 3/9/2021 (1A/1V/1U stented). Post op course c/b DGF requiring HD    Interval Events:  - Increasing UOP on Lasix gtt  - Adjusted Envarsus dose  - Weaned off supplemental O2      Potential Discharge date: pending clinical stability  Education:  Medications  Plan of care:  See Below      MEDICATIONS  (STANDING):  calcitriol   Capsule 0.25 MICROGram(s) Oral daily  carvedilol 25 milliGRAM(s) Oral every 12 hours  chlorhexidine 2% Cloths 1 Application(s) Topical daily  dextrose 40% Gel 15 Gram(s) Oral once  dextrose 5%. 1000 milliLiter(s) (50 mL/Hr) IV Continuous <Continuous>  dextrose 5%. 1000 milliLiter(s) (100 mL/Hr) IV Continuous <Continuous>  dextrose 50% Injectable 25 Gram(s) IV Push once  dextrose 50% Injectable 12.5 Gram(s) IV Push once  dextrose 50% Injectable 25 Gram(s) IV Push once  epoetin goran-epbx (RETACRIT) Injectable 97351 Unit(s) IV Push <User Schedule>  famotidine    Tablet 20 milliGRAM(s) Oral daily  furosemide Infusion 10 mG/Hr (5 mL/Hr) IV Continuous <Continuous>  gentamicin 0.1% Ointment 1 Application(s) Topical <User Schedule>  glucagon  Injectable 1 milliGRAM(s) IntraMuscular once  insulin glargine Injectable (LANTUS) 20 Unit(s) SubCutaneous at bedtime  insulin lispro (ADMELOG) corrective regimen sliding scale   SubCutaneous three times a day before meals  insulin lispro (ADMELOG) corrective regimen sliding scale   SubCutaneous at bedtime  insulin lispro Injectable (ADMELOG) 3 Unit(s) SubCutaneous three times a day before meals  mycophenolate mofetil 1000 milliGRAM(s) Oral <User Schedule>  NIFEdipine XL 90 milliGRAM(s) Oral daily  nystatin    Suspension 983057 Unit(s) Swish and Swallow four times a day  polyethylene glycol 3350 17 Gram(s) Oral daily  predniSONE   Tablet 5 milliGRAM(s) Oral daily  senna 2 Tablet(s) Oral at bedtime  sevelamer carbonate 1600 milliGRAM(s) Oral three times a day with meals  tacrolimus ER Tablet (ENVARSUS XR) 5 milliGRAM(s) Oral <User Schedule>  tamsulosin 0.4 milliGRAM(s) Oral at bedtime  trimethoprim   80 mG/sulfamethoxazole 400 mG 1 Tablet(s) Oral daily  valGANciclovir 450 milliGRAM(s) Oral <User Schedule>    MEDICATIONS  (PRN):  acetaminophen   Tablet .. 650 milliGRAM(s) Oral every 6 hours PRN Mild Pain (1 - 3)  traMADol 25 milliGRAM(s) Oral every 4 hours PRN Mild Pain (1 - 3)  traMADol 50 milliGRAM(s) Oral every 4 hours PRN Moderate Pain (4 - 6)      PAST MEDICAL & SURGICAL HISTORY:  DM (diabetes mellitus)    HTN (hypertension)    Chronic kidney disease (CKD)        Vital Signs Last 24 Hrs  T(C): 37.2 (20 Mar 2021 09:00), Max: 37.4 (19 Mar 2021 21:00)  T(F): 98.9 (20 Mar 2021 09:00), Max: 99.3 (19 Mar 2021 21:00)  HR: 67 (20 Mar 2021 09:00) (61 - 69)  BP: 149/76 (20 Mar 2021 09:00) (110/55 - 153/72)  BP(mean): 104 (20 Mar 2021 01:19) (83 - 104)  RR: 18 (20 Mar 2021 09:00) (18 - 19)  SpO2: 96% (20 Mar 2021 09:00) (88% - 98%)    I&O's Summary    19 Mar 2021 07:01  -  20 Mar 2021 07:00  --------------------------------------------------------  IN: 1040 mL / OUT: 1370 mL / NET: -330 mL    20 Mar 2021 07:01  -  20 Mar 2021 11:01  --------------------------------------------------------  IN: 165 mL / OUT: 300 mL / NET: -135 mL                              8.1    6.83  )-----------( 188      ( 20 Mar 2021 06:08 )             24.2     03-20    134<L>  |  93<L>  |  49<H>  ----------------------------<  287<H>  4.2   |  27  |  6.25<H>    Ca    8.6      20 Mar 2021 06:08  Phos  5.7     03-20  Mg     2.3     03-20    TPro  5.8<L>  /  Alb  3.3  /  TBili  0.4  /  DBili  x   /  AST  10  /  ALT  7<L>  /  AlkPhos  114  03-20    Tacrolimus (), Serum: 9.6 ng/mL (03-20 @ 07:36)          Review of systems  Gen: No weight changes, fatigue, fevers/chills, weakness  Skin: No rashes  Head/Eyes/Ears/Mouth: No headache; Normal hearing; Normal vision w/o blurriness; No sinus pain/discomfort, sore throat  Respiratory: No dyspnea, cough, wheezing, hemoptysis  CV: No chest pain, PND, orthopnea  GI: Mild abdominal pain at surgical incision site; denies diarrhea, constipation, nausea, vomiting, melena, hematochezia  : No increased frequency, dysuria, hematuria, nocturia  MSK: No joint pain/swelling; no back pain; no edema  Neuro: No dizziness/lightheadedness, weakness, seizures, numbness, tingling  Heme: No easy bruising or bleeding  Endo: No heat/cold intolerance  Psych: No significant nervousness, anxiety, stress, depression  All other systems were reviewed and are negative, except as noted.    PHYSICAL EXAM:  Constitutional: Well developed / well nourished  Eyes: Anicteric, PERRLA  ENMT: nc/at  Neck: central line RIJ  Respiratory: improving breath sounds on R lung base  Cardiovascular: RRR  Gastrointestinal: Soft, non distended, mild tenderness at the incision site; incision c/d/i;   Genitourinary: Urinary catheter in place  Extremities: SCD's in place and working bilaterally, AVF palpable. 1+ edema b/l LE. no calf TTP  Vascular: 1+ DP pulses b/l  Neurological: A&O x3  Skin: no rashes, ulcerations or lesions  Musculoskeletal: Moving all extremities  Psychiatric: Responsive       Transplant Surgery - Multidisciplinary Rounds  --------------------------------------------------------------  DCD DDRT (1a/1v/1u--stented)   Date: 3/9/2021    POD# 11    Present: Patient seen and examined with multidisciplinary team including Transplant Surgeon Dr. Moore, Transplant Nephrologist Dr Olmos, ACPs Rafael, and bedside RN during am rounds. Disciplines not in attendance will be notified of plan.      HPI: 72 y/o M with PMHx of HTN (age 43), DM on insulin (age 43), gout, anxiety, depression, OCD and CKD since 2016.  He started HD via L AVF  in April 2020 at [facility] and transitioned to PD about 5 months ago. He makes ~2 ounces of urine 4 times daily.      Underwent DCD DDRT 3/9/2021 on 3/9/2021 (1A/1V/1U stented). Post op course c/b DGF requiring HD    Interval Events:  - Increasing UOP on Lasix gtt  -SCr up to 6.3 from 5.5  - Adjusted Envarsus dose  -Weaned off supplemental O2  -FK level 9.6  -PD overnight       Potential Discharge date: pending clinical stability  Education:  Medications  Plan of care:  See Below      MEDICATIONS  (STANDING):  calcitriol   Capsule 0.25 MICROGram(s) Oral daily  carvedilol 25 milliGRAM(s) Oral every 12 hours  chlorhexidine 2% Cloths 1 Application(s) Topical daily  dextrose 40% Gel 15 Gram(s) Oral once  dextrose 5%. 1000 milliLiter(s) (50 mL/Hr) IV Continuous <Continuous>  dextrose 5%. 1000 milliLiter(s) (100 mL/Hr) IV Continuous <Continuous>  dextrose 50% Injectable 25 Gram(s) IV Push once  dextrose 50% Injectable 12.5 Gram(s) IV Push once  dextrose 50% Injectable 25 Gram(s) IV Push once  epoetin goran-epbx (RETACRIT) Injectable 68084 Unit(s) IV Push <User Schedule>  famotidine    Tablet 20 milliGRAM(s) Oral daily  furosemide Infusion 10 mG/Hr (5 mL/Hr) IV Continuous <Continuous>  gentamicin 0.1% Ointment 1 Application(s) Topical <User Schedule>  glucagon  Injectable 1 milliGRAM(s) IntraMuscular once  insulin glargine Injectable (LANTUS) 20 Unit(s) SubCutaneous at bedtime  insulin lispro (ADMELOG) corrective regimen sliding scale   SubCutaneous three times a day before meals  insulin lispro (ADMELOG) corrective regimen sliding scale   SubCutaneous at bedtime  insulin lispro Injectable (ADMELOG) 3 Unit(s) SubCutaneous three times a day before meals  mycophenolate mofetil 1000 milliGRAM(s) Oral <User Schedule>  NIFEdipine XL 90 milliGRAM(s) Oral daily  nystatin    Suspension 381558 Unit(s) Swish and Swallow four times a day  polyethylene glycol 3350 17 Gram(s) Oral daily  predniSONE   Tablet 5 milliGRAM(s) Oral daily  senna 2 Tablet(s) Oral at bedtime  sevelamer carbonate 1600 milliGRAM(s) Oral three times a day with meals  tacrolimus ER Tablet (ENVARSUS XR) 5 milliGRAM(s) Oral <User Schedule>  tamsulosin 0.4 milliGRAM(s) Oral at bedtime  trimethoprim   80 mG/sulfamethoxazole 400 mG 1 Tablet(s) Oral daily  valGANciclovir 450 milliGRAM(s) Oral <User Schedule>    MEDICATIONS  (PRN):  acetaminophen   Tablet .. 650 milliGRAM(s) Oral every 6 hours PRN Mild Pain (1 - 3)  traMADol 25 milliGRAM(s) Oral every 4 hours PRN Mild Pain (1 - 3)  traMADol 50 milliGRAM(s) Oral every 4 hours PRN Moderate Pain (4 - 6)      PAST MEDICAL & SURGICAL HISTORY:  DM (diabetes mellitus)    HTN (hypertension)    Chronic kidney disease (CKD)        Vital Signs Last 24 Hrs  T(C): 37.2 (20 Mar 2021 09:00), Max: 37.4 (19 Mar 2021 21:00)  T(F): 98.9 (20 Mar 2021 09:00), Max: 99.3 (19 Mar 2021 21:00)  HR: 67 (20 Mar 2021 09:00) (61 - 69)  BP: 149/76 (20 Mar 2021 09:00) (110/55 - 153/72)  BP(mean): 104 (20 Mar 2021 01:19) (83 - 104)  RR: 18 (20 Mar 2021 09:00) (18 - 19)  SpO2: 96% (20 Mar 2021 09:00) (88% - 98%)    I&O's Summary    19 Mar 2021 07:01  -  20 Mar 2021 07:00  --------------------------------------------------------  IN: 1040 mL / OUT: 1370 mL / NET: -330 mL    20 Mar 2021 07:01  -  20 Mar 2021 11:01  --------------------------------------------------------  IN: 165 mL / OUT: 300 mL / NET: -135 mL                              8.1    6.83  )-----------( 188      ( 20 Mar 2021 06:08 )             24.2     03-20    134<L>  |  93<L>  |  49<H>  ----------------------------<  287<H>  4.2   |  27  |  6.25<H>    Ca    8.6      20 Mar 2021 06:08  Phos  5.7     03-20  Mg     2.3     03-20    TPro  5.8<L>  /  Alb  3.3  /  TBili  0.4  /  DBili  x   /  AST  10  /  ALT  7<L>  /  AlkPhos  114  03-20    Tacrolimus (), Serum: 9.6 ng/mL (03-20 @ 07:36)          Review of systems  Gen: No weight changes, fatigue, fevers/chills, weakness  Skin: No rashes  Head/Eyes/Ears/Mouth: No headache; Normal hearing; Normal vision w/o blurriness; No sinus pain/discomfort, sore throat  Respiratory: No dyspnea, cough, wheezing, hemoptysis  CV: No chest pain, PND, orthopnea  GI: Mild abdominal pain at surgical incision site; denies diarrhea, constipation, nausea, vomiting, melena, hematochezia  : No increased frequency, dysuria, hematuria, nocturia  MSK: No joint pain/swelling; no back pain; no edema  Neuro: No dizziness/lightheadedness, weakness, seizures, numbness, tingling  Heme: No easy bruising or bleeding  Endo: No heat/cold intolerance  Psych: No significant nervousness, anxiety, stress, depression  All other systems were reviewed and are negative, except as noted.    PHYSICAL EXAM:  Constitutional: Well developed / well nourished  Eyes: Anicteric, PERRLA  ENMT: nc/at  Neck: central line RIJ  Respiratory: improving breath sounds on R lung base  Cardiovascular: RRR  Gastrointestinal: Soft, non distended, mild tenderness at the incision site; incision c/d/i;   Genitourinary: Urinary catheter in place  Extremities: SCD's in place and working bilaterally, AVF palpable. 1+ edema b/l LE. no calf TTP  Vascular: 1+ DP pulses b/l  Neurological: A&O x3  Skin: no rashes, ulcerations or lesions  Musculoskeletal: Moving all extremities  Psychiatric: Responsive

## 2021-03-20 NOTE — PROGRESS NOTE ADULT - SUBJECTIVE AND OBJECTIVE BOX
[facility] DIVISION OF KIDNEY DISEASES AND HYPERTENSION -- FOLLOW UP NOTE  --------------------------------------------------------------------------------  Chief Complaint:    24 hour events/subjective:  Patient was seen and examined at bedside  denies any complaints     PAST HISTORY  --------------------------------------------------------------------------------  No significant changes to PMH, PSH, FHx, SHx, unless otherwise noted    ALLERGIES & MEDICATIONS  --------------------------------------------------------------------------------  Allergies    ACE inhibitors (Angioedema)  Levaquin (Angioedema)    Intolerances      Standing Inpatient Medications  calcitriol   Capsule 0.25 MICROGram(s) Oral daily  carvedilol 25 milliGRAM(s) Oral every 12 hours  chlorhexidine 2% Cloths 1 Application(s) Topical daily  dextrose 40% Gel 15 Gram(s) Oral once  dextrose 5%. 1000 milliLiter(s) IV Continuous <Continuous>  dextrose 5%. 1000 milliLiter(s) IV Continuous <Continuous>  dextrose 50% Injectable 25 Gram(s) IV Push once  dextrose 50% Injectable 12.5 Gram(s) IV Push once  dextrose 50% Injectable 25 Gram(s) IV Push once  epoetin goran-epbx (RETACRIT) Injectable 38112 Unit(s) IV Push <User Schedule>  famotidine    Tablet 20 milliGRAM(s) Oral daily  furosemide Infusion 10 mG/Hr IV Continuous <Continuous>  gentamicin 0.1% Ointment 1 Application(s) Topical <User Schedule>  glucagon  Injectable 1 milliGRAM(s) IntraMuscular once  insulin glargine Injectable (LANTUS) 20 Unit(s) SubCutaneous at bedtime  insulin lispro (ADMELOG) corrective regimen sliding scale   SubCutaneous three times a day before meals  insulin lispro (ADMELOG) corrective regimen sliding scale   SubCutaneous at bedtime  insulin lispro Injectable (ADMELOG) 3 Unit(s) SubCutaneous three times a day before meals  mycophenolate mofetil 1000 milliGRAM(s) Oral <User Schedule>  NIFEdipine XL 90 milliGRAM(s) Oral daily  nystatin    Suspension 066280 Unit(s) Swish and Swallow four times a day  polyethylene glycol 3350 17 Gram(s) Oral daily  predniSONE   Tablet 5 milliGRAM(s) Oral daily  senna 2 Tablet(s) Oral at bedtime  sevelamer carbonate 1600 milliGRAM(s) Oral three times a day with meals  tacrolimus ER Tablet (ENVARSUS XR) 5 milliGRAM(s) Oral <User Schedule>  tamsulosin 0.4 milliGRAM(s) Oral at bedtime  trimethoprim   80 mG/sulfamethoxazole 400 mG 1 Tablet(s) Oral daily  valGANciclovir 450 milliGRAM(s) Oral <User Schedule>    PRN Inpatient Medications  acetaminophen   Tablet .. 650 milliGRAM(s) Oral every 6 hours PRN  traMADol 25 milliGRAM(s) Oral every 4 hours PRN  traMADol 50 milliGRAM(s) Oral every 4 hours PRN      REVIEW OF SYSTEMS  --------------------------------------------------------------------------------  Gen: No fatigue, fevers/chills, weakness  Skin: No rashes  Head/Eyes/Ears/Mouth: No headache;No sore throat  Respiratory: No dyspnea, cough,   CV: No chest pain, PND, orthopnea  GI: No abdominal pain, diarrhea, constipation, nausea, vomiting  Transplant: No pain  : No increased frequency, dysuria, hematuria, nocturia  MSK: No joint pain/swelling; no back pain; no edema  Neuro: No dizziness/lightheadedness, weakness, seizures, numbness, tingling  Psych: No significant nervousness, anxiety, stress, depression    All other systems were reviewed and are negative, except as noted.    VITALS/PHYSICAL EXAM  --------------------------------------------------------------------------------  T(C): 37.2 (03-20-21 @ 09:00), Max: 37.4 (03-19-21 @ 21:00)  HR: 67 (03-20-21 @ 09:00) (61 - 69)  BP: 149/76 (03-20-21 @ 09:00) (110/55 - 153/72)  RR: 18 (03-20-21 @ 09:00) (18 - 19)  SpO2: 96% (03-20-21 @ 09:00) (88% - 98%)  Wt(kg): --        03-19-21 @ 07:01  -  03-20-21 @ 07:00  --------------------------------------------------------  IN: 1040 mL / OUT: 1370 mL / NET: -330 mL    03-20-21 @ 07:01  -  03-20-21 @ 11:48  --------------------------------------------------------  IN: 165 mL / OUT: 300 mL / NET: -135 mL      Physical Exam:  	Gen: NAD  	HEENT: PERRL, supple neck, clear oropharynx  	Pulm: CTA B/L  	CV: RRR, S1S2; no rub  	Back: No spinal or CVA tenderness; no sacral edema  	Abd: +BS, soft, nontender/nondistended                      Transplant: No tenderness, swelling  	: No suprapubic tenderness  	UE: Warm, FROM; no edema; no asterixis  	LE: Warm, FROM; no edema  	Neuro: No focal deficits  	Psych: Normal affect and mood  	Skin: Warm, without rashes      LABS/STUDIES  --------------------------------------------------------------------------------              8.1    6.83  >-----------<  188      [03-20-21 @ 06:08]              24.2     134  |  93  |  49  ----------------------------<  287      [03-20-21 @ 06:08]  4.2   |  27  |  6.25        Ca     8.6     [03-20-21 @ 06:08]      Mg     2.3     [03-20-21 @ 06:08]      Phos  5.7     [03-20-21 @ 06:08]    TPro  5.8  /  Alb  3.3  /  TBili  0.4  /  DBili  x   /  AST  10  /  ALT  7   /  AlkPhos  114  [03-20-21 @ 06:08]          Creatinine Trend:  SCr 6.25 [03-20 @ 06:08]  SCr 5.50   [03-19 @ 06:10]  SCr 7.18 [03-18 @ 06:56]  SCr 6.79 [03-17 @ 06:17]  SCr 6.05 [03-16 @ 16:48]    Tacrolimus (), Serum: 9.6 ng/mL (03-20 @ 07:36)  Tacrolimus (), Serum: 12.6 ng/mL (03-19 @ 07:34)  Tacrolimus (), Serum: 12.4 ng/mL (03-18 @ 08:25)  Tacrolimus (), Serum: 10.5 ng/mL (03-17 @ 07:15)            Urinalysis - [03-16-21 @ 15:47]      Color Light Orange / Appearance Slightly Turbid / SG 1.010 / pH 6.5      Gluc Trace / Ketone Negative  / Bili Negative / Urobili Negative       Blood Large / Protein 100 / Leuk Est Small / Nitrite Negative       / WBC 14 / Hyaline 6 / Gran  / Sq Epi  / Non Sq Epi 7 / Bacteria Negative      Iron 37, TIBC 303, %sat 12      [03-19-21 @ 10:58]  Ferritin 624      [03-19-21 @ 11:25]    HBsAb 56.4      [03-09-21 @ 17:13]  HBsAg Nonreact      [03-09-21 @ 17:13]  HBcAb Nonreact      [03-09-21 @ 17:13]  HCV 0.32, Nonreact      [03-09-21 @ 17:13]  HIV Nonreact      [03-09-21 @ 18:20]

## 2021-03-21 ENCOUNTER — TRANSCRIPTION ENCOUNTER (OUTPATIENT)
Age: 71
End: 2021-03-21

## 2021-03-21 VITALS
RESPIRATION RATE: 18 BRPM | TEMPERATURE: 99 F | OXYGEN SATURATION: 98 % | SYSTOLIC BLOOD PRESSURE: 126 MMHG | DIASTOLIC BLOOD PRESSURE: 64 MMHG | HEART RATE: 66 BPM

## 2021-03-21 LAB
ALBUMIN SERPL ELPH-MCNC: 3.1 G/DL — LOW (ref 3.3–5)
ALP SERPL-CCNC: 119 U/L — SIGNIFICANT CHANGE UP (ref 40–120)
ALT FLD-CCNC: 7 U/L — LOW (ref 10–45)
ANION GAP SERPL CALC-SCNC: 16 MMOL/L — SIGNIFICANT CHANGE UP (ref 5–17)
AST SERPL-CCNC: 6 U/L — LOW (ref 10–40)
BASOPHILS # BLD AUTO: 0.02 K/UL — SIGNIFICANT CHANGE UP (ref 0–0.2)
BASOPHILS NFR BLD AUTO: 0.3 % — SIGNIFICANT CHANGE UP (ref 0–2)
BILIRUB SERPL-MCNC: 0.4 MG/DL — SIGNIFICANT CHANGE UP (ref 0.2–1.2)
BUN SERPL-MCNC: 54 MG/DL — HIGH (ref 7–23)
CALCIUM SERPL-MCNC: 8.2 MG/DL — LOW (ref 8.4–10.5)
CHLORIDE SERPL-SCNC: 95 MMOL/L — LOW (ref 96–108)
CO2 SERPL-SCNC: 26 MMOL/L — SIGNIFICANT CHANGE UP (ref 22–31)
CREAT SERPL-MCNC: 6.77 MG/DL — HIGH (ref 0.5–1.3)
CULTURE RESULTS: SIGNIFICANT CHANGE UP
EOSINOPHIL # BLD AUTO: 0.1 K/UL — SIGNIFICANT CHANGE UP (ref 0–0.5)
EOSINOPHIL NFR BLD AUTO: 1.3 % — SIGNIFICANT CHANGE UP (ref 0–6)
GLUCOSE SERPL-MCNC: 349 MG/DL — HIGH (ref 70–99)
HCT VFR BLD CALC: 25.5 % — LOW (ref 39–50)
HGB BLD-MCNC: 8.2 G/DL — LOW (ref 13–17)
IMM GRANULOCYTES NFR BLD AUTO: 0.8 % — SIGNIFICANT CHANGE UP (ref 0–1.5)
LYMPHOCYTES # BLD AUTO: 0.57 K/UL — LOW (ref 1–3.3)
LYMPHOCYTES # BLD AUTO: 7.5 % — LOW (ref 13–44)
MAGNESIUM SERPL-MCNC: 2.3 MG/DL — SIGNIFICANT CHANGE UP (ref 1.6–2.6)
MCHC RBC-ENTMCNC: 32 PG — SIGNIFICANT CHANGE UP (ref 27–34)
MCHC RBC-ENTMCNC: 32.2 GM/DL — SIGNIFICANT CHANGE UP (ref 32–36)
MCV RBC AUTO: 99.6 FL — SIGNIFICANT CHANGE UP (ref 80–100)
MONOCYTES # BLD AUTO: 0.83 K/UL — SIGNIFICANT CHANGE UP (ref 0–0.9)
MONOCYTES NFR BLD AUTO: 11 % — SIGNIFICANT CHANGE UP (ref 2–14)
NEUTROPHILS # BLD AUTO: 5.99 K/UL — SIGNIFICANT CHANGE UP (ref 1.8–7.4)
NEUTROPHILS NFR BLD AUTO: 79.1 % — HIGH (ref 43–77)
NRBC # BLD: 0 /100 WBCS — SIGNIFICANT CHANGE UP (ref 0–0)
PHOSPHATE SERPL-MCNC: 5.6 MG/DL — HIGH (ref 2.5–4.5)
PLATELET # BLD AUTO: 190 K/UL — SIGNIFICANT CHANGE UP (ref 150–400)
POTASSIUM SERPL-MCNC: 4 MMOL/L — SIGNIFICANT CHANGE UP (ref 3.5–5.3)
POTASSIUM SERPL-SCNC: 4 MMOL/L — SIGNIFICANT CHANGE UP (ref 3.5–5.3)
PROT SERPL-MCNC: 5.9 G/DL — LOW (ref 6–8.3)
RBC # BLD: 2.56 M/UL — LOW (ref 4.2–5.8)
RBC # FLD: 16 % — HIGH (ref 10.3–14.5)
SODIUM SERPL-SCNC: 137 MMOL/L — SIGNIFICANT CHANGE UP (ref 135–145)
SPECIMEN SOURCE: SIGNIFICANT CHANGE UP
TACROLIMUS SERPL-MCNC: 10.2 NG/ML — SIGNIFICANT CHANGE UP
WBC # BLD: 7.57 K/UL — SIGNIFICANT CHANGE UP (ref 3.8–10.5)
WBC # FLD AUTO: 7.57 K/UL — SIGNIFICANT CHANGE UP (ref 3.8–10.5)

## 2021-03-21 PROCEDURE — 84295 ASSAY OF SERUM SODIUM: CPT

## 2021-03-21 PROCEDURE — 82330 ASSAY OF CALCIUM: CPT

## 2021-03-21 PROCEDURE — U0005: CPT

## 2021-03-21 PROCEDURE — 81001 URINALYSIS AUTO W/SCOPE: CPT

## 2021-03-21 PROCEDURE — 85730 THROMBOPLASTIN TIME PARTIAL: CPT

## 2021-03-21 PROCEDURE — 97110 THERAPEUTIC EXERCISES: CPT

## 2021-03-21 PROCEDURE — 82803 BLOOD GASES ANY COMBINATION: CPT

## 2021-03-21 PROCEDURE — 85014 HEMATOCRIT: CPT

## 2021-03-21 PROCEDURE — 82962 GLUCOSE BLOOD TEST: CPT

## 2021-03-21 PROCEDURE — 99261: CPT

## 2021-03-21 PROCEDURE — 82728 ASSAY OF FERRITIN: CPT

## 2021-03-21 PROCEDURE — 85610 PROTHROMBIN TIME: CPT

## 2021-03-21 PROCEDURE — 85018 HEMOGLOBIN: CPT

## 2021-03-21 PROCEDURE — 85025 COMPLETE CBC W/AUTO DIFF WBC: CPT

## 2021-03-21 PROCEDURE — C2617: CPT

## 2021-03-21 PROCEDURE — 87389 HIV-1 AG W/HIV-1&-2 AB AG IA: CPT

## 2021-03-21 PROCEDURE — 83550 IRON BINDING TEST: CPT

## 2021-03-21 PROCEDURE — 93970 EXTREMITY STUDY: CPT

## 2021-03-21 PROCEDURE — 80197 ASSAY OF TACROLIMUS: CPT

## 2021-03-21 PROCEDURE — 86704 HEP B CORE ANTIBODY TOTAL: CPT

## 2021-03-21 PROCEDURE — 82607 VITAMIN B-12: CPT

## 2021-03-21 PROCEDURE — 86900 BLOOD TYPING SEROLOGIC ABO: CPT

## 2021-03-21 PROCEDURE — 97530 THERAPEUTIC ACTIVITIES: CPT

## 2021-03-21 PROCEDURE — 74177 CT ABD & PELVIS W/CONTRAST: CPT

## 2021-03-21 PROCEDURE — C1889: CPT

## 2021-03-21 PROCEDURE — 80048 BASIC METABOLIC PNL TOTAL CA: CPT

## 2021-03-21 PROCEDURE — 83605 ASSAY OF LACTIC ACID: CPT

## 2021-03-21 PROCEDURE — 83735 ASSAY OF MAGNESIUM: CPT

## 2021-03-21 PROCEDURE — 86923 COMPATIBILITY TEST ELECTRIC: CPT

## 2021-03-21 PROCEDURE — 84100 ASSAY OF PHOSPHORUS: CPT

## 2021-03-21 PROCEDURE — 84132 ASSAY OF SERUM POTASSIUM: CPT

## 2021-03-21 PROCEDURE — 86706 HEP B SURFACE ANTIBODY: CPT

## 2021-03-21 PROCEDURE — 71045 X-RAY EXAM CHEST 1 VIEW: CPT

## 2021-03-21 PROCEDURE — U0003: CPT

## 2021-03-21 PROCEDURE — 86901 BLOOD TYPING SEROLOGIC RH(D): CPT

## 2021-03-21 PROCEDURE — 72170 X-RAY EXAM OF PELVIS: CPT

## 2021-03-21 PROCEDURE — 73070 X-RAY EXAM OF ELBOW: CPT

## 2021-03-21 PROCEDURE — 82746 ASSAY OF FOLIC ACID SERUM: CPT

## 2021-03-21 PROCEDURE — 82565 ASSAY OF CREATININE: CPT

## 2021-03-21 PROCEDURE — 82947 ASSAY GLUCOSE BLOOD QUANT: CPT

## 2021-03-21 PROCEDURE — 87340 HEPATITIS B SURFACE AG IA: CPT

## 2021-03-21 PROCEDURE — 82435 ASSAY OF BLOOD CHLORIDE: CPT

## 2021-03-21 PROCEDURE — 83540 ASSAY OF IRON: CPT

## 2021-03-21 PROCEDURE — 76776 US EXAM K TRANSPL W/DOPPLER: CPT

## 2021-03-21 PROCEDURE — 80053 COMPREHEN METABOLIC PANEL: CPT

## 2021-03-21 PROCEDURE — 99232 SBSQ HOSP IP/OBS MODERATE 35: CPT

## 2021-03-21 PROCEDURE — 87522 HEPATITIS C REVRS TRNSCRPJ: CPT

## 2021-03-21 PROCEDURE — C9399: CPT

## 2021-03-21 PROCEDURE — 86803 HEPATITIS C AB TEST: CPT

## 2021-03-21 PROCEDURE — P9016: CPT

## 2021-03-21 PROCEDURE — 93005 ELECTROCARDIOGRAM TRACING: CPT

## 2021-03-21 PROCEDURE — 83036 HEMOGLOBIN GLYCOSYLATED A1C: CPT

## 2021-03-21 PROCEDURE — 85027 COMPLETE CBC AUTOMATED: CPT

## 2021-03-21 PROCEDURE — 97161 PT EVAL LOW COMPLEX 20 MIN: CPT

## 2021-03-21 PROCEDURE — 97116 GAIT TRAINING THERAPY: CPT

## 2021-03-21 PROCEDURE — 87040 BLOOD CULTURE FOR BACTERIA: CPT

## 2021-03-21 PROCEDURE — 86850 RBC ANTIBODY SCREEN: CPT

## 2021-03-21 RX ORDER — MYCOPHENOLATE MOFETIL 250 MG/1
1000 CAPSULE ORAL
Qty: 0 | Refills: 0 | DISCHARGE
Start: 2021-03-21

## 2021-03-21 RX ORDER — TACROLIMUS 5 MG/1
7 CAPSULE ORAL
Qty: 0 | Refills: 0 | DISCHARGE
Start: 2021-03-21

## 2021-03-21 RX ORDER — VALGANCICLOVIR 450 MG/1
1 TABLET, FILM COATED ORAL
Qty: 0 | Refills: 0 | DISCHARGE
Start: 2021-03-21

## 2021-03-21 RX ORDER — CARVEDILOL PHOSPHATE 80 MG/1
1 CAPSULE, EXTENDED RELEASE ORAL
Qty: 0 | Refills: 0 | DISCHARGE

## 2021-03-21 RX ORDER — TACROLIMUS 5 MG/1
11 CAPSULE ORAL
Qty: 0 | Refills: 0 | DISCHARGE
Start: 2021-03-21

## 2021-03-21 RX ORDER — SEVELAMER CARBONATE 2400 MG/1
2 POWDER, FOR SUSPENSION ORAL
Qty: 0 | Refills: 0 | DISCHARGE
Start: 2021-03-21

## 2021-03-21 RX ORDER — FAMOTIDINE 10 MG/ML
1 INJECTION INTRAVENOUS
Qty: 0 | Refills: 0 | DISCHARGE
Start: 2021-03-21

## 2021-03-21 RX ORDER — OLMESARTAN MEDOXOMIL 5 MG/1
1 TABLET, FILM COATED ORAL
Qty: 0 | Refills: 0 | DISCHARGE

## 2021-03-21 RX ORDER — SENNA PLUS 8.6 MG/1
2 TABLET ORAL
Qty: 0 | Refills: 0 | DISCHARGE
Start: 2021-03-21

## 2021-03-21 RX ORDER — SEVELAMER CARBONATE 2400 MG/1
2 POWDER, FOR SUSPENSION ORAL
Qty: 0 | Refills: 0 | DISCHARGE

## 2021-03-21 RX ORDER — INSULIN LISPRO 100/ML
5 VIAL (ML) SUBCUTANEOUS
Refills: 0 | Status: DISCONTINUED | OUTPATIENT
Start: 2021-03-21 | End: 2021-03-21

## 2021-03-21 RX ORDER — NYSTATIN 500MM UNIT
5 POWDER (EA) MISCELLANEOUS
Qty: 0 | Refills: 0 | DISCHARGE
Start: 2021-03-21

## 2021-03-21 RX ORDER — MYCOPHENOLATE MOFETIL 250 MG/1
500 CAPSULE ORAL
Qty: 0 | Refills: 0 | DISCHARGE
Start: 2021-03-21

## 2021-03-21 RX ORDER — INSULIN GLARGINE 100 [IU]/ML
20 INJECTION, SOLUTION SUBCUTANEOUS
Qty: 0 | Refills: 0 | DISCHARGE
Start: 2021-03-21

## 2021-03-21 RX ORDER — MYCOPHENOLATE MOFETIL 250 MG/1
1 CAPSULE ORAL
Qty: 0 | Refills: 0 | DISCHARGE
Start: 2021-03-21

## 2021-03-21 RX ORDER — INSULIN GLARGINE 100 [IU]/ML
25 INJECTION, SOLUTION SUBCUTANEOUS
Qty: 0 | Refills: 0 | DISCHARGE

## 2021-03-21 RX ORDER — CARVEDILOL PHOSPHATE 80 MG/1
1 CAPSULE, EXTENDED RELEASE ORAL
Qty: 0 | Refills: 0 | DISCHARGE
Start: 2021-03-21

## 2021-03-21 RX ORDER — FUROSEMIDE 40 MG
1 TABLET ORAL
Qty: 60 | Refills: 0
Start: 2021-03-21

## 2021-03-21 RX ORDER — INSULIN GLARGINE 100 [IU]/ML
25 INJECTION, SOLUTION SUBCUTANEOUS
Qty: 0 | Refills: 0 | DISCHARGE
Start: 2021-03-21

## 2021-03-21 RX ORDER — CALCITRIOL 0.5 UG/1
1 CAPSULE ORAL
Qty: 0 | Refills: 0 | DISCHARGE
Start: 2021-03-21

## 2021-03-21 RX ORDER — POLYETHYLENE GLYCOL 3350 17 G/17G
17 POWDER, FOR SOLUTION ORAL
Qty: 0 | Refills: 0 | DISCHARGE
Start: 2021-03-21

## 2021-03-21 RX ORDER — NIFEDIPINE 30 MG
1 TABLET, EXTENDED RELEASE 24 HR ORAL
Qty: 0 | Refills: 0 | DISCHARGE
Start: 2021-03-21

## 2021-03-21 RX ORDER — TAMSULOSIN HYDROCHLORIDE 0.4 MG/1
1 CAPSULE ORAL
Qty: 0 | Refills: 0 | DISCHARGE
Start: 2021-03-21

## 2021-03-21 RX ORDER — FUROSEMIDE 40 MG
2 TABLET ORAL
Qty: 0 | Refills: 0 | DISCHARGE

## 2021-03-21 RX ORDER — CALCIFEDIOL 30 UG/1
1 CAPSULE, EXTENDED RELEASE ORAL
Qty: 0 | Refills: 0 | DISCHARGE

## 2021-03-21 RX ORDER — INSULIN LISPRO 100/ML
3 VIAL (ML) SUBCUTANEOUS
Qty: 0 | Refills: 0 | DISCHARGE

## 2021-03-21 RX ORDER — DILTIAZEM HCL 120 MG
1 CAPSULE, EXT RELEASE 24 HR ORAL
Qty: 0 | Refills: 0 | DISCHARGE

## 2021-03-21 RX ORDER — INSULIN GLARGINE 100 [IU]/ML
21 INJECTION, SOLUTION SUBCUTANEOUS
Qty: 0 | Refills: 0 | DISCHARGE
Start: 2021-03-21

## 2021-03-21 RX ORDER — FUROSEMIDE 40 MG
80 TABLET ORAL
Refills: 0 | Status: DISCONTINUED | OUTPATIENT
Start: 2021-03-21 | End: 2021-03-21

## 2021-03-21 RX ORDER — DOXAZOSIN MESYLATE 4 MG
1 TABLET ORAL
Qty: 0 | Refills: 0 | DISCHARGE

## 2021-03-21 RX ORDER — TACROLIMUS 5 MG/1
5 CAPSULE ORAL
Qty: 0 | Refills: 0 | DISCHARGE
Start: 2021-03-21

## 2021-03-21 RX ADMIN — MYCOPHENOLATE MOFETIL 1000 MILLIGRAM(S): 250 CAPSULE ORAL at 07:58

## 2021-03-21 RX ADMIN — Medication 5 UNIT(S): at 19:15

## 2021-03-21 RX ADMIN — Medication 5 MILLIGRAM(S): at 05:52

## 2021-03-21 RX ADMIN — Medication 500000 UNIT(S): at 18:15

## 2021-03-21 RX ADMIN — SEVELAMER CARBONATE 1600 MILLIGRAM(S): 2400 POWDER, FOR SUSPENSION ORAL at 08:54

## 2021-03-21 RX ADMIN — Medication 5 UNIT(S): at 12:35

## 2021-03-21 RX ADMIN — Medication 90 MILLIGRAM(S): at 05:52

## 2021-03-21 RX ADMIN — TACROLIMUS 5 MILLIGRAM(S): 5 CAPSULE ORAL at 07:58

## 2021-03-21 RX ADMIN — Medication 4: at 12:34

## 2021-03-21 RX ADMIN — Medication 2: at 19:16

## 2021-03-21 RX ADMIN — SEVELAMER CARBONATE 1600 MILLIGRAM(S): 2400 POWDER, FOR SUSPENSION ORAL at 12:35

## 2021-03-21 RX ADMIN — CARVEDILOL PHOSPHATE 25 MILLIGRAM(S): 80 CAPSULE, EXTENDED RELEASE ORAL at 05:52

## 2021-03-21 RX ADMIN — Medication 500000 UNIT(S): at 11:50

## 2021-03-21 RX ADMIN — Medication 500000 UNIT(S): at 05:52

## 2021-03-21 RX ADMIN — Medication 6: at 08:54

## 2021-03-21 RX ADMIN — Medication 3 UNIT(S): at 08:54

## 2021-03-21 RX ADMIN — Medication 80 MILLIGRAM(S): at 18:14

## 2021-03-21 RX ADMIN — CHLORHEXIDINE GLUCONATE 1 APPLICATION(S): 213 SOLUTION TOPICAL at 11:52

## 2021-03-21 RX ADMIN — CARVEDILOL PHOSPHATE 25 MILLIGRAM(S): 80 CAPSULE, EXTENDED RELEASE ORAL at 18:15

## 2021-03-21 RX ADMIN — FAMOTIDINE 20 MILLIGRAM(S): 10 INJECTION INTRAVENOUS at 11:50

## 2021-03-21 RX ADMIN — MYCOPHENOLATE MOFETIL 1000 MILLIGRAM(S): 250 CAPSULE ORAL at 19:37

## 2021-03-21 RX ADMIN — SEVELAMER CARBONATE 1600 MILLIGRAM(S): 2400 POWDER, FOR SUSPENSION ORAL at 19:17

## 2021-03-21 RX ADMIN — Medication 1 TABLET(S): at 11:51

## 2021-03-21 RX ADMIN — CALCITRIOL 0.25 MICROGRAM(S): 0.5 CAPSULE ORAL at 11:51

## 2021-03-21 NOTE — PROGRESS NOTE ADULT - ASSESSMENT
71M with h/o HTN (age 43), IDDM (age 43), gout, anxiety, depression, OCD, CKD (since 2016) was on HD via L AVF (4/2020 Dr. Novak) with transition to PD in 9/2020 now s/p R DCD DDRT (3/9/21)    [] DCD DDRT c/b DGF  - remains on Lasix gtt with improving u/o   - Continue PD as per nephrology  - Immuno: Env by level, MMF 1g bid, Pred 5mg daily, Simulect completed  - PPx: valcyte/bactrim/nystatin  - Strict I&Os  - Diet: Renal   - Pain control  - Bowel regimen   - SCDs/IS/OOB  - Epoetin 10,000U BIW, Calcitriol 0.25mg daily    [] DM  - Hyperglycemia  - Fingersticks ac and qhs with SSI coverage  - Cont Lantus 20u qhs, increase pre-meal Lispro 5u tid        [] HTN  - cont coreg 25mg bid and nifedipine 30mg daily     [] Dispo planning  71M with h/o HTN (age 43), IDDM (age 43), gout, anxiety, depression, OCD, CKD (since 2016) was on HD via L AVF (4/2020 Dr. Novak) with transition to PD in 9/2020 now s/p R DCD DDRT (3/9/21)    [] DCD DDRT c/b DGF  - dc lasix gtt; start Lasix 80mg bid   - Continue PD as per nephrology  - Immuno: Env by level, MMF 1g bid, Pred 5mg daily, Simulect completed  - PPx: valcyte/bactrim/nystatin  - Strict I&Os  - Diet: Renal   - Pain control  - Bowel regimen   - SCDs/IS/OOB  - Epoetin 10,000U BIW, Calcitriol 0.25mg daily    [] DM  - Hyperglycemia  - Fingersticks ac and qhs with SSI coverage  - Cont Lantus 20u qhs, increase pre-meal Lispro 5u tid        [] HTN  - cont coreg 25mg bid and nifedipine 30mg daily     [] Dispo  - dc home today

## 2021-03-21 NOTE — PROGRESS NOTE ADULT - PROBLEM SELECTOR PROBLEM 2
DM (diabetes mellitus)

## 2021-03-21 NOTE — PROGRESS NOTE ADULT - REASON FOR ADMISSION
DDRT
renal transplant
DDRT
renal transplant
DDRT
renal transplant
renal transplant
DDRT
renal transplant

## 2021-03-21 NOTE — CHART NOTE - NSCHARTNOTEFT_GEN_A_CORE
Nutrition follow up     Pt seen for post kidney transplant recipient nutrition follow up per department protocol.     Hospital course as per chart: Pt 70 y/o M with PMH of HTN, DM on insulin, gout, anxiety, depression, OCD, CKD since (2016) previously on HD - transitioned to PD about 5 months ago, admitted for kidney transplant, S/P DDRT (03/09), with delayed graft function due to ATN - requiring HD; has good urine output, but solutes are still high. Transitioned to PD prior to being discharged.     Source: Patient [x]    Family [ ]     other [x]; Medical record, RN    Pt forgetful as per documentation -confirmed information with RN. Pt reports good appetite and PO intake, states "I am eating like a monster". Denies difficulty chewing. Reports sometimes having difficulty swallowing - refused changes in diet texture. Pt denies nausea, vomiting, diarrhea, or constipation, last BM yesterday (03/20). Urine output as per flow sheets (03/19) 1370 ml -> (03/20) 1450 ml -> (03/21) 400 ml. Pt received extensive education on DM and post transplant nutrition therapy and food safety guidelines for transplant recipients with nutrition package in previous RD visit - reviewed per pt's request. Pt was able to teach back 4 points.      Diet: Consistent Carbohydrate renal with snack + no concentrated potassium + no concentrated phosphorus + low salt + 1000 ml fluid restriction     Enteral /Parenteral Nutrition: n/a    Weight as per flow sheets: (03/09) 184.3 pounds -> (03/12) 201.5 pounds -> (03/15) 206.7 pounds -> (03/18) 198.4 pounds -> (03/21) 189.3 pounds - ?accuracy of weight fluctuations as pt previously with edema and S/P DDRT, will continue to monitor as able.   % Weight Change: n/a    Pertinent Medications: MEDICATIONS  (STANDING):  calcitriol   Capsule 0.25 MICROGram(s) Oral daily  carvedilol 25 milliGRAM(s) Oral every 12 hours  chlorhexidine 2% Cloths 1 Application(s) Topical daily  dextrose 40% Gel 15 Gram(s) Oral once  dextrose 5%. 1000 milliLiter(s) (50 mL/Hr) IV Continuous <Continuous>  dextrose 5%. 1000 milliLiter(s) (100 mL/Hr) IV Continuous <Continuous>  dextrose 50% Injectable 25 Gram(s) IV Push once  dextrose 50% Injectable 12.5 Gram(s) IV Push once  dextrose 50% Injectable 25 Gram(s) IV Push once  epoetin goran-epbx (RETACRIT) Injectable 26579 Unit(s) IV Push <User Schedule>  famotidine    Tablet 20 milliGRAM(s) Oral daily  furosemide Infusion 10 mG/Hr (5 mL/Hr) IV Continuous <Continuous>  gentamicin 0.1% Ointment 1 Application(s) Topical <User Schedule>  glucagon  Injectable 1 milliGRAM(s) IntraMuscular once  insulin glargine Injectable (LANTUS) 20 Unit(s) SubCutaneous at bedtime  insulin lispro (ADMELOG) corrective regimen sliding scale   SubCutaneous three times a day before meals  insulin lispro (ADMELOG) corrective regimen sliding scale   SubCutaneous at bedtime  insulin lispro Injectable (ADMELOG) 5 Unit(s) SubCutaneous three times a day before meals  mycophenolate mofetil 1000 milliGRAM(s) Oral <User Schedule>  NIFEdipine XL 90 milliGRAM(s) Oral daily  nystatin    Suspension 068972 Unit(s) Swish and Swallow four times a day  polyethylene glycol 3350 17 Gram(s) Oral daily  predniSONE   Tablet 5 milliGRAM(s) Oral daily  senna 2 Tablet(s) Oral at bedtime  sevelamer carbonate 1600 milliGRAM(s) Oral three times a day with meals  tacrolimus ER Tablet (ENVARSUS XR) 5 milliGRAM(s) Oral <User Schedule>  tamsulosin 0.4 milliGRAM(s) Oral at bedtime  trimethoprim   80 mG/sulfamethoxazole 400 mG 1 Tablet(s) Oral daily  valGANciclovir 450 milliGRAM(s) Oral <User Schedule>    MEDICATIONS  (PRN):  acetaminophen   Tablet .. 650 milliGRAM(s) Oral every 6 hours PRN Mild Pain (1 - 3)  traMADol 25 milliGRAM(s) Oral every 4 hours PRN Mild Pain (1 - 3)  traMADol 50 milliGRAM(s) Oral every 4 hours PRN Moderate Pain (4 - 6)    Pertinent Labs: (03/21) Glu 349 mg/dL<H> Cr  6.77 mg/dL<H> BUN 54 mg/dL<H> Phos 5.6 mg/dL<H>   Finger sticks: (03/21) 235 - 299 (03/20) 153 - 294   (03/15) HbA1c 6.0% - indicates good BG control.     Skin: no noted pressure injuries as per documentation.  No noted edema as per flow sheets at this time (previously +1 valeria. ankle and foot edema).     Estimated Needs:   [x] no change since previous assessment  [ ] recalculated:     Previous Nutrition Diagnoses:   [x] Increased Nutrient Needs   [x] Food & Nutrition Related Knowledge Deficit     Nutrition Diagnoses are [x] ongoing - being addressed with PO intake encouragement and nutrition therapy education.  Pt currently at goals: pt to meet >75% of estimated nutritional needs during hospital stay and able to teach back 3 points of nutrition education provided.     New Nutrition Diagnosis: [x] not applicable    Interventions:     1. Recommend Consistent Carbohydrate with snack + no concentrated phosphorus diet + 1000 ml fluid restriction (defer fluids to team) upon discharge. Recommend follow up visit with Transplant MD and outpatient RD for dietary modifications as warranted.   2. Encourage PO intake and provide food preferences.   3. Reviewed education on DM and post transplant nutrition therapy and food safety guidelines for transplant recipients before discharge. Discussed importance of thoroughly washing all fresh fruits/vegetables, importance of avoiding uncooked/raw/unpasteurized foods, avoiding pre-made deli/buffet/salad bar meals. Reviewed recommendations to avoid grapefruit, pomegranate and star fruit while taking immunosuppressant medication. Reviewed recommendations for moderate intake of sodium and carbohydrates with transplant medications. Reviewed effect of steroids on BG levels and importance of limiting concentrated sweets. Stressed the importance of a balanced meal to maintain blood glucose. Encouraged vegetables consumption. Described HbA1c and stressed importance of its normal levels. Encouraged Pt to continue monitoring blood glucose at home. All questions answered.   4. Continue to obtain weights to identify changes if any.     Monitoring and Evaluation:     [x] PO intake [x] Tolerance to diet prescription [x] weights [x] follow up per protocol    [x] other: urine output     RD remains available.  Grecia Aden MS RDN CDN #[phone].

## 2021-03-21 NOTE — PROGRESS NOTE ADULT - PROBLEM SELECTOR PROBLEM 1
Kidney transplant recipient

## 2021-03-21 NOTE — PROGRESS NOTE ADULT - ASSESSMENT
Patient is a 70 y/o M w PMH of ESRD on PD for 5 months, was on HD in 2020, with nephrologist Dr Novak, IDDM, HTN, gout, depression, anxiety and OCD. S/p DCD with JOJO DDRT on 3/9/21.     1. S/p DDRT on 3/9/21 -Allograft function with DGF due to ATN - Has good Urine output ,but solutes are still high . Last HD was 3/18/21 with 2L UF and currently on PD. Last PD was last night 3/20  with ~2L UF.  2. Immunosupression - Simulect induction, currently on Envarsus ( goal 8-10) , MMF 1g PO BID and Prednisone 5 mg po daily   3. Prophylaxis - bactrim/nystatin/valcyte   4. Hypertension - On Coreg to 25 mg PO BID and Nifedipine 90 mg PO daily.  5.  DM2 -on Lantus 20 unit at bedtime and increase lispro pre-meal to 5 units with meals with corrective sliding scale.   6. Anemia - on retacrit 10,000 unit TTS.

## 2021-03-21 NOTE — PROGRESS NOTE ADULT - PROBLEM SELECTOR PROBLEM 4
JOJO (acute kidney injury)

## 2021-03-21 NOTE — PROGRESS NOTE ADULT - SUBJECTIVE AND OBJECTIVE BOX
[facility] DIVISION OF KIDNEY DISEASES AND HYPERTENSION -- FOLLOW UP NOTE  --------------------------------------------------------------------------------  Chief Complaint:    24 hour events/subjective:  Patient was seen and examined at bedside  Had PD last night. good UOP but solutes are still high       PAST HISTORY  --------------------------------------------------------------------------------  No significant changes to PMH, PSH, FHx, SHx, unless otherwise noted    ALLERGIES & MEDICATIONS  --------------------------------------------------------------------------------  Allergies    ACE inhibitors (Angioedema)  Levaquin (Angioedema)    Intolerances      Standing Inpatient Medications  calcitriol   Capsule 0.25 MICROGram(s) Oral daily  carvedilol 25 milliGRAM(s) Oral every 12 hours  chlorhexidine 2% Cloths 1 Application(s) Topical daily  dextrose 40% Gel 15 Gram(s) Oral once  dextrose 5%. 1000 milliLiter(s) IV Continuous <Continuous>  dextrose 5%. 1000 milliLiter(s) IV Continuous <Continuous>  dextrose 50% Injectable 25 Gram(s) IV Push once  dextrose 50% Injectable 12.5 Gram(s) IV Push once  dextrose 50% Injectable 25 Gram(s) IV Push once  epoetin goran-epbx (RETACRIT) Injectable 03566 Unit(s) IV Push <User Schedule>  famotidine    Tablet 20 milliGRAM(s) Oral daily  furosemide Infusion 10 mG/Hr IV Continuous <Continuous>  gentamicin 0.1% Ointment 1 Application(s) Topical <User Schedule>  glucagon  Injectable 1 milliGRAM(s) IntraMuscular once  insulin glargine Injectable (LANTUS) 20 Unit(s) SubCutaneous at bedtime  insulin lispro (ADMELOG) corrective regimen sliding scale   SubCutaneous three times a day before meals  insulin lispro (ADMELOG) corrective regimen sliding scale   SubCutaneous at bedtime  insulin lispro Injectable (ADMELOG) 5 Unit(s) SubCutaneous three times a day before meals  mycophenolate mofetil 1000 milliGRAM(s) Oral <User Schedule>  NIFEdipine XL 90 milliGRAM(s) Oral daily  nystatin    Suspension 091110 Unit(s) Swish and Swallow four times a day  polyethylene glycol 3350 17 Gram(s) Oral daily  predniSONE   Tablet 5 milliGRAM(s) Oral daily  senna 2 Tablet(s) Oral at bedtime  sevelamer carbonate 1600 milliGRAM(s) Oral three times a day with meals  tacrolimus ER Tablet (ENVARSUS XR) 5 milliGRAM(s) Oral <User Schedule>  tamsulosin 0.4 milliGRAM(s) Oral at bedtime  trimethoprim   80 mG/sulfamethoxazole 400 mG 1 Tablet(s) Oral daily  valGANciclovir 450 milliGRAM(s) Oral <User Schedule>    PRN Inpatient Medications  acetaminophen   Tablet .. 650 milliGRAM(s) Oral every 6 hours PRN  traMADol 25 milliGRAM(s) Oral every 4 hours PRN  traMADol 50 milliGRAM(s) Oral every 4 hours PRN      REVIEW OF SYSTEMS  --------------------------------------------------------------------------------  Gen: No fatigue, fevers/chills, weakness  Skin: No rashes  Head/Eyes/Ears/Mouth: No headache;No sore throat  Respiratory: No dyspnea, cough,   CV: No chest pain, PND, orthopnea  GI: No abdominal pain, diarrhea, constipation, nausea, vomiting  Transplant: No pain  : No increased frequency, dysuria, hematuria, nocturia  MSK: No joint pain/swelling; no back pain; no edema  Neuro: No dizziness/lightheadedness, weakness, seizures, numbness, tingling  Psych: No significant nervousness, anxiety, stress, depression    All other systems were reviewed and are negative, except as noted.    VITALS/PHYSICAL EXAM  --------------------------------------------------------------------------------  T(C): 37.1 (03-21-21 @ 09:00), Max: 37.3 (03-20-21 @ 21:15)  HR: 99 (03-21-21 @ 09:00) (64 - 99)  BP: 156/74 (03-21-21 @ 09:00) (123/58 - 163/80)  RR: 18 (03-21-21 @ 09:00) (18 - 19)  SpO2: 99% (03-21-21 @ 09:00) (94% - 100%)  Wt(kg): --        03-20-21 @ 07:01  -  03-21-21 @ 07:00  --------------------------------------------------------  IN: 610 mL / OUT: 1450 mL / NET: -840 mL    03-21-21 @ 07:01  -  03-21-21 @ 09:55  --------------------------------------------------------  IN: 130 mL / OUT: 100 mL / NET: 30 mL      Physical Exam:  	Gen: NAD  	HEENT: PERRL, supple neck, clear oropharynx  	Pulm: CTA B/L  	CV: RRR, S1S2; no rub  	Back: No spinal or CVA tenderness; no sacral edema  	Abd: +BS, soft, nontender/nondistended                      Transplant: No tenderness, swelling  	: No suprapubic tenderness  	UE: Warm, FROM; no edema; no asterixis  	LE: Warm, FROM; no edema  	Neuro: No focal deficits  	Psych: Normal affect and mood  	Skin: Warm, without rashes      LABS/STUDIES  --------------------------------------------------------------------------------              8.2    7.57  >-----------<  190      [03-21-21 @ 04:24]              25.5     137  |  95  |  54  ----------------------------<  349      [03-21-21 @ 04:24]  4.0   |  26  |  6.77        Ca     8.2     [03-21-21 @ 04:24]      Mg     2.3     [03-21-21 @ 04:24]      Phos  5.6     [03-21-21 @ 04:24]    TPro  5.9  /  Alb  3.1  /  TBili  0.4  /  DBili  x   /  AST  6   /  ALT  7   /  AlkPhos  119  [03-21-21 @ 04:24]          Creatinine Trend:  SCr 6.77 [03-21 @ 04:24]  SCr 6.25 [03-20 @ 06:08]  SCr 5.50 [03-19 @ 06:10]  SCr 7.18 [03-18 @ 06:56]  SCr 6.79 [03-17 @ 06:17]    Tacrolimus (), Serum: 9.6 ng/mL (03-20 @ 07:36)  Tacrolimus (), Serum: 12.6 ng/mL (03-19 @ 07:34)  Tacrolimus (), Serum: 12.4 ng/mL (03-18 @ 08:25)  Tacrolimus (), Serum: 10.5 ng/mL (03-17 @ 07:15)            Urinalysis - [03-16-21 @ 15:47]      Color Light Orange / Appearance Slightly Turbid / SG 1.010 / pH 6.5      Gluc Trace / Ketone Negative  / Bili Negative / Urobili Negative       Blood Large / Protein 100 / Leuk Est Small / Nitrite Negative       / WBC 14 / Hyaline 6 / Gran  / Sq Epi  / Non Sq Epi 7 / Bacteria Negative      Iron 37, TIBC 303, %sat 12      [03-19-21 @ 10:58]  Ferritin 624      [03-19-21 @ 11:25]    HBsAb 56.4      [03-09-21 @ 17:13]  HBsAg Nonreact      [03-09-21 @ 17:13]  HBcAb Nonreact      [03-09-21 @ 17:13]  HCV 0.32, Nonreact      [03-09-21 @ 17:13]  HIV Nonreact      [03-09-21 @ 18:20]

## 2021-03-21 NOTE — PROGRESS NOTE ADULT - SUBJECTIVE AND OBJECTIVE BOX
Transplant Surgery - Multidisciplinary Rounds  --------------------------------------------------------------  DCD DDRT (1a/1v/1u--stented)   Date: 3/9/2021    POD# 12    Present: Patient seen and examined with multidisciplinary team including Transplant Surgeon: Dr. Moore Transplant Nephrologist: Dr. Amarilis BABB, ERIC Everett and unit RN during am rounds.  Disciplines not in attendance will be notified of the plan.       HPI: 72 y/o M with PMHx of HTN (age 43), DM on insulin (age 43), gout, anxiety, depression, OCD and CKD since 2016.  He started HD via L AVF  in April 2020 at [facility] and transitioned to PD about 5 months ago. He makes ~2 ounces of urine 4 times daily.      Underwent DCD DDRT 3/9/2021 on 3/9/2021 (1A/1V/1U stented). Post op course c/b DGF requiring HD and daily PD    Interval Events:  - POD 12 s/p DDRT: remains on Lasix gtt; good u/o 1.5L, SCr 6.7  - No overnight events  - Saturating 100% on RA  - elevated FS; Insulin regimen adjusted       Potential Discharge date: pending clinical stability  Education:  Medications  Plan of care:  See Below    MEDICATIONS  (STANDING):  calcitriol   Capsule 0.25 MICROGram(s) Oral daily  carvedilol 25 milliGRAM(s) Oral every 12 hours  chlorhexidine 2% Cloths 1 Application(s) Topical daily  dextrose 40% Gel 15 Gram(s) Oral once  dextrose 5%. 1000 milliLiter(s) (50 mL/Hr) IV Continuous <Continuous>  dextrose 5%. 1000 milliLiter(s) (100 mL/Hr) IV Continuous <Continuous>  dextrose 50% Injectable 25 Gram(s) IV Push once  dextrose 50% Injectable 12.5 Gram(s) IV Push once  dextrose 50% Injectable 25 Gram(s) IV Push once  epoetin goran-epbx (RETACRIT) Injectable 49372 Unit(s) IV Push <User Schedule>  famotidine    Tablet 20 milliGRAM(s) Oral daily  furosemide Infusion 10 mG/Hr (5 mL/Hr) IV Continuous <Continuous>  gentamicin 0.1% Ointment 1 Application(s) Topical <User Schedule>  glucagon  Injectable 1 milliGRAM(s) IntraMuscular once  insulin glargine Injectable (LANTUS) 20 Unit(s) SubCutaneous at bedtime  insulin lispro (ADMELOG) corrective regimen sliding scale   SubCutaneous three times a day before meals  insulin lispro (ADMELOG) corrective regimen sliding scale   SubCutaneous at bedtime  insulin lispro Injectable (ADMELOG) 5 Unit(s) SubCutaneous three times a day before meals  mycophenolate mofetil 1000 milliGRAM(s) Oral <User Schedule>  NIFEdipine XL 90 milliGRAM(s) Oral daily  nystatin    Suspension 449767 Unit(s) Swish and Swallow four times a day  polyethylene glycol 3350 17 Gram(s) Oral daily  predniSONE   Tablet 5 milliGRAM(s) Oral daily  senna 2 Tablet(s) Oral at bedtime  sevelamer carbonate 1600 milliGRAM(s) Oral three times a day with meals  tacrolimus ER Tablet (ENVARSUS XR) 5 milliGRAM(s) Oral <User Schedule>  tamsulosin 0.4 milliGRAM(s) Oral at bedtime  trimethoprim   80 mG/sulfamethoxazole 400 mG 1 Tablet(s) Oral daily  valGANciclovir 450 milliGRAM(s) Oral <User Schedule>    MEDICATIONS  (PRN):  acetaminophen   Tablet .. 650 milliGRAM(s) Oral every 6 hours PRN Mild Pain (1 - 3)  traMADol 25 milliGRAM(s) Oral every 4 hours PRN Mild Pain (1 - 3)  traMADol 50 milliGRAM(s) Oral every 4 hours PRN Moderate Pain (4 - 6)      PAST MEDICAL & SURGICAL HISTORY:  DM (diabetes mellitus)  HTN (hypertension)  Chronic kidney disease (CKD)    Vital Signs Last 24 Hrs  T(C): 37.1 (21 Mar 2021 09:00), Max: 37.3 (20 Mar 2021 21:15)  T(F): 98.7 (21 Mar 2021 09:00), Max: 99.1 (20 Mar 2021 21:15)  HR: 99 (21 Mar 2021 09:00) (64 - 99)  BP: 156/74 (21 Mar 2021 09:00) (123/58 - 163/80)  BP(mean): 94 (20 Mar 2021 21:15) (94 - 94)  RR: 18 (21 Mar 2021 09:00) (18 - 19)  SpO2: 99% (21 Mar 2021 09:00) (94% - 100%)    I&O's Summary    20 Mar 2021 07:01  -  21 Mar 2021 07:00  --------------------------------------------------------  IN: 610 mL / OUT: 1450 mL / NET: -840 mL    21 Mar 2021 07:01  -  21 Mar 2021 09:42  --------------------------------------------------------  IN: 130 mL / OUT: 100 mL / NET: 30 mL                          8.2    7.57  )-----------( 190      ( 21 Mar 2021 04:24 )             25.5     03-21    137  |  95<L>  |  54<H>  ----------------------------<  349<H>  4.0   |  26  |  6.77<H>    Ca    8.2<L>      21 Mar 2021 04:24  Phos  5.6     03-21  Mg     2.3     03-21    TPro  5.9<L>  /  Alb  3.1<L>  /  TBili  0.4  /  DBili  x   /  AST  6<L>  /  ALT  7<L>  /  AlkPhos  119  03-21    Tacrolimus (), Serum: 9.6 ng/mL (03-20 @ 07:36)    Culture - Blood (collected 03-16-21 @ 22:13)  Source: .Blood Blood-Peripheral  Preliminary Report (03-17-21 @ 23:01):    No growth to date.      Review of systems  Gen: No weight changes, fatigue, fevers/chills, weakness  Skin: No rashes  Head/Eyes/Ears/Mouth: No headache; Normal hearing; Normal vision w/o blurriness; No sinus pain/discomfort, sore throat  Respiratory: No dyspnea, cough, wheezing, hemoptysis  CV: No chest pain, PND, orthopnea  GI: Mild abdominal pain at surgical incision site; denies diarrhea, constipation, nausea, vomiting, melena, hematochezia  : No increased frequency, dysuria, hematuria, nocturia  MSK: No joint pain/swelling; no back pain; no edema  Neuro: No dizziness/lightheadedness, weakness, seizures, numbness, tingling  Heme: No easy bruising or bleeding  Endo: No heat/cold intolerance  Psych: No significant nervousness, anxiety, stress, depression  All other systems were reviewed and are negative, except as noted.    PHYSICAL EXAM:  Constitutional: Well developed / well nourished  Eyes: Anicteric, PERRLA  ENMT: nc/at  Neck: supple   Respiratory: improving breath sounds on R lung base  Cardiovascular: RRR  Gastrointestinal: Soft, non distended, mild tenderness at the incision site; incision c/d/i;   Genitourinary: voiding spontaneously   Extremities: SCD's in place and working bilaterally, AVF palpable.  Vascular: 1+ DP pulses b/l  Neurological: A&O x3  Skin: no rashes, ulcerations or lesions  Musculoskeletal: Moving all extremities  Psychiatric: Responsive

## 2021-03-21 NOTE — DISCHARGE NOTE NURSING/CASE MANAGEMENT/SOCIAL WORK - PATIENT PORTAL LINK FT
You can access the FollowMyHealth Patient Portal offered by [facility] by registering at the following website: [email or web address]. By joining [facility]’s FollowMyHealth portal, you will also be able to view your health information using other applications (apps) compatible with our system.

## 2021-03-22 ENCOUNTER — APPOINTMENT (OUTPATIENT)
Dept: TRANSPLANT | Facility: CLINIC | Age: 71
End: 2021-03-22
Payer: COMMERCIAL

## 2021-03-22 ENCOUNTER — LABORATORY RESULT (OUTPATIENT)
Age: 71
End: 2021-03-22

## 2021-03-22 VITALS
DIASTOLIC BLOOD PRESSURE: 55 MMHG | WEIGHT: 180 LBS | HEART RATE: 71 BPM | BODY MASS INDEX: 29.99 KG/M2 | TEMPERATURE: 97.6 F | OXYGEN SATURATION: 100 % | HEIGHT: 65 IN | RESPIRATION RATE: 12 BRPM | SYSTOLIC BLOOD PRESSURE: 141 MMHG

## 2021-03-22 DIAGNOSIS — Z01.818 ENCOUNTER FOR OTHER PREPROCEDURAL EXAMINATION: ICD-10-CM

## 2021-03-22 PROBLEM — E11.9 TYPE 2 DIABETES MELLITUS WITHOUT COMPLICATIONS: Chronic | Status: ACTIVE | Noted: 2021-03-09

## 2021-03-22 PROBLEM — I10 ESSENTIAL (PRIMARY) HYPERTENSION: Chronic | Status: ACTIVE | Noted: 2021-03-09

## 2021-03-22 PROBLEM — N18.9 CHRONIC KIDNEY DISEASE, UNSPECIFIED: Chronic | Status: ACTIVE | Noted: 2021-03-09

## 2021-03-22 LAB
ALBUMIN SERPL ELPH-MCNC: 3.8 G/DL
ALP BLD-CCNC: 139 U/L
ALT SERPL-CCNC: 8 U/L
ANION GAP SERPL CALC-SCNC: 17 MMOL/L
APPEARANCE: CLEAR
AST SERPL-CCNC: 10 U/L
BACTERIA: NEGATIVE
BASOPHILS # BLD AUTO: 0.04 K/UL
BASOPHILS NFR BLD AUTO: 0.4 %
BILIRUB SERPL-MCNC: 0.5 MG/DL
BILIRUBIN URINE: NEGATIVE
BLOOD URINE: ABNORMAL
BUN SERPL-MCNC: 58 MG/DL
CALCIUM SERPL-MCNC: 8.3 MG/DL
CALCIUM SERPL-MCNC: 8.3 MG/DL
CHLORIDE SERPL-SCNC: 95 MMOL/L
CO2 SERPL-SCNC: 29 MMOL/L
COLOR: YELLOW
CREAT SERPL-MCNC: 6.71 MG/DL
CREAT SPEC-SCNC: 92 MG/DL
CREAT/PROT UR: 0.8 RATIO
EOSINOPHIL # BLD AUTO: 0.08 K/UL
EOSINOPHIL NFR BLD AUTO: 0.7 %
GLUCOSE QUALITATIVE U: ABNORMAL
GLUCOSE SERPL-MCNC: 171 MG/DL
HCT VFR BLD CALC: 27.2 %
HGB BLD-MCNC: 8.8 G/DL
HYALINE CASTS: 0 /LPF
IMM GRANULOCYTES NFR BLD AUTO: 0.5 %
KETONES URINE: NEGATIVE
LDH SERPL-CCNC: 317 U/L
LEUKOCYTE ESTERASE URINE: NEGATIVE
LYMPHOCYTES # BLD AUTO: 0.4 K/UL
LYMPHOCYTES NFR BLD AUTO: 3.5 %
MAGNESIUM SERPL-MCNC: 2.2 MG/DL
MAN DIFF?: NORMAL
MCHC RBC-ENTMCNC: 32.4 GM/DL
MCHC RBC-ENTMCNC: 32.7 PG
MCV RBC AUTO: 101.1 FL
MICROSCOPIC-UA: NORMAL
MONOCYTES # BLD AUTO: 0.72 K/UL
MONOCYTES NFR BLD AUTO: 6.4 %
NEUTROPHILS # BLD AUTO: 10.03 K/UL
NEUTROPHILS NFR BLD AUTO: 88.5 %
NITRITE URINE: NEGATIVE
PARATHYROID HORMONE INTACT: 461 PG/ML
PH URINE: 6
PHOSPHATE SERPL-MCNC: 5.1 MG/DL
PLATELET # BLD AUTO: 214 K/UL
POTASSIUM SERPL-SCNC: 4.1 MMOL/L
PROT SERPL-MCNC: 6.1 G/DL
PROT UR-MCNC: 75 MG/DL
PROTEIN URINE: ABNORMAL
RBC # BLD: 2.69 M/UL
RBC # FLD: 16.3 %
RED BLOOD CELLS URINE: 6 /HPF
SODIUM SERPL-SCNC: 141 MMOL/L
SPECIFIC GRAVITY URINE: 1.02
SQUAMOUS EPITHELIAL CELLS: 2 /HPF
TACROLIMUS SERPL-MCNC: 7.1 NG/ML
URATE SERPL-MCNC: 7.6 MG/DL
UROBILINOGEN URINE: NORMAL
WBC # FLD AUTO: 11.33 K/UL
WHITE BLOOD CELLS URINE: 11 /HPF

## 2021-03-22 PROCEDURE — 99213 OFFICE O/P EST LOW 20 MIN: CPT | Mod: 24

## 2021-03-22 PROCEDURE — 99072 ADDL SUPL MATRL&STAF TM PHE: CPT

## 2021-03-22 NOTE — HISTORY OF PRESENT ILLNESS
[ Donor] :  donor [Basiliximab] : basiliximab [Steroids] : steroids [Negative/Negative] : Donor Negative/Recipient Negative [] : Yes [Other: ___] : [unfilled] [PHS Increased Risk] : no Public Health Service increased risk [Hepatitis C] : no hepatitis c [ABO Incompatible] : ABO compatible [DCD] : donation after cardiac death [Imported Organ] : not an imported organ [Received on Pump] : organ received on pump [Terminal Creatinine: ____] : Terminal Creatinine: [unfilled] [KDPI: ____] : Kidney Donor Profile Index: [unfilled] [TextBox_7] : 3/9/2021 [TextBox_52] : 48y/o [de-identified] : Discharged yesterday on PD.  Has noticed increasing UOP.  Pain well controlled.  No incisional issues.  Tolerating diet, nl BMs.

## 2021-03-22 NOTE — PHYSICAL EXAM
[Well Developed] : well developed [Well Nourished] : well nourished [Normal Rate] : normal rate [Soft] : soft [Non-tender] : non-tender [] : right posterior tibial palpable [Alert] : alert [Responds to Questions Appropriately] : responds to questions appropriately [Oriented] : oriented [Appropriate] : appropriate [TextBox_34] : no edema [Site: ___] : Site: [unfilled] [Clean] : clean [Dry] : dry [Healing Well] : healing well

## 2021-03-22 NOTE — PLAN
[FreeTextEntry1] : * Graft -- UOP increasing, will f/u today's labs.  Stent is in place. \par * PD -- prescription reviewed with Dr. SKINNY Olmos today\par * Immuno -- envarsus goal 8-10, MMF 1gm BID, pred 5\par *HTN - discharged from hosp last night, will keep records this week\par * DM - discharged from hosp last night, will keep records this week\par * PPX - Valcyte TIWk, Bactrim qd, Nystatin S/S\par \par F/u with nephrology next week.

## 2021-03-25 LAB
BKV DNA SPEC QL NAA+PROBE: NEGATIVE COPIES/ML
LOG 10 BK QUANTITATION PCR: NORMAL

## 2021-03-26 ENCOUNTER — APPOINTMENT (OUTPATIENT)
Dept: TRANSPLANT | Facility: CLINIC | Age: 71
End: 2021-03-26

## 2021-03-26 LAB
ALBUMIN SERPL ELPH-MCNC: 3.7 G/DL
ALP BLD-CCNC: 127 U/L
ALT SERPL-CCNC: 8 U/L
ANION GAP SERPL CALC-SCNC: 16 MMOL/L
APPEARANCE: CLEAR
AST SERPL-CCNC: 9 U/L
BACTERIA: NEGATIVE
BASOPHILS # BLD AUTO: 0.07 K/UL
BASOPHILS NFR BLD AUTO: 0.8 %
BILIRUB SERPL-MCNC: 0.4 MG/DL
BILIRUBIN URINE: NEGATIVE
BLOOD URINE: NORMAL
BUN SERPL-MCNC: 65 MG/DL
CALCIUM SERPL-MCNC: 8.9 MG/DL
CHLORIDE SERPL-SCNC: 100 MMOL/L
CO2 SERPL-SCNC: 25 MMOL/L
COLOR: NORMAL
CREAT SERPL-MCNC: 3.53 MG/DL
CREAT SPEC-SCNC: 90 MG/DL
CREAT/PROT UR: 0.3 RATIO
EOSINOPHIL # BLD AUTO: 0.13 K/UL
EOSINOPHIL NFR BLD AUTO: 1.4 %
GLUCOSE QUALITATIVE U: ABNORMAL
GLUCOSE SERPL-MCNC: 345 MG/DL
HCT VFR BLD CALC: 27.3 %
HGB BLD-MCNC: 8.6 G/DL
HYALINE CASTS: 1 /LPF
IMM GRANULOCYTES NFR BLD AUTO: 0.7 %
KETONES URINE: NEGATIVE
LEUKOCYTE ESTERASE URINE: NEGATIVE
LYMPHOCYTES # BLD AUTO: 0.68 K/UL
LYMPHOCYTES NFR BLD AUTO: 7.4 %
MAGNESIUM SERPL-MCNC: 1.8 MG/DL
MAN DIFF?: NORMAL
MCHC RBC-ENTMCNC: 31.5 GM/DL
MCHC RBC-ENTMCNC: 32.1 PG
MCV RBC AUTO: 101.9 FL
MICROSCOPIC-UA: NORMAL
MONOCYTES # BLD AUTO: 0.54 K/UL
MONOCYTES NFR BLD AUTO: 5.9 %
NEUTROPHILS # BLD AUTO: 7.72 K/UL
NEUTROPHILS NFR BLD AUTO: 83.8 %
NITRITE URINE: NEGATIVE
PH URINE: 5.5
PHOSPHATE SERPL-MCNC: 3.8 MG/DL
PLATELET # BLD AUTO: 179 K/UL
POTASSIUM SERPL-SCNC: 4.1 MMOL/L
PROT SERPL-MCNC: 6.2 G/DL
PROT UR-MCNC: 28 MG/DL
PROTEIN URINE: ABNORMAL
RBC # BLD: 2.68 M/UL
RBC # FLD: 15.4 %
RED BLOOD CELLS URINE: 5 /HPF
SODIUM SERPL-SCNC: 141 MMOL/L
SPECIFIC GRAVITY URINE: 1.01
SQUAMOUS EPITHELIAL CELLS: 0 /HPF
TACROLIMUS SERPL-MCNC: 5.5 NG/ML
UROBILINOGEN URINE: NORMAL
WBC # FLD AUTO: 9.2 K/UL
WHITE BLOOD CELLS URINE: 4 /HPF

## 2021-03-29 RX ORDER — CARVEDILOL 25 MG/1
25 TABLET, FILM COATED ORAL
Refills: 0 | Status: DISCONTINUED | COMMUNITY
End: 2021-03-29

## 2021-03-29 RX ORDER — CALCIFEDIOL 30 UG/1
30 CAPSULE, EXTENDED RELEASE ORAL DAILY
Qty: 90 | Refills: 0 | Status: DISCONTINUED | COMMUNITY
Start: 2020-06-25 | End: 2021-03-29

## 2021-03-29 RX ORDER — OLMESARTAN MEDOXOMIL 40 MG/1
40 TABLET, FILM COATED ORAL
Qty: 90 | Refills: 3 | Status: DISCONTINUED | COMMUNITY
Start: 2020-06-25 | End: 2021-03-29

## 2021-03-29 RX ORDER — DILTIAZEM HYDROCHLORIDE 240 MG/1
240 CAPSULE, EXTENDED RELEASE ORAL
Refills: 0 | Status: DISCONTINUED | COMMUNITY
Start: 2019-01-23 | End: 2021-03-29

## 2021-03-30 ENCOUNTER — APPOINTMENT (OUTPATIENT)
Dept: NEPHROLOGY | Facility: CLINIC | Age: 71
End: 2021-03-30
Payer: COMMERCIAL

## 2021-03-30 ENCOUNTER — LABORATORY RESULT (OUTPATIENT)
Age: 71
End: 2021-03-30

## 2021-03-30 VITALS
RESPIRATION RATE: 12 BRPM | HEART RATE: 68 BPM | WEIGHT: 167 LBS | HEIGHT: 65 IN | OXYGEN SATURATION: 99 % | DIASTOLIC BLOOD PRESSURE: 52 MMHG | TEMPERATURE: 97.9 F | BODY MASS INDEX: 27.82 KG/M2 | SYSTOLIC BLOOD PRESSURE: 89 MMHG

## 2021-03-30 PROCEDURE — 99215 OFFICE O/P EST HI 40 MIN: CPT

## 2021-03-30 PROCEDURE — 99072 ADDL SUPL MATRL&STAF TM PHE: CPT

## 2021-03-30 RX ORDER — PREDNISONE 5 MG/1
5 TABLET ORAL
Qty: 42 | Refills: 0 | Status: DISCONTINUED | COMMUNITY
Start: 2021-03-10 | End: 2021-03-30

## 2021-03-30 NOTE — HISTORY OF PRESENT ILLNESS
[FreeTextEntry1] : \par 71 year old man with ESRD due to DM on PD since April 2020. S/p DDRT on 3/9/2021   \par PMH:  DM type II and Hypertension diagnosed in his early 40’s. Hyperlipidemia, gout, Anxiety, Depression, OCD.  No cardiac disease.  cPRA 0%. \par \par Donor 49 yr old, DCD,  KDPI 71%, Terminal Cr 8mg/dL, No HLA mismatch, CMV D-R-.  \par Course complicated by DGF. Discharged on peritoneal dialysis.  \par \par Presents for scheduled follow up accompanied by his wife. Remains on PD at night for 10 hours. Using 1.5% and 2.5% Dianeal, 5 exchanges.  \par Voiding urine without difficulty, UOP increasing. \par No fever, chills, cough, shortness of breath, chest pain, leg swelling, nausea, vomiting or diarrhea.  \par  Appetite is excellent. \par Feels weak and tired - ambulates with a walker at home since the transplant. Today needed a wheelchair to come from the car. Feels light headed and dizzy. Home nurse evaluated pt at home yesterday, PT scheduled for tomorrow.  \par \par \par Home records reviewed. \par - UOP 1.8- 2 liters. No fever. \par - BP  110-130 systolic. \par - Blood glucose 140-220 \par - Weight down to 169lbs, down 10lbs since last week. \par

## 2021-03-30 NOTE — ASSESSMENT
[FreeTextEntry1] : Labs reviewed. Scr 2.43, Electrolytes fair K 4.2, Co2 25, Mg 1.9, phos 3.0, Hgb 8.9, WBC 6.4, Pl 215\par \par 71 yr old man with ESRD due to DM, was on PD, s/p DDRT on 3/9/21 complicated by DGF. Currently on PD. Urinating a lot more. Today he feels light headed and fatigued. BP 90/60 on repeat. Weight down by 10lbs. No edema. Hypovolemic likely due to diuretics and PD. \par \par S/p DDRT on 3/9/21 complicated by DCD remains on PD. \par  - UOP improving > 2 liters per day. \par  - Creatinine down to 2.43mg/dL today. \par - Minimal proteinuria ~ 300mg/day \par  - Decrease lasix to 80mg po daily \par - Stop PD. Monitor renal function. \par \par Immunosuppression \par - S/p Simulect induction \par - On Envarsus 7mg po daily, level  6.6 low. Increase to 8mg daily. \par - Continue CellCept 1gm bid, prednisone 5mg daily \par \par Infection prophylaxis \par - Valcyte 450mg 3x/week dosed for renal function \par - Bactrim SS daily\par - Nystatin S/S\par \par Diabetes type II \par - Lantus 25 units qhs, Humalog 5 units qac. Blood glucose improving 140-220. Expected to improve off PD. Continue to monitor FS 4 times per day. \par \par Hypertension : On Carvedilol 25mg po bid and Nifedipine xl 90mg po daily. BP too low on meds. Will hold Nifedipine for low BP. \par \par Hyperparathyroidism - on Calcitriol 0.25mcg po daily, iPTH 461, calcium/phos ok\par - D/c Sevelamer \par \par Anemia - multifactorial. CKD, recent surgery, meds. Hgb 8.9 stable. \par \par F/u with Dr. Schuler on 4/7/21. Staples to be removed next week.

## 2021-03-30 NOTE — PHYSICAL EXAM
[Sclera] : the sclera and conjunctiva were normal [Oropharynx] : the oropharynx was normal [Jugular Venous Distention Increased] : there was no jugular-venous distention [] : no respiratory distress [Respiration, Rhythm And Depth] : normal respiratory rhythm and effort [Exaggerated Use Of Accessory Muscles For Inspiration] : no accessory muscle use [Auscultation Breath Sounds / Voice Sounds] : lungs were clear to auscultation bilaterally [Heart Sounds] : normal S1 and S2 [Heart Sounds Gallop] : no gallops [Murmurs] : no murmurs [Edema] : there was no peripheral edema [Involuntary Movements] : no involuntary movements were seen [Peritoneal Dialysis Catheter] : peritoneal dialysis catheter [No Focal Deficits] : no focal deficits [Oriented To Time, Place, And Person] : oriented to person, place, and time [FreeTextEntry1] : RLQ staples present, no signs of infection. Abdomen obese. Non tender. PD catheter present.

## 2021-03-31 LAB
ALBUMIN SERPL ELPH-MCNC: 3.7 G/DL
ALP BLD-CCNC: 145 U/L
ALT SERPL-CCNC: 9 U/L
ANION GAP SERPL CALC-SCNC: 15 MMOL/L
APPEARANCE: CLEAR
AST SERPL-CCNC: 10 U/L
BACTERIA: NEGATIVE
BASOPHILS # BLD AUTO: 0.06 K/UL
BASOPHILS NFR BLD AUTO: 0.9 %
BILIRUB SERPL-MCNC: 0.3 MG/DL
BILIRUBIN URINE: NEGATIVE
BLOOD URINE: NORMAL
BUN SERPL-MCNC: 55 MG/DL
CALCIUM SERPL-MCNC: 9.1 MG/DL
CALCIUM SERPL-MCNC: 9.1 MG/DL
CHLORIDE SERPL-SCNC: 101 MMOL/L
CO2 SERPL-SCNC: 25 MMOL/L
COLOR: YELLOW
CREAT SERPL-MCNC: 2.43 MG/DL
CREAT SPEC-SCNC: 85 MG/DL
CREAT/PROT UR: 0.3 RATIO
EOSINOPHIL # BLD AUTO: 0.06 K/UL
EOSINOPHIL NFR BLD AUTO: 0.9 %
GLUCOSE QUALITATIVE U: ABNORMAL
GLUCOSE SERPL-MCNC: 276 MG/DL
HCT VFR BLD CALC: 27.5 %
HGB BLD-MCNC: 8.9 G/DL
HYALINE CASTS: 2 /LPF
IMM GRANULOCYTES NFR BLD AUTO: 0.3 %
KETONES URINE: NEGATIVE
LDH SERPL-CCNC: 229 U/L
LEUKOCYTE ESTERASE URINE: NEGATIVE
LYMPHOCYTES # BLD AUTO: 0.48 K/UL
LYMPHOCYTES NFR BLD AUTO: 7.5 %
MAGNESIUM SERPL-MCNC: 1.9 MG/DL
MAN DIFF?: NORMAL
MCHC RBC-ENTMCNC: 32.4 GM/DL
MCHC RBC-ENTMCNC: 32.5 PG
MCV RBC AUTO: 100.4 FL
MICROSCOPIC-UA: NORMAL
MONOCYTES # BLD AUTO: 0.38 K/UL
MONOCYTES NFR BLD AUTO: 5.9 %
NEUTROPHILS # BLD AUTO: 5.43 K/UL
NEUTROPHILS NFR BLD AUTO: 84.5 %
NITRITE URINE: NEGATIVE
PARATHYROID HORMONE INTACT: 281 PG/ML
PH URINE: 5.5
PHOSPHATE SERPL-MCNC: 3 MG/DL
PLATELET # BLD AUTO: 215 K/UL
POTASSIUM SERPL-SCNC: 4.2 MMOL/L
PROT SERPL-MCNC: 6.2 G/DL
PROT UR-MCNC: 21 MG/DL
PROTEIN URINE: NORMAL
RBC # BLD: 2.74 M/UL
RBC # FLD: 15.2 %
RED BLOOD CELLS URINE: 6 /HPF
SODIUM SERPL-SCNC: 141 MMOL/L
SPECIFIC GRAVITY URINE: 1.01
SQUAMOUS EPITHELIAL CELLS: 3 /HPF
TACROLIMUS SERPL-MCNC: 6.6 NG/ML
URATE SERPL-MCNC: 7.8 MG/DL
UROBILINOGEN URINE: NORMAL
WBC # FLD AUTO: 6.43 K/UL
WHITE BLOOD CELLS URINE: 8 /HPF

## 2021-03-31 RX ORDER — TACROLIMUS 1 MG/1
1 TABLET, EXTENDED RELEASE ORAL DAILY
Qty: 90 | Refills: 11 | Status: DISCONTINUED | COMMUNITY
Start: 2021-03-10 | End: 2021-03-31

## 2021-04-02 LAB
BKV DNA SPEC QL NAA+PROBE: NEGATIVE COPIES/ML
LOG 10 BK QUANTITATION PCR: NORMAL

## 2021-04-07 ENCOUNTER — APPOINTMENT (OUTPATIENT)
Dept: TRANSPLANT | Facility: CLINIC | Age: 71
End: 2021-04-07
Payer: MEDICARE

## 2021-04-07 VITALS
BODY MASS INDEX: 28.32 KG/M2 | WEIGHT: 170 LBS | HEART RATE: 68 BPM | SYSTOLIC BLOOD PRESSURE: 114 MMHG | TEMPERATURE: 97.8 F | RESPIRATION RATE: 12 BRPM | OXYGEN SATURATION: 97 % | DIASTOLIC BLOOD PRESSURE: 68 MMHG | HEIGHT: 65 IN

## 2021-04-07 PROCEDURE — 99024 POSTOP FOLLOW-UP VISIT: CPT

## 2021-04-07 RX ORDER — FAMOTIDINE 20 MG/1
20 TABLET, FILM COATED ORAL
Qty: 30 | Refills: 11 | Status: DISCONTINUED | COMMUNITY
Start: 2021-03-10 | End: 2021-04-07

## 2021-04-07 NOTE — PLAN
[FreeTextEntry1] : * Graft -- Off PD.  Took tac this AM, will get labs tomorrow.  Needs appt for PD catheter and ureteral stent removal. \par \par * Immuno -- envarsus goal 8-10, MMF 1gm BID, pred 5\par \par *HTN - controlled\par \par * DM - controlled\par \par * PPX - Valcyte TIWk, Bactrim qd, Nystatin S/S\par \par * Edema - controlled on Lasix.\par F/u with nephrology next week.

## 2021-04-07 NOTE — REASON FOR VISIT
[Follow-Up] : a follow-up visit  [Spouse] : spouse [FreeTextEntry3] : DDRTx [FreeTextEntry5] : 3/9/2021

## 2021-04-07 NOTE — HISTORY OF PRESENT ILLNESS
[ Donor] :  donor [Basiliximab] : basiliximab [Steroids] : steroids [Negative/Negative] : Donor Negative/Recipient Negative [] : Yes [Other: ___] : [unfilled] [PHS Increased Risk] : no Public Health Service increased risk [Hepatitis C] : no hepatitis c [ABO Incompatible] : ABO compatible [DCD] : donation after cardiac death [Imported Organ] : not an imported organ [Received on Pump] : organ received on pump [Terminal Creatinine: ____] : Terminal Creatinine: [unfilled] [KDPI: ____] : Kidney Donor Profile Index: [unfilled] [TextBox_7] : 3/9/2021 [TextBox_52] : 48y/o [de-identified] : Doing well.  Good UOP.  BP/glucose with okay control.  No dysuria. No edema.  No incisional issues.

## 2021-04-08 ENCOUNTER — NON-APPOINTMENT (OUTPATIENT)
Age: 71
End: 2021-04-08

## 2021-04-09 LAB
ALBUMIN SERPL ELPH-MCNC: 4.1 G/DL
ALP BLD-CCNC: 248 U/L
ALT SERPL-CCNC: 15 U/L
ANION GAP SERPL CALC-SCNC: 12 MMOL/L
APPEARANCE: CLEAR
AST SERPL-CCNC: 11 U/L
BACTERIA: NEGATIVE
BASOPHILS # BLD AUTO: 0.06 K/UL
BASOPHILS NFR BLD AUTO: 1.4 %
BILIRUB SERPL-MCNC: 0.2 MG/DL
BILIRUBIN URINE: NEGATIVE
BLOOD URINE: NEGATIVE
BUN SERPL-MCNC: 43 MG/DL
CALCIUM SERPL-MCNC: 9.5 MG/DL
CALCIUM SERPL-MCNC: 9.5 MG/DL
CHLORIDE SERPL-SCNC: 104 MMOL/L
CO2 SERPL-SCNC: 21 MMOL/L
COLOR: YELLOW
CREAT SERPL-MCNC: 1.84 MG/DL
CREAT SPEC-SCNC: 81 MG/DL
CREAT/PROT UR: 0.2 RATIO
EOSINOPHIL # BLD AUTO: 0.06 K/UL
EOSINOPHIL NFR BLD AUTO: 1.4 %
GLUCOSE QUALITATIVE U: ABNORMAL
GLUCOSE SERPL-MCNC: 254 MG/DL
HBV CORE IGG+IGM SER QL: NONREACTIVE
HBV SURFACE AG SER QL: NONREACTIVE
HCT VFR BLD CALC: 30.9 %
HCV AB SER QL: NONREACTIVE
HCV S/CO RATIO: 0.16 S/CO
HGB BLD-MCNC: 9.9 G/DL
HIV1 RNA # SERPL NAA+PROBE: NORMAL
HIV1 RNA # SERPL NAA+PROBE: NORMAL COPIES/ML
HIV1+2 AB SPEC QL IA.RAPID: NONREACTIVE
HYALINE CASTS: 0 /LPF
IMM GRANULOCYTES NFR BLD AUTO: 1.2 %
KETONES URINE: NEGATIVE
LDH SERPL-CCNC: 248 U/L
LEUKOCYTE ESTERASE URINE: NEGATIVE
LYMPHOCYTES # BLD AUTO: 0.55 K/UL
LYMPHOCYTES NFR BLD AUTO: 13 %
MAGNESIUM SERPL-MCNC: 1.9 MG/DL
MAN DIFF?: NORMAL
MCHC RBC-ENTMCNC: 32 GM/DL
MCHC RBC-ENTMCNC: 32.6 PG
MCV RBC AUTO: 101.6 FL
MICROSCOPIC-UA: NORMAL
MONOCYTES # BLD AUTO: 0.57 K/UL
MONOCYTES NFR BLD AUTO: 13.5 %
NEUTROPHILS # BLD AUTO: 2.94 K/UL
NEUTROPHILS NFR BLD AUTO: 69.5 %
NITRITE URINE: NEGATIVE
PARATHYROID HORMONE INTACT: 151 PG/ML
PH URINE: 6
PHOSPHATE SERPL-MCNC: 3.3 MG/DL
PLATELET # BLD AUTO: 288 K/UL
POTASSIUM SERPL-SCNC: 5.3 MMOL/L
PROT SERPL-MCNC: 6.5 G/DL
PROT UR-MCNC: 17 MG/DL
PROTEIN URINE: NORMAL
RBC # BLD: 3.04 M/UL
RBC # FLD: 14.6 %
RED BLOOD CELLS URINE: 3 /HPF
SODIUM SERPL-SCNC: 138 MMOL/L
SPECIFIC GRAVITY URINE: 1.01
SQUAMOUS EPITHELIAL CELLS: 1 /HPF
TACROLIMUS SERPL-MCNC: 7.8 NG/ML
URATE SERPL-MCNC: 6.7 MG/DL
UROBILINOGEN URINE: NORMAL
VIRAL LOAD INTERP: NORMAL
VIRAL LOAD LOG: NORMAL LG COP/ML
WBC # FLD AUTO: 4.23 K/UL
WHITE BLOOD CELLS URINE: 3 /HPF

## 2021-04-12 LAB
BKV DNA SPEC QL NAA+PROBE: NEGATIVE COPIES/ML
HBV DNA # SERPL NAA+PROBE: NOT DETECTED IU/ML
HCV RNA SERPL NAA DL=5-ACNC: NOT DETECTED IU/ML
HCV RNA SERPL NAA+PROBE-LOG IU: NOT DETECTED LOG10IU/ML
HEPB DNA PCR LOG: NOT DETECTED LOG10IU/ML
LOG 10 BK QUANTITATION PCR: NORMAL

## 2021-04-12 RX ORDER — SEVELAMER CARBONATE 800 MG/1
800 TABLET, FILM COATED ORAL 3 TIMES DAILY
Qty: 180 | Refills: 11 | Status: DISCONTINUED | COMMUNITY
Start: 2021-03-15 | End: 2021-04-12

## 2021-04-13 ENCOUNTER — APPOINTMENT (OUTPATIENT)
Dept: NEPHROLOGY | Facility: CLINIC | Age: 71
End: 2021-04-13
Payer: COMMERCIAL

## 2021-04-13 VITALS — BODY MASS INDEX: 28.12 KG/M2 | SYSTOLIC BLOOD PRESSURE: 110 MMHG | WEIGHT: 169 LBS | DIASTOLIC BLOOD PRESSURE: 70 MMHG

## 2021-04-13 LAB
ALBUMIN SERPL ELPH-MCNC: 4.2 G/DL
ALP BLD-CCNC: 257 U/L
ALT SERPL-CCNC: 11 U/L
ANION GAP SERPL CALC-SCNC: 10 MMOL/L
APPEARANCE: CLEAR
AST SERPL-CCNC: 7 U/L
BACTERIA: NEGATIVE
BASOPHILS # BLD AUTO: 0.04 K/UL
BASOPHILS NFR BLD AUTO: 0.6 %
BILIRUB SERPL-MCNC: 0.2 MG/DL
BILIRUBIN URINE: NEGATIVE
BLOOD URINE: NEGATIVE
BUN SERPL-MCNC: 55 MG/DL
CALCIUM SERPL-MCNC: 9.5 MG/DL
CHLORIDE SERPL-SCNC: 106 MMOL/L
CO2 SERPL-SCNC: 21 MMOL/L
COLOR: NORMAL
CREAT SERPL-MCNC: 1.75 MG/DL
CREAT SPEC-SCNC: 60 MG/DL
CREAT/PROT UR: 0.1 RATIO
EOSINOPHIL # BLD AUTO: 0.05 K/UL
EOSINOPHIL NFR BLD AUTO: 0.8 %
GLUCOSE QUALITATIVE U: ABNORMAL
GLUCOSE SERPL-MCNC: 241 MG/DL
HCT VFR BLD CALC: 33.1 %
HGB BLD-MCNC: 10.7 G/DL
HYALINE CASTS: 0 /LPF
IMM GRANULOCYTES NFR BLD AUTO: 1.1 %
KETONES URINE: NEGATIVE
LDH SERPL-CCNC: 233 U/L
LEUKOCYTE ESTERASE URINE: NEGATIVE
LYMPHOCYTES # BLD AUTO: 0.85 K/UL
LYMPHOCYTES NFR BLD AUTO: 13.6 %
MAGNESIUM SERPL-MCNC: 2.1 MG/DL
MAN DIFF?: NORMAL
MCHC RBC-ENTMCNC: 32.3 GM/DL
MCHC RBC-ENTMCNC: 32.3 PG
MCV RBC AUTO: 100 FL
MICROSCOPIC-UA: NORMAL
MONOCYTES # BLD AUTO: 0.67 K/UL
MONOCYTES NFR BLD AUTO: 10.7 %
NEUTROPHILS # BLD AUTO: 4.58 K/UL
NEUTROPHILS NFR BLD AUTO: 73.2 %
NITRITE URINE: NEGATIVE
PH URINE: 5.5
PHOSPHATE SERPL-MCNC: 3.2 MG/DL
PLATELET # BLD AUTO: 207 K/UL
POTASSIUM SERPL-SCNC: 5.8 MMOL/L
PROT SERPL-MCNC: 6.5 G/DL
PROT UR-MCNC: 7 MG/DL
PROTEIN URINE: NEGATIVE
RBC # BLD: 3.31 M/UL
RBC # FLD: 14.3 %
RED BLOOD CELLS URINE: 1 /HPF
SODIUM SERPL-SCNC: 137 MMOL/L
SPECIFIC GRAVITY URINE: 1.01
SQUAMOUS EPITHELIAL CELLS: 0 /HPF
TACROLIMUS SERPL-MCNC: 12 NG/ML
URATE SERPL-MCNC: 6.5 MG/DL
UROBILINOGEN URINE: NORMAL
WBC # FLD AUTO: 6.26 K/UL
WHITE BLOOD CELLS URINE: 1 /HPF

## 2021-04-13 PROCEDURE — 99072 ADDL SUPL MATRL&STAF TM PHE: CPT

## 2021-04-13 PROCEDURE — 99215 OFFICE O/P EST HI 40 MIN: CPT

## 2021-04-13 RX ORDER — NIFEDIPINE 90 MG/1
90 TABLET, EXTENDED RELEASE ORAL
Qty: 270 | Refills: 3 | Status: DISCONTINUED | COMMUNITY
Start: 2021-03-11 | End: 2021-04-13

## 2021-04-13 NOTE — ASSESSMENT
[FreeTextEntry1] : Labs: Cr 1.75, K 5.8, Bicarb 21, Tac level 12 , Ca 9.5, Phos 3.2, Mg 2.1  LFTs WNL iPTH 151 \par WBC 6.2, Hgb 10.7, Pl 207 \par U/A glucosuria, otherwise normal . Urine P/Cr 0.1 \par \par 71 yr old man with ESRD due to DM, was on PD, s/p DDRT on 3/9/21 complicated by DGF discharged on PD. Graft function recovered and off PD since 3/30. Seen last week, scr was down to 1.84mg/dL. \par \par S/p DDRT on 3/9/21 complicated by DGF \par - Off dialysis since 3/30/21. Continue lasix 40mg po daily \par - Creatinine slowly declining  1.75mg/dL today. No proteinuria.   \par \par Immunosuppression \par - S/p Simulect induction \par - On Envarsus 8mg daily. Level 12.0 high - reduce to 7mg po daily \par - Continue CellCept 1gm bid, prednisone 5mg daily \par \par Infection prophylaxis \par - Valcyte 450mg 3x/week dosed for renal function. CMV low risk.  \par - Bactrim SS daily\par - Nystatin S/S\par \par Diabetes type II \par - On Lantus 25 units qhs, Humalog 6-8 units qac, glycemic control sub optimal \par Patient will f/u with his endocrinologist Dr. Micky Abad. \par \par Hypertension : On Carvedilol 25mg po bid, Nifedipine d/ame 2 weeks ago. BP acceptable. \par \par Hyperparathyroidism - on Calcitriol 0.25mcg po daily, iPTH down to 151 from 461, calcium/phos ok\par \par Anemia improving - Hgb 10.7 \par \par F/u with Dr. Schuler next week. Stent and PD catheter removal to be scheduled by Dr. Schuler. \par \par

## 2021-04-13 NOTE — HISTORY OF PRESENT ILLNESS
[FreeTextEntry1] : 71 year old man with ESRD due to DM on PD since April 2020. S/p DDRT on 3/9/2021 \par PMH: DM type II and Hypertension diagnosed in his early 40’s. Hyperlipidemia, gout, Anxiety, Depression, OCD. No cardiac disease. cPRA 0%. COVID-19 Vaccine completed in February. \par \par Donor 49 yr old, DCD, KDPI 71%, Terminal Cr 8mg/dL, No HLA mismatch, CMV D-R-. \par Course complicated by DGF. Discharged on peritoneal dialysis. Off PD since 3/30/21.  \par \par Presents for scheduled follow up accompanied by his wife. \par Voiding urine without difficulty, no dysuria or hematuria. Frequency less,  nocturia  2-3 times. \par No fever, chills, cough, shortness of breath, chest pain, leg swelling, nausea, vomiting or diarrhea. \par Appetite is excellent. \par No longer using a walker. \par Energy is still low but getting better. \par Home PT completed, Home nursing - last visit is this week.   \par - -130 systolic. \par - Blood glucose 160-300's. \par - Weight 169lbs\par \par \par

## 2021-04-13 NOTE — PHYSICAL EXAM
[Peritoneal Dialysis Catheter] : peritoneal dialysis catheter [General Appearance - Alert] : alert [General Appearance - In No Acute Distress] : in no acute distress [Sclera] : the sclera and conjunctiva were normal [Oropharynx] : the oropharynx was normal [Jugular Venous Distention Increased] : there was no jugular-venous distention [Respiration, Rhythm And Depth] : normal respiratory rhythm and effort [Exaggerated Use Of Accessory Muscles For Inspiration] : no accessory muscle use [Auscultation Breath Sounds / Voice Sounds] : lungs were clear to auscultation bilaterally [Heart Sounds] : normal S1 and S2 [Heart Sounds Gallop] : no gallops [Murmurs] : no murmurs [Edema] : there was no peripheral edema [Bowel Sounds] : normal bowel sounds [Abdomen Soft] : soft [Involuntary Movements] : no involuntary movements were seen [___ (cm) Fistula] : [unfilled] (cm) fistula [Bruit] : a bruit was present [Thrill] : a thrill was present [] : right dorsalis pedis palpable [No Focal Deficits] : no focal deficits [Oriented To Time, Place, And Person] : oriented to person, place, and time [FreeTextEntry1] : RLQ wound well healed, no signs of infection. Abdomen obese. Non tender. PD catheter present.

## 2021-04-19 ENCOUNTER — OUTPATIENT (OUTPATIENT)
Dept: OUTPATIENT SERVICES | Facility: HOSPITAL | Age: 71
LOS: 1 days | End: 2021-04-19
Payer: COMMERCIAL

## 2021-04-19 DIAGNOSIS — Z11.52 ENCOUNTER FOR SCREENING FOR COVID-19: ICD-10-CM

## 2021-04-19 LAB — SARS-COV-2 RNA SPEC QL NAA+PROBE: SIGNIFICANT CHANGE UP

## 2021-04-19 PROCEDURE — U0003: CPT

## 2021-04-19 PROCEDURE — C9803: CPT

## 2021-04-19 PROCEDURE — U0005: CPT

## 2021-04-21 ENCOUNTER — APPOINTMENT (OUTPATIENT)
Dept: TRANSPLANT | Facility: CLINIC | Age: 71
End: 2021-04-21
Payer: MEDICARE

## 2021-04-21 ENCOUNTER — TRANSCRIPTION ENCOUNTER (OUTPATIENT)
Age: 71
End: 2021-04-21

## 2021-04-21 PROCEDURE — 99024 POSTOP FOLLOW-UP VISIT: CPT

## 2021-04-21 NOTE — HISTORY OF PRESENT ILLNESS
[ Donor] :  donor [Basiliximab] : basiliximab [Steroids] : steroids [Negative/Negative] : Donor Negative/Recipient Negative [] : Yes [Other: ___] : [unfilled] [PHS Increased Risk] : no Public Health Service increased risk [Hepatitis C] : no hepatitis c [ABO Incompatible] : ABO compatible [DCD] : donation after cardiac death [Imported Organ] : not an imported organ [Received on Pump] : organ received on pump [Terminal Creatinine: ____] : Terminal Creatinine: [unfilled] [KDPI: ____] : Kidney Donor Profile Index: [unfilled] [TextBox_7] : 3/9/2021 [TextBox_52] : 50y/o [de-identified] : Urinating well, no dysuria.

## 2021-04-21 NOTE — PLAN
[FreeTextEntry1] : * Graft -- Graft working, Cr continues to downtrend.  PD cath and stent coming out tomorrow. \par \par * Immuno -- envarsus goal 8-10, MMF 1gm BID, pred 5\par \par *HTN - controlled\par \par * DM - followed by Dr. Abad who recommended 5U Humalog premeal + SS and 25U Lantus qhs\par \par * PPX - Valcyte qd,  Bactrim qd, Nystatin S/S\par \par * Edema - resolved, d/c Lasix.

## 2021-04-22 ENCOUNTER — OUTPATIENT (OUTPATIENT)
Dept: INPATIENT UNIT | Facility: HOSPITAL | Age: 71
LOS: 1 days | End: 2021-04-22
Payer: MEDICARE

## 2021-04-22 ENCOUNTER — NON-APPOINTMENT (OUTPATIENT)
Age: 71
End: 2021-04-22

## 2021-04-22 ENCOUNTER — APPOINTMENT (OUTPATIENT)
Dept: TRANSPLANT | Facility: HOSPITAL | Age: 71
End: 2021-04-22

## 2021-04-22 VITALS
SYSTOLIC BLOOD PRESSURE: 166 MMHG | TEMPERATURE: 97 F | RESPIRATION RATE: 16 BRPM | OXYGEN SATURATION: 98 % | DIASTOLIC BLOOD PRESSURE: 81 MMHG | HEART RATE: 67 BPM

## 2021-04-22 VITALS
WEIGHT: 167.99 LBS | SYSTOLIC BLOOD PRESSURE: 146 MMHG | OXYGEN SATURATION: 96 % | DIASTOLIC BLOOD PRESSURE: 79 MMHG | TEMPERATURE: 98 F | HEIGHT: 66 IN | HEART RATE: 69 BPM | RESPIRATION RATE: 18 BRPM

## 2021-04-22 DIAGNOSIS — Z94.0 KIDNEY TRANSPLANT STATUS: ICD-10-CM

## 2021-04-22 PROCEDURE — 49422 REMOVE TUNNELED IP CATH: CPT

## 2021-04-22 PROCEDURE — 52310 CYSTOSCOPY AND TREATMENT: CPT

## 2021-04-22 PROCEDURE — 82962 GLUCOSE BLOOD TEST: CPT

## 2021-04-22 PROCEDURE — 52310 CYSTOSCOPY AND TREATMENT: CPT | Mod: GC,58

## 2021-04-22 PROCEDURE — 49422 REMOVE TUNNELED IP CATH: CPT | Mod: GC,58

## 2021-04-22 RX ORDER — SODIUM CHLORIDE 9 MG/ML
1000 INJECTION, SOLUTION INTRAVENOUS
Refills: 0 | Status: DISCONTINUED | OUTPATIENT
Start: 2021-04-22 | End: 2021-05-06

## 2021-04-22 RX ORDER — SODIUM CHLORIDE 9 MG/ML
1000 INJECTION, SOLUTION INTRAVENOUS
Refills: 0 | Status: DISCONTINUED | OUTPATIENT
Start: 2021-04-22 | End: 2021-04-22

## 2021-04-22 RX ORDER — DEXTROSE 50 % IN WATER 50 %
25 SYRINGE (ML) INTRAVENOUS ONCE
Refills: 0 | Status: DISCONTINUED | OUTPATIENT
Start: 2021-04-22 | End: 2021-05-06

## 2021-04-22 RX ORDER — ACETAMINOPHEN 500 MG
100 TABLET ORAL
Qty: 0 | Refills: 0 | DISCHARGE
Start: 2021-04-22

## 2021-04-22 RX ORDER — ONDANSETRON 8 MG/1
4 TABLET, FILM COATED ORAL ONCE
Refills: 0 | Status: DISCONTINUED | OUTPATIENT
Start: 2021-04-22 | End: 2021-04-22

## 2021-04-22 RX ORDER — INSULIN LISPRO 100/ML
VIAL (ML) SUBCUTANEOUS
Refills: 0 | Status: DISCONTINUED | OUTPATIENT
Start: 2021-04-22 | End: 2021-05-06

## 2021-04-22 RX ORDER — FUROSEMIDE 40 MG
1 TABLET ORAL
Qty: 0 | Refills: 0 | DISCHARGE

## 2021-04-22 RX ORDER — ACETAMINOPHEN 500 MG
1000 TABLET ORAL ONCE
Refills: 0 | Status: DISCONTINUED | OUTPATIENT
Start: 2021-04-22 | End: 2021-04-22

## 2021-04-22 RX ORDER — LIDOCAINE HCL 20 MG/ML
0.2 VIAL (ML) INJECTION ONCE
Refills: 0 | Status: DISCONTINUED | OUTPATIENT
Start: 2021-04-22 | End: 2021-04-22

## 2021-04-22 RX ORDER — DEXTROSE 50 % IN WATER 50 %
12.5 SYRINGE (ML) INTRAVENOUS ONCE
Refills: 0 | Status: DISCONTINUED | OUTPATIENT
Start: 2021-04-22 | End: 2021-05-06

## 2021-04-22 RX ORDER — GLUCAGON INJECTION, SOLUTION 0.5 MG/.1ML
1 INJECTION, SOLUTION SUBCUTANEOUS ONCE
Refills: 0 | Status: DISCONTINUED | OUTPATIENT
Start: 2021-04-22 | End: 2021-05-06

## 2021-04-22 RX ORDER — HYDROMORPHONE HYDROCHLORIDE 2 MG/ML
0.25 INJECTION INTRAMUSCULAR; INTRAVENOUS; SUBCUTANEOUS
Refills: 0 | Status: DISCONTINUED | OUTPATIENT
Start: 2021-04-22 | End: 2021-04-22

## 2021-04-22 RX ORDER — SODIUM CHLORIDE 9 MG/ML
3 INJECTION INTRAMUSCULAR; INTRAVENOUS; SUBCUTANEOUS EVERY 8 HOURS
Refills: 0 | Status: DISCONTINUED | OUTPATIENT
Start: 2021-04-22 | End: 2021-04-22

## 2021-04-22 RX ORDER — DEXTROSE 50 % IN WATER 50 %
15 SYRINGE (ML) INTRAVENOUS ONCE
Refills: 0 | Status: DISCONTINUED | OUTPATIENT
Start: 2021-04-22 | End: 2021-05-06

## 2021-04-22 RX ADMIN — Medication 4: at 13:12

## 2021-04-22 NOTE — ASU DISCHARGE PLAN (ADULT/PEDIATRIC) - ASU DC SPECIAL INSTRUCTIONSFT
Follow up with Dr. Schuler in one week of discharge Call transplant center at [phone] to make an appointment for Follow up with Dr. Schuler in one week of discharge Call transplant center at [phone] to make an appointment for Follow up with Dr. Schuler in one week of discharge.  Resume all medications as prescribed prior to this procedure

## 2021-04-22 NOTE — PROVIDER CONTACT NOTE (MEDICATION) - ASSESSMENT
Provider contacted to clarify and confirm dosages and changes made to pt meds on discharge instructions

## 2021-04-22 NOTE — ASU PATIENT PROFILE, ADULT - IS PATIENT PREGNANT?
not applicable (Male) Carac Counseling:  I discussed with the patient the risks of Carac including but not limited to erythema, scaling, itching, weeping, crusting, and pain.

## 2021-04-22 NOTE — H&P PST ADULT - ATTENDING COMMENTS
Patient is s/p DDRTx.  Ureteral stent placed at the time of transplant to prophylax against ureteral complications.  Patient now presents for routine removal.  We discussed the risks, benefits and alternatives of the surgery.  Outlined risks include, but are not limited to, bleeding, infection and damage to the transplant ureter, bladder or lower urinary tract.  Also has PD catheter which needs to be removed.  The patient signed consent after all questions were satisfactorily answered.

## 2021-04-22 NOTE — ASU DISCHARGE PLAN (ADULT/PEDIATRIC) - CALL YOUR DOCTOR IF YOU HAVE ANY OF THE FOLLOWING:
Bleeding that does not stop/Swelling that gets worse/Pain not relieved by Medications/Fever greater than (need to indicate Fahrenheit or Celsius)/Numbness, tingling, color or temperature change to extremity/Nausea and vomiting that does not stop/Unable to urinate/Excessive diarrhea

## 2021-04-22 NOTE — ASU DISCHARGE PLAN (ADULT/PEDIATRIC) - CARE PROVIDER_API CALL
Som Schuler)  Surgery  [address] [place] [address]  Phone: [phone]  Fax: [phone]  Follow Up Time:

## 2021-04-23 ENCOUNTER — NON-APPOINTMENT (OUTPATIENT)
Age: 71
End: 2021-04-23

## 2021-04-23 LAB
ALBUMIN SERPL ELPH-MCNC: 4.2 G/DL
ALP BLD-CCNC: 214 U/L
ALT SERPL-CCNC: 7 U/L
ANION GAP SERPL CALC-SCNC: 14 MMOL/L
APPEARANCE: CLEAR
AST SERPL-CCNC: 9 U/L
BACTERIA: NEGATIVE
BASOPHILS # BLD AUTO: 0.07 K/UL
BASOPHILS NFR BLD AUTO: 0.9 %
BILIRUB SERPL-MCNC: 0.2 MG/DL
BILIRUBIN URINE: NEGATIVE
BKV DNA SPEC QL NAA+PROBE: NEGATIVE COPIES/ML
BLOOD URINE: NEGATIVE
BUN SERPL-MCNC: 52 MG/DL
CALCIUM SERPL-MCNC: 9.5 MG/DL
CHLORIDE SERPL-SCNC: 105 MMOL/L
CO2 SERPL-SCNC: 16 MMOL/L
COLOR: NORMAL
CREAT SERPL-MCNC: 1.36 MG/DL
CREAT SPEC-SCNC: 48 MG/DL
CREAT/PROT UR: 0.2 RATIO
EOSINOPHIL # BLD AUTO: 0.05 K/UL
EOSINOPHIL NFR BLD AUTO: 0.7 %
GLUCOSE QUALITATIVE U: ABNORMAL
GLUCOSE SERPL-MCNC: 338 MG/DL
HCT VFR BLD CALC: 34.4 %
HGB BLD-MCNC: 10.9 G/DL
HYALINE CASTS: 0 /LPF
IMM GRANULOCYTES NFR BLD AUTO: 0.7 %
KETONES URINE: NEGATIVE
LDH SERPL-CCNC: 226 U/L
LEUKOCYTE ESTERASE URINE: NEGATIVE
LOG 10 BK QUANTITATION PCR: NORMAL
LYMPHOCYTES # BLD AUTO: 0.92 K/UL
LYMPHOCYTES NFR BLD AUTO: 12.3 %
MAGNESIUM SERPL-MCNC: 2 MG/DL
MAN DIFF?: NORMAL
MCHC RBC-ENTMCNC: 31.7 GM/DL
MCHC RBC-ENTMCNC: 32.3 PG
MCV RBC AUTO: 102.1 FL
MICROSCOPIC-UA: NORMAL
MONOCYTES # BLD AUTO: 0.73 K/UL
MONOCYTES NFR BLD AUTO: 9.8 %
NEUTROPHILS # BLD AUTO: 5.66 K/UL
NEUTROPHILS NFR BLD AUTO: 75.6 %
NITRITE URINE: NEGATIVE
PH URINE: 5.5
PHOSPHATE SERPL-MCNC: 2.9 MG/DL
PLATELET # BLD AUTO: 168 K/UL
POTASSIUM SERPL-SCNC: 5.3 MMOL/L
PROT SERPL-MCNC: 6.5 G/DL
PROT UR-MCNC: 7 MG/DL
PROTEIN URINE: NEGATIVE
RBC # BLD: 3.37 M/UL
RBC # FLD: 13.7 %
RED BLOOD CELLS URINE: 1 /HPF
SODIUM SERPL-SCNC: 135 MMOL/L
SPECIFIC GRAVITY URINE: 1.01
SQUAMOUS EPITHELIAL CELLS: 0 /HPF
TACROLIMUS SERPL-MCNC: 8.2 NG/ML
URATE SERPL-MCNC: 6.7 MG/DL
UROBILINOGEN URINE: NORMAL
WBC # FLD AUTO: 7.48 K/UL
WHITE BLOOD CELLS URINE: 1 /HPF

## 2021-04-29 ENCOUNTER — INPATIENT (INPATIENT)
Facility: HOSPITAL | Age: 71
LOS: 2 days | Discharge: HOME | DRG: 641 | End: 2021-05-02
Payer: MEDICARE

## 2021-04-29 ENCOUNTER — APPOINTMENT (OUTPATIENT)
Dept: TRANSPLANT | Facility: CLINIC | Age: 71
End: 2021-04-29

## 2021-04-29 VITALS
RESPIRATION RATE: 18 BRPM | HEIGHT: 66 IN | SYSTOLIC BLOOD PRESSURE: 160 MMHG | DIASTOLIC BLOOD PRESSURE: 77 MMHG | WEIGHT: 171.96 LBS | HEART RATE: 75 BPM | OXYGEN SATURATION: 97 % | TEMPERATURE: 98 F

## 2021-04-29 PROCEDURE — 93010 ELECTROCARDIOGRAM REPORT: CPT | Mod: 76

## 2021-04-29 PROCEDURE — 99291 CRITICAL CARE FIRST HOUR: CPT

## 2021-04-30 DIAGNOSIS — I10 ESSENTIAL (PRIMARY) HYPERTENSION: ICD-10-CM

## 2021-04-30 DIAGNOSIS — Z94.0 KIDNEY TRANSPLANT STATUS: Chronic | ICD-10-CM

## 2021-04-30 DIAGNOSIS — E87.5 HYPERKALEMIA: ICD-10-CM

## 2021-04-30 DIAGNOSIS — E78.5 HYPERLIPIDEMIA, UNSPECIFIED: ICD-10-CM

## 2021-04-30 DIAGNOSIS — E11.65 TYPE 2 DIABETES MELLITUS WITH HYPERGLYCEMIA: ICD-10-CM

## 2021-04-30 LAB
ALBUMIN SERPL ELPH-MCNC: 3.6 G/DL — SIGNIFICANT CHANGE UP (ref 3.3–5)
ALBUMIN SERPL ELPH-MCNC: 3.8 G/DL — SIGNIFICANT CHANGE UP (ref 3.3–5)
ALBUMIN SERPL ELPH-MCNC: 4.1 G/DL — SIGNIFICANT CHANGE UP (ref 3.3–5)
ALBUMIN SERPL ELPH-MCNC: 4.2 G/DL
ALP BLD-CCNC: 188 U/L
ALP SERPL-CCNC: 163 U/L — HIGH (ref 40–120)
ALP SERPL-CCNC: 166 U/L — HIGH (ref 40–120)
ALP SERPL-CCNC: 179 U/L — HIGH (ref 40–120)
ALT FLD-CCNC: 10 U/L — SIGNIFICANT CHANGE UP (ref 10–45)
ALT FLD-CCNC: 8 U/L — LOW (ref 10–45)
ALT FLD-CCNC: 9 U/L — LOW (ref 10–45)
ALT SERPL-CCNC: 11 U/L
ANION GAP SERPL CALC-SCNC: 10 MMOL/L — SIGNIFICANT CHANGE UP (ref 5–17)
ANION GAP SERPL CALC-SCNC: 11 MMOL/L — SIGNIFICANT CHANGE UP (ref 5–17)
ANION GAP SERPL CALC-SCNC: 14 MMOL/L — SIGNIFICANT CHANGE UP (ref 5–17)
ANION GAP SERPL CALC-SCNC: 9 MMOL/L
ANION GAP SERPL CALC-SCNC: 9 MMOL/L — SIGNIFICANT CHANGE UP (ref 5–17)
ANION GAP SERPL CALC-SCNC: 9 MMOL/L — SIGNIFICANT CHANGE UP (ref 5–17)
APPEARANCE: CLEAR
AST SERPL-CCNC: 10 U/L — SIGNIFICANT CHANGE UP (ref 10–40)
AST SERPL-CCNC: 13 U/L
AST SERPL-CCNC: 9 U/L — LOW (ref 10–40)
AST SERPL-CCNC: 9 U/L — LOW (ref 10–40)
BACTERIA: NEGATIVE
BASE EXCESS BLDV CALC-SCNC: -2.8 MMOL/L — LOW (ref -2–2)
BASOPHILS # BLD AUTO: 0.04 K/UL
BASOPHILS # BLD AUTO: 0.04 K/UL — SIGNIFICANT CHANGE UP (ref 0–0.2)
BASOPHILS # BLD AUTO: 0.05 K/UL — SIGNIFICANT CHANGE UP (ref 0–0.2)
BASOPHILS NFR BLD AUTO: 0.5 %
BASOPHILS NFR BLD AUTO: 0.7 % — SIGNIFICANT CHANGE UP (ref 0–2)
BASOPHILS NFR BLD AUTO: 0.9 % — SIGNIFICANT CHANGE UP (ref 0–2)
BILIRUB SERPL-MCNC: 0.2 MG/DL
BILIRUB SERPL-MCNC: 0.2 MG/DL — SIGNIFICANT CHANGE UP (ref 0.2–1.2)
BILIRUBIN URINE: NEGATIVE
BLOOD URINE: NEGATIVE
BUN SERPL-MCNC: 28 MG/DL — HIGH (ref 7–23)
BUN SERPL-MCNC: 31 MG/DL — HIGH (ref 7–23)
BUN SERPL-MCNC: 34 MG/DL — HIGH (ref 7–23)
BUN SERPL-MCNC: 39 MG/DL — HIGH (ref 7–23)
BUN SERPL-MCNC: 40 MG/DL
BUN SERPL-MCNC: 40 MG/DL — HIGH (ref 7–23)
CA-I SERPL-SCNC: 1.29 MMOL/L — SIGNIFICANT CHANGE UP (ref 1.12–1.3)
CALCIUM SERPL-MCNC: 9 MG/DL — SIGNIFICANT CHANGE UP (ref 8.4–10.5)
CALCIUM SERPL-MCNC: 9.2 MG/DL — SIGNIFICANT CHANGE UP (ref 8.4–10.5)
CALCIUM SERPL-MCNC: 9.3 MG/DL — SIGNIFICANT CHANGE UP (ref 8.4–10.5)
CALCIUM SERPL-MCNC: 9.5 MG/DL — SIGNIFICANT CHANGE UP (ref 8.4–10.5)
CALCIUM SERPL-MCNC: 9.6 MG/DL
CALCIUM SERPL-MCNC: 9.6 MG/DL
CALCIUM SERPL-MCNC: 9.6 MG/DL — SIGNIFICANT CHANGE UP (ref 8.4–10.5)
CHLORIDE BLDV-SCNC: 109 MMOL/L — HIGH (ref 96–108)
CHLORIDE SERPL-SCNC: 105 MMOL/L
CHLORIDE SERPL-SCNC: 105 MMOL/L — SIGNIFICANT CHANGE UP (ref 96–108)
CHLORIDE SERPL-SCNC: 107 MMOL/L — SIGNIFICANT CHANGE UP (ref 96–108)
CHLORIDE SERPL-SCNC: 109 MMOL/L — HIGH (ref 96–108)
CHLORIDE SERPL-SCNC: 109 MMOL/L — HIGH (ref 96–108)
CHLORIDE SERPL-SCNC: 111 MMOL/L — HIGH (ref 96–108)
CO2 BLDV-SCNC: 24 MMOL/L — SIGNIFICANT CHANGE UP (ref 22–30)
CO2 SERPL-SCNC: 17 MMOL/L — LOW (ref 22–31)
CO2 SERPL-SCNC: 18 MMOL/L
CO2 SERPL-SCNC: 18 MMOL/L — LOW (ref 22–31)
CO2 SERPL-SCNC: 18 MMOL/L — LOW (ref 22–31)
CO2 SERPL-SCNC: 19 MMOL/L — LOW (ref 22–31)
CO2 SERPL-SCNC: 20 MMOL/L — LOW (ref 22–31)
COLOR: NORMAL
COVID-19 SPIKE DOMAIN ANTIBODY INTERPRETATION: POSITIVE
CREAT SERPL-MCNC: 1.04 MG/DL — SIGNIFICANT CHANGE UP (ref 0.5–1.3)
CREAT SERPL-MCNC: 1.12 MG/DL — SIGNIFICANT CHANGE UP (ref 0.5–1.3)
CREAT SERPL-MCNC: 1.14 MG/DL
CREAT SERPL-MCNC: 1.14 MG/DL — SIGNIFICANT CHANGE UP (ref 0.5–1.3)
CREAT SERPL-MCNC: 1.27 MG/DL — SIGNIFICANT CHANGE UP (ref 0.5–1.3)
CREAT SERPL-MCNC: 1.32 MG/DL — HIGH (ref 0.5–1.3)
CREAT SPEC-SCNC: 34 MG/DL
CREAT/PROT UR: 0.3 RATIO
EOSINOPHIL # BLD AUTO: 0.06 K/UL — SIGNIFICANT CHANGE UP (ref 0–0.5)
EOSINOPHIL # BLD AUTO: 0.07 K/UL
EOSINOPHIL # BLD AUTO: 0.09 K/UL — SIGNIFICANT CHANGE UP (ref 0–0.5)
EOSINOPHIL NFR BLD AUTO: 0.9 %
EOSINOPHIL NFR BLD AUTO: 1 % — SIGNIFICANT CHANGE UP (ref 0–6)
EOSINOPHIL NFR BLD AUTO: 1.5 % — SIGNIFICANT CHANGE UP (ref 0–6)
GAS PNL BLDV: 134 MMOL/L — LOW (ref 135–145)
GAS PNL BLDV: SIGNIFICANT CHANGE UP
GAS PNL BLDV: SIGNIFICANT CHANGE UP
GLUCOSE BLDC GLUCOMTR-MCNC: 136 MG/DL — HIGH (ref 70–99)
GLUCOSE BLDC GLUCOMTR-MCNC: 140 MG/DL — HIGH (ref 70–99)
GLUCOSE BLDC GLUCOMTR-MCNC: 161 MG/DL — HIGH (ref 70–99)
GLUCOSE BLDC GLUCOMTR-MCNC: 173 MG/DL — HIGH (ref 70–99)
GLUCOSE BLDC GLUCOMTR-MCNC: 175 MG/DL — HIGH (ref 70–99)
GLUCOSE BLDC GLUCOMTR-MCNC: 188 MG/DL — HIGH (ref 70–99)
GLUCOSE BLDC GLUCOMTR-MCNC: 195 MG/DL — HIGH (ref 70–99)
GLUCOSE BLDV-MCNC: 374 MG/DL — HIGH (ref 70–99)
GLUCOSE QUALITATIVE U: ABNORMAL
GLUCOSE SERPL-MCNC: 131 MG/DL — HIGH (ref 70–99)
GLUCOSE SERPL-MCNC: 143 MG/DL — HIGH (ref 70–99)
GLUCOSE SERPL-MCNC: 165 MG/DL — HIGH (ref 70–99)
GLUCOSE SERPL-MCNC: 305 MG/DL
GLUCOSE SERPL-MCNC: 412 MG/DL — HIGH (ref 70–99)
GLUCOSE SERPL-MCNC: 422 MG/DL — HIGH (ref 70–99)
HCO3 BLDV-SCNC: 23 MMOL/L — SIGNIFICANT CHANGE UP (ref 21–29)
HCT VFR BLD CALC: 30.4 % — LOW (ref 39–50)
HCT VFR BLD CALC: 32.8 % — LOW (ref 39–50)
HCT VFR BLD CALC: 33.2 %
HCT VFR BLDA CALC: 31 % — LOW (ref 39–50)
HGB BLD CALC-MCNC: 10.1 G/DL — LOW (ref 13–17)
HGB BLD-MCNC: 10.6 G/DL — LOW (ref 13–17)
HGB BLD-MCNC: 10.9 G/DL
HGB BLD-MCNC: 9.8 G/DL — LOW (ref 13–17)
HYALINE CASTS: 0 /LPF
IMM GRANULOCYTES NFR BLD AUTO: 0.8 %
IMM GRANULOCYTES NFR BLD AUTO: 0.8 % — SIGNIFICANT CHANGE UP (ref 0–1.5)
IMM GRANULOCYTES NFR BLD AUTO: 0.9 % — SIGNIFICANT CHANGE UP (ref 0–1.5)
KETONES URINE: NEGATIVE
LACTATE BLDV-MCNC: 1.2 MMOL/L — SIGNIFICANT CHANGE UP (ref 0.7–2)
LDH SERPL-CCNC: 263 U/L
LEUKOCYTE ESTERASE URINE: NEGATIVE
LYMPHOCYTES # BLD AUTO: 0.49 K/UL
LYMPHOCYTES # BLD AUTO: 0.83 K/UL — LOW (ref 1–3.3)
LYMPHOCYTES # BLD AUTO: 0.96 K/UL — LOW (ref 1–3.3)
LYMPHOCYTES # BLD AUTO: 14.1 % — SIGNIFICANT CHANGE UP (ref 13–44)
LYMPHOCYTES # BLD AUTO: 16.5 % — SIGNIFICANT CHANGE UP (ref 13–44)
LYMPHOCYTES NFR BLD AUTO: 6.6 %
MAGNESIUM SERPL-MCNC: 1.8 MG/DL — SIGNIFICANT CHANGE UP (ref 1.6–2.6)
MAGNESIUM SERPL-MCNC: 1.8 MG/DL — SIGNIFICANT CHANGE UP (ref 1.6–2.6)
MAGNESIUM SERPL-MCNC: 1.9 MG/DL
MAN DIFF?: NORMAL
MCHC RBC-ENTMCNC: 32.1 PG — SIGNIFICANT CHANGE UP (ref 27–34)
MCHC RBC-ENTMCNC: 32.1 PG — SIGNIFICANT CHANGE UP (ref 27–34)
MCHC RBC-ENTMCNC: 32.2 GM/DL — SIGNIFICANT CHANGE UP (ref 32–36)
MCHC RBC-ENTMCNC: 32.3 GM/DL — SIGNIFICANT CHANGE UP (ref 32–36)
MCHC RBC-ENTMCNC: 32.5 PG
MCHC RBC-ENTMCNC: 32.8 GM/DL
MCV RBC AUTO: 99.1 FL
MCV RBC AUTO: 99.4 FL — SIGNIFICANT CHANGE UP (ref 80–100)
MCV RBC AUTO: 99.7 FL — SIGNIFICANT CHANGE UP (ref 80–100)
MICROSCOPIC-UA: NORMAL
MONOCYTES # BLD AUTO: 0.38 K/UL
MONOCYTES # BLD AUTO: 0.47 K/UL — SIGNIFICANT CHANGE UP (ref 0–0.9)
MONOCYTES # BLD AUTO: 0.49 K/UL — SIGNIFICANT CHANGE UP (ref 0–0.9)
MONOCYTES NFR BLD AUTO: 5.1 %
MONOCYTES NFR BLD AUTO: 8 % — SIGNIFICANT CHANGE UP (ref 2–14)
MONOCYTES NFR BLD AUTO: 8.4 % — SIGNIFICANT CHANGE UP (ref 2–14)
NEUTROPHILS # BLD AUTO: 4.19 K/UL — SIGNIFICANT CHANGE UP (ref 1.8–7.4)
NEUTROPHILS # BLD AUTO: 4.44 K/UL — SIGNIFICANT CHANGE UP (ref 1.8–7.4)
NEUTROPHILS # BLD AUTO: 6.38 K/UL
NEUTROPHILS NFR BLD AUTO: 71.8 % — SIGNIFICANT CHANGE UP (ref 43–77)
NEUTROPHILS NFR BLD AUTO: 75.4 % — SIGNIFICANT CHANGE UP (ref 43–77)
NEUTROPHILS NFR BLD AUTO: 86.1 %
NITRITE URINE: NEGATIVE
NRBC # BLD: 0 /100 WBCS — SIGNIFICANT CHANGE UP (ref 0–0)
NRBC # BLD: 0 /100 WBCS — SIGNIFICANT CHANGE UP (ref 0–0)
PARATHYROID HORMONE INTACT: 124 PG/ML
PCO2 BLDV: 45 MMHG — SIGNIFICANT CHANGE UP (ref 35–50)
PH BLDV: 7.32 — LOW (ref 7.35–7.45)
PH URINE: 6
PHOSPHATE SERPL-MCNC: 1.9 MG/DL — LOW (ref 2.5–4.5)
PHOSPHATE SERPL-MCNC: 2.3 MG/DL — LOW (ref 2.5–4.5)
PHOSPHATE SERPL-MCNC: 2.4 MG/DL
PLATELET # BLD AUTO: 170 K/UL — SIGNIFICANT CHANGE UP (ref 150–400)
PLATELET # BLD AUTO: 173 K/UL — SIGNIFICANT CHANGE UP (ref 150–400)
PLATELET # BLD AUTO: 187 K/UL
PO2 BLDV: 53 MMHG — HIGH (ref 25–45)
POTASSIUM BLDV-SCNC: 6.2 MMOL/L — CRITICAL HIGH (ref 3.5–5.3)
POTASSIUM SERPL-MCNC: 5.2 MMOL/L — SIGNIFICANT CHANGE UP (ref 3.5–5.3)
POTASSIUM SERPL-MCNC: 5.3 MMOL/L — SIGNIFICANT CHANGE UP (ref 3.5–5.3)
POTASSIUM SERPL-MCNC: 6.3 MMOL/L — CRITICAL HIGH (ref 3.5–5.3)
POTASSIUM SERPL-MCNC: 6.4 MMOL/L — CRITICAL HIGH (ref 3.5–5.3)
POTASSIUM SERPL-MCNC: 6.6 MMOL/L — CRITICAL HIGH (ref 3.5–5.3)
POTASSIUM SERPL-SCNC: 5.2 MMOL/L — SIGNIFICANT CHANGE UP (ref 3.5–5.3)
POTASSIUM SERPL-SCNC: 5.3 MMOL/L — SIGNIFICANT CHANGE UP (ref 3.5–5.3)
POTASSIUM SERPL-SCNC: 6.3 MMOL/L — CRITICAL HIGH (ref 3.5–5.3)
POTASSIUM SERPL-SCNC: 6.4 MMOL/L — CRITICAL HIGH (ref 3.5–5.3)
POTASSIUM SERPL-SCNC: 6.6 MMOL/L — CRITICAL HIGH (ref 3.5–5.3)
POTASSIUM SERPL-SCNC: 6.9 MMOL/L
PROT SERPL-MCNC: 5.9 G/DL — LOW (ref 6–8.3)
PROT SERPL-MCNC: 6.3 G/DL — SIGNIFICANT CHANGE UP (ref 6–8.3)
PROT SERPL-MCNC: 6.5 G/DL
PROT SERPL-MCNC: 6.7 G/DL — SIGNIFICANT CHANGE UP (ref 6–8.3)
PROT UR-MCNC: 12 MG/DL
PROTEIN URINE: NEGATIVE
RBC # BLD: 3.05 M/UL — LOW (ref 4.2–5.8)
RBC # BLD: 3.3 M/UL — LOW (ref 4.2–5.8)
RBC # BLD: 3.35 M/UL
RBC # FLD: 13.4 %
RBC # FLD: 13.5 % — SIGNIFICANT CHANGE UP (ref 10.3–14.5)
RBC # FLD: 13.7 % — SIGNIFICANT CHANGE UP (ref 10.3–14.5)
RED BLOOD CELLS URINE: 1 /HPF
SAO2 % BLDV: 87 % — SIGNIFICANT CHANGE UP (ref 67–88)
SARS-COV-2 AB SERPL IA-ACNC: 43.5 U/ML
SARS-COV-2 RNA SPEC QL NAA+PROBE: SIGNIFICANT CHANGE UP
SODIUM SERPL-SCNC: 133 MMOL/L
SODIUM SERPL-SCNC: 135 MMOL/L — SIGNIFICANT CHANGE UP (ref 135–145)
SODIUM SERPL-SCNC: 136 MMOL/L — SIGNIFICANT CHANGE UP (ref 135–145)
SODIUM SERPL-SCNC: 137 MMOL/L — SIGNIFICANT CHANGE UP (ref 135–145)
SODIUM SERPL-SCNC: 138 MMOL/L — SIGNIFICANT CHANGE UP (ref 135–145)
SODIUM SERPL-SCNC: 140 MMOL/L — SIGNIFICANT CHANGE UP (ref 135–145)
SPECIFIC GRAVITY URINE: 1.02
SQUAMOUS EPITHELIAL CELLS: 0 /HPF
TACROLIMUS SERPL-MCNC: 5.5 NG/ML — SIGNIFICANT CHANGE UP
TACROLIMUS SERPL-MCNC: 6.3 NG/ML — SIGNIFICANT CHANGE UP
TACROLIMUS SERPL-MCNC: 9.3 NG/ML
URATE SERPL-MCNC: 5.9 MG/DL
UROBILINOGEN URINE: NORMAL
WBC # BLD: 5.83 K/UL — SIGNIFICANT CHANGE UP (ref 3.8–10.5)
WBC # BLD: 5.89 K/UL — SIGNIFICANT CHANGE UP (ref 3.8–10.5)
WBC # FLD AUTO: 5.83 K/UL — SIGNIFICANT CHANGE UP (ref 3.8–10.5)
WBC # FLD AUTO: 5.89 K/UL — SIGNIFICANT CHANGE UP (ref 3.8–10.5)
WBC # FLD AUTO: 7.42 K/UL
WHITE BLOOD CELLS URINE: 1 /HPF

## 2021-04-30 PROCEDURE — 93975 VASCULAR STUDY: CPT | Mod: 26

## 2021-04-30 PROCEDURE — 99222 1ST HOSP IP/OBS MODERATE 55: CPT | Mod: GC

## 2021-04-30 PROCEDURE — 99222 1ST HOSP IP/OBS MODERATE 55: CPT

## 2021-04-30 RX ORDER — DEXTROSE 50 % IN WATER 50 %
25 SYRINGE (ML) INTRAVENOUS ONCE
Refills: 0 | Status: DISCONTINUED | OUTPATIENT
Start: 2021-04-30 | End: 2021-05-02

## 2021-04-30 RX ORDER — INSULIN HUMAN 100 [IU]/ML
10 INJECTION, SOLUTION SUBCUTANEOUS ONCE
Refills: 0 | Status: COMPLETED | OUTPATIENT
Start: 2021-04-30 | End: 2021-04-30

## 2021-04-30 RX ORDER — INSULIN GLARGINE 100 [IU]/ML
25 INJECTION, SOLUTION SUBCUTANEOUS AT BEDTIME
Refills: 0 | Status: DISCONTINUED | OUTPATIENT
Start: 2021-04-30 | End: 2021-05-01

## 2021-04-30 RX ORDER — MYCOPHENOLATE MOFETIL 250 MG/1
1000 CAPSULE ORAL
Refills: 0 | Status: DISCONTINUED | OUTPATIENT
Start: 2021-04-30 | End: 2021-05-02

## 2021-04-30 RX ORDER — INSULIN LISPRO 100/ML
VIAL (ML) SUBCUTANEOUS AT BEDTIME
Refills: 0 | Status: DISCONTINUED | OUTPATIENT
Start: 2021-04-30 | End: 2021-05-01

## 2021-04-30 RX ORDER — INSULIN LISPRO 100/ML
VIAL (ML) SUBCUTANEOUS
Refills: 0 | Status: DISCONTINUED | OUTPATIENT
Start: 2021-04-30 | End: 2021-05-01

## 2021-04-30 RX ORDER — INSULIN LISPRO 100/ML
5 VIAL (ML) SUBCUTANEOUS
Refills: 0 | Status: DISCONTINUED | OUTPATIENT
Start: 2021-04-30 | End: 2021-05-01

## 2021-04-30 RX ORDER — FAMOTIDINE 10 MG/ML
20 INJECTION INTRAVENOUS DAILY
Refills: 0 | Status: DISCONTINUED | OUTPATIENT
Start: 2021-04-30 | End: 2021-05-02

## 2021-04-30 RX ORDER — SODIUM CHLORIDE 9 MG/ML
1000 INJECTION INTRAMUSCULAR; INTRAVENOUS; SUBCUTANEOUS ONCE
Refills: 0 | Status: COMPLETED | OUTPATIENT
Start: 2021-04-30 | End: 2021-04-30

## 2021-04-30 RX ORDER — CARVEDILOL PHOSPHATE 80 MG/1
25 CAPSULE, EXTENDED RELEASE ORAL ONCE
Refills: 0 | Status: COMPLETED | OUTPATIENT
Start: 2021-04-30 | End: 2021-04-30

## 2021-04-30 RX ORDER — DEXTROSE 50 % IN WATER 50 %
50 SYRINGE (ML) INTRAVENOUS ONCE
Refills: 0 | Status: COMPLETED | OUTPATIENT
Start: 2021-04-30 | End: 2021-04-30

## 2021-04-30 RX ORDER — ACETAMINOPHEN 500 MG
650 TABLET ORAL ONCE
Refills: 0 | Status: COMPLETED | OUTPATIENT
Start: 2021-04-30 | End: 2021-04-30

## 2021-04-30 RX ORDER — CALCIUM GLUCONATE 100 MG/ML
1 VIAL (ML) INTRAVENOUS ONCE
Refills: 0 | Status: COMPLETED | OUTPATIENT
Start: 2021-04-30 | End: 2021-04-30

## 2021-04-30 RX ORDER — DEXTROSE 50 % IN WATER 50 %
25 SYRINGE (ML) INTRAVENOUS ONCE
Refills: 0 | Status: DISCONTINUED | OUTPATIENT
Start: 2021-04-30 | End: 2021-04-30

## 2021-04-30 RX ORDER — SODIUM ZIRCONIUM CYCLOSILICATE 10 G/10G
10 POWDER, FOR SUSPENSION ORAL ONCE
Refills: 0 | Status: COMPLETED | OUTPATIENT
Start: 2021-04-30 | End: 2021-04-30

## 2021-04-30 RX ORDER — VALGANCICLOVIR 450 MG/1
450 TABLET, FILM COATED ORAL DAILY
Refills: 0 | Status: DISCONTINUED | OUTPATIENT
Start: 2021-04-30 | End: 2021-05-02

## 2021-04-30 RX ORDER — SENNA PLUS 8.6 MG/1
2 TABLET ORAL AT BEDTIME
Refills: 0 | Status: DISCONTINUED | OUTPATIENT
Start: 2021-04-30 | End: 2021-05-02

## 2021-04-30 RX ORDER — SODIUM CHLORIDE 9 MG/ML
1000 INJECTION, SOLUTION INTRAVENOUS
Refills: 0 | Status: DISCONTINUED | OUTPATIENT
Start: 2021-04-30 | End: 2021-05-02

## 2021-04-30 RX ORDER — SODIUM ZIRCONIUM CYCLOSILICATE 10 G/10G
10 POWDER, FOR SUSPENSION ORAL THREE TIMES A DAY
Refills: 0 | Status: COMPLETED | OUTPATIENT
Start: 2021-05-01 | End: 2021-05-01

## 2021-04-30 RX ORDER — DEXTROSE 50 % IN WATER 50 %
12.5 SYRINGE (ML) INTRAVENOUS ONCE
Refills: 0 | Status: DISCONTINUED | OUTPATIENT
Start: 2021-04-30 | End: 2021-05-02

## 2021-04-30 RX ORDER — SODIUM BICARBONATE 1 MEQ/ML
1300 SYRINGE (ML) INTRAVENOUS
Refills: 0 | Status: DISCONTINUED | OUTPATIENT
Start: 2021-04-30 | End: 2021-05-01

## 2021-04-30 RX ORDER — GLUCAGON INJECTION, SOLUTION 0.5 MG/.1ML
1 INJECTION, SOLUTION SUBCUTANEOUS ONCE
Refills: 0 | Status: DISCONTINUED | OUTPATIENT
Start: 2021-04-30 | End: 2021-05-02

## 2021-04-30 RX ORDER — CARVEDILOL PHOSPHATE 80 MG/1
25 CAPSULE, EXTENDED RELEASE ORAL EVERY 12 HOURS
Refills: 0 | Status: DISCONTINUED | OUTPATIENT
Start: 2021-04-30 | End: 2021-05-02

## 2021-04-30 RX ORDER — TACROLIMUS 5 MG/1
6 CAPSULE ORAL
Refills: 0 | Status: DISCONTINUED | OUTPATIENT
Start: 2021-04-30 | End: 2021-05-01

## 2021-04-30 RX ORDER — ASPIRIN/CALCIUM CARB/MAGNESIUM 324 MG
81 TABLET ORAL DAILY
Refills: 0 | Status: DISCONTINUED | OUTPATIENT
Start: 2021-04-30 | End: 2021-05-02

## 2021-04-30 RX ORDER — TAMSULOSIN HYDROCHLORIDE 0.4 MG/1
0.4 CAPSULE ORAL AT BEDTIME
Refills: 0 | Status: DISCONTINUED | OUTPATIENT
Start: 2021-04-30 | End: 2021-05-02

## 2021-04-30 RX ORDER — DEXTROSE 50 % IN WATER 50 %
15 SYRINGE (ML) INTRAVENOUS ONCE
Refills: 0 | Status: DISCONTINUED | OUTPATIENT
Start: 2021-04-30 | End: 2021-05-02

## 2021-04-30 RX ORDER — NYSTATIN 500MM UNIT
500000 POWDER (EA) MISCELLANEOUS
Refills: 0 | Status: DISCONTINUED | OUTPATIENT
Start: 2021-04-30 | End: 2021-05-02

## 2021-04-30 RX ORDER — ACETAMINOPHEN 500 MG
2 TABLET ORAL
Qty: 0 | Refills: 0 | DISCHARGE

## 2021-04-30 RX ORDER — SODIUM BICARBONATE 1 MEQ/ML
50 SYRINGE (ML) INTRAVENOUS ONCE
Refills: 0 | Status: COMPLETED | OUTPATIENT
Start: 2021-04-30 | End: 2021-04-30

## 2021-04-30 RX ORDER — ATOVAQUONE 750 MG/5ML
1500 SUSPENSION ORAL DAILY
Refills: 0 | Status: DISCONTINUED | OUTPATIENT
Start: 2021-04-30 | End: 2021-05-02

## 2021-04-30 RX ADMIN — Medication 81 MILLIGRAM(S): at 11:41

## 2021-04-30 RX ADMIN — Medication 500000 UNIT(S): at 11:41

## 2021-04-30 RX ADMIN — MYCOPHENOLATE MOFETIL 1000 MILLIGRAM(S): 250 CAPSULE ORAL at 19:40

## 2021-04-30 RX ADMIN — Medication 5 UNIT(S): at 13:26

## 2021-04-30 RX ADMIN — TAMSULOSIN HYDROCHLORIDE 0.4 MILLIGRAM(S): 0.4 CAPSULE ORAL at 21:37

## 2021-04-30 RX ADMIN — VALGANCICLOVIR 450 MILLIGRAM(S): 450 TABLET, FILM COATED ORAL at 11:41

## 2021-04-30 RX ADMIN — SENNA PLUS 2 TABLET(S): 8.6 TABLET ORAL at 21:38

## 2021-04-30 RX ADMIN — SODIUM ZIRCONIUM CYCLOSILICATE 10 GRAM(S): 10 POWDER, FOR SUSPENSION ORAL at 18:14

## 2021-04-30 RX ADMIN — Medication 2: at 13:27

## 2021-04-30 RX ADMIN — Medication 650 MILLIGRAM(S): at 22:10

## 2021-04-30 RX ADMIN — Medication 1300 MILLIGRAM(S): at 17:33

## 2021-04-30 RX ADMIN — Medication 5 UNIT(S): at 09:39

## 2021-04-30 RX ADMIN — Medication 500000 UNIT(S): at 17:34

## 2021-04-30 RX ADMIN — Medication 650 MILLIGRAM(S): at 21:37

## 2021-04-30 RX ADMIN — Medication 2: at 09:40

## 2021-04-30 RX ADMIN — MYCOPHENOLATE MOFETIL 1000 MILLIGRAM(S): 250 CAPSULE ORAL at 08:33

## 2021-04-30 RX ADMIN — TACROLIMUS 6 MILLIGRAM(S): 5 CAPSULE ORAL at 10:00

## 2021-04-30 RX ADMIN — Medication 50 MILLILITER(S): at 18:09

## 2021-04-30 RX ADMIN — Medication 500000 UNIT(S): at 06:38

## 2021-04-30 RX ADMIN — ATOVAQUONE 1500 MILLIGRAM(S): 750 SUSPENSION ORAL at 11:42

## 2021-04-30 RX ADMIN — Medication 50 MILLIEQUIVALENT(S): at 18:13

## 2021-04-30 RX ADMIN — FAMOTIDINE 20 MILLIGRAM(S): 10 INJECTION INTRAVENOUS at 11:42

## 2021-04-30 RX ADMIN — INSULIN GLARGINE 25 UNIT(S): 100 INJECTION, SOLUTION SUBCUTANEOUS at 21:37

## 2021-04-30 RX ADMIN — CARVEDILOL PHOSPHATE 25 MILLIGRAM(S): 80 CAPSULE, EXTENDED RELEASE ORAL at 10:00

## 2021-04-30 RX ADMIN — SODIUM ZIRCONIUM CYCLOSILICATE 10 GRAM(S): 10 POWDER, FOR SUSPENSION ORAL at 01:36

## 2021-04-30 RX ADMIN — SODIUM ZIRCONIUM CYCLOSILICATE 10 GRAM(S): 10 POWDER, FOR SUSPENSION ORAL at 04:44

## 2021-04-30 RX ADMIN — Medication 5 MILLIGRAM(S): at 06:38

## 2021-04-30 RX ADMIN — SODIUM CHLORIDE 1000 MILLILITER(S): 9 INJECTION INTRAMUSCULAR; INTRAVENOUS; SUBCUTANEOUS at 04:44

## 2021-04-30 RX ADMIN — INSULIN HUMAN 10 UNIT(S): 100 INJECTION, SOLUTION SUBCUTANEOUS at 18:12

## 2021-04-30 RX ADMIN — CARVEDILOL PHOSPHATE 25 MILLIGRAM(S): 80 CAPSULE, EXTENDED RELEASE ORAL at 21:37

## 2021-04-30 RX ADMIN — Medication 100 GRAM(S): at 01:36

## 2021-04-30 RX ADMIN — INSULIN HUMAN 10 UNIT(S): 100 INJECTION, SOLUTION SUBCUTANEOUS at 01:36

## 2021-04-30 NOTE — CONSULT NOTE ADULT - PROBLEM SELECTOR RECOMMENDATION 9
Diabetes Education and Nutrition Eval  Not enough information to adjust reigmen yet. Glucose current at goal.  Monitor on Lantus 25 units daily and Admelog 5 units with meals  Mod correction scale qac + bedtime  Goal glucose 100-180  Outpt. endo follow-up  Outpt. optho, podiatry, nephrology  Plan to d/c on basal bolus Diabetes Education and Nutrition Eval  Not enough information to adjust regimen yet. Glucose current at goal.  Monitor on Lantus 25 units daily and Admelog 5 units with meals  Mod correction scale qac + bedtime  Goal glucose 100-180  Outpt. endo follow-up  Outpt. optho, podiatry, nephrology  Plan to d/c on basal bolus

## 2021-04-30 NOTE — ED PROVIDER NOTE - ST/T WAVE
No acute ST elevations or depressions. Peaked T waves no stemi T Waves have diminished in amplitude and acuity

## 2021-04-30 NOTE — H&P ADULT - NSHPREVIEWOFSYSTEMS_GEN_ALL_CORE
Review of systems  Gen: No weight changes, fatigue, fevers/chills, +weakness  Skin: No rashes  Head/Eyes/Ears/Mouth: No headache; Normal hearing; Normal vision w/o blurriness; No sinus pain/discomfort, sore throat  Respiratory: No dyspnea, cough, wheezing, hemoptysis  CV: No chest pain, PND, orthopnea  GI: no abdominal pain; denies diarrhea, constipation, nausea, vomiting, melena, hematochezia  : No increased frequency, dysuria, hematuria, nocturia  MSK: No joint pain/swelling; no back pain; no edema  Neuro: No dizziness/lightheadedness, weakness, seizures, numbness, tingling  Heme: No easy bruising or bleeding  Endo: No heat/cold intolerance  Psych: No significant nervousness, anxiety, stress, depression  All other systems were reviewed and are negative, except as noted.

## 2021-04-30 NOTE — H&P ADULT - NSHPLABSRESULTS_GEN_ALL_CORE
MEDICATIONS  (STANDING):  aspirin  chewable 81 milliGRAM(s) Oral daily  famotidine    Tablet 20 milliGRAM(s) Oral daily  mycophenolate mofetil 1000 milliGRAM(s) Oral <User Schedule>  nystatin    Suspension 977157 Unit(s) Oral four times a day  predniSONE   Tablet 5 milliGRAM(s) Oral daily  senna 2 Tablet(s) Oral at bedtime  tamsulosin 0.4 milliGRAM(s) Oral at bedtime  valGANciclovir 450 milliGRAM(s) Oral daily    MEDICATIONS  (PRN):      PAST MEDICAL & SURGICAL HISTORY:  Chronic kidney disease (CKD)    HTN (hypertension)    DM (diabetes mellitus)    H/O kidney transplant  R, 3/11/2021        Vital Signs Last 24 Hrs  T(C): 36.9 (30 Apr 2021 00:33), Max: 36.9 (30 Apr 2021 00:33)  T(F): 98.4 (30 Apr 2021 00:33), Max: 98.4 (30 Apr 2021 00:33)  HR: 70 (30 Apr 2021 01:53) (70 - 77)  BP: 179/74 (30 Apr 2021 01:53) (152/76 - 179/74)  BP(mean): --  RR: 14 (30 Apr 2021 01:53) (14 - 18)  SpO2: 97% (30 Apr 2021 01:53) (97% - 97%)    I&O's Summary                            10.6   5.89  )-----------( 173      ( 30 Apr 2021 00:33 )             32.8     04-30    136  |  105  |  39<H>  ----------------------------<  412<H>  6.3<HH>   |  17<L>  |  1.27    Ca    9.5      30 Apr 2021 03:29    TPro  6.7  /  Alb  4.1  /  TBili  0.2  /  DBili  x   /  AST  9<L>  /  ALT  9<L>  /  AlkPhos  179<H>  04-30

## 2021-04-30 NOTE — H&P ADULT - HISTORY OF PRESENT ILLNESS
70 y/o M with PMHx of HTN (age 43), DM on insulin (age 43), gout, anxiety, depression, OCD and CKD since 2016.  He started HD via L AVF  in April 2020 at [facility] and transitioned to PD.    Underwent R DCD DDRT 3/9/2021 on 3/9/2021 (1A/1V/1U--stented). Post-op course c/b DGF requiring HD and daily PD.  Has had excellent graft function now off PD: Cr ~1.3-1.4, good UO on Flomax.  Ureteral stent and PD catheter removed on 4/22.    Sent in from clinic this evening for hyperkalemia to 6.9, asymptomatic.  Upon arrival to ED, K was 6.4 with peaked T waves on EKG, as well as glucose to 400 on BMP.  He has been compliant with his insulin regimen, but has noticed erratic blood sugars as high as 300s during the day, with symptomatic hypoglycemia at times awakening him in the middle of the night. Has uncontrolled DM since transplant and maintains close f/u with his Endocrinologist Dr. Abad.      Overall, he admits to generalized weakness, but denies fevers, chills, n/v, CP, palpitations, SOB, extremity pain or weakness.  Able to ambulate without assistance with no GASTON. Has good appetite and normal bowel function.

## 2021-04-30 NOTE — ED PROVIDER NOTE - NS ED ROS FT
CONSTITUTIONAL: No fevers, chills, fatigue, dizziness, weakness, unexpected weight change  EYES: No double vision, blurry vision  ENT: No ear pain, nasal congestion, runny nose, sore throat  CV: No chest pain, palpitations  PULM: No cough, shortness of breath  GI: No abdominal pain, nausea, vomiting, diarrhea, constipation  : No dysuria, polyuria, hematuria  SKIN: No rashes, swelling  MSK: No muscle aches  NEURO: No headache, paresthesias  PSYCHIATRIC: Denies suicidal, homicidal ideations

## 2021-04-30 NOTE — CONSULT NOTE ADULT - SUBJECTIVE AND OBJECTIVE BOX
HPI:  72 y/o M with PMHx of HTN (age 43), DM on insulin (age 43), gout, anxiety, depression, OCD and CKD since 2016.  He started HD via L AVF  in April 2020 at [facility] and transitioned to PD.    Underwent R DCD DDRT 3/9/2021 on 3/9/2021 (1A/1V/1U--stented). Post-op course c/b DGF requiring HD and daily PD.  Has had excellent graft function now off PD: Cr ~1.3-1.4, good UO on Flomax.  Ureteral stent and PD catheter removed on 4/22.    Sent in from clinic this evening for hyperkalemia to 6.9, asymptomatic.  Upon arrival to ED, K was 6.4 with peaked T waves on EKG, as well as glucose to 400 on BMP.  He has been compliant with his insulin regimen, but has noticed erratic blood sugars as high as 300s during the day, with symptomatic hypoglycemia at times awakening him in the middle of the night. Has uncontrolled DM since transplant and maintains close f/u with his Endocrinologist Dr. Abad.      Overall, he admits to generalized weakness, but denies fevers, chills, n/v, CP, palpitations, SOB, extremity pain or weakness.  Able to ambulate without assistance with no GASTON. Has good appetite and normal bowel function.                       (30 Apr 2021 04:32)      PAST MEDICAL & SURGICAL HISTORY:  Chronic kidney disease (CKD)    HTN (hypertension)    DM (diabetes mellitus)    H/O kidney transplant  R, 3/11/2021        FAMILY HISTORY:      Social History:    Outpatient Medications:    MEDICATIONS  (STANDING):  aspirin  chewable 81 milliGRAM(s) Oral daily  atovaquone  Suspension 1500 milliGRAM(s) Oral daily  carvedilol 25 milliGRAM(s) Oral every 12 hours  dextrose 40% Gel 15 Gram(s) Oral once  dextrose 5%. 1000 milliLiter(s) (50 mL/Hr) IV Continuous <Continuous>  dextrose 5%. 1000 milliLiter(s) (100 mL/Hr) IV Continuous <Continuous>  dextrose 50% Injectable 25 Gram(s) IV Push once  dextrose 50% Injectable 12.5 Gram(s) IV Push once  dextrose 50% Injectable 25 Gram(s) IV Push once  famotidine    Tablet 20 milliGRAM(s) Oral daily  glucagon  Injectable 1 milliGRAM(s) IntraMuscular once  insulin glargine Injectable (LANTUS) 25 Unit(s) SubCutaneous at bedtime  insulin lispro (ADMELOG) corrective regimen sliding scale   SubCutaneous three times a day before meals  insulin lispro (ADMELOG) corrective regimen sliding scale   SubCutaneous at bedtime  insulin lispro Injectable (ADMELOG) 5 Unit(s) SubCutaneous three times a day before meals  mycophenolate mofetil 1000 milliGRAM(s) Oral <User Schedule>  nystatin    Suspension 947609 Unit(s) Oral four times a day  predniSONE   Tablet 5 milliGRAM(s) Oral daily  senna 2 Tablet(s) Oral at bedtime  sodium bicarbonate 1300 milliGRAM(s) Oral two times a day  tacrolimus ER Tablet (ENVARSUS XR) 6 milliGRAM(s) Oral <User Schedule>  tamsulosin 0.4 milliGRAM(s) Oral at bedtime  valGANciclovir 450 milliGRAM(s) Oral daily    MEDICATIONS  (PRN):      Allergies    ACE inhibitors (Angioedema)  Levaquin (Angioedema)    Intolerances      Review of Systems:  Constitutional: No fever, No change in weight  Eyes: No blurry vision  Neuro: No headache, No paresthesias  HEENT: No throat pain  Cardiovascular: No chest pain  Respiratory: No SOB  GI: No nausea or vomiting  : No polyuria  Skin: no rash  Psych: no depression  Endocrine: No polydipsia, No heat or cold intolerance, rest as noted in HPI  Hem/lymph: no swelling    All other review of systems negative      PHYSICAL EXAM:  VITALS: T(C): 36.6 (04-30-21 @ 13:00)  T(F): 97.8 (04-30-21 @ 13:00), Max: 98.4 (04-30-21 @ 00:33)  HR: 64 (04-30-21 @ 13:00) (64 - 77)  BP: 167/79 (04-30-21 @ 13:00) (152/76 - 180/80)  RR:  (14 - 18)  SpO2:  (97% - 98%)  Wt(kg): --  GENERAL: NAD at this time  EYES: No proptosis, EOMI  HEENT:  Atraumatic, Normocephalic,   THYROID: Normal size, no palpable nodules  RESPIRATORY: Clear to auscultation bilaterally, full excursion, non-labored  CARDIOVASCULAR: Regular rhythm; No murmurs; no peripheral edema  GI: Soft, nontender, non distended, normal bowel sounds  SKIN: Dry, intact, No rashes or lesions  MUSCULOSKELETAL: normal strength  NEURO: follows commands, no tremor, normal reflexes  PSYCH: Alert and oriented x 3, normal affect, normal mood  CUSHING'S SIGNS: no striae      POCT Blood Glucose.: 188 mg/dL (04-30-21 @ 12:30)  POCT Blood Glucose.: 173 mg/dL (04-30-21 @ 09:20)  POCT Blood Glucose.: 175 mg/dL (04-30-21 @ 06:53)  POCT Blood Glucose.: 161 mg/dL (04-30-21 @ 06:07)  POCT Blood Glucose.: 268 mg/dL (04-30-21 @ 02:03)  POCT Blood Glucose.: 372 mg/dL (04-30-21 @ 01:27)                              9.8    5.83  )-----------( 170      ( 30 Apr 2021 06:35 )             30.4       04-30    138  |  111<H>  |  34<H>  ----------------------------<  165<H>  5.3   |  18<L>  |  1.04    EGFR if : 83  EGFR if non : 72    Ca    9.3      04-30  Mg     1.8     04-30  Phos  2.3     04-30    TPro  6.3  /  Alb  3.8  /  TBili  0.2  /  DBili  x   /  AST  10  /  ALT  10  /  AlkPhos  163<H>  04-30    Thyroid Function Tests:            Radiology:                  HPI:  72 y/o M w/ jx of Type 2 DM dx at age 40. Follows with Dr. Abad. Complicated by ESRD s/p renal transplant a few weeks ago now on prednisone 5mg daily. At home on Lantus 25 units qhs and Humalog 5 units qac +corrections. Adherent to insulin. Checks glucose 4x/day with variable values with hypoglycemia in the middle of the night on occasion also with hyperglycemia mostly during the day. Monitors carbs. +polyuria and polydipsia. No family hx of diabetes.    Also PMHx of HTN (age 43), gout, anxiety, depression, OCD and CKD since 2016.  He started HD via L AVF  in April 2020 at [facility] and transitioned to PD.    Underwent R DCD DDRT 3/9/2021 on 3/9/2021 (1A/1V/1U--stented). Post-op course c/b DGF requiring HD and daily PD.  Has had excellent graft function now off PD: Cr ~1.3-1.4, good UO on Flomax.  Ureteral stent and PD catheter removed on 4/22.    Sent in from clinic for hyperkalemia to 6.9, asymptomatic.  Upon arrival to ED, K was 6.4 with peaked T waves on EKG, as well as glucose to 400 on BMP. Overall, he admits to generalized weakness, but denies fevers, chills, n/v, CP, palpitations, SOB, extremity pain or weakness.  Able to ambulate without assistance with no GASTON. Has good appetite and normal bowel function.      PAST MEDICAL & SURGICAL HISTORY:  Chronic kidney disease (CKD)    HTN (hypertension)    DM (diabetes mellitus)    H/O kidney transplant  R, 3/11/2021        FAMILY HISTORY: No hx of diabetes in parents      Social History: No tobacco or alcohol use    Outpatient Medications:  · 	calcitriol 0.25 mcg oral capsule: 1 cap(s) orally once a day  · 	sulfamethoxazole-trimethoprim 400 mg-80 mg oral tablet: 1 tab(s) orally once a day  · 	senna oral tablet: 2 tab(s) orally once a day (at bedtime)  · 	polyethylene glycol 3350 oral powder for reconstitution: 17 gram(s) orally once a day  · 	tacrolimus 1 mg oral tablet, extended release: 7 tab(s) orally  · 	mycophenolate mofetil 250 mg oral capsule: 1000 milligram(s) orally 2 times a day  · 	famotidine 20 mg oral tablet: 1 tab(s) orally once a day  · 	carvedilol 25 mg oral tablet: 1 tab(s) orally every 12 hours  · 	valGANciclovir 450 mg oral tablet: 1 tab(s) orally   · 	nystatin 100,000 units/mL oral suspension: 5 milliliter(s) orally 4 times a day  · 	insulin glargine: 25 unit(s) subcutaneous once a day (at bedtime)  · 	tamsulosin 0.4 mg oral capsule: 1 cap(s) orally once a day (at bedtime)  · 	predniSONE: 5 milligram(s) orally once a day  · 	insulin lispro 100 units/mL injectable solution: 5 unit(s) injectable 3 times a day  · 	aspirin 81 mg oral tablet, chewable: 1 tab(s) orally once a day  · 	Tylenol 325 mg oral capsule: 2 cap(s) orally every 6 hours, As Needed    MEDICATIONS  (STANDING):  aspirin  chewable 81 milliGRAM(s) Oral daily  atovaquone  Suspension 1500 milliGRAM(s) Oral daily  carvedilol 25 milliGRAM(s) Oral every 12 hours  dextrose 40% Gel 15 Gram(s) Oral once  dextrose 5%. 1000 milliLiter(s) (50 mL/Hr) IV Continuous <Continuous>  dextrose 5%. 1000 milliLiter(s) (100 mL/Hr) IV Continuous <Continuous>  dextrose 50% Injectable 25 Gram(s) IV Push once  dextrose 50% Injectable 12.5 Gram(s) IV Push once  dextrose 50% Injectable 25 Gram(s) IV Push once  famotidine    Tablet 20 milliGRAM(s) Oral daily  glucagon  Injectable 1 milliGRAM(s) IntraMuscular once  insulin glargine Injectable (LANTUS) 25 Unit(s) SubCutaneous at bedtime  insulin lispro (ADMELOG) corrective regimen sliding scale   SubCutaneous three times a day before meals  insulin lispro (ADMELOG) corrective regimen sliding scale   SubCutaneous at bedtime  insulin lispro Injectable (ADMELOG) 5 Unit(s) SubCutaneous three times a day before meals  mycophenolate mofetil 1000 milliGRAM(s) Oral <User Schedule>  nystatin    Suspension 418164 Unit(s) Oral four times a day  predniSONE   Tablet 5 milliGRAM(s) Oral daily  senna 2 Tablet(s) Oral at bedtime  sodium bicarbonate 1300 milliGRAM(s) Oral two times a day  tacrolimus ER Tablet (ENVARSUS XR) 6 milliGRAM(s) Oral <User Schedule>  tamsulosin 0.4 milliGRAM(s) Oral at bedtime  valGANciclovir 450 milliGRAM(s) Oral daily    MEDICATIONS  (PRN):      Allergies    ACE inhibitors (Angioedema)  Levaquin (Angioedema)    Intolerances      Review of Systems:  Constitutional: No fever, No change in weight  Eyes: No blurry vision  Neuro: No headache, No paresthesias  HEENT: No throat pain  Cardiovascular: No chest pain  Respiratory: No SOB  GI: No nausea or vomiting  : + polyuria  Skin: no rash  Psych: no depression  Endocrine: + polydipsia, No heat or cold intolerance, rest as noted in HPI  Hem/lymph: no swelling    All other review of systems negative      PHYSICAL EXAM:  VITALS: T(C): 36.6 (04-30-21 @ 13:00)  T(F): 97.8 (04-30-21 @ 13:00), Max: 98.4 (04-30-21 @ 00:33)  HR: 64 (04-30-21 @ 13:00) (64 - 77)  BP: 167/79 (04-30-21 @ 13:00) (152/76 - 180/80)  RR:  (14 - 18)  SpO2:  (97% - 98%)  Wt(kg): --  GENERAL: NAD at this time  EYES: No proptosis, EOMI  HEENT:  Atraumatic, Normocephalic,   THYROID: Normal size, no palpable nodules  RESPIRATORY: Clear to auscultation bilaterally, full excursion, non-labored  CARDIOVASCULAR: Regular rhythm; No murmurs; no peripheral edema  GI: Soft, nontender, non distended, normal bowel sounds  SKIN: Dry, intact, No rashes or lesions  MUSCULOSKELETAL: normal strength  NEURO: follows commands  PSYCH: Alert and oriented x 3, normal affect, normal mood  CUSHING'S SIGNS: no striae      POCT Blood Glucose.: 188 mg/dL (04-30-21 @ 12:30)  POCT Blood Glucose.: 173 mg/dL (04-30-21 @ 09:20)  POCT Blood Glucose.: 175 mg/dL (04-30-21 @ 06:53)  POCT Blood Glucose.: 161 mg/dL (04-30-21 @ 06:07)  POCT Blood Glucose.: 268 mg/dL (04-30-21 @ 02:03)  POCT Blood Glucose.: 372 mg/dL (04-30-21 @ 01:27)                              9.8    5.83  )-----------( 170      ( 30 Apr 2021 06:35 )             30.4       04-30    138  |  111<H>  |  34<H>  ----------------------------<  165<H>  5.3   |  18<L>  |  1.04    EGFR if : 83  EGFR if non : 72    Ca    9.3      04-30  Mg     1.8     04-30  Phos  2.3     04-30    TPro  6.3  /  Alb  3.8  /  TBili  0.2  /  DBili  x   /  AST  10  /  ALT  10  /  AlkPhos  163<H>  04-30    Thyroid Function Tests:            Radiology:

## 2021-04-30 NOTE — ED PROVIDER NOTE - CLINICAL SUMMARY MEDICAL DECISION MAKING FREE TEXT BOX
71M recent right renal transplant here for incidental hyperK in office at 11a, took daily insulin at 6p premeal and 11p long acting. Asymptomatic. Vitals wnl. Exam benign. EKG demonstrating peaked T waves. 1g calcium gluc ordered. Unclear etiology for hyperkalemia. Patient stated that K+ has been creeping up on labwork but no med changes, no GI sxs. Check labs for repeat K+, tacro and cellcept level, cardiac monitor. 71M recent right renal transplant here for incidental hyperK in office at 11a, took daily insulin at 6p premeal and 11p long acting. Asymptomatic. Vitals wnl. Exam benign. EKG demonstrating peaked T waves. 1g calcium gluc ordered. Unclear etiology for hyperkalemia. Patient stated that K+ has been creeping up on labwork but no med changes, no GI sxs. Check labs for repeat K+, tacro and cellcept level, cardiac monitor.  Hilario Hernandez MD, FACEP Nephro recommendations state elevated K possibly secondary to bactrim and tacro, will continue to monitor and to be admitted

## 2021-04-30 NOTE — H&P ADULT - NSTOBACCOSCREENHP_GEN_A_NCS
[Home] : at home, [unfilled] , at the time of the visit. [Medical Office: ([facility])___] : at the medical office located in  [Verbal consent obtained from patient] : the patient, [unfilled] No

## 2021-04-30 NOTE — ED PROVIDER NOTE - ATTENDING CONTRIBUTION TO CARE
See MDM above.   Hilario Hernandez MD, FACEP   Patient endorsed to the transplant surgery team at the time of admission. Based on patient's history and physical exam, as well as the results of today's workup, I feel that patient warrants admission to the hospital for further workup/evaluation and continued management. I discussed the findings of today's workup with the patient and addressed the patient's questions and concerns. The patient was agreeable with admission. Our team spoke with the inpatient receiving team who accepted the patient for admission and subsequently took over the patient's care at the time of admission. The receiving team will follow up on pending labs, analgesia, any clinical imaging results, ancillary findings, reassess, and disposition as clinically indicated. Details of patient and plan conveyed to receiving physician team and conveyed back for understanding. There were no questions at this time about the patient's status, disposition, and plan. Patient's care to be taken over by receiving physician team at this time, all decisions regarding the progression of care will be made at their discretion.

## 2021-04-30 NOTE — ED PROVIDER NOTE - PROGRESS NOTE DETAILS
K is nearly stable 6.4->6.3, will continue to monitor on telemetry, no events, will continue hyperkalemia therapies and patient to be admitted

## 2021-04-30 NOTE — CONSULT NOTE ADULT - ASSESSMENT
72 y/o M w/ Type 2 DM w/ hyperglycemia complicated by ESRD s/p renal tx now on chronic prednisone 5mg daily 72 y/o M w/ Type 2 DM w/ hyperglycemia complicated by ESRD s/p renal tx now on chronic prednisone 5mg daily admitted for hyperkalemia and hyperglycemia.

## 2021-04-30 NOTE — CONSULT NOTE ADULT - ASSESSMENT
Patient is a 70 y/o M w PMH of ESRD on PD for 5 months, was on HD in 2020, with nephrologist Dr Novak, IDDM, HTN, gout, depression, anxiety and OCD. S/p DCD with JOJO DDRT on 3/9/21, initially c/b by DGF, was discharged on PD. Graft now functioning. PD catheter and stent was removed 4/22/21 sent to ED for hyperkalemia.    1. Hyperkalemia, likely in setting of medications, including bactrim/tacrolimus - Noted to have elevated K of 6.9 (non-hemolyzed) on 4/29/21, presented to ED with K of 6.4, got Lokelma 10g + 10g, with insulin/d50 and calcium gluconate. Last K improved to 5.3. Monitor K. Low K diet.   2. S/p DDRT on 3/9/21 - currently with excellent graft function. Last Scr improved to 1.04 off PD.   3. Immunosupression - Simulect induction, increase Envarsus to 6 mg PO daily ( goal 8-10) , MMF 1g PO BID and Prednisone 5 mg po daily   4. Prophylaxis - Will d/c bactrim b/c of hyperkalemia, will change to Atovaquone, continue Nystatin/valcyte   5. Hypertension - above target range, resume home BP meds and titrate accordingly. Low salt diet.   6. DM2 - resume home dose insulin regimen    If any questions, please feel free to contact me     Jennifer Mcdowell  Nephrology Fellow  [facility] Pager: [phone]  [facility] Pager: [phone]

## 2021-04-30 NOTE — CONSULT NOTE ADULT - SUBJECTIVE AND OBJECTIVE BOX
[facility] DIVISION OF KIDNEY DISEASES AND HYPERTENSION -- [phone]  -- INITIAL CONSULT NOTE  --------------------------------------------------------------------------------  HPI: Patient is a 70 y/o M w PMH of ESRD on PD for 5 months, was on HD in 2020, with nephrologist Dr Novak, IDDM, HTN, gout, depression, anxiety and OCD. Noted to have elevated K of 6.9 (non-hemolyzed) on 4/29/21, presented to ED with K of 6.4, got Lokelma 10g + 10g, with insulin/d50 and calcium gluconate. Last K improved to 5.3.     Patient was seen and examined at bedside. Reported feeling well. Denies CP, SOB, fever, chills, nausea, vomiting, diarrhea, LE edema or dysuria.    PAST HISTORY  --------------------------------------------------------------------------------  PAST MEDICAL & SURGICAL HISTORY:  Chronic kidney disease (CKD)    HTN (hypertension)    DM (diabetes mellitus)    H/O kidney transplant  R, 3/11/2021      FAMILY HISTORY:    PAST SOCIAL HISTORY:    ALLERGIES & MEDICATIONS  --------------------------------------------------------------------------------  Allergies    ACE inhibitors (Angioedema)  Levaquin (Angioedema)    Intolerances      Standing Inpatient Medications  aspirin  chewable 81 milliGRAM(s) Oral daily  atovaquone  Suspension 1500 milliGRAM(s) Oral daily  carvedilol 25 milliGRAM(s) Oral every 12 hours  dextrose 40% Gel 15 Gram(s) Oral once  dextrose 5%. 1000 milliLiter(s) IV Continuous <Continuous>  dextrose 5%. 1000 milliLiter(s) IV Continuous <Continuous>  dextrose 50% Injectable 25 Gram(s) IV Push once  dextrose 50% Injectable 12.5 Gram(s) IV Push once  dextrose 50% Injectable 25 Gram(s) IV Push once  famotidine    Tablet 20 milliGRAM(s) Oral daily  glucagon  Injectable 1 milliGRAM(s) IntraMuscular once  insulin glargine Injectable (LANTUS) 25 Unit(s) SubCutaneous at bedtime  insulin lispro (ADMELOG) corrective regimen sliding scale   SubCutaneous three times a day before meals  insulin lispro (ADMELOG) corrective regimen sliding scale   SubCutaneous at bedtime  insulin lispro Injectable (ADMELOG) 5 Unit(s) SubCutaneous three times a day before meals  mycophenolate mofetil 1000 milliGRAM(s) Oral <User Schedule>  nystatin    Suspension 991433 Unit(s) Oral four times a day  predniSONE   Tablet 5 milliGRAM(s) Oral daily  senna 2 Tablet(s) Oral at bedtime  sodium bicarbonate 1300 milliGRAM(s) Oral two times a day  tacrolimus ER Tablet (ENVARSUS XR) 6 milliGRAM(s) Oral <User Schedule>  tamsulosin 0.4 milliGRAM(s) Oral at bedtime  valGANciclovir 450 milliGRAM(s) Oral daily    PRN Inpatient Medications      REVIEW OF SYSTEMS  --------------------------------------------------------------------------------  Gen: No fevers/chills  Respiratory: No dyspnea, cough  CV: No chest pain  GI: No abdominal pain, diarrhea  : No dysuria, hematuria  MSK: No  edema    All other systems were reviewed and are negative, except as noted.    VITALS/PHYSICAL EXAM  --------------------------------------------------------------------------------  T(C): 36.6 (04-30-21 @ 13:00), Max: 36.9 (04-30-21 @ 00:33)  HR: 64 (04-30-21 @ 13:00) (64 - 77)  BP: 167/79 (04-30-21 @ 13:00) (152/76 - 180/80)  RR: 18 (04-30-21 @ 13:00) (14 - 18)  SpO2: 97% (04-30-21 @ 13:00) (97% - 98%)  Wt(kg): --  Height (cm): 167.6 (04-29-21 @ 23:55)  Weight (kg): 78 (04-29-21 @ 23:55)  BMI (kg/m2): 27.8 (04-29-21 @ 23:55)  BSA (m2): 1.88 (04-29-21 @ 23:55)      04-30-21 @ 07:01  -  04-30-21 @ 14:48  --------------------------------------------------------  IN: 0 mL / OUT: 300 mL / NET: -300 mL      Physical Exam:  	Gen: NAD  	HEENT: MMM  	Pulm: CTA B/L  	CV: S1S2  	Abd: Soft, +BS   	Ext: No LE edema B/L  	Neuro: Awake  	Skin: Warm and dry    LABS/STUDIES  --------------------------------------------------------------------------------              9.8    5.83  >-----------<  170      [04-30-21 @ 06:35]              30.4     138  |  111  |  34  ----------------------------<  165      [04-30-21 @ 06:34]  5.3   |  18  |  1.04        Ca     9.3     [04-30-21 @ 06:34]      Mg     1.8     [04-30-21 @ 06:34]      Phos  2.3     [04-30-21 @ 06:34]    TPro  6.3  /  Alb  3.8  /  TBili  0.2  /  DBili  x   /  AST  10  /  ALT  10  /  AlkPhos  163  [04-30-21 @ 06:34]          Creatinine Trend:  SCr 1.04 [04-30 @ 06:34]  SCr 1.27 [04-30 @ 03:29]  SCr 1.32 [04-30 @ 00:33]    Urinalysis - [03-16-21 @ 15:47]      Color Light Orange / Appearance Slightly Turbid / SG 1.010 / pH 6.5      Gluc Trace / Ketone Negative  / Bili Negative / Urobili Negative       Blood Large / Protein 100 / Leuk Est Small / Nitrite Negative       / WBC 14 / Hyaline 6 / Gran  / Sq Epi  / Non Sq Epi 7 / Bacteria Negative      Iron 37, TIBC 303, %sat 12      [03-19-21 @ 10:58]  Ferritin 624      [03-19-21 @ 11:25]    HBsAb 56.4      [03-09-21 @ 17:13]  HBsAg Nonreact      [03-09-21 @ 17:13]  HBcAb Nonreact      [03-09-21 @ 17:13]  HCV 0.32, Nonreact      [03-09-21 @ 17:13]  HIV Nonreact      [03-09-21 @ 18:20]     [facility] DIVISION OF KIDNEY DISEASES AND HYPERTENSION -- [phone]  -- INITIAL CONSULT NOTE  --------------------------------------------------------------------------------   Patient is a 70 y/o M w PMH of ESRD on PD for 5 months, was on HD in 2020, with nephrologist Dr Novak, IDDM, HTN, gout, depression, anxiety and OCD.  S/p DCD with JOJO DDRT on 3/9/21, initially c/b by DGF, was discharged on PD. Graft now functioning. PD catheter and stent was removed 4/22/21 sent to ED for hyperkalemia. Noted to have elevated K of 6.9 (non-hemolyzed) on 4/29/21, presented to ED with K of 6.4, got Lokelma 10g + 10g, with insulin/d50 and calcium gluconate. Last K improved to 5.3.     Patient was seen and examined at bedside. Reported feeling well. Denies CP, SOB, fever, chills, nausea, vomiting, diarrhea, LE edema or dysuria.    PAST HISTORY  --------------------------------------------------------------------------------  PAST MEDICAL & SURGICAL HISTORY:  Chronic kidney disease (CKD)    HTN (hypertension)    DM (diabetes mellitus)    H/O kidney transplant  R, 3/11/2021      FAMILY HISTORY:    PAST SOCIAL HISTORY:    ALLERGIES & MEDICATIONS  --------------------------------------------------------------------------------  Allergies    ACE inhibitors (Angioedema)  Levaquin (Angioedema)    Intolerances      Standing Inpatient Medications  aspirin  chewable 81 milliGRAM(s) Oral daily  atovaquone  Suspension 1500 milliGRAM(s) Oral daily  carvedilol 25 milliGRAM(s) Oral every 12 hours  dextrose 40% Gel 15 Gram(s) Oral once  dextrose 5%. 1000 milliLiter(s) IV Continuous <Continuous>  dextrose 5%. 1000 milliLiter(s) IV Continuous <Continuous>  dextrose 50% Injectable 25 Gram(s) IV Push once  dextrose 50% Injectable 12.5 Gram(s) IV Push once  dextrose 50% Injectable 25 Gram(s) IV Push once  famotidine    Tablet 20 milliGRAM(s) Oral daily  glucagon  Injectable 1 milliGRAM(s) IntraMuscular once  insulin glargine Injectable (LANTUS) 25 Unit(s) SubCutaneous at bedtime  insulin lispro (ADMELOG) corrective regimen sliding scale   SubCutaneous three times a day before meals  insulin lispro (ADMELOG) corrective regimen sliding scale   SubCutaneous at bedtime  insulin lispro Injectable (ADMELOG) 5 Unit(s) SubCutaneous three times a day before meals  mycophenolate mofetil 1000 milliGRAM(s) Oral <User Schedule>  nystatin    Suspension 007672 Unit(s) Oral four times a day  predniSONE   Tablet 5 milliGRAM(s) Oral daily  senna 2 Tablet(s) Oral at bedtime  sodium bicarbonate 1300 milliGRAM(s) Oral two times a day  tacrolimus ER Tablet (ENVARSUS XR) 6 milliGRAM(s) Oral <User Schedule>  tamsulosin 0.4 milliGRAM(s) Oral at bedtime  valGANciclovir 450 milliGRAM(s) Oral daily    PRN Inpatient Medications      REVIEW OF SYSTEMS  --------------------------------------------------------------------------------  Gen: No fevers/chills  Respiratory: No dyspnea, cough  CV: No chest pain  GI: No abdominal pain, diarrhea  : No dysuria, hematuria  MSK: No  edema    All other systems were reviewed and are negative, except as noted.    VITALS/PHYSICAL EXAM  --------------------------------------------------------------------------------  T(C): 36.6 (04-30-21 @ 13:00), Max: 36.9 (04-30-21 @ 00:33)  HR: 64 (04-30-21 @ 13:00) (64 - 77)  BP: 167/79 (04-30-21 @ 13:00) (152/76 - 180/80)  RR: 18 (04-30-21 @ 13:00) (14 - 18)  SpO2: 97% (04-30-21 @ 13:00) (97% - 98%)  Wt(kg): --  Height (cm): 167.6 (04-29-21 @ 23:55)  Weight (kg): 78 (04-29-21 @ 23:55)  BMI (kg/m2): 27.8 (04-29-21 @ 23:55)  BSA (m2): 1.88 (04-29-21 @ 23:55)      04-30-21 @ 07:01  -  04-30-21 @ 14:48  --------------------------------------------------------  IN: 0 mL / OUT: 300 mL / NET: -300 mL      Physical Exam:  	Gen: NAD  	HEENT: MMM  	Pulm: CTA B/L  	CV: S1S2  	Abd: Soft, +BS   	Ext: No LE edema B/L  	Neuro: Awake  	Skin: Warm and dry    LABS/STUDIES  --------------------------------------------------------------------------------              9.8    5.83  >-----------<  170      [04-30-21 @ 06:35]              30.4     138  |  111  |  34  ----------------------------<  165      [04-30-21 @ 06:34]  5.3   |  18  |  1.04        Ca     9.3     [04-30-21 @ 06:34]      Mg     1.8     [04-30-21 @ 06:34]      Phos  2.3     [04-30-21 @ 06:34]    TPro  6.3  /  Alb  3.8  /  TBili  0.2  /  DBili  x   /  AST  10  /  ALT  10  /  AlkPhos  163  [04-30-21 @ 06:34]          Creatinine Trend:  SCr 1.04 [04-30 @ 06:34]  SCr 1.27 [04-30 @ 03:29]  SCr 1.32 [04-30 @ 00:33]    Urinalysis - [03-16-21 @ 15:47]      Color Light Orange / Appearance Slightly Turbid / SG 1.010 / pH 6.5      Gluc Trace / Ketone Negative  / Bili Negative / Urobili Negative       Blood Large / Protein 100 / Leuk Est Small / Nitrite Negative       / WBC 14 / Hyaline 6 / Gran  / Sq Epi  / Non Sq Epi 7 / Bacteria Negative      Iron 37, TIBC 303, %sat 12      [03-19-21 @ 10:58]  Ferritin 624      [03-19-21 @ 11:25]    HBsAb 56.4      [03-09-21 @ 17:13]  HBsAg Nonreact      [03-09-21 @ 17:13]  HBcAb Nonreact      [03-09-21 @ 17:13]  HCV 0.32, Nonreact      [03-09-21 @ 17:13]  HIV Nonreact      [03-09-21 @ 18:20]

## 2021-04-30 NOTE — CONSULT NOTE ADULT - PROBLEM SELECTOR PROBLEM 1
Kidney transplant recipient
Type 2 diabetes mellitus with hyperglycemia, with long-term current use of insulin

## 2021-04-30 NOTE — ED ADULT NURSE NOTE - OBJECTIVE STATEMENT
70y/o male history of HTN, DM Kidney transplant on 3/11/21 presents to the ED from home c/o hyperkalemia from blood work done outpatient today, as per pt was told his potassium was 5.9. Pt is AOx4, patent airway, clear lung sounds, soft non tender abdomen, strong peripheral pulses, skin is warm dry and intact, no changes in bowel/bladder patterns. Patient denies fever, chills, n/v, weakness, abd pain, diarrhea/constipation, numbness/tingling, urinary s/s, in no respiratory distress, no chest pain, Patient safety provided with call bell within reach and bed in the lowest position.

## 2021-04-30 NOTE — ED PROVIDER NOTE - OBJECTIVE STATEMENT
71M hx of HTN (age 43), DM on insulin (age 43), gout, anxiety, depression, OCD and CKD since 2016, right renal transplant 3/11 (Dr. Schuler) presents to the ED after routine weekly blood work in office at 11am showed hyperK to 5.9. Patient has been asymptomatic. No changes in urinary habits, chest pain, shortness of breath, nausea, vomiting, diarrhea. No med changes. Patient took 10U humalog at 6p and 26U lantus at 11p. 71M hx of HTN (age 43), DM on insulin (age 43), gout, anxiety, depression, OCD and CKD since 2016, right renal transplant 3/11 (Dr. Schuler) presents to the ED after routine weekly blood work in office at 11am showed hyperK to 5.9. Patient has been asymptomatic. No changes in urinary habits, chest pain, shortness of breath, nausea, vomiting, diarrhea. No med changes. Patient took 10U humalog at 6p and 26U lantus at 11p. Patient on tacro and cellcept (mycophenolate).

## 2021-04-30 NOTE — CONSULT NOTE ADULT - ATTENDING COMMENTS
s/p DDRT on 3/9/21 admitted for Hyperkalemia       Allograft function is good   K is wnl now after medical management .  Stop bactrim and start mepron. will switch to dapsone after checking G6PD . d/c on lokelma 10 gm po daily for 1 week and f/u in clinic   IS meds as above

## 2021-04-30 NOTE — ED PROVIDER NOTE - PHYSICAL EXAMINATION
GENERAL: elderly male, lying in bed, NAD. Vital signs are within normal limits  EYES: Conjunctiva noninjected or pale, sclera anicteric  HENT: NC/AT, moist mucous membranes  NECK: Supple, trachea midline  LUNG: Nonlabored respirations, no wheezes, rales  CV: RRR, Pulses- Radial: 2+ b/l  ABDOMEN: Nondistended, nontender, right post-surgical scar, clean/dry/intact  MSK: No visible deformities, nontender extremities, no LE edema  SKIN: No rashes, bruises  NEURO: AAOx4 (to person, place, time, event), no tremor, steady gait  PSYCH: Normal mood and affect

## 2021-04-30 NOTE — H&P ADULT - NSHPPHYSICALEXAM_GEN_ALL_CORE
Constitutional: Well developed / well nourished  Eyes: Anicteric, PERRLA  ENMT: nc/at  Neck: supple  Respiratory: CTA B/L  Cardiovascular: RRR  Gastrointestinal: Soft, ND/NT. healing incisional scars (RLQ/PD cath sites)  Genitourinary: Voiding spontaneously  Extremities: L AVF palpable  Vascular: Palpable dp pulses bilaterally  Neurological: A&O x3  Skin: no rashes, ulcerations or lesions;  Musculoskeletal: Moving all extremities  Psychiatric: Responsive

## 2021-04-30 NOTE — H&P ADULT - ASSESSMENT
70 y/o M with PMHx of HTN (age 43), DM on insulin (age 43), gout, anxiety, depression, OCD and CKD since 2016.  He started HD via L AVF  in April 2020 at [facility] and transitioned to PD.    Underwent R DCD DDRT 3/9/2021 on 3/9/2021 (1A/1V/1U--stented). Post-op course c/b DGF requiring HD and daily PD.  Has had excellent graft function now off PD: Cr ~1.3-1.4, good UO on Flomax.      Returns with hyperkalemia and hyperglycemia    Hyperkalemia s/p DDRT with good graft function:  -Received 1g Ca Gluc, Lokelma 10: 6.4->6.3  -Administer 1L NS bolus, give additional Lokelma 10x1  -trend labs  -continue Flomax  -Immuno: Envarsus per level (4/29: 9.3), MMF 1g BID, Pred 5  -PPx: -hold Bactrim, send G6PD.  Continue Valcyte/Nystatin/Pepcid    Uncontrolled DM  -NPH 10U, restart home regimen  -will start insulin gtt if needed on floor  -Endo c/s    HTN  -restart home regimen accordingly    Dispo  -Admit to [facility] tele

## 2021-05-01 LAB
ALBUMIN SERPL ELPH-MCNC: 3.2 G/DL — LOW (ref 3.3–5)
ALP SERPL-CCNC: 161 U/L — HIGH (ref 40–120)
ALT FLD-CCNC: 9 U/L — LOW (ref 10–45)
ANION GAP SERPL CALC-SCNC: 11 MMOL/L — SIGNIFICANT CHANGE UP (ref 5–17)
ANION GAP SERPL CALC-SCNC: 11 MMOL/L — SIGNIFICANT CHANGE UP (ref 5–17)
ANION GAP SERPL CALC-SCNC: 12 MMOL/L — SIGNIFICANT CHANGE UP (ref 5–17)
AST SERPL-CCNC: 9 U/L — LOW (ref 10–40)
BASOPHILS # BLD AUTO: 0.05 K/UL — SIGNIFICANT CHANGE UP (ref 0–0.2)
BASOPHILS NFR BLD AUTO: 0.8 % — SIGNIFICANT CHANGE UP (ref 0–2)
BILIRUB SERPL-MCNC: 0.4 MG/DL — SIGNIFICANT CHANGE UP (ref 0.2–1.2)
BUN SERPL-MCNC: 26 MG/DL — HIGH (ref 7–23)
BUN SERPL-MCNC: 27 MG/DL — HIGH (ref 7–23)
BUN SERPL-MCNC: 30 MG/DL — HIGH (ref 7–23)
CALCIUM SERPL-MCNC: 9.1 MG/DL — SIGNIFICANT CHANGE UP (ref 8.4–10.5)
CALCIUM SERPL-MCNC: 9.4 MG/DL — SIGNIFICANT CHANGE UP (ref 8.4–10.5)
CALCIUM SERPL-MCNC: 9.5 MG/DL — SIGNIFICANT CHANGE UP (ref 8.4–10.5)
CHLORIDE SERPL-SCNC: 104 MMOL/L — SIGNIFICANT CHANGE UP (ref 96–108)
CHLORIDE SERPL-SCNC: 107 MMOL/L — SIGNIFICANT CHANGE UP (ref 96–108)
CHLORIDE SERPL-SCNC: 111 MMOL/L — HIGH (ref 96–108)
CO2 SERPL-SCNC: 17 MMOL/L — LOW (ref 22–31)
CO2 SERPL-SCNC: 21 MMOL/L — LOW (ref 22–31)
CO2 SERPL-SCNC: 22 MMOL/L — SIGNIFICANT CHANGE UP (ref 22–31)
COVID-19 SPIKE DOMAIN AB INTERP: POSITIVE
COVID-19 SPIKE DOMAIN ANTIBODY RESULT: 36.5 U/ML — HIGH
CREAT SERPL-MCNC: 0.9 MG/DL — SIGNIFICANT CHANGE UP (ref 0.5–1.3)
CREAT SERPL-MCNC: 1.03 MG/DL — SIGNIFICANT CHANGE UP (ref 0.5–1.3)
CREAT SERPL-MCNC: 1.04 MG/DL — SIGNIFICANT CHANGE UP (ref 0.5–1.3)
EOSINOPHIL # BLD AUTO: 0.11 K/UL — SIGNIFICANT CHANGE UP (ref 0–0.5)
EOSINOPHIL NFR BLD AUTO: 1.7 % — SIGNIFICANT CHANGE UP (ref 0–6)
GLUCOSE BLDC GLUCOMTR-MCNC: 145 MG/DL — HIGH (ref 70–99)
GLUCOSE BLDC GLUCOMTR-MCNC: 163 MG/DL — HIGH (ref 70–99)
GLUCOSE BLDC GLUCOMTR-MCNC: 167 MG/DL — HIGH (ref 70–99)
GLUCOSE BLDC GLUCOMTR-MCNC: 180 MG/DL — HIGH (ref 70–99)
GLUCOSE BLDC GLUCOMTR-MCNC: 205 MG/DL — HIGH (ref 70–99)
GLUCOSE BLDC GLUCOMTR-MCNC: 94 MG/DL — SIGNIFICANT CHANGE UP (ref 70–99)
GLUCOSE SERPL-MCNC: 167 MG/DL — HIGH (ref 70–99)
GLUCOSE SERPL-MCNC: 184 MG/DL — HIGH (ref 70–99)
GLUCOSE SERPL-MCNC: 72 MG/DL — SIGNIFICANT CHANGE UP (ref 70–99)
HCT VFR BLD CALC: 35 % — LOW (ref 39–50)
HGB BLD-MCNC: 10.6 G/DL — LOW (ref 13–17)
IMM GRANULOCYTES NFR BLD AUTO: 0.8 % — SIGNIFICANT CHANGE UP (ref 0–1.5)
LYMPHOCYTES # BLD AUTO: 0.8 K/UL — LOW (ref 1–3.3)
LYMPHOCYTES # BLD AUTO: 12.5 % — LOW (ref 13–44)
MAGNESIUM SERPL-MCNC: 1.8 MG/DL — SIGNIFICANT CHANGE UP (ref 1.6–2.6)
MCHC RBC-ENTMCNC: 30.3 GM/DL — LOW (ref 32–36)
MCHC RBC-ENTMCNC: 32.2 PG — SIGNIFICANT CHANGE UP (ref 27–34)
MCV RBC AUTO: 106.4 FL — HIGH (ref 80–100)
MONOCYTES # BLD AUTO: 0.51 K/UL — SIGNIFICANT CHANGE UP (ref 0–0.9)
MONOCYTES NFR BLD AUTO: 8 % — SIGNIFICANT CHANGE UP (ref 2–14)
MYCOPHENOLATE SERPL-MCNC: ABNORMAL
NEUTROPHILS # BLD AUTO: 4.88 K/UL — SIGNIFICANT CHANGE UP (ref 1.8–7.4)
NEUTROPHILS NFR BLD AUTO: 76.2 % — SIGNIFICANT CHANGE UP (ref 43–77)
NRBC # BLD: 0 /100 WBCS — SIGNIFICANT CHANGE UP (ref 0–0)
PHOSPHATE SERPL-MCNC: 2.1 MG/DL — LOW (ref 2.5–4.5)
PLATELET # BLD AUTO: 144 K/UL — LOW (ref 150–400)
POTASSIUM SERPL-MCNC: 5.4 MMOL/L — HIGH (ref 3.5–5.3)
POTASSIUM SERPL-MCNC: 5.5 MMOL/L — HIGH (ref 3.5–5.3)
POTASSIUM SERPL-MCNC: 5.8 MMOL/L — HIGH (ref 3.5–5.3)
POTASSIUM SERPL-SCNC: 5.4 MMOL/L — HIGH (ref 3.5–5.3)
POTASSIUM SERPL-SCNC: 5.5 MMOL/L — HIGH (ref 3.5–5.3)
POTASSIUM SERPL-SCNC: 5.8 MMOL/L — HIGH (ref 3.5–5.3)
PROT SERPL-MCNC: 5.9 G/DL — LOW (ref 6–8.3)
RBC # BLD: 3.29 M/UL — LOW (ref 4.2–5.8)
RBC # FLD: 13.5 % — SIGNIFICANT CHANGE UP (ref 10.3–14.5)
SARS-COV-2 IGG+IGM SERPL QL IA: 36.5 U/ML — HIGH
SARS-COV-2 IGG+IGM SERPL QL IA: POSITIVE
SODIUM SERPL-SCNC: 138 MMOL/L — SIGNIFICANT CHANGE UP (ref 135–145)
SODIUM SERPL-SCNC: 139 MMOL/L — SIGNIFICANT CHANGE UP (ref 135–145)
SODIUM SERPL-SCNC: 139 MMOL/L — SIGNIFICANT CHANGE UP (ref 135–145)
TACROLIMUS SERPL-MCNC: 6.9 NG/ML — SIGNIFICANT CHANGE UP
WBC # BLD: 6.4 K/UL — SIGNIFICANT CHANGE UP (ref 3.8–10.5)
WBC # FLD AUTO: 6.4 K/UL — SIGNIFICANT CHANGE UP (ref 3.8–10.5)

## 2021-05-01 PROCEDURE — 99233 SBSQ HOSP IP/OBS HIGH 50: CPT

## 2021-05-01 RX ORDER — SODIUM BICARBONATE 1 MEQ/ML
1950 SYRINGE (ML) INTRAVENOUS
Refills: 0 | Status: DISCONTINUED | OUTPATIENT
Start: 2021-05-01 | End: 2021-05-02

## 2021-05-01 RX ORDER — INSULIN HUMAN 100 [IU]/ML
5 INJECTION, SOLUTION SUBCUTANEOUS ONCE
Refills: 0 | Status: DISCONTINUED | OUTPATIENT
Start: 2021-05-01 | End: 2021-05-01

## 2021-05-01 RX ORDER — INSULIN LISPRO 100/ML
VIAL (ML) SUBCUTANEOUS
Refills: 0 | Status: DISCONTINUED | OUTPATIENT
Start: 2021-05-01 | End: 2021-05-02

## 2021-05-01 RX ORDER — DIPHENHYDRAMINE HCL 50 MG
25 CAPSULE ORAL ONCE
Refills: 0 | Status: COMPLETED | OUTPATIENT
Start: 2021-05-01 | End: 2021-05-01

## 2021-05-01 RX ORDER — ACETAMINOPHEN 500 MG
975 TABLET ORAL ONCE
Refills: 0 | Status: COMPLETED | OUTPATIENT
Start: 2021-05-01 | End: 2021-05-01

## 2021-05-01 RX ORDER — INSULIN LISPRO 100/ML
VIAL (ML) SUBCUTANEOUS AT BEDTIME
Refills: 0 | Status: DISCONTINUED | OUTPATIENT
Start: 2021-05-01 | End: 2021-05-02

## 2021-05-01 RX ORDER — TACROLIMUS 5 MG/1
1 CAPSULE ORAL ONCE
Refills: 0 | Status: COMPLETED | OUTPATIENT
Start: 2021-05-01 | End: 2021-05-01

## 2021-05-01 RX ORDER — INSULIN HUMAN 100 [IU]/ML
5 INJECTION, SOLUTION SUBCUTANEOUS ONCE
Refills: 0 | Status: COMPLETED | OUTPATIENT
Start: 2021-05-01 | End: 2021-05-01

## 2021-05-01 RX ORDER — TACROLIMUS 5 MG/1
7 CAPSULE ORAL
Refills: 0 | Status: DISCONTINUED | OUTPATIENT
Start: 2021-05-02 | End: 2021-05-02

## 2021-05-01 RX ORDER — NIFEDIPINE 30 MG
30 TABLET, EXTENDED RELEASE 24 HR ORAL DAILY
Refills: 0 | Status: DISCONTINUED | OUTPATIENT
Start: 2021-05-02 | End: 2021-05-02

## 2021-05-01 RX ORDER — NIFEDIPINE 30 MG
30 TABLET, EXTENDED RELEASE 24 HR ORAL ONCE
Refills: 0 | Status: COMPLETED | OUTPATIENT
Start: 2021-05-01 | End: 2021-05-01

## 2021-05-01 RX ORDER — INSULIN GLARGINE 100 [IU]/ML
21 INJECTION, SOLUTION SUBCUTANEOUS AT BEDTIME
Refills: 0 | Status: DISCONTINUED | OUTPATIENT
Start: 2021-05-01 | End: 2021-05-02

## 2021-05-01 RX ORDER — MAGNESIUM SULFATE 500 MG/ML
2 VIAL (ML) INJECTION ONCE
Refills: 0 | Status: COMPLETED | OUTPATIENT
Start: 2021-05-01 | End: 2021-05-01

## 2021-05-01 RX ORDER — DEXTROSE 50 % IN WATER 50 %
50 SYRINGE (ML) INTRAVENOUS ONCE
Refills: 0 | Status: DISCONTINUED | OUTPATIENT
Start: 2021-05-01 | End: 2021-05-01

## 2021-05-01 RX ORDER — INSULIN LISPRO 100/ML
7 VIAL (ML) SUBCUTANEOUS
Refills: 0 | Status: DISCONTINUED | OUTPATIENT
Start: 2021-05-01 | End: 2021-05-02

## 2021-05-01 RX ORDER — DEXTROSE 50 % IN WATER 50 %
50 SYRINGE (ML) INTRAVENOUS ONCE
Refills: 0 | Status: COMPLETED | OUTPATIENT
Start: 2021-05-01 | End: 2021-05-01

## 2021-05-01 RX ADMIN — Medication 1300 MILLIGRAM(S): at 05:07

## 2021-05-01 RX ADMIN — Medication 500000 UNIT(S): at 00:00

## 2021-05-01 RX ADMIN — VALGANCICLOVIR 450 MILLIGRAM(S): 450 TABLET, FILM COATED ORAL at 12:04

## 2021-05-01 RX ADMIN — Medication 25 MILLIGRAM(S): at 00:14

## 2021-05-01 RX ADMIN — SODIUM ZIRCONIUM CYCLOSILICATE 10 GRAM(S): 10 POWDER, FOR SUSPENSION ORAL at 22:45

## 2021-05-01 RX ADMIN — Medication 30 MILLIGRAM(S): at 10:34

## 2021-05-01 RX ADMIN — Medication 500000 UNIT(S): at 05:07

## 2021-05-01 RX ADMIN — Medication 2: at 18:57

## 2021-05-01 RX ADMIN — SODIUM ZIRCONIUM CYCLOSILICATE 10 GRAM(S): 10 POWDER, FOR SUSPENSION ORAL at 05:07

## 2021-05-01 RX ADMIN — Medication 62.5 MILLIMOLE(S): at 11:55

## 2021-05-01 RX ADMIN — Medication 5 UNIT(S): at 13:14

## 2021-05-01 RX ADMIN — CARVEDILOL PHOSPHATE 25 MILLIGRAM(S): 80 CAPSULE, EXTENDED RELEASE ORAL at 17:46

## 2021-05-01 RX ADMIN — INSULIN GLARGINE 21 UNIT(S): 100 INJECTION, SOLUTION SUBCUTANEOUS at 21:36

## 2021-05-01 RX ADMIN — INSULIN HUMAN 5 UNIT(S): 100 INJECTION, SOLUTION SUBCUTANEOUS at 16:20

## 2021-05-01 RX ADMIN — MYCOPHENOLATE MOFETIL 1000 MILLIGRAM(S): 250 CAPSULE ORAL at 08:01

## 2021-05-01 RX ADMIN — FAMOTIDINE 20 MILLIGRAM(S): 10 INJECTION INTRAVENOUS at 12:03

## 2021-05-01 RX ADMIN — CARVEDILOL PHOSPHATE 25 MILLIGRAM(S): 80 CAPSULE, EXTENDED RELEASE ORAL at 05:07

## 2021-05-01 RX ADMIN — Medication 5 UNIT(S): at 18:56

## 2021-05-01 RX ADMIN — Medication 5 MILLIGRAM(S): at 05:07

## 2021-05-01 RX ADMIN — Medication 500000 UNIT(S): at 22:45

## 2021-05-01 RX ADMIN — Medication 2: at 13:15

## 2021-05-01 RX ADMIN — Medication 975 MILLIGRAM(S): at 22:09

## 2021-05-01 RX ADMIN — Medication 5 UNIT(S): at 08:14

## 2021-05-01 RX ADMIN — Medication 500000 UNIT(S): at 17:46

## 2021-05-01 RX ADMIN — TACROLIMUS 6 MILLIGRAM(S): 5 CAPSULE ORAL at 07:56

## 2021-05-01 RX ADMIN — Medication 975 MILLIGRAM(S): at 22:33

## 2021-05-01 RX ADMIN — Medication 50 GRAM(S): at 08:14

## 2021-05-01 RX ADMIN — ATOVAQUONE 1500 MILLIGRAM(S): 750 SUSPENSION ORAL at 12:03

## 2021-05-01 RX ADMIN — TACROLIMUS 1 MILLIGRAM(S): 5 CAPSULE ORAL at 15:15

## 2021-05-01 RX ADMIN — TAMSULOSIN HYDROCHLORIDE 0.4 MILLIGRAM(S): 0.4 CAPSULE ORAL at 21:36

## 2021-05-01 RX ADMIN — MYCOPHENOLATE MOFETIL 1000 MILLIGRAM(S): 250 CAPSULE ORAL at 19:25

## 2021-05-01 RX ADMIN — Medication 1950 MILLIGRAM(S): at 17:47

## 2021-05-01 RX ADMIN — Medication 81 MILLIGRAM(S): at 12:03

## 2021-05-01 RX ADMIN — Medication 50 MILLILITER(S): at 16:20

## 2021-05-01 RX ADMIN — Medication 500000 UNIT(S): at 12:03

## 2021-05-01 RX ADMIN — SENNA PLUS 2 TABLET(S): 8.6 TABLET ORAL at 21:35

## 2021-05-01 RX ADMIN — SODIUM ZIRCONIUM CYCLOSILICATE 10 GRAM(S): 10 POWDER, FOR SUSPENSION ORAL at 13:43

## 2021-05-01 NOTE — PROGRESS NOTE ADULT - PROBLEM SELECTOR PLAN 1
Diabetes Education and Nutrition Eval  Recommend decrease Lantus from 25 to 21 units daily  Increase Admelog from 5 to 7units with meals  Mod correction scale qac + bedtime  Goal glucose 100-180  Outpt. endo follow-up  Outpt. optho, podiatry, nephrology  Plan to d/c on basal bolus.

## 2021-05-01 NOTE — PROGRESS NOTE ADULT - PROBLEM SELECTOR PLAN 3
LDL goal <70  Can obtain fasting lipid panel as outpt     Elkin Garcia MD  Endocrine Fellow   Pager  on weekdays 9am- 5pm   Please call  after hours and on weekends

## 2021-05-02 ENCOUNTER — TRANSCRIPTION ENCOUNTER (OUTPATIENT)
Age: 71
End: 2021-05-02

## 2021-05-02 VITALS
DIASTOLIC BLOOD PRESSURE: 61 MMHG | SYSTOLIC BLOOD PRESSURE: 125 MMHG | OXYGEN SATURATION: 95 % | RESPIRATION RATE: 18 BRPM | HEART RATE: 70 BPM | TEMPERATURE: 98 F

## 2021-05-02 LAB
ALBUMIN SERPL ELPH-MCNC: 3.7 G/DL — SIGNIFICANT CHANGE UP (ref 3.3–5)
ALP SERPL-CCNC: 166 U/L — HIGH (ref 40–120)
ALT FLD-CCNC: 9 U/L — LOW (ref 10–45)
ANION GAP SERPL CALC-SCNC: 13 MMOL/L — SIGNIFICANT CHANGE UP (ref 5–17)
AST SERPL-CCNC: 10 U/L — SIGNIFICANT CHANGE UP (ref 10–40)
BASOPHILS # BLD AUTO: 0.02 K/UL — SIGNIFICANT CHANGE UP (ref 0–0.2)
BASOPHILS NFR BLD AUTO: 0.3 % — SIGNIFICANT CHANGE UP (ref 0–2)
BILIRUB SERPL-MCNC: 0.5 MG/DL — SIGNIFICANT CHANGE UP (ref 0.2–1.2)
BUN SERPL-MCNC: 22 MG/DL — SIGNIFICANT CHANGE UP (ref 7–23)
CALCIUM SERPL-MCNC: 9.2 MG/DL — SIGNIFICANT CHANGE UP (ref 8.4–10.5)
CHLORIDE SERPL-SCNC: 106 MMOL/L — SIGNIFICANT CHANGE UP (ref 96–108)
CO2 SERPL-SCNC: 22 MMOL/L — SIGNIFICANT CHANGE UP (ref 22–31)
CREAT SERPL-MCNC: 1.08 MG/DL — SIGNIFICANT CHANGE UP (ref 0.5–1.3)
EOSINOPHIL # BLD AUTO: 0.08 K/UL — SIGNIFICANT CHANGE UP (ref 0–0.5)
EOSINOPHIL NFR BLD AUTO: 1.1 % — SIGNIFICANT CHANGE UP (ref 0–6)
GLUCOSE BLDC GLUCOMTR-MCNC: 163 MG/DL — HIGH (ref 70–99)
GLUCOSE BLDC GLUCOMTR-MCNC: 194 MG/DL — HIGH (ref 70–99)
GLUCOSE SERPL-MCNC: 118 MG/DL — HIGH (ref 70–99)
HCT VFR BLD CALC: 31.7 % — LOW (ref 39–50)
HGB BLD-MCNC: 10.2 G/DL — LOW (ref 13–17)
IMM GRANULOCYTES NFR BLD AUTO: 0.7 % — SIGNIFICANT CHANGE UP (ref 0–1.5)
LYMPHOCYTES # BLD AUTO: 0.86 K/UL — LOW (ref 1–3.3)
LYMPHOCYTES # BLD AUTO: 11.5 % — LOW (ref 13–44)
MAGNESIUM SERPL-MCNC: 1.8 MG/DL — SIGNIFICANT CHANGE UP (ref 1.6–2.6)
MCHC RBC-ENTMCNC: 31.7 PG — SIGNIFICANT CHANGE UP (ref 27–34)
MCHC RBC-ENTMCNC: 32.2 GM/DL — SIGNIFICANT CHANGE UP (ref 32–36)
MCV RBC AUTO: 98.4 FL — SIGNIFICANT CHANGE UP (ref 80–100)
MONOCYTES # BLD AUTO: 0.64 K/UL — SIGNIFICANT CHANGE UP (ref 0–0.9)
MONOCYTES NFR BLD AUTO: 8.6 % — SIGNIFICANT CHANGE UP (ref 2–14)
NEUTROPHILS # BLD AUTO: 5.83 K/UL — SIGNIFICANT CHANGE UP (ref 1.8–7.4)
NEUTROPHILS NFR BLD AUTO: 77.8 % — HIGH (ref 43–77)
NRBC # BLD: 0 /100 WBCS — SIGNIFICANT CHANGE UP (ref 0–0)
PHOSPHATE SERPL-MCNC: 2.2 MG/DL — LOW (ref 2.5–4.5)
PLATELET # BLD AUTO: 173 K/UL — SIGNIFICANT CHANGE UP (ref 150–400)
POTASSIUM SERPL-MCNC: 4.5 MMOL/L — SIGNIFICANT CHANGE UP (ref 3.5–5.3)
POTASSIUM SERPL-SCNC: 4.5 MMOL/L — SIGNIFICANT CHANGE UP (ref 3.5–5.3)
PROT SERPL-MCNC: 6.5 G/DL — SIGNIFICANT CHANGE UP (ref 6–8.3)
RBC # BLD: 3.22 M/UL — LOW (ref 4.2–5.8)
RBC # FLD: 13.2 % — SIGNIFICANT CHANGE UP (ref 10.3–14.5)
SODIUM SERPL-SCNC: 141 MMOL/L — SIGNIFICANT CHANGE UP (ref 135–145)
TACROLIMUS SERPL-MCNC: 10.4 NG/ML — SIGNIFICANT CHANGE UP
WBC # BLD: 7.48 K/UL — SIGNIFICANT CHANGE UP (ref 3.8–10.5)
WBC # FLD AUTO: 7.48 K/UL — SIGNIFICANT CHANGE UP (ref 3.8–10.5)

## 2021-05-02 PROCEDURE — 80197 ASSAY OF TACROLIMUS: CPT

## 2021-05-02 PROCEDURE — 80053 COMPREHEN METABOLIC PANEL: CPT

## 2021-05-02 PROCEDURE — 96374 THER/PROPH/DIAG INJ IV PUSH: CPT

## 2021-05-02 PROCEDURE — 85014 HEMATOCRIT: CPT

## 2021-05-02 PROCEDURE — 87635 SARS-COV-2 COVID-19 AMP PRB: CPT

## 2021-05-02 PROCEDURE — 93005 ELECTROCARDIOGRAM TRACING: CPT

## 2021-05-02 PROCEDURE — 82962 GLUCOSE BLOOD TEST: CPT

## 2021-05-02 PROCEDURE — 82955 ASSAY OF G6PD ENZYME: CPT

## 2021-05-02 PROCEDURE — 80180 DRUG SCRN QUAN MYCOPHENOLATE: CPT

## 2021-05-02 PROCEDURE — 83605 ASSAY OF LACTIC ACID: CPT

## 2021-05-02 PROCEDURE — 80048 BASIC METABOLIC PNL TOTAL CA: CPT

## 2021-05-02 PROCEDURE — 84100 ASSAY OF PHOSPHORUS: CPT

## 2021-05-02 PROCEDURE — 99232 SBSQ HOSP IP/OBS MODERATE 35: CPT

## 2021-05-02 PROCEDURE — 85018 HEMOGLOBIN: CPT

## 2021-05-02 PROCEDURE — 83735 ASSAY OF MAGNESIUM: CPT

## 2021-05-02 PROCEDURE — 82435 ASSAY OF BLOOD CHLORIDE: CPT

## 2021-05-02 PROCEDURE — 82330 ASSAY OF CALCIUM: CPT

## 2021-05-02 PROCEDURE — 85025 COMPLETE CBC W/AUTO DIFF WBC: CPT

## 2021-05-02 PROCEDURE — 82947 ASSAY GLUCOSE BLOOD QUANT: CPT

## 2021-05-02 PROCEDURE — 93975 VASCULAR STUDY: CPT

## 2021-05-02 PROCEDURE — 86769 SARS-COV-2 COVID-19 ANTIBODY: CPT

## 2021-05-02 PROCEDURE — 99285 EMERGENCY DEPT VISIT HI MDM: CPT | Mod: 25

## 2021-05-02 PROCEDURE — 84295 ASSAY OF SERUM SODIUM: CPT

## 2021-05-02 PROCEDURE — 84132 ASSAY OF SERUM POTASSIUM: CPT

## 2021-05-02 PROCEDURE — 82803 BLOOD GASES ANY COMBINATION: CPT

## 2021-05-02 RX ORDER — MAGNESIUM SULFATE 500 MG/ML
2 VIAL (ML) INJECTION ONCE
Refills: 0 | Status: COMPLETED | OUTPATIENT
Start: 2021-05-02 | End: 2021-05-02

## 2021-05-02 RX ORDER — SODIUM BICARBONATE 1 MEQ/ML
3 SYRINGE (ML) INTRAVENOUS
Qty: 180 | Refills: 0
Start: 2021-05-02 | End: 2021-05-31

## 2021-05-02 RX ORDER — ATOVAQUONE 750 MG/5ML
10 SUSPENSION ORAL
Qty: 300 | Refills: 0
Start: 2021-05-02 | End: 2021-05-31

## 2021-05-02 RX ORDER — TACROLIMUS 5 MG/1
4 CAPSULE ORAL
Qty: 0 | Refills: 0 | DISCHARGE
Start: 2021-05-02

## 2021-05-02 RX ORDER — SODIUM ZIRCONIUM CYCLOSILICATE 10 G/10G
10 POWDER, FOR SUSPENSION ORAL DAILY
Refills: 0 | Status: DISCONTINUED | OUTPATIENT
Start: 2021-05-02 | End: 2021-05-02

## 2021-05-02 RX ORDER — NIFEDIPINE 30 MG
2 TABLET, EXTENDED RELEASE 24 HR ORAL
Qty: 0 | Refills: 0 | DISCHARGE
Start: 2021-05-02 | End: 2021-05-31

## 2021-05-02 RX ORDER — SODIUM ZIRCONIUM CYCLOSILICATE 10 G/10G
10 POWDER, FOR SUSPENSION ORAL
Refills: 0 | Status: DISCONTINUED | OUTPATIENT
Start: 2021-05-02 | End: 2021-05-02

## 2021-05-02 RX ORDER — SODIUM ZIRCONIUM CYCLOSILICATE 10 G/10G
10 POWDER, FOR SUSPENSION ORAL
Qty: 600 | Refills: 0
Start: 2021-05-02 | End: 2021-05-31

## 2021-05-02 RX ORDER — NIFEDIPINE 30 MG
1 TABLET, EXTENDED RELEASE 24 HR ORAL
Qty: 30 | Refills: 0
Start: 2021-05-02 | End: 2021-05-31

## 2021-05-02 RX ORDER — TACROLIMUS 5 MG/1
7 CAPSULE ORAL
Qty: 0 | Refills: 0 | DISCHARGE
Start: 2021-05-02

## 2021-05-02 RX ORDER — TAMSULOSIN HYDROCHLORIDE 0.4 MG/1
1 CAPSULE ORAL
Qty: 0 | Refills: 0 | DISCHARGE
Start: 2021-05-02

## 2021-05-02 RX ORDER — SODIUM ZIRCONIUM CYCLOSILICATE 10 G/10G
5 POWDER, FOR SUSPENSION ORAL DAILY
Refills: 0 | Status: DISCONTINUED | OUTPATIENT
Start: 2021-05-02 | End: 2021-05-02

## 2021-05-02 RX ORDER — MAGNESIUM OXIDE 400 MG ORAL TABLET 241.3 MG
1 TABLET ORAL
Qty: 60 | Refills: 0
Start: 2021-05-02 | End: 2021-05-31

## 2021-05-02 RX ORDER — SODIUM,POTASSIUM PHOSPHATES 278-250MG
1 POWDER IN PACKET (EA) ORAL
Qty: 60 | Refills: 0
Start: 2021-05-02 | End: 2021-05-31

## 2021-05-02 RX ORDER — SODIUM,POTASSIUM PHOSPHATES 278-250MG
1 POWDER IN PACKET (EA) ORAL
Refills: 0 | Status: DISCONTINUED | OUTPATIENT
Start: 2021-05-02 | End: 2021-05-02

## 2021-05-02 RX ORDER — TACROLIMUS 5 MG/1
1 CAPSULE ORAL
Qty: 0 | Refills: 0 | DISCHARGE
Start: 2021-05-02

## 2021-05-02 RX ORDER — MAGNESIUM OXIDE 400 MG ORAL TABLET 241.3 MG
400 TABLET ORAL DAILY
Refills: 0 | Status: DISCONTINUED | OUTPATIENT
Start: 2021-05-02 | End: 2021-05-02

## 2021-05-02 RX ADMIN — FAMOTIDINE 20 MILLIGRAM(S): 10 INJECTION INTRAVENOUS at 12:37

## 2021-05-02 RX ADMIN — Medication 1950 MILLIGRAM(S): at 05:09

## 2021-05-02 RX ADMIN — Medication 7 UNIT(S): at 09:15

## 2021-05-02 RX ADMIN — Medication 50 GRAM(S): at 10:07

## 2021-05-02 RX ADMIN — Medication 500000 UNIT(S): at 12:37

## 2021-05-02 RX ADMIN — Medication 7 UNIT(S): at 13:29

## 2021-05-02 RX ADMIN — Medication 1: at 13:28

## 2021-05-02 RX ADMIN — TACROLIMUS 7 MILLIGRAM(S): 5 CAPSULE ORAL at 07:49

## 2021-05-02 RX ADMIN — Medication 62.5 MILLIMOLE(S): at 11:04

## 2021-05-02 RX ADMIN — MYCOPHENOLATE MOFETIL 1000 MILLIGRAM(S): 250 CAPSULE ORAL at 07:47

## 2021-05-02 RX ADMIN — Medication 1: at 09:15

## 2021-05-02 RX ADMIN — CARVEDILOL PHOSPHATE 25 MILLIGRAM(S): 80 CAPSULE, EXTENDED RELEASE ORAL at 05:09

## 2021-05-02 RX ADMIN — SODIUM ZIRCONIUM CYCLOSILICATE 10 GRAM(S): 10 POWDER, FOR SUSPENSION ORAL at 09:16

## 2021-05-02 RX ADMIN — VALGANCICLOVIR 450 MILLIGRAM(S): 450 TABLET, FILM COATED ORAL at 12:37

## 2021-05-02 RX ADMIN — Medication 500000 UNIT(S): at 05:08

## 2021-05-02 RX ADMIN — ATOVAQUONE 1500 MILLIGRAM(S): 750 SUSPENSION ORAL at 12:37

## 2021-05-02 RX ADMIN — Medication 81 MILLIGRAM(S): at 12:37

## 2021-05-02 RX ADMIN — Medication 5 MILLIGRAM(S): at 05:09

## 2021-05-02 RX ADMIN — MAGNESIUM OXIDE 400 MG ORAL TABLET 400 MILLIGRAM(S): 241.3 TABLET ORAL at 12:38

## 2021-05-02 RX ADMIN — Medication 30 MILLIGRAM(S): at 05:09

## 2021-05-02 NOTE — DISCHARGE NOTE PROVIDER - NSDCFUSCHEDAPPT_GEN_ALL_CORE_FT
MALISSA MOSES ; 05/04/2021 ; NPP Nephro [address] [facility] MALISSA Mcdaniel ; 05/19/2021 ; NPP Surg TrPl [address] [facility] MALISSA Mcdaniel ; 06/02/2021 ; NPP Nephro [address] [facility] MALISSA Mcdaniel ; 06/16/2021 ; NPP Surg TrPl [address] [facility] MALISSA Mcdaniel ; 06/30/2021 ; NPP Surg TrPl [address] [facility] MALISSA Mcdaniel ; 07/13/2021 ; NPP Nephro [address] [facility] MALISSA Mcdaniel ; 07/28/2021 ; NPP Surg TrPl [address] [facility]

## 2021-05-02 NOTE — DISCHARGE NOTE PROVIDER - NSDCMRMEDTOKEN_GEN_ALL_CORE_FT
aspirin 81 mg oral tablet, chewable: 1 tab(s) orally once a day  atovaquone 750 mg/5 mL oral suspension: 10 milliliter(s) orally once a day  calcitriol 0.25 mcg oral capsule: 1 cap(s) orally once a day  carvedilol 25 mg oral tablet: 1 tab(s) orally every 12 hours  famotidine 20 mg oral tablet: 1 tab(s) orally once a day  insulin glargine: 21 unit(s) subcutaneous once a day (at bedtime)  insulin lispro 100 units/mL injectable solution: 7 unit(s) injectable 3 times a day  Lokelma 10 g oral powder for reconstitution: 10 gram(s) orally 2 times a day   magnesium oxide 400 mg (241.3 mg elemental magnesium) oral tablet: 1 tab(s) orally 2 times a day   mycophenolate mofetil 250 mg oral capsule: 1000 milligram(s) orally 2 times a day  NIFEdipine 30 mg oral tablet, extended release: 1 tab(s) orally once a day  nystatin 100,000 units/mL oral suspension: 5 milliliter(s) orally 4 times a day  predniSONE 5 mg oral tablet: 1 tab(s) orally once a day  senna oral tablet: 2 tab(s) orally once a day (at bedtime)  sodium bicarbonate 650 mg oral tablet: 3 tab(s) orally 2 times a day  sodium/potassium/phosphorus 160 mg-280 mg-250 mg oral powder for reconstitution: 1 packet(s) orally 2 times a day  sulfamethoxazole-trimethoprim 400 mg-80 mg oral tablet: 1 tab(s) orally once a day  tacrolimus 1 mg oral tablet, extended release: 7 tab(s) orally   tamsulosin 0.4 mg oral capsule: 1 cap(s) orally once a day (at bedtime)  valGANciclovir 450 mg oral tablet: 1 tab(s) orally

## 2021-05-02 NOTE — PROGRESS NOTE ADULT - PROVIDER SPECIALTY LIST ADULT
Transplant Nephrology
Transplant Nephrology
Transplant Surgery
Endocrinology

## 2021-05-02 NOTE — DISCHARGE NOTE NURSING/CASE MANAGEMENT/SOCIAL WORK - NSDCFUADDAPPT_GEN_ALL_CORE_FT
1. Please follow-up at the Transplant clinic on Tuesday 5/4/21 at your previously scheduled time with Dr. Patino.  Phone: [phone].   [address] [place] [address]  Please call the clinic upon arrival for your appointment. They will let you know when to come in.  This is to avoid multiple patients occupying the waiting area. Thank you!    2. Please follow up with your primary care physician in one week regarding your hospitalization.

## 2021-05-02 NOTE — DISCHARGE NOTE NURSING/CASE MANAGEMENT/SOCIAL WORK - PATIENT PORTAL LINK FT
You can access the FollowMyHealth Patient Portal offered by [facility] by registering at the following website: [email or web address]. By joining [facility]’s FollowMyHealth portal, you will also be able to view your health information using other applications (apps) compatible with our system.

## 2021-05-02 NOTE — PROGRESS NOTE ADULT - SUBJECTIVE AND OBJECTIVE BOX
Transplant Surgery - Multidisciplinary Rounds  --------------------------------------------------------------  DCD DDRT 3/9/21    Present:  Patient seen with multidisciplinary team including Transplant Surgeon:  Dr. Engel,  Transplant Nephrologist:  Dr. SKINNY Patino, ACP's: Milad/Osbaldo and RN in am rounds and examined by Dr. Engel.   Disciplines not in attendance will be notified of the plan.       HPI: 70 y/o M with PMHx of HTN (age 43), DM on insulin (age 43), gout, anxiety, depression, OCD and CKD since 2016.  He started HD via L AVF  in April 2020 at [facility] and transitioned to PD.    Underwent R DCD DDRT 3/9/2021 on 3/9/2021 (1A/1V/1U--stented). Post-op course c/b DGF requiring HD and daily PD.  Has had excellent graft function now off PD: Cr ~1.3-1.4, good UO on Flomax.  Ureteral stent and PD catheter removed on 4/22.    Sent in from clinic this evening for hyperkalemia to 6.9, asymptomatic.  Upon arrival to ED, K was 6.4 with peaked T waves on EKG, as well as glucose to 400 on BMP.  He has been compliant with his insulin regimen, but has noticed erratic blood sugars as high as 300s during the day, with symptomatic hypoglycemia at times awakening him in the middle of the night. Has uncontrolled DM since transplant and maintains close f/u with his Endocrinologist Dr. Abad.      Overall, he admits to generalized weakness, but denies fevers, chills, n/v, CP, palpitations, SOB, extremity pain or weakness.  Able to ambulate without assistance with no GASTON. Has good appetite and normal bowel function.      Interval Events:  - Received Lokelma, Insuiln/Dextrose  - Endocrine consulted  - Started on Lokelma q8  - bactrim held.  G6PD sent results pending.. Mepron started    Potential Discharge date: pending clinical improvement    Education:  Medications    Plan of care:  See Below       MEDICATIONS  (STANDING):  aspirin  chewable 81 milliGRAM(s) Oral daily  atovaquone  Suspension 1500 milliGRAM(s) Oral daily  carvedilol 25 milliGRAM(s) Oral every 12 hours  dextrose 40% Gel 15 Gram(s) Oral once  dextrose 5%. 1000 milliLiter(s) (50 mL/Hr) IV Continuous <Continuous>  dextrose 5%. 1000 milliLiter(s) (100 mL/Hr) IV Continuous <Continuous>  dextrose 50% Injectable 25 Gram(s) IV Push once  dextrose 50% Injectable 12.5 Gram(s) IV Push once  dextrose 50% Injectable 25 Gram(s) IV Push once  famotidine    Tablet 20 milliGRAM(s) Oral daily  glucagon  Injectable 1 milliGRAM(s) IntraMuscular once  insulin glargine Injectable (LANTUS) 21 Unit(s) SubCutaneous at bedtime  insulin lispro (ADMELOG) corrective regimen sliding scale   SubCutaneous three times a day before meals  insulin lispro (ADMELOG) corrective regimen sliding scale   SubCutaneous at bedtime  insulin lispro Injectable (ADMELOG) 7 Unit(s) SubCutaneous three times a day before meals  magnesium oxide 400 milliGRAM(s) Oral daily  mycophenolate mofetil 1000 milliGRAM(s) Oral <User Schedule>  NIFEdipine XL 30 milliGRAM(s) Oral daily  nystatin    Suspension 787220 Unit(s) Oral four times a day  potassium phosphate / sodium phosphate Powder (PHOS-NaK) 1 Packet(s) Oral two times a day  predniSONE   Tablet 5 milliGRAM(s) Oral daily  senna 2 Tablet(s) Oral at bedtime  sodium bicarbonate 1950 milliGRAM(s) Oral two times a day  sodium zirconium cyclosilicate 10 Gram(s) Oral two times a day  tacrolimus ER Tablet (ENVARSUS XR) 7 milliGRAM(s) Oral <User Schedule>  tamsulosin 0.4 milliGRAM(s) Oral at bedtime  valGANciclovir 450 milliGRAM(s) Oral daily    MEDICATIONS  (PRN):      PAST MEDICAL & SURGICAL HISTORY:  Chronic kidney disease (CKD)    HTN (hypertension)    DM (diabetes mellitus)    H/O kidney transplant  R, 3/11/2021        Vital Signs Last 24 Hrs  T(C): 37.3 (02 May 2021 09:00), Max: 37.3 (02 May 2021 09:00)  T(F): 99.1 (02 May 2021 09:00), Max: 99.1 (02 May 2021 09:00)  HR: 78 (02 May 2021 09:00) (68 - 78)  BP: 142/68 (02 May 2021 09:00) (125/61 - 163/80)  BP(mean): 101 (02 May 2021 01:00) (101 - 101)  RR: 18 (02 May 2021 09:00) (16 - 18)  SpO2: 95% (02 May 2021 09:00) (93% - 98%)    I&O's Summary    01 May 2021 07:01  -  02 May 2021 07:00  --------------------------------------------------------  IN: 880 mL / OUT: 2100 mL / NET: -1220 mL    02 May 2021 07:01  -  02 May 2021 11:09  --------------------------------------------------------  IN: 200 mL / OUT: 100 mL / NET: 100 mL                              10.2   7.48  )-----------( 173      ( 02 May 2021 05:53 )             31.7     05-02    141  |  106  |  22  ----------------------------<  118<H>  4.5   |  22  |  1.08    Ca    9.2      02 May 2021 05:53  Phos  2.2     05-02  Mg     1.8     05-02    TPro  6.5  /  Alb  3.7  /  TBili  0.5  /  DBili  x   /  AST  10  /  ALT  9<L>  /  AlkPhos  166<H>  05-02    Tacrolimus (), Serum: 6.9 ng/mL (05-01 @ 08:24)          Review of Systems:  Gen: No weight changes, fatigue, fevers/chills, +weakness  Skin: No rashes  Head/Eyes/Ears/Mouth: No headache; Normal hearing; Normal vision w/o blurriness; No sinus pain/discomfort, sore throat  Respiratory: No dyspnea, cough, wheezing, hemoptysis  CV: No chest pain, PND, orthopnea  GI: no abdominal pain; denies diarrhea, constipation, nausea, vomiting, melena, hematochezia  : No increased frequency, dysuria, hematuria, nocturia  MSK: No joint pain/swelling; no back pain; no edema  Neuro: No dizziness/lightheadedness, weakness, seizures, numbness, tingling  Heme: No easy bruising or bleeding  Endo: No heat/cold intolerance  Psych: No significant nervousness, anxiety, stress, depression  All other systems were reviewed and are negative, except as noted.          PHYSICAL EXAM:  Constitutional: Well developed / well nourished  Eyes: Anicteric, PERRLA  ENMT: nc/at  Neck: supple  Respiratory: CTA B/L  Cardiovascular: RRR  Gastrointestinal: Soft, ND/NT. healing incisional scars (RLQ/PD cath sites)  Genitourinary: Voiding spontaneously  Extremities: L AVF palpable  Vascular: Palpable dp pulses bilaterally  Neurological: A&O x3  Skin: no rashes, ulcerations or lesions;  Musculoskeletal: Moving all extremities  Psychiatric: Responsive
[facility] DIVISION OF KIDNEY DISEASES AND HYPERTENSION -- FOLLOW UP NOTE  --------------------------------------------------------------------------------  Authored by: Bryant Patino   Cell # [phone]     MALISSA MOSES was seen and examined at bedside.     24 hour events: K elevated to 5.8 yesterday afternoon, received additional Lokelma, Insulin/D50 and sodium bicarbonate increased. Envarsus dose increased to 7mg.   He fees well. Wants to go home.   Voiding urine without difficulty. No NVD. No fever.     Standing Inpatient Medications  aspirin  chewable 81 milliGRAM(s) Oral daily  atovaquone  Suspension 1500 milliGRAM(s) Oral daily  carvedilol 25 milliGRAM(s) Oral every 12 hours  famotidine    Tablet 20 milliGRAM(s) Oral daily  insulin glargine Injectable (LANTUS) 21 Unit(s) SubCutaneous at bedtime  insulin lispro (ADMELOG) corrective regimen sliding scale   SubCutaneous three times a day before meals  insulin lispro (ADMELOG) corrective regimen sliding scale   SubCutaneous at bedtime  insulin lispro Injectable (ADMELOG) 7 Unit(s) SubCutaneous three times a day before meals  magnesium oxide 400 milliGRAM(s) Oral daily  mycophenolate mofetil 1000 milliGRAM(s) Oral <User Schedule>  NIFEdipine XL 30 milliGRAM(s) Oral daily  nystatin    Suspension 261127 Unit(s) Oral four times a day  potassium phosphate / sodium phosphate Powder (PHOS-NaK) 1 Packet(s) Oral two times a day  predniSONE   Tablet 5 milliGRAM(s) Oral daily  senna 2 Tablet(s) Oral at bedtime  sodium bicarbonate 1950 milliGRAM(s) Oral two times a day  sodium phosphate IVPB 15 milliMole(s) IV Intermittent once  sodium zirconium cyclosilicate 10 Gram(s) Oral two times a day  tacrolimus ER Tablet (ENVARSUS XR) 7 milliGRAM(s) Oral <User Schedule>  tamsulosin 0.4 milliGRAM(s) Oral at bedtime  valGANciclovir 450 milliGRAM(s) Oral daily    PRN Inpatient Medications        VITALS/PHYSICAL EXAM  --------------------------------------------------------------------------------  T(C): 37.3 (05-02-21 @ 09:00), Max: 37.3 (05-02-21 @ 09:00)  HR: 78 (05-02-21 @ 09:00) (68 - 78)  BP: 142/68 (05-02-21 @ 09:00) (125/61 - 163/80)  RR: 18 (05-02-21 @ 09:00) (16 - 18)  SpO2: 95% (05-02-21 @ 09:00) (93% - 98%)      05-01-21 @ 07:01  -  05-02-21 @ 07:00  --------------------------------------------------------  IN: 880 mL / OUT: 2100 mL / NET: -1220 mL    05-02-21 @ 07:01  -  05-02-21 @ 10:43  --------------------------------------------------------  IN: 200 mL / OUT: 100 mL / NET: 100 mL      Physical Exam:  Awake and alert, resting comfortably, no signs of distress  Lungs clear b/l   Regular S1 and S2   Abdomen soft and non tender, wound well healed  RLQ graft non tender   No LE edema  Alert and oriented        LABS/STUDIES  --------------------------------------------------------------------------------              10.2   7.48  >-----------<  173      [05-02-21 @ 05:53]              31.7     141  |  106  |  22  ----------------------------<  118      [05-02-21 @ 05:53]  4.5   |  22  |  1.08        Ca     9.2     [05-02-21 @ 05:53]      Mg     1.8     [05-02-21 @ 05:53]      Phos  2.2     [05-02-21 @ 05:53]    TPro  6.5  /  Alb  3.7  /  TBili  0.5  /  DBili  x   /  AST  10  /  ALT  9   /  AlkPhos  166  [05-02-21 @ 05:53]      Creatinine Trend:  SCr 1.08 [05-02 @ 05:53]  SCr 1.04 [05-01 @ 18:47]  SCr 0.90 [05-01 @ 13:26]  SCr 1.03 [05-01 @ 05:54]  SCr 1.12 [04-30 @ 19:56]    Tacrolimus (), Serum: 6.9 ng/mL (05-01 @ 08:24)  Tacrolimus (), Serum: 5.5 ng/mL (04-30 @ 07:14)  Tacrolimus (), Serum: 6.3 ng/mL (04-30 @ 03:38)      POCT Blood Glucose.: 163 mg/dL (02 May 2021 08:47)  POCT Blood Glucose.: 205 mg/dL (01 May 2021 21:19)  POCT Blood Glucose.: 180 mg/dL (01 May 2021 18:35)  POCT Blood Glucose.: 145 mg/dL (01 May 2021 16:56)  POCT Blood Glucose.: 163 mg/dL (01 May 2021 16:19)  POCT Blood Glucose.: 167 mg/dL (01 May 2021 13:07)        
[facility] DIVISION OF KIDNEY DISEASES AND HYPERTENSION -- FOLLOW UP NOTE  --------------------------------------------------------------------------------  Authored by: Bryant Patino   Cell # [phone]     MALISSA MOSES was seen and examined at bedside.     24 hour events: No major events. Hyperglycemia better. BP elevated. Afebrile.   subjective: Feels well.     Standing Inpatient Medications  aspirin  chewable 81 milliGRAM(s) Oral daily  atovaquone  Suspension 1500 milliGRAM(s) Oral daily  carvedilol 25 milliGRAM(s) Oral every 12 hours  famotidine    Tablet 20 milliGRAM(s) Oral daily  insulin glargine Injectable (LANTUS) 25 Unit(s) SubCutaneous at bedtime  insulin lispro (ADMELOG) corrective regimen sliding scale   SubCutaneous three times a day before meals  insulin lispro (ADMELOG) corrective regimen sliding scale   SubCutaneous at bedtime  insulin lispro Injectable (ADMELOG) 5 Unit(s) SubCutaneous three times a day before meals  mycophenolate mofetil 1000 milliGRAM(s) Oral <User Schedule>  nystatin    Suspension 013637 Unit(s) Oral four times a day  predniSONE   Tablet 5 milliGRAM(s) Oral daily  senna 2 Tablet(s) Oral at bedtime  sodium bicarbonate 1950 milliGRAM(s) Oral two times a day  sodium zirconium cyclosilicate 10 Gram(s) Oral three times a day  tacrolimus ER Tablet (ENVARSUS XR) 6 milliGRAM(s) Oral <User Schedule>  tamsulosin 0.4 milliGRAM(s) Oral at bedtime  valGANciclovir 450 milliGRAM(s) Oral daily        VITALS/PHYSICAL EXAM  --------------------------------------------------------------------------------  T(C): 37.1 (05-01-21 @ 13:00), Max: 37.2 (05-01-21 @ 05:00)  HR: 68 (05-01-21 @ 13:00) (68 - 91)  BP: 161/77 (05-01-21 @ 13:00) (145/69 - 178/85)  RR: 18 (05-01-21 @ 13:00) (18 - 18)  SpO2: 98% (05-01-21 @ 13:00) (95% - 99%)    Height (cm): 167.6 (04-29-21 @ 23:55)  Weight (kg): 78 (04-29-21 @ 23:55)  BMI (kg/m2): 27.8 (04-29-21 @ 23:55)  BSA (m2): 1.88 (04-29-21 @ 23:55)      04-30-21 @ 07:01  -  05-01-21 @ 07:00  --------------------------------------------------------  IN: 1320 mL / OUT: 2675 mL / NET: -1355 mL    05-01-21 @ 07:01  -  05-01-21 @ 13:50  --------------------------------------------------------  IN: 400 mL / OUT: 375 mL / NET: 25 mL      Physical Exam:  Awake and alert, resting comfortably in recliner, no signs of distress  Lungs clear b/l   Regular S1 and S2   Abdomen soft and non tender, wound well healed  No LE edema  Alert and oriented       LABS/STUDIES  --------------------------------------------------------------------------------              10.6   6.40  >-----------<  144      [05-01-21 @ 05:54]              35.0     139  |  111  |  30  ----------------------------<  72      [05-01-21 @ 05:54]  5.5   |  17  |  1.03        Ca     9.1     [05-01-21 @ 05:54]      Mg     1.8     [05-01-21 @ 05:54]      Phos  2.1     [05-01-21 @ 05:54]    TPro  5.9  /  Alb  3.2  /  TBili  0.4  /  DBili  x   /  AST  9   /  ALT  9   /  AlkPhos  161  [05-01-21 @ 05:54]      Creatinine Trend:  SCr 1.03 [05-01 @ 05:54]  SCr 1.12 [04-30 @ 19:56]  SCr 1.14 [04-30 @ 17:10]  SCr 1.04 [04-30 @ 06:34]  SCr 1.27 [04-30 @ 03:29]    Tacrolimus (), Serum: 6.9 ng/mL (05-01 @ 08:24)  Tacrolimus (), Serum: 5.5 ng/mL (04-30 @ 07:14)  Tacrolimus (), Serum: 6.3 ng/mL (04-30 @ 03:38)      CAPILLARY BLOOD GLUCOSE  POCT Blood Glucose.: 167 mg/dL (01 May 2021 13:07)  POCT Blood Glucose.: 94 mg/dL (01 May 2021 07:54)  POCT Blood Glucose.: 195 mg/dL (30 Apr 2021 21:27)  POCT Blood Glucose.: 136 mg/dL (30 Apr 2021 18:41)  POCT Blood Glucose.: 140 mg/dL (30 Apr 2021 17:55)      
Transplant Surgery - Multidisciplinary Rounds  --------------------------------------------------------------  DCD DDRT 3/9/21    Present:   Patient seen with multidisciplinary team including Transplant Surgeon:  Dr. Engel,  Transplant Nephrologist:  Dr. SKINNY Olmos.  Nephrology Fellow Pharmacists: Tammie Damon. Nurse Practitioner:  Guille Stinson and  Radha Vallecillo and Surgical Resident  in am rounds.    Disciplines not in attendance will be notified of the plan.       HPI:  72 y/o M with PMHx of HTN (age 43), DM on insulin (age 43), gout, anxiety, depression, OCD and CKD since 2016.  He started HD via L AVF  in April 2020 at [facility] and transitioned to PD.    Underwent R DCD DDRT 3/9/2021 on 3/9/2021 (1A/1V/1U--stented). Post-op course c/b DGF requiring HD and daily PD.  Has had excellent graft function now off PD: Cr ~1.3-1.4, good UO on Flomax.  Ureteral stent and PD catheter removed on 4/22.    Sent in from clinic this evening for hyperkalemia to 6.9, asymptomatic.  Upon arrival to ED, K was 6.4 with peaked T waves on EKG, as well as glucose to 400 on BMP.  He has been compliant with his insulin regimen, but has noticed erratic blood sugars as high as 300s during the day, with symptomatic hypoglycemia at times awakening him in the middle of the night. Has uncontrolled DM since transplant and maintains close f/u with his Endocrinologist Dr. Abad.      Overall, he admits to generalized weakness, but denies fevers, chills, n/v, CP, palpitations, SOB, extremity pain or weakness.  Able to ambulate without assistance with no GASTON. Has good appetite and normal bowel function.        Interval Events:  Patient seen and examined  In ED hyperkalemia treated w/ lokelma x 2 and shifted x2  repeat K this am 5.3  hypertensive sbp 170s home coreg restarted  G6PD sent results pending     Potential Discharge date: pending clinical improvement    Education:  Medications    Plan of care:  See Below      MEDICATIONS  aspirin  chewable 81 milliGRAM(s) Oral daily  atovaquone  Suspension 1500 milliGRAM(s) Oral daily  carvedilol 25 milliGRAM(s) Oral every 12 hours  dextrose 40% Gel 15 Gram(s) Oral once  dextrose 5%. 1000 milliLiter(s) IV Continuous <Continuous>  dextrose 5%. 1000 milliLiter(s) IV Continuous <Continuous>  dextrose 50% Injectable 25 Gram(s) IV Push once  dextrose 50% Injectable 12.5 Gram(s) IV Push once  dextrose 50% Injectable 25 Gram(s) IV Push once  famotidine    Tablet 20 milliGRAM(s) Oral daily  glucagon  Injectable 1 milliGRAM(s) IntraMuscular once  insulin glargine Injectable (LANTUS) 25 Unit(s) SubCutaneous at bedtime  insulin lispro (ADMELOG) corrective regimen sliding scale   SubCutaneous three times a day before meals  insulin lispro (ADMELOG) corrective regimen sliding scale   SubCutaneous at bedtime  insulin lispro Injectable (ADMELOG) 5 Unit(s) SubCutaneous three times a day before meals  mycophenolate mofetil 1000 milliGRAM(s) Oral <User Schedule>  nystatin    Suspension 289774 Unit(s) Oral four times a day  predniSONE   Tablet 5 milliGRAM(s) Oral daily  senna 2 Tablet(s) Oral at bedtime  sodium bicarbonate 1300 milliGRAM(s) Oral two times a day  tacrolimus ER Tablet (ENVARSUS XR) 6 milliGRAM(s) Oral <User Schedule>  tamsulosin 0.4 milliGRAM(s) Oral at bedtime  valGANciclovir 450 milliGRAM(s) Oral daily        Vital Signs Last 24 Hrs  T(C): 36.8 (30 Apr 2021 09:24), Max: 36.9 (30 Apr 2021 00:33)  T(F): 98.3 (30 Apr 2021 09:24), Max: 98.4 (30 Apr 2021 00:33)  HR: 65 (30 Apr 2021 09:24) (65 - 77)  BP: 180/80 (30 Apr 2021 09:24) (152/76 - 180/80)  BP(mean): 112 (30 Apr 2021 06:11) (112 - 112)  RR: 18 (30 Apr 2021 09:24) (14 - 18)  SpO2: 98% (30 Apr 2021 09:24) (97% - 98%)    I&O's Summary    30 Apr 2021 07:01  -  30 Apr 2021 11:28  --------------------------------------------------------  IN: 0 mL / OUT: 300 mL / NET: -300 mL                              9.8    5.83  )-----------( 170      ( 30 Apr 2021 06:35 )             30.4     04-30    138  |  111<H>  |  34<H>  ----------------------------<  165<H>  5.3   |  18<L>  |  1.04    Ca    9.3      30 Apr 2021 06:34  Phos  2.3     04-30  Mg     1.8     04-30    TPro  6.3  /  Alb  3.8  /  TBili  0.2  /  DBili  x   /  AST  10  /  ALT  10  /  AlkPhos  163<H>  04-30    Tacrolimus (), Serum: 5.5 ng/mL (04-30 @ 07:14)          Review of Systems:  Gen: No weight changes, fatigue, fevers/chills, +weakness  Skin: No rashes  Head/Eyes/Ears/Mouth: No headache; Normal hearing; Normal vision w/o blurriness; No sinus pain/discomfort, sore throat  Respiratory: No dyspnea, cough, wheezing, hemoptysis  CV: No chest pain, PND, orthopnea  GI: no abdominal pain; denies diarrhea, constipation, nausea, vomiting, melena, hematochezia  : No increased frequency, dysuria, hematuria, nocturia  MSK: No joint pain/swelling; no back pain; no edema  Neuro: No dizziness/lightheadedness, weakness, seizures, numbness, tingling  Heme: No easy bruising or bleeding  Endo: No heat/cold intolerance  Psych: No significant nervousness, anxiety, stress, depression  All other systems were reviewed and are negative, except as noted.          PHYSICAL EXAM:  Constitutional: Well developed / well nourished  Eyes: Anicteric, PERRLA  ENMT: nc/at  Neck: supple  Respiratory: CTA B/L  Cardiovascular: RRR  Gastrointestinal: Soft, ND/NT. healing incisional scars (RLQ/PD cath sites)  Genitourinary: Voiding spontaneously  Extremities: L AVF palpable  Vascular: Palpable dp pulses bilaterally  Neurological: A&O x3  Skin: no rashes, ulcerations or lesions;  Musculoskeletal: Moving all extremities  Psychiatric: Responsive
Transplant Surgery - Multidisciplinary Rounds  --------------------------------------------------------------  DCD DDRT 3/9/21    Present:   Patient seen with multidisciplinary team including Transplant Surgeon:  Dr. Engel,  Transplant Nephrologist:  Dr. SKINNY Patino, [facility] Osbaldo and RN in am rounds and examined by Dr. Engel.   Disciplines not in attendance will be notified of the plan.       HPI:  72 y/o M with PMHx of HTN (age 43), DM on insulin (age 43), gout, anxiety, depression, OCD and CKD since 2016.  He started HD via L AVF  in April 2020 at [facility] and transitioned to PD.    Underwent R DCD DDRT 3/9/2021 on 3/9/2021 (1A/1V/1U--stented). Post-op course c/b DGF requiring HD and daily PD.  Has had excellent graft function now off PD: Cr ~1.3-1.4, good UO on Flomax.  Ureteral stent and PD catheter removed on 4/22.    Sent in from clinic this evening for hyperkalemia to 6.9, asymptomatic.  Upon arrival to ED, K was 6.4 with peaked T waves on EKG, as well as glucose to 400 on BMP.  He has been compliant with his insulin regimen, but has noticed erratic blood sugars as high as 300s during the day, with symptomatic hypoglycemia at times awakening him in the middle of the night. Has uncontrolled DM since transplant and maintains close f/u with his Endocrinologist Dr. Abad.      Overall, he admits to generalized weakness, but denies fevers, chills, n/v, CP, palpitations, SOB, extremity pain or weakness.  Able to ambulate without assistance with no GASTON. Has good appetite and normal bowel function.      Interval Events:  - Peaked T waves on EKG. K+ 6.4, Glucose >400  - Received Lokelma, Insuln/Dextrose, ca Gluconate, 1L NS  - Endocrine consulted  - Started on Lokelma q8  - bactrim held.  G6PD sent results pending.. Mepron started    Potential Discharge date: pending clinical improvement    Education:  Medications    Plan of care:  See Below      MEDICATIONS  (STANDING):  aspirin  chewable 81 milliGRAM(s) Oral daily  atovaquone  Suspension 1500 milliGRAM(s) Oral daily  carvedilol 25 milliGRAM(s) Oral every 12 hours  dextrose 40% Gel 15 Gram(s) Oral once  dextrose 5%. 1000 milliLiter(s) (50 mL/Hr) IV Continuous <Continuous>  dextrose 5%. 1000 milliLiter(s) (100 mL/Hr) IV Continuous <Continuous>  dextrose 50% Injectable 25 Gram(s) IV Push once  dextrose 50% Injectable 12.5 Gram(s) IV Push once  dextrose 50% Injectable 25 Gram(s) IV Push once  famotidine    Tablet 20 milliGRAM(s) Oral daily  glucagon  Injectable 1 milliGRAM(s) IntraMuscular once  insulin glargine Injectable (LANTUS) 25 Unit(s) SubCutaneous at bedtime  insulin lispro (ADMELOG) corrective regimen sliding scale   SubCutaneous three times a day before meals  insulin lispro (ADMELOG) corrective regimen sliding scale   SubCutaneous at bedtime  insulin lispro Injectable (ADMELOG) 5 Unit(s) SubCutaneous three times a day before meals  mycophenolate mofetil 1000 milliGRAM(s) Oral <User Schedule>  nystatin    Suspension 423160 Unit(s) Oral four times a day  predniSONE   Tablet 5 milliGRAM(s) Oral daily  senna 2 Tablet(s) Oral at bedtime  sodium bicarbonate 1950 milliGRAM(s) Oral two times a day  sodium zirconium cyclosilicate 10 Gram(s) Oral three times a day  tacrolimus ER Tablet (ENVARSUS XR) 6 milliGRAM(s) Oral <User Schedule>  tamsulosin 0.4 milliGRAM(s) Oral at bedtime  valGANciclovir 450 milliGRAM(s) Oral daily    MEDICATIONS  (PRN):      PAST MEDICAL & SURGICAL HISTORY:  DM (diabetes mellitus)    HTN (hypertension)    Chronic kidney disease (CKD)    H/O kidney transplant  R, 3/11/2021        Vital Signs Last 24 Hrs  T(C): 37.2 (01 May 2021 09:00), Max: 37.2 (01 May 2021 05:00)  T(F): 99 (01 May 2021 09:00), Max: 99 (01 May 2021 09:00)  HR: 73 (01 May 2021 10:30) (64 - 91)  BP: 145/69 (01 May 2021 10:30) (145/69 - 178/85)  BP(mean): --  RR: 18 (01 May 2021 09:00) (18 - 18)  SpO2: 97% (01 May 2021 09:00) (95% - 99%)    I&O's Summary    30 Apr 2021 07:01  -  01 May 2021 07:00  --------------------------------------------------------  IN: 1320 mL / OUT: 2675 mL / NET: -1355 mL    01 May 2021 07:01  -  01 May 2021 12:57  --------------------------------------------------------  IN: 200 mL / OUT: 175 mL / NET: 25 mL                              10.6   6.40  )-----------( 144      ( 01 May 2021 05:54 )             35.0     05-01    139  |  111<H>  |  30<H>  ----------------------------<  72  5.5<H>   |  17<L>  |  1.03    Ca    9.1      01 May 2021 05:54  Phos  2.1     05-01  Mg     1.8     05-01    TPro  5.9<L>  /  Alb  3.2<L>  /  TBili  0.4  /  DBili  x   /  AST  9<L>  /  ALT  9<L>  /  AlkPhos  161<H>  05-01    Tacrolimus (), Serum: 6.9 ng/mL (05-01 @ 08:24)        Review of Systems:  Gen: No weight changes, fatigue, fevers/chills, +weakness  Skin: No rashes  Head/Eyes/Ears/Mouth: No headache; Normal hearing; Normal vision w/o blurriness; No sinus pain/discomfort, sore throat  Respiratory: No dyspnea, cough, wheezing, hemoptysis  CV: No chest pain, PND, orthopnea  GI: no abdominal pain; denies diarrhea, constipation, nausea, vomiting, melena, hematochezia  : No increased frequency, dysuria, hematuria, nocturia  MSK: No joint pain/swelling; no back pain; no edema  Neuro: No dizziness/lightheadedness, weakness, seizures, numbness, tingling  Heme: No easy bruising or bleeding  Endo: No heat/cold intolerance  Psych: No significant nervousness, anxiety, stress, depression  All other systems were reviewed and are negative, except as noted.          PHYSICAL EXAM:  Constitutional: Well developed / well nourished  Eyes: Anicteric, PERRLA  ENMT: nc/at  Neck: supple  Respiratory: CTA B/L  Cardiovascular: RRR  Gastrointestinal: Soft, ND/NT. healing incisional scars (RLQ/PD cath sites)  Genitourinary: Voiding spontaneously  Extremities: L AVF palpable  Vascular: Palpable dp pulses bilaterally  Neurological: A&O x3  Skin: no rashes, ulcerations or lesions;  Musculoskeletal: Moving all extremities  Psychiatric: Responsive
    Chief Complaint: hyperglycemia     History: Patient seen and examined at bedside. Eating entire meal. AM FS was 94 and serum was 72. patient reports he was asymptomatic. Reports BG has been more variable after renal transplant     MEDICATIONS  (STANDING):  acetaminophen   Tablet .. 975 milliGRAM(s) Oral once  aspirin  chewable 81 milliGRAM(s) Oral daily  atovaquone  Suspension 1500 milliGRAM(s) Oral daily  carvedilol 25 milliGRAM(s) Oral every 12 hours  dextrose 40% Gel 15 Gram(s) Oral once  dextrose 5%. 1000 milliLiter(s) (50 mL/Hr) IV Continuous <Continuous>  dextrose 5%. 1000 milliLiter(s) (100 mL/Hr) IV Continuous <Continuous>  dextrose 50% Injectable 25 Gram(s) IV Push once  dextrose 50% Injectable 12.5 Gram(s) IV Push once  dextrose 50% Injectable 25 Gram(s) IV Push once  famotidine    Tablet 20 milliGRAM(s) Oral daily  glucagon  Injectable 1 milliGRAM(s) IntraMuscular once  insulin glargine Injectable (LANTUS) 21 Unit(s) SubCutaneous at bedtime  insulin lispro (ADMELOG) corrective regimen sliding scale   SubCutaneous three times a day before meals  insulin lispro (ADMELOG) corrective regimen sliding scale   SubCutaneous at bedtime  insulin lispro Injectable (ADMELOG) 5 Unit(s) SubCutaneous three times a day before meals  mycophenolate mofetil 1000 milliGRAM(s) Oral <User Schedule>  nystatin    Suspension [id number] Unit(s) Oral four times a day  predniSONE   Tablet 5 milliGRAM(s) Oral daily  senna 2 Tablet(s) Oral at bedtime  sodium bicarbonate 1950 milliGRAM(s) Oral two times a day  sodium zirconium cyclosilicate 10 Gram(s) Oral three times a day  tamsulosin 0.4 milliGRAM(s) Oral at bedtime  valGANciclovir 450 milliGRAM(s) Oral daily      PHYSICAL EXAM:  VITALS: T(C): 37.1 (05-01-21 @ 21:00)  T(F): 98.7 (05-01-21 @ 21:00), Max: 99 (05-01-21 @ 09:00)  HR: 75 (05-01-21 @ 21:00) (68 - 76)  BP: 147/73 (05-01-21 @ 21:00) (125/61 - 178/85)  RR:  (16 - 18)  SpO2:  (93% - 98%)  Wt(kg): 78kg   GENERAL: NAD, well-groomed, well-developed  RESPIRATORY: Clear to auscultation bilaterally; No rales, rhonchi, wheezing, or rubs  CARDIOVASCULAR: Regular rate and rhythm; No murmurs; no peripheral edema  GI: Soft, nontender, non distended, normal bowel sounds  MUSCULOSKELETAL: Full range of motion, normal strength  PSYCH: Alert and oriented x 3,reactive affect      POCT Blood Glucose.: 205 mg/dL (05-01-21 @ 21:19)  POCT Blood Glucose.: 180 mg/dL (05-01-21 @ 18:35)  POCT Blood Glucose.: 145 mg/dL (05-01-21 @ 16:56)  POCT Blood Glucose.: 163 mg/dL (05-01-21 @ 16:19)  POCT Blood Glucose.: 167 mg/dL (05-01-21 @ 13:07)  POCT Blood Glucose.: 94 mg/dL (05-01-21 @ 07:54)  POCT Blood Glucose.: 195 mg/dL (04-30-21 @ 21:27)  POCT Blood Glucose.: 136 mg/dL (04-30-21 @ 18:41)  POCT Blood Glucose.: 140 mg/dL (04-30-21 @ 17:55)  POCT Blood Glucose.: 188 mg/dL (04-30-21 @ 12:30)  POCT Blood Glucose.: 173 mg/dL (04-30-21 @ 09:20)  POCT Blood Glucose.: 175 mg/dL (04-30-21 @ 06:53)  POCT Blood Glucose.: 161 mg/dL (04-30-21 @ 06:07)  POCT Blood Glucose.: 268 mg/dL (04-30-21 @ 02:03)  POCT Blood Glucose.: 372 mg/dL (04-30-21 @ 01:27)      05-01    138  |  104  |  26<H>  ----------------------------<  184<H>  5.4<H>   |  22  |  1.04    EGFR if : 83  EGFR if non : 72    Ca    9.4      05-01  Mg     1.8     05-01  Phos  2.1     05-01    TPro  5.9<L>  /  Alb  3.2<L>  /  TBili  0.4  /  DBili  x   /  AST  9<L>  /  ALT  9<L>  /  AlkPhos  161<H>  05-01

## 2021-05-02 NOTE — DISCHARGE NOTE PROVIDER - CARE PROVIDER_API CALL
Bryant Patino  INTERNAL MEDICINE  [address] [place] [address]  Phone: [phone]  Fax: [phone]  Follow Up Time:     Som Schuler)  Surgery  [address] [place] [id number]  Phone: [phone]  Fax: [phone]  Follow Up Time:

## 2021-05-02 NOTE — PROGRESS NOTE ADULT - ASSESSMENT
72 y/o M with PMHx of HTN (age 43), DM on insulin (age 43), gout, anxiety, depression, OCD and CKD since 2016.  He started HD via L AVF  in April 2020 at [facility] [place] and transitioned to PD.    Underwent R DCD DDRT 3/9/2021 on 3/9/2021 (1A/1V/1U--stented). Post-op course c/b DGF requiring HD and daily PD.  Has had excellent graft function now off PD: Cr ~1.3-1.4, good UO on Flomax.      Returns with hyperkalemia and hyperglycemia    Hyperkalemia s/p DDRT with good graft function:  - Good Urine out, Sr. Cr 1.04  - Immuno: Envarsis by level, MMF 1/1, Pred 5  - PPx: start Mepron, valctye, nystatin    - G6PD send f/u results  - start sodium bicarb 1300 mg BID   - continue Flomax  - CBC, BMP, Mg/ phos, tacro level daily  - dvt ppx w venodynes    Uncontrolled DM  -NPH 10U x1 given  - on home lantus 25 u QHS  -insulin s/s  -premeal lispro 5 units AC  -Endo c/s  - low potassium diabetic diet       HTN  - coreg 25 mg bid   - monitor and manage BP
70 y/o M w PMH of ESRD on PD for 5 months, was on HD in 2020, IDDM, HTN, gout, depression, anxiety and OCD.   S/p DDRT on 3/9/21 with DCD kidney, initially c/b by DGF, was discharged on PD. Graft function improved. PD catheter and stent was removed 4/22/21 sent to ED for hyperkalemia of 6.4    1. Hyperkalemia - multifactorial - severe hyperglycemia (was 400 on admission), medications (tac, bactrim), metabolic acidosis.       - Glucose now well controlled, Bactrim discontinued. On Lokelma. Sodium bicarbonate increased. K normal today       - Continue Lokelma 10gm po TID, low K diet. Continue sodium bicarbonate 1950mg po bid.     2. S/p DDRT on 3/9/21 - currently with excellent graft function.   3. Immunosuppression - Simulect induction, Envarsus 7mg daily level today pending - trough goal 8-10     Continue MMF 1g PO BID and Prednisone 5 mg po daily   4. Prophylaxis - continue Nystatin/valcyte. Bactrim d/ame on admission for high K, continue atovaquone. F/u G6PD level if normal change to dapsone   5. Hypertension - Nifedipine added to Coreg yesterday. BP better controlled. Continue Coreg 25mg po bid, Nifedipine xl 30mg po daily   6. DM2 better controlled-Continue Lantus and Admelog.   7. Hypophosphatemia and Hypomagnesemia - replete IV now.  Start Phos 250mg po bid, Mg 400mg po bid    D/c home today, F/u on Tuesday 5/4/21 with me, labs in clinic   Patient seen and examined and plan discussed with the transplant surgery team including        
72 y/o M with PMHx of HTN (age 43), DM on insulin (age 43), gout, anxiety, depression, OCD and CKD since 2016.  He started HD via L AVF  in April 2020 at [facility] [place] and transitioned to PD.    Underwent R DCD DDRT 3/9/2021 on 3/9/2021 (1A/1V/1U--stented). Post-op course c/b DGF requiring HD and daily PD.  Has had excellent graft function now off PD: Cr ~1.3-1.4, good UO on Flomax.      Returns with hyperkalemia and hyperglycemia    Hyperkalemia/Hypeglycemia   s/p DDRT with good graft function  - Continue Lokelma 10mg q8 hours  - repeat BMP at 1PM today.  IF K stable, possible DC to home later today  - Increase Na HCO3 to 3 tabs BID  - Immuno: Envarsus, MMF 1/1, Pred 5  - PPx: Mepron, valctye, nystatin    - FU G6PD sent 4/30  - continue Flomax  - CBC, BMP, Mg/ phos, tacro level daily  - SCDs    Uncontrolled DM  - Appreciate Endocrinology consult  - Continue Lantus 25UQPm and Ademlog premeal 5u premeal and adjust as needed  - Low potassium diabetic diet       HTN  -Elevated BP  - Continue Coreg 25 mg bid   - Start Nifedipine XL 30mg qd  
70 y/o M with PMHx of HTN (age 43), DM on insulin (age 43), gout, anxiety, depression, OCD and CKD since 2016.  He started HD via L AVF  in April 2020 at [facility] [place] and transitioned to PD.    Underwent R DCD DDRT 3/9/2021 on 3/9/2021 (1A/1V/1U--stented). Post-op course c/b DGF requiring HD and daily PD.  Has had excellent graft function now off PD: Cr ~1.3-1.4, good UO on Flomax.      Returns with hyperkalemia and hyperglycemia    Hyperkalemia/Hypeglycemia  s/p DDRT with good graft function  - Continue Lokelma 10mg q12 hours  - DC to home later today  - Increase Na HCO3 to 3 tabs BID  - Immuno: Envarsus, MMF 1/1, Pred 5  - PPx: Mepron, valctye, nystatin    - FU G6PD sent 4/30  - continue Flomax  - SCDs    Uncontrolled DM  - Appreciate Endocrinology consult: adjusted insulin  - Low potassium diabetic diet       HTN  -Elevated BP  - Continue Coreg 25 mg bid   - Start Nifedipine XL 30mg qd  
72 y/o M w PMH of ESRD on PD for 5 months, was on HD in 2020, with nephrologist Dr Novak, IDDM, HTN, gout, depression, anxiety and OCD.   S/p DDRT on 3/9/21 with DCD kidney, initially c/b by DGF, was discharged on PD. Graft function improved. PD catheter and stent was removed 4/22/21 sent to ED for hyperkalemia.     1. Hyperkalemia - multifactorial - severe hyperglycemia, medications (tac, bactrim) - s/p lokelma, Insulin and bactrim d/ame     - K improving, 5.5 today      - Continue lokelma 10gm po TID , low K diet      - Glycemic control      - Increase sodium bicarb to 1300mg po bid - will help lower K too.     2. S/p DDRT on 3/9/21 - currently with excellent graft function.   3. Immunosuppression - Simulect induction, Envarsus 6mg daily level 6.9 low - Increase to 7mg daily ( goal 8-10)      Continue MMF 1g PO BID and Prednisone 5 mg po daily   4. Prophylaxis - continue Nystatin/valcyte. Bactrim d/ame on admission for high K, continue atovaquone. F/u G6PD level if normal change to dapsone   5. Hypertension - Uncontrolled. Continue Coreg 25mg po bid, Add Nifedipine xl 30mg po daily   6. DM2 -Continue Lantus and Admelog. Endo follow up   7. Hypophosphatemia - replete       
72 y/o M w/ Type 2 DM w/ hyperglycemia complicated by ESRD s/p renal tx now on chronic prednisone 5mg daily admitted for hyperkalemia and hyperglycemia.

## 2021-05-02 NOTE — DISCHARGE NOTE PROVIDER - HOSPITAL COURSE
70 y/o M with PMHx of HTN (age 43), DM on insulin (age 43), gout, anxiety, depression, OCD and CKD since 2016.  He started HD via L AVF  in April 2020 at [facility] [place] and transitioned to PD.   3/9/2021 underwent R DCD DDRT 3/9/2021 (1A/1V/1U--stented). Post-op course c/b DGF requiring HD and daily PD.  Has had excellent graft function now off PD: Cr ~1.3-1.4, good UO on Flomax.  Ureteral stent and PD catheter removed on 4/22. He was admitted 4/30 for hyperkalemia 6.9 with notably peaked T waves on EKG.  He was shifted a couple times with insulin/Dextrose and started on Lokelma TID. Continued on a low potassium diet. Bactrim was discontinued and he was started on Mepron.  G6Pd pending.  Sodium bicarbonate was increased to 1950mg TID.  Endocrinology was consulted with improvement in glucose control.  Serum creatinine remained stable and was 1.08 on day of discharge with good UOP.   K+ was 4.5 and he was discharged to home on Lokelma BID.  He  was evaluated by the multi-disciplinary team including surgeon, nephrologist, NP, pharmacist, nutrition, social work, and nursing and deemed stable for discharge to home with close outpatient follow-up.

## 2021-05-02 NOTE — DISCHARGE NOTE PROVIDER - NSDCCPCAREPLAN_GEN_ALL_CORE_FT
PRINCIPAL DISCHARGE DIAGNOSIS  Diagnosis: Hyperkalemia  Assessment and Plan of Treatment: Continue to take all medications as prescribed.  Follow a low potassium diet.  Follow-up with Nephrology on Tuesday 5/4/2021      SECONDARY DISCHARGE DIAGNOSES  Diagnosis: Renal transplant recipient  Assessment and Plan of Treatment: No heavy lifting anything more than 10-15lbs or straining. Otherwise, you may return to your usual level of physical activity.   Call transplant clinic If you developed any of the following, fever, pain, redness, swelling at incision site, cough, nausea, vomiting, painful urination, difficulty urination, or not making any urine.  NOTIFY YOUR SURGEON IF: You have any bleeding that does not stop, any pus draining from your wound, any fever (over 100.4 F) or chills, persistent nausea/vomiting with inability to tolerate food or liquids, persistent diarrhea, or if your pain is not controlled on your discharge pain medications.  Keep away from people who have cough, cold, and symptom of flu.  Only take medications that are on your discharge list  If you missed your medications call the transplant office, do not double up medication because you missed a dose.  If you have any question regarding your medication please call transplant office.    Diagnosis: Type 2 diabetes mellitus with hyperglycemia, with long-term current use of insulin  Assessment and Plan of Treatment: Follow a low carb low sugar diet. Continue to take all antidiabetic medications/insulin as prescribed. Follow up with your Primary Care Doctor regularly for blood sugar/A1c checks. Be sure to see an eye doctor and foot doctor on an annual basis.    Diagnosis: Essential hypertension  Assessment and Plan of Treatment: Be sure to follow a low salt diet. If you have been prescribed antihypertensive medications to control your blood pressure, be sure to take them every day as prescribed and do not miss any doses, the medications do not work if they are not taken consistently. Follow up with your Primary Care Doctor and have your Blood Pressure checked regularly.

## 2021-05-02 NOTE — DISCHARGE NOTE PROVIDER - NSDCFUADDAPPT_GEN_ALL_CORE_FT
1. Please follow-up at the Transplant clinic on Tuesday 5/4/21 at your previously scheduled time with Dr. Patino.  Phone: [phone].   [address] [place] [address]  Please call the clinic upon arrival for your appointment. They will let you know when to come in.  This is to avoid multiple patients occupying the waiting area. Thank you!    2. Please follow up with your primary care physician in one week regarding your hospitalization.

## 2021-05-03 LAB
BKV DNA SPEC QL NAA+PROBE: NEGATIVE COPIES/ML
G6PD RBC-CCNC: 20.7 U/G HGB — HIGH (ref 7–20.5)
LOG 10 BK QUANTITATION PCR: NORMAL

## 2021-05-04 ENCOUNTER — APPOINTMENT (OUTPATIENT)
Dept: NEPHROLOGY | Facility: CLINIC | Age: 71
End: 2021-05-04
Payer: COMMERCIAL

## 2021-05-04 VITALS
DIASTOLIC BLOOD PRESSURE: 72 MMHG | HEIGHT: 65 IN | WEIGHT: 179 LBS | HEART RATE: 70 BPM | RESPIRATION RATE: 12 BRPM | OXYGEN SATURATION: 98 % | BODY MASS INDEX: 29.82 KG/M2 | SYSTOLIC BLOOD PRESSURE: 162 MMHG | TEMPERATURE: 98 F

## 2021-05-04 PROCEDURE — 99215 OFFICE O/P EST HI 40 MIN: CPT

## 2021-05-04 PROCEDURE — 99072 ADDL SUPL MATRL&STAF TM PHE: CPT

## 2021-05-04 RX ORDER — FUROSEMIDE 40 MG/1
40 TABLET ORAL DAILY
Qty: 30 | Refills: 11 | Status: DISCONTINUED | COMMUNITY
Start: 2021-03-18 | End: 2021-05-04

## 2021-05-04 RX ORDER — SULFAMETHOXAZOLE AND TRIMETHOPRIM 400; 80 MG/1; MG/1
400-80 TABLET ORAL
Qty: 30 | Refills: 11 | Status: DISCONTINUED | COMMUNITY
Start: 2021-03-10 | End: 2021-05-04

## 2021-05-04 NOTE — HISTORY OF PRESENT ILLNESS
[FreeTextEntry1] : 71 year old man with ESRD due to DM on PD since April 2020. S/p DDRT on 3/9/2021 \par PMH: DM type II and Hypertension diagnosed in his early 40’s. Hyperlipidemia, gout, Anxiety, Depression, OCD. No cardiac disease. cPRA 0%. \par \par Donor 49 yr old, DCD, KDPI 71%, Terminal Cr 8mg/dL, No HLA mismatch, CMV D-R-. \par Course complicated by DGF. Discharged on peritoneal dialysis. Graft function recovered and has been off PD 3/30/21. \par \par PD catheter and transplant ureteric stent removed on 4/22/21. \par He was hospitalized 4/30/21 with hyperkalemia and severe hyperglycemia. Bactrim was d/ame and he was started on lokelma and sodium bicarbonate. He presents for follow up visit accompanied by his wife. \par \par He feels week. No fever, no chills, No NVD. Appetite is good. Adhering to low K diet. \par

## 2021-05-04 NOTE — ASSESSMENT
[FreeTextEntry1] : \par 71 yr old man with ESRD due to DM, was on PD, s/p DDRT on 3/9/21 complicated by DGF discharged on PD. Graft function recovered and off PD since 3/30. Serum creatinine improved and down to 1mg/dL. \par \par S/p DDRT on 3/9/21 complicated by DGF , now has excellent graft function\par - Cr stable 1.03mg/dL. No proteinuria. \par \par Immunosuppression \par - S/p Simulect induction \par - On Envarsus 7mg daily. Level pending. Aim for trough level 8-10\par - Continue CellCept 1gm bid, prednisone 5mg daily \par \par Infection prophylaxis \par - Continue Valcyte 450mg daily, nystatin SS QID \par - Bactrim d/ame for hyperkalemia. G6PD level normal. D/c mepron. Start dapsone 100mg po daily. \par \par Diabetes type II \par - On Lantus 2 units qhs, Humalog 7 units qac, glycemic control sub optimal \par Patient will f/u with his endocrinologist Dr. Micky Abad. \par \par Hypertension : On Carvedilol 25mg po bid, Nifedipine xl 30mg daily. BP elevated. Advised to monitor at home, will increase Nifedipine if persistently elevated. \par \par Hyperparathyroidism - on Calcitriol 0.25mcg po daily. \par \par Anemia stable Hgb 9.8 \par \par F/u next week for lab work.

## 2021-05-04 NOTE — PHYSICAL EXAM
[General Appearance - Alert] : alert [General Appearance - In No Acute Distress] : in no acute distress [Sclera] : the sclera and conjunctiva were normal [Oropharynx] : the oropharynx was normal [Jugular Venous Distention Increased] : there was no jugular-venous distention [Respiration, Rhythm And Depth] : normal respiratory rhythm and effort [Exaggerated Use Of Accessory Muscles For Inspiration] : no accessory muscle use [Auscultation Breath Sounds / Voice Sounds] : lungs were clear to auscultation bilaterally [Heart Sounds] : normal S1 and S2 [Heart Sounds Gallop] : no gallops [Murmurs] : no murmurs [Edema] : there was no peripheral edema [Bowel Sounds] : normal bowel sounds [Abdomen Soft] : soft [Involuntary Movements] : no involuntary movements were seen [___ (cm) Fistula] : [unfilled] (cm) fistula [Peritoneal Dialysis Catheter] : peritoneal dialysis catheter [Bruit] : a bruit was present [Thrill] : a thrill was present [] : right dorsalis pedis palpable [No Focal Deficits] : no focal deficits [Oriented To Time, Place, And Person] : oriented to person, place, and time [FreeTextEntry1] : RLQ wound well healed, no signs of infection. Abdomen obese. Non tender. Site of PD catheter removal wounds clean.

## 2021-05-05 ENCOUNTER — APPOINTMENT (OUTPATIENT)
Dept: NEPHROLOGY | Facility: CLINIC | Age: 71
End: 2021-05-05

## 2021-05-05 LAB
ALBUMIN SERPL ELPH-MCNC: 3.9 G/DL
ALP BLD-CCNC: 163 U/L
ALT SERPL-CCNC: 11 U/L
ANION GAP SERPL CALC-SCNC: 12 MMOL/L
APPEARANCE: CLEAR
AST SERPL-CCNC: 11 U/L
BACTERIA: NEGATIVE
BASOPHILS # BLD AUTO: 0.03 K/UL
BASOPHILS NFR BLD AUTO: 0.4 %
BILIRUB SERPL-MCNC: 0.3 MG/DL
BILIRUBIN URINE: NEGATIVE
BLOOD URINE: NEGATIVE
BUN SERPL-MCNC: 29 MG/DL
CALCIUM SERPL-MCNC: 9.1 MG/DL
CALCIUM SERPL-MCNC: 9.1 MG/DL
CHLORIDE SERPL-SCNC: 105 MMOL/L
CO2 SERPL-SCNC: 24 MMOL/L
COLOR: YELLOW
CREAT SERPL-MCNC: 1.03 MG/DL
CREAT SPEC-SCNC: 83 MG/DL
CREAT/PROT UR: 0.2 RATIO
EOSINOPHIL # BLD AUTO: 0.12 K/UL
EOSINOPHIL NFR BLD AUTO: 1.7 %
ESTIMATED AVERAGE GLUCOSE: 166 MG/DL
GLUCOSE QUALITATIVE U: NEGATIVE
GLUCOSE SERPL-MCNC: 112 MG/DL
HBA1C MFR BLD HPLC: 7.4 %
HCT VFR BLD CALC: 30.6 %
HGB BLD-MCNC: 9.8 G/DL
HYALINE CASTS: 1 /LPF
IMM GRANULOCYTES NFR BLD AUTO: 0.7 %
KETONES URINE: NEGATIVE
LDH SERPL-CCNC: 239 U/L
LEUKOCYTE ESTERASE URINE: NEGATIVE
LYMPHOCYTES # BLD AUTO: 0.5 K/UL
LYMPHOCYTES NFR BLD AUTO: 7 %
MAGNESIUM SERPL-MCNC: 2.1 MG/DL
MAN DIFF?: NORMAL
MCHC RBC-ENTMCNC: 32 GM/DL
MCHC RBC-ENTMCNC: 32.5 PG
MCV RBC AUTO: 101.3 FL
MICROSCOPIC-UA: NORMAL
MONOCYTES # BLD AUTO: 0.56 K/UL
MONOCYTES NFR BLD AUTO: 7.8 %
NEUTROPHILS # BLD AUTO: 5.9 K/UL
NEUTROPHILS NFR BLD AUTO: 82.4 %
NITRITE URINE: NEGATIVE
PARATHYROID HORMONE INTACT: 203 PG/ML
PH URINE: 6.5
PHOSPHATE SERPL-MCNC: 2.6 MG/DL
PLATELET # BLD AUTO: 194 K/UL
POTASSIUM SERPL-SCNC: 5.1 MMOL/L
PROT SERPL-MCNC: 6.1 G/DL
PROT UR-MCNC: 18 MG/DL
PROTEIN URINE: NORMAL
RBC # BLD: 3.02 M/UL
RBC # FLD: 13.2 %
RED BLOOD CELLS URINE: 5 /HPF
SODIUM SERPL-SCNC: 140 MMOL/L
SPECIFIC GRAVITY URINE: 1.02
SQUAMOUS EPITHELIAL CELLS: 0 /HPF
TACROLIMUS SERPL-MCNC: 10.9 NG/ML
URATE SERPL-MCNC: 5.9 MG/DL
UROBILINOGEN URINE: NORMAL
WBC # FLD AUTO: 7.16 K/UL
WHITE BLOOD CELLS URINE: 4 /HPF

## 2021-05-11 ENCOUNTER — APPOINTMENT (OUTPATIENT)
Dept: NEPHROLOGY | Facility: CLINIC | Age: 71
End: 2021-05-11

## 2021-05-13 ENCOUNTER — LABORATORY RESULT (OUTPATIENT)
Age: 71
End: 2021-05-13

## 2021-05-13 ENCOUNTER — APPOINTMENT (OUTPATIENT)
Dept: TRANSPLANT | Facility: CLINIC | Age: 71
End: 2021-05-13

## 2021-05-13 LAB
ALBUMIN SERPL ELPH-MCNC: 4 G/DL
ALP BLD-CCNC: 148 U/L
ALT SERPL-CCNC: 10 U/L
ANION GAP SERPL CALC-SCNC: 10 MMOL/L
APPEARANCE: CLEAR
AST SERPL-CCNC: 10 U/L
BACTERIA: NEGATIVE
BASOPHILS # BLD AUTO: 0.05 K/UL
BASOPHILS NFR BLD AUTO: 0.5 %
BILIRUB SERPL-MCNC: 0.6 MG/DL
BILIRUBIN URINE: NEGATIVE
BLOOD URINE: NORMAL
BUN SERPL-MCNC: 19 MG/DL
CALCIUM SERPL-MCNC: 9.4 MG/DL
CHLORIDE SERPL-SCNC: 105 MMOL/L
CO2 SERPL-SCNC: 26 MMOL/L
COLOR: YELLOW
CREAT SERPL-MCNC: 0.93 MG/DL
CREAT SPEC-SCNC: 76 MG/DL
CREAT/PROT UR: 0.4 RATIO
EOSINOPHIL # BLD AUTO: 0.06 K/UL
EOSINOPHIL NFR BLD AUTO: 0.7 %
GLUCOSE QUALITATIVE U: ABNORMAL
GLUCOSE SERPL-MCNC: 154 MG/DL
HCT VFR BLD CALC: 29.9 %
HGB BLD-MCNC: 9.7 G/DL
HYALINE CASTS: 2 /LPF
IMM GRANULOCYTES NFR BLD AUTO: 0.5 %
KETONES URINE: NEGATIVE
LDH SERPL-CCNC: 253 U/L
LEUKOCYTE ESTERASE URINE: NEGATIVE
LYMPHOCYTES # BLD AUTO: 0.37 K/UL
LYMPHOCYTES NFR BLD AUTO: 4 %
MAGNESIUM SERPL-MCNC: 1.7 MG/DL
MAN DIFF?: NORMAL
MCHC RBC-ENTMCNC: 31.4 PG
MCHC RBC-ENTMCNC: 32.4 GM/DL
MCV RBC AUTO: 96.8 FL
MICROSCOPIC-UA: NORMAL
MONOCYTES # BLD AUTO: 0.69 K/UL
MONOCYTES NFR BLD AUTO: 7.5 %
NEUTROPHILS # BLD AUTO: 7.93 K/UL
NEUTROPHILS NFR BLD AUTO: 86.8 %
NITRITE URINE: NEGATIVE
PH URINE: 6.5
PHOSPHATE SERPL-MCNC: 1.9 MG/DL
PLATELET # BLD AUTO: 303 K/UL
POTASSIUM SERPL-SCNC: 5.4 MMOL/L
PROT SERPL-MCNC: 6.3 G/DL
PROT UR-MCNC: 27 MG/DL
PROTEIN URINE: NORMAL
RBC # BLD: 3.09 M/UL
RBC # FLD: 12.9 %
RED BLOOD CELLS URINE: 4 /HPF
SODIUM SERPL-SCNC: 141 MMOL/L
SPECIFIC GRAVITY URINE: 1.02
SQUAMOUS EPITHELIAL CELLS: 1 /HPF
TACROLIMUS SERPL-MCNC: 9.4 NG/ML
URATE SERPL-MCNC: 4.8 MG/DL
UROBILINOGEN URINE: NORMAL
WBC # FLD AUTO: 9.15 K/UL
WHITE BLOOD CELLS URINE: 12 /HPF

## 2021-05-18 ENCOUNTER — APPOINTMENT (OUTPATIENT)
Dept: NEPHROLOGY | Facility: CLINIC | Age: 71
End: 2021-05-18

## 2021-05-18 LAB
BKV DNA SPEC QL NAA+PROBE: NEGATIVE COPIES/ML
LOG 10 BK QUANTITATION PCR: NORMAL

## 2021-05-19 ENCOUNTER — APPOINTMENT (OUTPATIENT)
Dept: TRANSPLANT | Facility: CLINIC | Age: 71
End: 2021-05-19

## 2021-05-19 ENCOUNTER — LABORATORY RESULT (OUTPATIENT)
Age: 71
End: 2021-05-19

## 2021-05-20 LAB
ALBUMIN SERPL ELPH-MCNC: 4 G/DL
ALP BLD-CCNC: 161 U/L
ALT SERPL-CCNC: 8 U/L
ANION GAP SERPL CALC-SCNC: 11 MMOL/L
APPEARANCE: CLEAR
AST SERPL-CCNC: 11 U/L
BACTERIA: NEGATIVE
BASOPHILS # BLD AUTO: 0 K/UL
BASOPHILS NFR BLD AUTO: 0 %
BILIRUB SERPL-MCNC: 0.5 MG/DL
BILIRUBIN URINE: NEGATIVE
BLOOD URINE: NEGATIVE
BUN SERPL-MCNC: 27 MG/DL
CALCIUM SERPL-MCNC: 9.6 MG/DL
CHLORIDE SERPL-SCNC: 102 MMOL/L
CO2 SERPL-SCNC: 24 MMOL/L
COLOR: YELLOW
CREAT SERPL-MCNC: 0.98 MG/DL
CREAT SPEC-SCNC: 61 MG/DL
CREAT/PROT UR: 0.3 RATIO
EOSINOPHIL # BLD AUTO: 0 K/UL
EOSINOPHIL NFR BLD AUTO: 0 %
GLUCOSE QUALITATIVE U: ABNORMAL
GLUCOSE SERPL-MCNC: 343 MG/DL
HCT VFR BLD CALC: 32.8 %
HGB BLD-MCNC: 10.2 G/DL
HYALINE CASTS: 0 /LPF
KETONES URINE: NEGATIVE
LDH SERPL-CCNC: 296 U/L
LEUKOCYTE ESTERASE URINE: NEGATIVE
LYMPHOCYTES # BLD AUTO: 0.5 K/UL
LYMPHOCYTES NFR BLD AUTO: 6.8 %
MAGNESIUM SERPL-MCNC: 1.8 MG/DL
MAN DIFF?: NORMAL
MCHC RBC-ENTMCNC: 30.8 PG
MCHC RBC-ENTMCNC: 31.1 GM/DL
MCV RBC AUTO: 99.1 FL
MICROSCOPIC-UA: NORMAL
MONOCYTES # BLD AUTO: 0.31 K/UL
MONOCYTES NFR BLD AUTO: 4.2 %
NEUTROPHILS # BLD AUTO: 6.46 K/UL
NEUTROPHILS NFR BLD AUTO: 87.3 %
NITRITE URINE: NEGATIVE
PH URINE: 6
PHOSPHATE SERPL-MCNC: 2.7 MG/DL
PLATELET # BLD AUTO: 303 K/UL
POTASSIUM SERPL-SCNC: 5.7 MMOL/L
PROT SERPL-MCNC: 6.3 G/DL
PROT UR-MCNC: 18 MG/DL
PROTEIN URINE: NORMAL
RBC # BLD: 3.31 M/UL
RBC # FLD: 13.3 %
RED BLOOD CELLS URINE: 2 /HPF
SODIUM SERPL-SCNC: 137 MMOL/L
SPECIFIC GRAVITY URINE: 1.02
SQUAMOUS EPITHELIAL CELLS: 0 /HPF
TACROLIMUS SERPL-MCNC: 7.7 NG/ML
URATE SERPL-MCNC: 4.4 MG/DL
UROBILINOGEN URINE: NORMAL
WBC # FLD AUTO: 7.4 K/UL
WHITE BLOOD CELLS URINE: 3 /HPF

## 2021-05-24 LAB
BKV DNA SPEC QL NAA+PROBE: NEGATIVE COPIES/ML
LOG 10 BK QUANTITATION PCR: NORMAL

## 2021-06-02 ENCOUNTER — APPOINTMENT (OUTPATIENT)
Dept: TRANSPLANT | Facility: CLINIC | Age: 71
End: 2021-06-02

## 2021-06-03 ENCOUNTER — APPOINTMENT (OUTPATIENT)
Dept: NEPHROLOGY | Facility: CLINIC | Age: 71
End: 2021-06-03
Payer: COMMERCIAL

## 2021-06-03 LAB
ALBUMIN SERPL ELPH-MCNC: 4.1 G/DL
ALP BLD-CCNC: 151 U/L
ALT SERPL-CCNC: 9 U/L
ANION GAP SERPL CALC-SCNC: 11 MMOL/L
APPEARANCE: CLEAR
AST SERPL-CCNC: 13 U/L
BACTERIA: NEGATIVE
BASOPHILS # BLD AUTO: 0.03 K/UL
BASOPHILS NFR BLD AUTO: 0.8 %
BILIRUB SERPL-MCNC: 0.5 MG/DL
BILIRUBIN URINE: NEGATIVE
BLOOD URINE: NEGATIVE
BUN SERPL-MCNC: 30 MG/DL
CALCIUM SERPL-MCNC: 9.3 MG/DL
CALCIUM SERPL-MCNC: 9.3 MG/DL
CHLORIDE SERPL-SCNC: 103 MMOL/L
CO2 SERPL-SCNC: 24 MMOL/L
COLOR: YELLOW
CREAT SERPL-MCNC: 0.99 MG/DL
CREAT SPEC-SCNC: 77 MG/DL
CREAT/PROT UR: 0.2 RATIO
EOSINOPHIL # BLD AUTO: 0.05 K/UL
EOSINOPHIL NFR BLD AUTO: 1.4 %
GLUCOSE QUALITATIVE U: ABNORMAL
GLUCOSE SERPL-MCNC: 363 MG/DL
HCT VFR BLD CALC: 31.8 %
HGB BLD-MCNC: 10.2 G/DL
HYALINE CASTS: 0 /LPF
IMM GRANULOCYTES NFR BLD AUTO: 1.4 %
KETONES URINE: NEGATIVE
LDH SERPL-CCNC: 335 U/L
LEUKOCYTE ESTERASE URINE: NEGATIVE
LYMPHOCYTES # BLD AUTO: 0.35 K/UL
LYMPHOCYTES NFR BLD AUTO: 9.7 %
MAGNESIUM SERPL-MCNC: 1.9 MG/DL
MAN DIFF?: NORMAL
MCHC RBC-ENTMCNC: 31.3 PG
MCHC RBC-ENTMCNC: 32.1 GM/DL
MCV RBC AUTO: 97.5 FL
MICROSCOPIC-UA: NORMAL
MONOCYTES # BLD AUTO: 0.35 K/UL
MONOCYTES NFR BLD AUTO: 9.7 %
NEUTROPHILS # BLD AUTO: 2.78 K/UL
NEUTROPHILS NFR BLD AUTO: 77 %
NITRITE URINE: NEGATIVE
PARATHYROID HORMONE INTACT: 153 PG/ML
PH URINE: 6
PHOSPHATE SERPL-MCNC: 2.2 MG/DL
PLATELET # BLD AUTO: 211 K/UL
POTASSIUM SERPL-SCNC: 5.3 MMOL/L
PROT SERPL-MCNC: 6.5 G/DL
PROT UR-MCNC: 17 MG/DL
PROTEIN URINE: NORMAL
RBC # BLD: 3.26 M/UL
RBC # FLD: 13.4 %
RED BLOOD CELLS URINE: 3 /HPF
SODIUM SERPL-SCNC: 139 MMOL/L
SPECIFIC GRAVITY URINE: 1.02
SQUAMOUS EPITHELIAL CELLS: 0 /HPF
TACROLIMUS SERPL-MCNC: 10 NG/ML
URATE SERPL-MCNC: 5.3 MG/DL
UROBILINOGEN URINE: NORMAL
WBC # FLD AUTO: 3.61 K/UL
WHITE BLOOD CELLS URINE: 2 /HPF

## 2021-06-03 PROCEDURE — 99072 ADDL SUPL MATRL&STAF TM PHE: CPT

## 2021-06-03 PROCEDURE — 99215 OFFICE O/P EST HI 40 MIN: CPT

## 2021-06-03 RX ORDER — SODIUM BICARBONATE 650 MG/1
650 TABLET ORAL TWICE DAILY
Qty: 60 | Refills: 11 | Status: DISCONTINUED | COMMUNITY
Start: 2021-04-23 | End: 2021-06-03

## 2021-06-03 NOTE — HISTORY OF PRESENT ILLNESS
[FreeTextEntry1] : 71 year old man with ESRD due to DM on PD since April 2020. S/p DDRT on 3/9/2021 \par PMH: DM type II and Hypertension diagnosed in his early 40’s. Hyperlipidemia, gout, Anxiety, Depression, OCD. No cardiac disease. cPRA 0%. \par \par Donor 49 yr old, DCD, KDPI 71%, Terminal Cr 8mg/dL, No HLA mismatch, CMV D-R-. \par Course complicated by DGF. Discharged on peritoneal dialysis. Graft function recovered and has been off PD 3/30/21. \par Completed COVID vaccine in February 2021\par \par PD catheter and transplant ureteric stent removed on 4/22/21. \par He was hospitalized 4/30/21 with hyperkalemia and severe hyperglycemia. Bactrim was d/ame and he was started on Lokelma and sodium bicarbonate. \par \par Last seen 4 weeks ago. No major events since. \par C/o feeling tired. Energy fluctuates during the day. Blood pressure elevated 170-180 systolic.  \par On low K diet and Lokelma. \par Voiding urine without difficulty\par No Fever, chills. No NVD\par Blood glucose Fasting 180-220. On Lantus 18 units, 8 units premeal + 1 unit for additional 50. Followed by endo.  \par Eating much better. Weight is up 10lbs.  Had minimal swelling in ankles. \par

## 2021-06-03 NOTE — ASSESSMENT
[FreeTextEntry1] : Labs: K 5.3, Bicarb 24,  Cr 0.99, Glucose 363, Ca 9.3, phos 2.2, Mg 1.9 . Tac level 10.0 \par WBC 3.6, Hgb 10.2, Pl 211 \par U/A glucosuria, otherwise normal. Urine protein/cr 0.2 \par \par 71 yr old man with ESRD due to DM, was on PD, s/p DDRT on 3/9/21 complicated by DGF discharged on PD. Graft function recovered and off PD since 3/30.\par \par S/p DDRT on 3/9/21 complicated by DGF , now has excellent graft function\par - Cr stable 0.99mg/dL. No proteinuria. \par \par Immunosuppression \par - S/p Simulect induction \par - On Envarsus 6mg daily. Level 10.0 at goal. \par - Continue CellCept 1gm bid, prednisone 5mg daily \par \par Infection prophylaxis \par - Continue Valcyte 450mg daily, nystatin SS QID \par - Bactrim d/ame for hyperkalemia. Continue dapsone 100mg po daily. \par \par Diabetes type II  - Saw Endocrinologist Dr. Micky Abad Endo in April and Lantus was lowered to  18 units qhs, Humalog 8 units qac for hypoglyceia.  Glycemic control now sub optimal . Spoke to Dr. Abad he will see pt and adjust insulin, will also consider SGLT2 inhibitors.   \par \par Hypertension : On Carvedilol 25mg po bid, Nifedipine xl 60mg daily. BP elevated. Increase Nifedipine to 60mg po bid. \par \par Hyperparathyroidism - on Calcitriol 0.25mcg po daily.  \par Hypomagnesemia - Continue magnesium oxide 400mg po bid \par Hyperkalemia - Controlled, continue lokelma 10mg po bid, low K diet. \par Metabolic acidosis - resolved. D/c sodium bicarbonate \par \par Advised to start exercising - walk on treadmill 20min/day \par \par F/u on 6/16 with Dr. Schuler

## 2021-06-03 NOTE — PHYSICAL EXAM
[General Appearance - Alert] : alert [General Appearance - In No Acute Distress] : in no acute distress [Sclera] : the sclera and conjunctiva were normal [Oropharynx] : the oropharynx was normal [Jugular Venous Distention Increased] : there was no jugular-venous distention [Respiration, Rhythm And Depth] : normal respiratory rhythm and effort [Exaggerated Use Of Accessory Muscles For Inspiration] : no accessory muscle use [Auscultation Breath Sounds / Voice Sounds] : lungs were clear to auscultation bilaterally [Heart Sounds] : normal S1 and S2 [Heart Sounds Gallop] : no gallops [Murmurs] : no murmurs [Bowel Sounds] : normal bowel sounds [Abdomen Soft] : soft [Involuntary Movements] : no involuntary movements were seen [___ (cm) Fistula] : [unfilled] (cm) fistula [Bruit] : a bruit was present [Thrill] : a thrill was present [] : right posterior tibial palpable [No Focal Deficits] : no focal deficits [Oriented To Time, Place, And Person] : oriented to person, place, and time [FreeTextEntry1] : RLQ wound well healed. Graft non tender. Abdomen obese.

## 2021-06-07 LAB
BKV DNA SPEC QL NAA+PROBE: NEGATIVE COPIES/ML
LOG 10 BK QUANTITATION PCR: NORMAL

## 2021-06-14 NOTE — DISCHARGE NOTE PROVIDER - CARE PROVIDERS DIRECT ADDRESSES
,[email or web address] Mid-Level Procedure Text (A): After obtaining clear surgical margins the patient was sent to a mid-level provider for surgical repair.  The patient understands they will receive post-surgical care and follow-up from the mid-level provider.

## 2021-06-15 ENCOUNTER — APPOINTMENT (OUTPATIENT)
Dept: TRANSPLANT | Facility: CLINIC | Age: 71
End: 2021-06-15
Payer: COMMERCIAL

## 2021-06-15 VITALS
RESPIRATION RATE: 12 BRPM | SYSTOLIC BLOOD PRESSURE: 117 MMHG | TEMPERATURE: 97.9 F | WEIGHT: 189 LBS | BODY MASS INDEX: 31.49 KG/M2 | HEART RATE: 74 BPM | HEIGHT: 65 IN | DIASTOLIC BLOOD PRESSURE: 65 MMHG

## 2021-06-15 PROCEDURE — 99214 OFFICE O/P EST MOD 30 MIN: CPT

## 2021-06-15 PROCEDURE — 99072 ADDL SUPL MATRL&STAF TM PHE: CPT

## 2021-06-15 RX ORDER — SODIUM ZIRCONIUM CYCLOSILICATE 10 G/10G
10 POWDER, FOR SUSPENSION ORAL TWICE DAILY
Qty: 60 | Refills: 3 | Status: DISCONTINUED | COMMUNITY
Start: 2021-05-04 | End: 2021-06-15

## 2021-06-15 RX ORDER — NYSTATIN 100000 [USP'U]/ML
100000 SUSPENSION ORAL 4 TIMES DAILY
Qty: 4 | Refills: 2 | Status: DISCONTINUED | COMMUNITY
Start: 2021-03-10 | End: 2021-06-15

## 2021-06-15 NOTE — HISTORY OF PRESENT ILLNESS
[ Donor] :  donor [Basiliximab] : basiliximab [Steroids] : steroids [Negative/Negative] : Donor Negative/Recipient Negative [] : Yes [Other: ___] : [unfilled] [PHS Increased Risk] : no Public Health Service increased risk [Hepatitis C] : no hepatitis c [ABO Incompatible] : ABO compatible [DCD] : donation after cardiac death [Imported Organ] : not an imported organ [Received on Pump] : organ received on pump [Terminal Creatinine: ____] : Terminal Creatinine: [unfilled] [KDPI: ____] : Kidney Donor Profile Index: [unfilled] [TextBox_7] : 3/9/2021 [TextBox_52] : 48y/o [de-identified] : BP improved after meds adjusted with Dr. Patino.

## 2021-06-15 NOTE — PLAN
[FreeTextEntry1] : * Graft -- Excellent graft function. \par \par * Immuno -- Closely monitoring for rejection and adjusting levels. Envarsus goal 8-10, MMF 1gm BID, pred 5\par \par *HTN -  Carvedilol 25mg PO BID, Nifedipine inc to 60mg PO BID.  Start Lasix 20mg qAM (mild edema)\par \par * DM - followed by Dr. Abad, now has CGM\par \par * PPX - Valcyte qd,  Bactrim qd, can stop Nystatin\par \par * Electrolytes - can d/c Lokelma. Mg in AM.\par \par

## 2021-06-15 NOTE — PHYSICAL EXAM
[Well Developed] : well developed [Well Nourished] : well nourished [Normal Rate] : normal rate [Soft] : soft [Non-tender] : non-tender [] : left posterior tibial palpable [Alert] : alert [Responds to Questions Appropriately] : responds to questions appropriately [Oriented] : oriented [Appropriate] : appropriate [TextBox_34] : no edema [Site: ___] : Site: [unfilled] [FreeTextEntry1] : well healed

## 2021-06-16 ENCOUNTER — LABORATORY RESULT (OUTPATIENT)
Age: 71
End: 2021-06-16

## 2021-06-16 ENCOUNTER — APPOINTMENT (OUTPATIENT)
Dept: TRANSPLANT | Facility: CLINIC | Age: 71
End: 2021-06-16

## 2021-06-17 ENCOUNTER — NON-APPOINTMENT (OUTPATIENT)
Age: 71
End: 2021-06-17

## 2021-06-17 LAB
ALBUMIN SERPL ELPH-MCNC: 4.6 G/DL
ALP BLD-CCNC: 143 U/L
ALT SERPL-CCNC: 17 U/L
ANION GAP SERPL CALC-SCNC: 12 MMOL/L
APPEARANCE: CLEAR
AST SERPL-CCNC: 14 U/L
BACTERIA: NEGATIVE
BASOPHILS # BLD AUTO: 0.05 K/UL
BASOPHILS NFR BLD AUTO: 1.7 %
BILIRUB SERPL-MCNC: 0.6 MG/DL
BILIRUBIN URINE: NEGATIVE
BLOOD URINE: NEGATIVE
BUN SERPL-MCNC: 24 MG/DL
CALCIUM SERPL-MCNC: 9.6 MG/DL
CALCIUM SERPL-MCNC: 9.6 MG/DL
CHLORIDE SERPL-SCNC: 107 MMOL/L
CO2 SERPL-SCNC: 23 MMOL/L
COLOR: YELLOW
CREAT SERPL-MCNC: 0.99 MG/DL
CREAT SPEC-SCNC: 80 MG/DL
CREAT/PROT UR: 0.3 RATIO
EOSINOPHIL # BLD AUTO: 0.12 K/UL
EOSINOPHIL NFR BLD AUTO: 4.2 %
FERRITIN SERPL-MCNC: 255 NG/ML
FOLATE SERPL-MCNC: 8.2 NG/ML
GLUCOSE QUALITATIVE U: ABNORMAL
GLUCOSE SERPL-MCNC: 98 MG/DL
HCT VFR BLD CALC: 32.3 %
HGB BLD-MCNC: 10.2 G/DL
HYALINE CASTS: 1 /LPF
IRON SATN MFR SERPL: 24 %
IRON SERPL-MCNC: 73 UG/DL
KETONES URINE: NEGATIVE
LDH SERPL-CCNC: 344 U/L
LEUKOCYTE ESTERASE URINE: NEGATIVE
LYMPHOCYTES # BLD AUTO: 0.29 K/UL
LYMPHOCYTES NFR BLD AUTO: 10 %
MAGNESIUM SERPL-MCNC: 2 MG/DL
MAN DIFF?: NORMAL
MCHC RBC-ENTMCNC: 31.2 PG
MCHC RBC-ENTMCNC: 31.6 GM/DL
MCV RBC AUTO: 98.8 FL
MICROSCOPIC-UA: NORMAL
MONOCYTES # BLD AUTO: 0.27 K/UL
MONOCYTES NFR BLD AUTO: 9.2 %
NEUTROPHILS # BLD AUTO: 2.16 K/UL
NEUTROPHILS NFR BLD AUTO: 74.1 %
NITRITE URINE: NEGATIVE
PARATHYROID HORMONE INTACT: 158 PG/ML
PH URINE: 6
PHOSPHATE SERPL-MCNC: 2.6 MG/DL
PLATELET # BLD AUTO: 194 K/UL
POTASSIUM SERPL-SCNC: 4.6 MMOL/L
PROT SERPL-MCNC: 6.6 G/DL
PROT UR-MCNC: 20 MG/DL
PROTEIN URINE: NORMAL
RBC # BLD: 3.27 M/UL
RBC # FLD: 14.6 %
RED BLOOD CELLS URINE: 1 /HPF
SODIUM SERPL-SCNC: 142 MMOL/L
SPECIFIC GRAVITY URINE: 1.02
SQUAMOUS EPITHELIAL CELLS: 0 /HPF
TACROLIMUS SERPL-MCNC: 8.9 NG/ML
TIBC SERPL-MCNC: 297 UG/DL
UIBC SERPL-MCNC: 225 UG/DL
URATE SERPL-MCNC: 5.3 MG/DL
UROBILINOGEN URINE: NORMAL
VIT B12 SERPL-MCNC: 812 PG/ML
WBC # FLD AUTO: 2.91 K/UL
WHITE BLOOD CELLS URINE: 2 /HPF

## 2021-06-21 LAB
BKV DNA SPEC QL NAA+PROBE: NEGATIVE COPIES/ML
LOG 10 BK QUANTITATION PCR: NORMAL

## 2021-06-30 ENCOUNTER — LABORATORY RESULT (OUTPATIENT)
Age: 71
End: 2021-06-30

## 2021-06-30 ENCOUNTER — APPOINTMENT (OUTPATIENT)
Dept: TRANSPLANT | Facility: CLINIC | Age: 71
End: 2021-06-30
Payer: MEDICARE

## 2021-06-30 VITALS
TEMPERATURE: 97.9 F | HEIGHT: 65 IN | OXYGEN SATURATION: 95 % | BODY MASS INDEX: 30.32 KG/M2 | SYSTOLIC BLOOD PRESSURE: 113 MMHG | WEIGHT: 182 LBS | DIASTOLIC BLOOD PRESSURE: 63 MMHG | RESPIRATION RATE: 12 BRPM | HEART RATE: 71 BPM

## 2021-06-30 DIAGNOSIS — N18.30 CHRONIC KIDNEY DISEASE, STAGE 3 UNSPECIFIED: ICD-10-CM

## 2021-06-30 DIAGNOSIS — N18.4 CHRONIC KIDNEY DISEASE, STAGE 4 (SEVERE): ICD-10-CM

## 2021-06-30 PROCEDURE — 99072 ADDL SUPL MATRL&STAF TM PHE: CPT

## 2021-06-30 PROCEDURE — 99214 OFFICE O/P EST MOD 30 MIN: CPT | Mod: 24

## 2021-07-01 ENCOUNTER — NON-APPOINTMENT (OUTPATIENT)
Age: 71
End: 2021-07-01

## 2021-07-01 PROBLEM — N18.4 CHRONIC KIDNEY DISEASE (CKD), STAGE IV (SEVERE): Status: RESOLVED | Noted: 2019-01-23 | Resolved: 2021-07-01

## 2021-07-01 LAB
ALBUMIN SERPL ELPH-MCNC: 4.8 G/DL
ALP BLD-CCNC: 153 U/L
ALT SERPL-CCNC: 16 U/L
ANION GAP SERPL CALC-SCNC: 12 MMOL/L
APPEARANCE: CLEAR
AST SERPL-CCNC: 14 U/L
BACTERIA: NEGATIVE
BASOPHILS # BLD AUTO: 0.05 K/UL
BASOPHILS NFR BLD AUTO: 1.6 %
BILIRUB SERPL-MCNC: 0.5 MG/DL
BILIRUBIN URINE: NEGATIVE
BLOOD URINE: NEGATIVE
BUN SERPL-MCNC: 36 MG/DL
CALCIUM SERPL-MCNC: 9.7 MG/DL
CHLORIDE SERPL-SCNC: 107 MMOL/L
CO2 SERPL-SCNC: 21 MMOL/L
COLOR: NORMAL
CREAT SERPL-MCNC: 1.23 MG/DL
CREAT SPEC-SCNC: 46 MG/DL
CREAT/PROT UR: 0.2 RATIO
EOSINOPHIL # BLD AUTO: 0.12 K/UL
EOSINOPHIL NFR BLD AUTO: 3.7 %
GLUCOSE QUALITATIVE U: NORMAL
GLUCOSE SERPL-MCNC: 101 MG/DL
HCT VFR BLD CALC: 37.2 %
HGB BLD-MCNC: 11.8 G/DL
HYALINE CASTS: 0 /LPF
IMM GRANULOCYTES NFR BLD AUTO: 2.2 %
KETONES URINE: NEGATIVE
LDH SERPL-CCNC: 352 U/L
LEUKOCYTE ESTERASE URINE: NEGATIVE
LYMPHOCYTES # BLD AUTO: 0.53 K/UL
LYMPHOCYTES NFR BLD AUTO: 16.5 %
MAGNESIUM SERPL-MCNC: 2 MG/DL
MAN DIFF?: NORMAL
MCHC RBC-ENTMCNC: 31.6 PG
MCHC RBC-ENTMCNC: 31.7 GM/DL
MCV RBC AUTO: 99.5 FL
MICROSCOPIC-UA: NORMAL
MONOCYTES # BLD AUTO: 0.67 K/UL
MONOCYTES NFR BLD AUTO: 20.9 %
NEUTROPHILS # BLD AUTO: 1.77 K/UL
NEUTROPHILS NFR BLD AUTO: 55.1 %
NITRITE URINE: NEGATIVE
PH URINE: 5.5
PHOSPHATE SERPL-MCNC: 3.7 MG/DL
PLATELET # BLD AUTO: 206 K/UL
POTASSIUM SERPL-SCNC: 5.1 MMOL/L
PROT SERPL-MCNC: 6.8 G/DL
PROT UR-MCNC: 7 MG/DL
PROTEIN URINE: NEGATIVE
RBC # BLD: 3.74 M/UL
RBC # FLD: 14.3 %
RED BLOOD CELLS URINE: 1 /HPF
SODIUM SERPL-SCNC: 140 MMOL/L
SPECIFIC GRAVITY URINE: 1.01
SQUAMOUS EPITHELIAL CELLS: 0 /HPF
TACROLIMUS SERPL-MCNC: 10.4 NG/ML
URATE SERPL-MCNC: 6.8 MG/DL
UROBILINOGEN URINE: NORMAL
WBC # FLD AUTO: 3.21 K/UL
WHITE BLOOD CELLS URINE: 0 /HPF

## 2021-07-01 NOTE — HISTORY OF PRESENT ILLNESS
[ Donor] :  donor [Basiliximab] : basiliximab [Steroids] : steroids [Negative/Negative] : Donor Negative/Recipient Negative [] : Yes [Other: ___] : [unfilled] [PHS Increased Risk] : no Public Health Service increased risk [Hepatitis C] : no hepatitis c [ABO Incompatible] : ABO compatible [DCD] : donation after cardiac death [Imported Organ] : not an imported organ [Received on Pump] : organ received on pump [Terminal Creatinine: ____] : Terminal Creatinine: [unfilled] [KDPI: ____] : Kidney Donor Profile Index: [unfilled] [TextBox_7] : 3/9/2021 [de-identified] : Doing well.  BP and sugars with improved control. Feels well.  Discussed COVID vaccine and antibodies.  [TextBox_52] : 48y/o

## 2021-07-01 NOTE — PLAN
[FreeTextEntry1] : * Graft -- Good function, stent is out. \par \par * Immuno -- Closely monitoring for rejection and adjusting levels. Envarsus goal 8-10, MMF 1gm BID, pred 5\par \par *HTN -  Carvedilol 25mg PO BID, Nifedipine inc to 60mg PO BID.  On Lasix 20mg qAM (mild edema continues)\par \par * DM - followed by Dr. Abad, now has CGM\par \par * PPX - Valcyte qd,  Bactrim qd\par \par

## 2021-07-06 LAB
BKV DNA SPEC QL NAA+PROBE: NEGATIVE COPIES/ML
CMV DNA SPEC QL NAA+PROBE: NOT DETECTED IU/ML
LOG 10 BK QUANTITATION PCR: NORMAL

## 2021-07-12 ENCOUNTER — LABORATORY RESULT (OUTPATIENT)
Age: 71
End: 2021-07-12

## 2021-07-12 ENCOUNTER — APPOINTMENT (OUTPATIENT)
Dept: TRANSPLANT | Facility: CLINIC | Age: 71
End: 2021-07-12

## 2021-07-13 ENCOUNTER — APPOINTMENT (OUTPATIENT)
Dept: NEPHROLOGY | Facility: CLINIC | Age: 71
End: 2021-07-13
Payer: MEDICARE

## 2021-07-13 VITALS
WEIGHT: 181 LBS | SYSTOLIC BLOOD PRESSURE: 123 MMHG | TEMPERATURE: 97 F | HEIGHT: 65 IN | DIASTOLIC BLOOD PRESSURE: 66 MMHG | HEART RATE: 69 BPM | RESPIRATION RATE: 12 BRPM | BODY MASS INDEX: 30.16 KG/M2 | OXYGEN SATURATION: 100 %

## 2021-07-13 LAB
ALBUMIN SERPL ELPH-MCNC: 4.3 G/DL
ALP BLD-CCNC: 155 U/L
ALT SERPL-CCNC: 12 U/L
ANION GAP SERPL CALC-SCNC: 13 MMOL/L
APPEARANCE: CLEAR
AST SERPL-CCNC: 17 U/L
BACTERIA: NEGATIVE
BASOPHILS # BLD AUTO: 0.06 K/UL
BASOPHILS NFR BLD AUTO: 1.7 %
BILIRUB SERPL-MCNC: 0.5 MG/DL
BILIRUBIN URINE: NEGATIVE
BLOOD URINE: NEGATIVE
BUN SERPL-MCNC: 29 MG/DL
CALCIUM SERPL-MCNC: 9.4 MG/DL
CHLORIDE SERPL-SCNC: 107 MMOL/L
CO2 SERPL-SCNC: 19 MMOL/L
COLOR: YELLOW
CREAT SERPL-MCNC: 1.04 MG/DL
CREAT SPEC-SCNC: 67 MG/DL
CREAT/PROT UR: 0.2 RATIO
EOSINOPHIL # BLD AUTO: 0.15 K/UL
EOSINOPHIL NFR BLD AUTO: 4.3 %
GLUCOSE QUALITATIVE U: NEGATIVE
GLUCOSE SERPL-MCNC: 125 MG/DL
HCT VFR BLD CALC: 40.8 %
HGB BLD-MCNC: 12.9 G/DL
HYALINE CASTS: 0 /LPF
IMM GRANULOCYTES NFR BLD AUTO: 0.9 %
KETONES URINE: NEGATIVE
LDH SERPL-CCNC: 443 U/L
LEUKOCYTE ESTERASE URINE: NEGATIVE
LYMPHOCYTES # BLD AUTO: 0.51 K/UL
LYMPHOCYTES NFR BLD AUTO: 14.5 %
MAGNESIUM SERPL-MCNC: 2 MG/DL
MAN DIFF?: NORMAL
MCHC RBC-ENTMCNC: 31 PG
MCHC RBC-ENTMCNC: 31.6 GM/DL
MCV RBC AUTO: 98.1 FL
MICROSCOPIC-UA: NORMAL
MONOCYTES # BLD AUTO: 0.6 K/UL
MONOCYTES NFR BLD AUTO: 17.1 %
NEUTROPHILS # BLD AUTO: 2.16 K/UL
NEUTROPHILS NFR BLD AUTO: 61.5 %
NITRITE URINE: NEGATIVE
PH URINE: 5.5
PHOSPHATE SERPL-MCNC: 3.2 MG/DL
PLATELET # BLD AUTO: NORMAL K/UL
POTASSIUM SERPL-SCNC: 5.7 MMOL/L
PROT SERPL-MCNC: 6.7 G/DL
PROT UR-MCNC: 12 MG/DL
PROTEIN URINE: NEGATIVE
RBC # BLD: 4.16 M/UL
RBC # FLD: 13.6 %
RED BLOOD CELLS URINE: 2 /HPF
SODIUM SERPL-SCNC: 139 MMOL/L
SPECIFIC GRAVITY URINE: 1.01
SQUAMOUS EPITHELIAL CELLS: 0 /HPF
TACROLIMUS SERPL-MCNC: 10.1 NG/ML
URATE SERPL-MCNC: 6.3 MG/DL
UROBILINOGEN URINE: NORMAL
WBC # FLD AUTO: 3.51 K/UL
WHITE BLOOD CELLS URINE: 1 /HPF

## 2021-07-13 PROCEDURE — 99072 ADDL SUPL MATRL&STAF TM PHE: CPT

## 2021-07-13 PROCEDURE — 99215 OFFICE O/P EST HI 40 MIN: CPT

## 2021-07-13 RX ORDER — EPOETIN ALFA-EPBX 10000 [IU]/ML
10000 INJECTION, SOLUTION INTRAVENOUS; SUBCUTANEOUS
Qty: 4 | Refills: 2 | Status: DISCONTINUED | COMMUNITY
Start: 2021-06-15 | End: 2021-07-13

## 2021-07-13 RX ORDER — DIBASIC SODIUM PHOSPHATE, MONOBASIC POTASSIUM PHOSPHATE AND MONOBASIC SODIUM PHOSPHATE 852; 155; 130 MG/1; MG/1; MG/1
155-852-130 TABLET ORAL
Qty: 60 | Refills: 3 | Status: DISCONTINUED | COMMUNITY
Start: 2021-06-03 | End: 2021-07-13

## 2021-07-13 RX ORDER — PEN NEEDLE, DIABETIC 29 G X1/2"
31G X 8 MM NEEDLE, DISPOSABLE MISCELLANEOUS
Qty: 3 | Refills: 3 | Status: ACTIVE | COMMUNITY
Start: 2021-07-13 | End: 1900-01-01

## 2021-07-13 RX ORDER — MAGNESIUM OXIDE 241.3 MG/1000MG
400 TABLET ORAL DAILY
Qty: 90 | Refills: 3 | Status: DISCONTINUED | COMMUNITY
Start: 2021-05-25 | End: 2021-07-13

## 2021-07-13 NOTE — HISTORY OF PRESENT ILLNESS
[FreeTextEntry1] : \par 71 year old man with ESRD due to DM on PD since April 2020. S/p DDRT on 3/9/2021 \par \par PMH: DM type II and Hypertension diagnosed in his early 40’s. Hyperlipidemia, gout, Anxiety, Depression, OCD. No cardiac disease. cPRA 0%. \par \par Donor 49 yr old, DCD, KDPI 71%, Terminal Cr 8mg/dL, No HLA mismatch, CMV D-R-. \par Course complicated by DGF. Discharged on peritoneal dialysis. Graft function recovered and has been off PD 3/30/21. \par Completed COVID vaccine in February 2021\par \par PD catheter and transplant ureteric stent removed on 4/22/21. \par He was hospitalized 4/30/21 with hyperkalemia and severe hyperglycemia. Bactrim was d/ame and he was started on Lokelma and sodium bicarbonate. \par \par Presents for scheduled follow up visit. No illness or hospitalization since last visit. \par He reports feeling better, has more energy. No fever or chills. No NVD. Appetite good. \par Urinating without difficulty. \par Off Lokelma, on Low K diet \par -140/60-80's. \par Blood glucose better controlled, fasting glucose 160 this AM, mostly < 200. Followed by Endo. \par Weight 181 lbs, unchanged. Eating 2 meals a day. Has not started exercising yet. \par

## 2021-07-13 NOTE — PHYSICAL EXAM
[General Appearance - Alert] : alert [General Appearance - In No Acute Distress] : in no acute distress [Sclera] : the sclera and conjunctiva were normal [Oropharynx] : the oropharynx was normal [Jugular Venous Distention Increased] : there was no jugular-venous distention [Respiration, Rhythm And Depth] : normal respiratory rhythm and effort [Exaggerated Use Of Accessory Muscles For Inspiration] : no accessory muscle use [Auscultation Breath Sounds / Voice Sounds] : lungs were clear to auscultation bilaterally [Heart Sounds] : normal S1 and S2 [Heart Sounds Gallop] : no gallops [Murmurs] : no murmurs [Bowel Sounds] : normal bowel sounds [Abdomen Soft] : soft [Involuntary Movements] : no involuntary movements were seen [___ (cm) Fistula] : [unfilled] (cm) fistula [Bruit] : a bruit was present [Thrill] : a thrill was present [] : right posterior tibial palpable [No Focal Deficits] : no focal deficits [Oriented To Time, Place, And Person] : oriented to person, place, and time [Edema] : there was no peripheral edema [FreeTextEntry1] : RLQ Graft non tender. Abdomen obese. Small umbilical hernia present, reducible.

## 2021-07-15 LAB
BKV DNA SPEC QL NAA+PROBE: NEGATIVE COPIES/ML
LOG 10 BK QUANTITATION PCR: NORMAL

## 2021-07-27 ENCOUNTER — TRANSCRIPTION ENCOUNTER (OUTPATIENT)
Age: 71
End: 2021-07-27

## 2021-07-28 ENCOUNTER — APPOINTMENT (OUTPATIENT)
Dept: TRANSPLANT | Facility: CLINIC | Age: 71
End: 2021-07-28
Payer: MEDICARE

## 2021-07-28 VITALS
BODY MASS INDEX: 29.82 KG/M2 | HEART RATE: 62 BPM | TEMPERATURE: 97 F | WEIGHT: 179 LBS | DIASTOLIC BLOOD PRESSURE: 62 MMHG | OXYGEN SATURATION: 100 % | HEIGHT: 65 IN | SYSTOLIC BLOOD PRESSURE: 103 MMHG | RESPIRATION RATE: 12 BRPM

## 2021-07-28 LAB
ALBUMIN SERPL ELPH-MCNC: 4.5 G/DL
ALP BLD-CCNC: 156 U/L
ALT SERPL-CCNC: 9 U/L
ANION GAP SERPL CALC-SCNC: 11 MMOL/L
APPEARANCE: CLEAR
AST SERPL-CCNC: 13 U/L
BACTERIA: NEGATIVE
BASOPHILS # BLD AUTO: 0.07 K/UL
BASOPHILS NFR BLD AUTO: 1.1 %
BILIRUB SERPL-MCNC: 0.6 MG/DL
BILIRUBIN URINE: NEGATIVE
BLOOD URINE: NEGATIVE
BUN SERPL-MCNC: 27 MG/DL
CALCIUM SERPL-MCNC: 10 MG/DL
CALCIUM SERPL-MCNC: 10 MG/DL
CHLORIDE SERPL-SCNC: 105 MMOL/L
CO2 SERPL-SCNC: 23 MMOL/L
COLOR: YELLOW
COVID-19 SPIKE DOMAIN ANTIBODY INTERPRETATION: POSITIVE
CREAT SERPL-MCNC: 1.24 MG/DL
CREAT SPEC-SCNC: 94 MG/DL
CREAT/PROT UR: 0.2 RATIO
EOSINOPHIL # BLD AUTO: 0.13 K/UL
EOSINOPHIL NFR BLD AUTO: 2 %
GLUCOSE QUALITATIVE U: NEGATIVE
GLUCOSE SERPL-MCNC: 82 MG/DL
HCT VFR BLD CALC: 40.9 %
HGB BLD-MCNC: 12.8 G/DL
HYALINE CASTS: 1 /LPF
IMM GRANULOCYTES NFR BLD AUTO: 0.9 %
KETONES URINE: NEGATIVE
LDH SERPL-CCNC: 333 U/L
LEUKOCYTE ESTERASE URINE: NEGATIVE
LYMPHOCYTES # BLD AUTO: 0.71 K/UL
LYMPHOCYTES NFR BLD AUTO: 10.8 %
MAGNESIUM SERPL-MCNC: 2.1 MG/DL
MAN DIFF?: NORMAL
MCHC RBC-ENTMCNC: 30.5 PG
MCHC RBC-ENTMCNC: 31.3 GM/DL
MCV RBC AUTO: 97.6 FL
MICROSCOPIC-UA: NORMAL
MONOCYTES # BLD AUTO: 0.69 K/UL
MONOCYTES NFR BLD AUTO: 10.5 %
NEUTROPHILS # BLD AUTO: 4.9 K/UL
NEUTROPHILS NFR BLD AUTO: 74.7 %
NITRITE URINE: NEGATIVE
PARATHYROID HORMONE INTACT: 137 PG/ML
PH URINE: 6
PHOSPHATE SERPL-MCNC: 2.6 MG/DL
PLATELET # BLD AUTO: 194 K/UL
POTASSIUM SERPL-SCNC: 6.2 MMOL/L
PROT SERPL-MCNC: 7 G/DL
PROT UR-MCNC: 18 MG/DL
PROTEIN URINE: NORMAL
RBC # BLD: 4.19 M/UL
RBC # FLD: 13.1 %
RED BLOOD CELLS URINE: 2 /HPF
SARS-COV-2 AB SERPL IA-ACNC: 14.2 U/ML
SODIUM SERPL-SCNC: 140 MMOL/L
SPECIFIC GRAVITY URINE: 1.02
SQUAMOUS EPITHELIAL CELLS: 0 /HPF
TACROLIMUS SERPL-MCNC: 14 NG/ML
URATE SERPL-MCNC: 5.9 MG/DL
UROBILINOGEN URINE: NORMAL
WBC # FLD AUTO: 6.56 K/UL
WHITE BLOOD CELLS URINE: 2 /HPF

## 2021-07-28 PROCEDURE — 99214 OFFICE O/P EST MOD 30 MIN: CPT | Mod: 24

## 2021-07-28 PROCEDURE — 99072 ADDL SUPL MATRL&STAF TM PHE: CPT

## 2021-07-28 RX ORDER — TACROLIMUS 1 MG/1
1 TABLET, EXTENDED RELEASE ORAL DAILY
Qty: 180 | Refills: 3 | Status: DISCONTINUED | COMMUNITY
Start: 2021-04-13 | End: 2021-07-28

## 2021-07-28 NOTE — HISTORY OF PRESENT ILLNESS
[ Donor] :  donor [Basiliximab] : basiliximab [Steroids] : steroids [Negative/Negative] : Donor Negative/Recipient Negative [] : Yes [Other: ___] : [unfilled] [DCD] : donation after cardiac death [Received on Pump] : organ received on pump [Terminal Creatinine: ____] : Terminal Creatinine: [unfilled] [KDPI: ____] : Kidney Donor Profile Index: [unfilled] [PHS Increased Risk] : no Public Health Service increased risk [Hepatitis C] : no hepatitis c [ABO Incompatible] : ABO compatible [Imported Organ] : not an imported organ [TextBox_7] : 3/9/2021 [TextBox_52] : 50y/o [de-identified] : no transplant complaints, rather he has had significant anxiety.  Describes OCD behaviors.  Very worried about COVD despite having (+) spike ab.  \par \par urinating well.  no edema.  good BP control.

## 2021-07-28 NOTE — PLAN
[FreeTextEntry1] : * Graft -- Good function, stent is out. \par \par * Immuno -- Closely monitoring for rejection and adjusting levels. Envarsus goal 8-10, MMF 1gm BID, pred 5\par \par *HTN -  Carvedilol 25mg PO BID, Nifedipine inc to 60mg PO BID. \par \par *Anxiety - need for psychiatrist eval discussed, case referred to  who will find a local psychiatrist within his network. \par \par * DM - followed by Dr. Abad, now has CGM\par \par * PPX - Valcyte qd,  Bactrim qd\par \par

## 2021-07-28 NOTE — PHYSICAL EXAM
[Well Developed] : well developed [Well Nourished] : well nourished [Normal Rate] : normal rate [Soft] : soft [Non-tender] : non-tender [] : right posterior tibial palpable [Alert] : alert [Responds to Questions Appropriately] : responds to questions appropriately [Oriented] : oriented [Appropriate] : appropriate [Site: ___] : Site: [unfilled] [Breathing Comfortably on RA] : breathing comfortably on room air [TextBox_34] : no edema [FreeTextEntry1] : well healed

## 2021-07-30 ENCOUNTER — APPOINTMENT (OUTPATIENT)
Dept: TRANSPLANT | Facility: CLINIC | Age: 71
End: 2021-07-30

## 2021-07-30 ENCOUNTER — NON-APPOINTMENT (OUTPATIENT)
Age: 71
End: 2021-07-30

## 2021-07-30 LAB
ANION GAP SERPL CALC-SCNC: 11 MMOL/L
BUN SERPL-MCNC: 19 MG/DL
CALCIUM SERPL-MCNC: 9.6 MG/DL
CHLORIDE SERPL-SCNC: 107 MMOL/L
CO2 SERPL-SCNC: 25 MMOL/L
CREAT SERPL-MCNC: 1.04 MG/DL
GLUCOSE SERPL-MCNC: 109 MG/DL
POTASSIUM SERPL-SCNC: 5.5 MMOL/L
SODIUM SERPL-SCNC: 142 MMOL/L

## 2021-07-31 LAB
BKV DNA SPEC QL NAA+PROBE: NEGATIVE COPIES/ML
LOG 10 BK QUANTITATION PCR: NORMAL

## 2021-08-09 ENCOUNTER — APPOINTMENT (OUTPATIENT)
Dept: TRANSPLANT | Facility: CLINIC | Age: 71
End: 2021-08-09

## 2021-08-09 NOTE — DIETITIAN INITIAL EVALUATION ADULT. - ENTER TO (G/KG)
Detail Level: Simple Additional Notes: MAD acne system wash and moisturizer Render Risk Assessment In Note?: no 1.4

## 2021-08-10 ENCOUNTER — APPOINTMENT (OUTPATIENT)
Dept: NEPHROLOGY | Facility: CLINIC | Age: 71
End: 2021-08-10
Payer: MEDICARE

## 2021-08-10 VITALS
TEMPERATURE: 97 F | SYSTOLIC BLOOD PRESSURE: 125 MMHG | RESPIRATION RATE: 12 BRPM | BODY MASS INDEX: 29.16 KG/M2 | HEART RATE: 70 BPM | HEIGHT: 65 IN | WEIGHT: 175 LBS | DIASTOLIC BLOOD PRESSURE: 73 MMHG | OXYGEN SATURATION: 100 %

## 2021-08-10 LAB
ALBUMIN SERPL ELPH-MCNC: 4.5 G/DL
ALP BLD-CCNC: 147 U/L
ALT SERPL-CCNC: 8 U/L
ANION GAP SERPL CALC-SCNC: 14 MMOL/L
APPEARANCE: CLEAR
AST SERPL-CCNC: 12 U/L
BACTERIA: NEGATIVE
BASOPHILS # BLD AUTO: 0.06 K/UL
BASOPHILS NFR BLD AUTO: 0.8 %
BILIRUB SERPL-MCNC: 0.8 MG/DL
BILIRUBIN URINE: NEGATIVE
BLOOD URINE: ABNORMAL
BUN SERPL-MCNC: 36 MG/DL
CALCIUM SERPL-MCNC: 9.7 MG/DL
CALCIUM SERPL-MCNC: 9.7 MG/DL
CHLORIDE SERPL-SCNC: 103 MMOL/L
CO2 SERPL-SCNC: 22 MMOL/L
COLOR: YELLOW
CREAT SERPL-MCNC: 1.35 MG/DL
CREAT SPEC-SCNC: 139 MG/DL
CREAT/PROT UR: 0.1 RATIO
EOSINOPHIL # BLD AUTO: 0.09 K/UL
EOSINOPHIL NFR BLD AUTO: 1.2 %
GLUCOSE QUALITATIVE U: NEGATIVE
GLUCOSE SERPL-MCNC: 140 MG/DL
HCT VFR BLD CALC: 37.8 %
HGB BLD-MCNC: 12.2 G/DL
HYALINE CASTS: 3 /LPF
IMM GRANULOCYTES NFR BLD AUTO: 0.4 %
KETONES URINE: NEGATIVE
LDH SERPL-CCNC: 275 U/L
LEUKOCYTE ESTERASE URINE: NEGATIVE
LYMPHOCYTES # BLD AUTO: 0.78 K/UL
LYMPHOCYTES NFR BLD AUTO: 10.6 %
MAGNESIUM SERPL-MCNC: 2 MG/DL
MAN DIFF?: NORMAL
MCHC RBC-ENTMCNC: 30.5 PG
MCHC RBC-ENTMCNC: 32.3 GM/DL
MCV RBC AUTO: 94.5 FL
MICROSCOPIC-UA: NORMAL
MONOCYTES # BLD AUTO: 0.75 K/UL
MONOCYTES NFR BLD AUTO: 10.2 %
NEUTROPHILS # BLD AUTO: 5.63 K/UL
NEUTROPHILS NFR BLD AUTO: 76.8 %
NITRITE URINE: NEGATIVE
PARATHYROID HORMONE INTACT: 116 PG/ML
PH URINE: 6
PHOSPHATE SERPL-MCNC: 2.8 MG/DL
PLATELET # BLD AUTO: 175 K/UL
POTASSIUM SERPL-SCNC: 4.9 MMOL/L
PROT SERPL-MCNC: 7 G/DL
PROT UR-MCNC: 18 MG/DL
PROTEIN URINE: NORMAL
RBC # BLD: 4 M/UL
RBC # FLD: 13.2 %
RED BLOOD CELLS URINE: 2 /HPF
SODIUM SERPL-SCNC: 139 MMOL/L
SPECIFIC GRAVITY URINE: 1.02
SQUAMOUS EPITHELIAL CELLS: 0 /HPF
TACROLIMUS SERPL-MCNC: 15 NG/ML
URATE SERPL-MCNC: 6.8 MG/DL
UROBILINOGEN URINE: NORMAL
WBC # FLD AUTO: 7.34 K/UL
WHITE BLOOD CELLS URINE: 3 /HPF

## 2021-08-10 PROCEDURE — 99215 OFFICE O/P EST HI 40 MIN: CPT

## 2021-08-10 RX ORDER — FUROSEMIDE 20 MG/1
20 TABLET ORAL
Qty: 30 | Refills: 3 | Status: DISCONTINUED | COMMUNITY
Start: 2021-06-15 | End: 2021-08-10

## 2021-08-10 RX ORDER — TACROLIMUS 4 MG/1
4 TABLET, EXTENDED RELEASE ORAL
Qty: 90 | Refills: 3 | Status: DISCONTINUED | COMMUNITY
Start: 2021-03-10 | End: 2021-08-10

## 2021-08-10 RX ORDER — FLUOXETINE HYDROCHLORIDE 40 MG/1
40 CAPSULE ORAL
Refills: 0 | Status: DISCONTINUED | COMMUNITY
End: 2021-08-10

## 2021-08-10 NOTE — HISTORY OF PRESENT ILLNESS
[FreeTextEntry1] : \par 71 year old man with ESRD due to DM on PD since April 2020. S/p DDRT on 3/9/2021 \par \par PMH: DM type II and Hypertension diagnosed in his early 40’s. Hyperlipidemia, gout, Anxiety, Depression, OCD. No cardiac disease. cPRA 0%. \par \par Donor 49 yr old, DCD, KDPI 71%, Terminal Cr 8mg/dL, No HLA mismatch, CMV D-R-. \par Course complicated by DGF. Discharged on peritoneal dialysis. Graft function recovered and has been off PD 3/30/21. \par Completed COVID vaccine in February 2021\par \par PD catheter and transplant ureteric stent removed on 4/22/21. \par He was hospitalized 4/30/21 with hyperkalemia, metabolic acidosis  and severe hyperglycemia. Bactrim was d/ame\par \par Seen by Dr. Schuler 12 days ago, K elevated to 6.2. Lokelma resumed - took it for a few days.  \par He presents for scheduled follow up. Not accompanied by his wife this time - she had other things to do. \par C/o worsening anxiety and OCD symptoms. Scheduled to see psychiatry (Dr. Buchanan) next week. \par \par Blood pressure ~ 120/70 at home \par Blood glucose 100 fasting, random mostly < 200. Using Humalog 4-8 units pre meals. \par Weight  170.5. Lost 4.5 lbs since last visit. \par Exercises on treadmill every day for 45-60min. \par Appetite is fine, but reducing portion. Energy is good. \par Urinating without difficulty. \par No fever or chills. No NVD. \par

## 2021-08-10 NOTE — PHYSICAL EXAM
[General Appearance - Alert] : alert [General Appearance - In No Acute Distress] : in no acute distress [Sclera] : the sclera and conjunctiva were normal [PERRL With Normal Accommodation] : pupils were equal in size, round, and reactive to light [Oropharynx] : the oropharynx was normal [Jugular Venous Distention Increased] : there was no jugular-venous distention [Respiration, Rhythm And Depth] : normal respiratory rhythm and effort [Exaggerated Use Of Accessory Muscles For Inspiration] : no accessory muscle use [Auscultation Breath Sounds / Voice Sounds] : lungs were clear to auscultation bilaterally [Heart Sounds] : normal S1 and S2 [Heart Sounds Gallop] : no gallops [Murmurs] : no murmurs [Edema] : there was no peripheral edema [Bowel Sounds] : normal bowel sounds [Abdomen Soft] : soft [Involuntary Movements] : no involuntary movements were seen [___ (cm) Fistula] : [unfilled] (cm) fistula [Bruit] : a bruit was present [Thrill] : a thrill was present [] : right posterior tibial palpable [No Focal Deficits] : no focal deficits [Oriented To Time, Place, And Person] : oriented to person, place, and time [FreeTextEntry1] : RLQ Graft non tender. Abdomen obese. Small umbilical hernia present, reducible.

## 2021-08-10 NOTE — ASSESSMENT
[FreeTextEntry1] : \par 71 yr old man with ESRD due to DM, was on PD, s/p DDRT on 3/9/21 complicated by DGF discharged on PD. Graft function recovered and off PD since 3/30. \par \par S/p DDRT on 3/9/21 complicated by DGF , now has excellent graft function\par - Cr 1.35mg/dL slightly elevated.  No proteinuria. JOJO likely due to CNI toxicity. \par \par Immunosuppression \par - S/p Simulect induction \par - On Envarsus 4mg daily. Level 15.0 elevated. Will reduce Envarsus to 2mg daily.\par - Continue CellCept 500 bid - dose was lowered for leukopenia\par - Continue prednisone 5mg daily \par \par Infection prophylaxis \par - Continue Valcyte 450mg daily until Sept 2021\par - Bactrim d/ame for hyperkalemia. Continue dapsone 100mg po daily. \par - Completed COVID vaccine. Damian Ab +ve \par \par Diabetes type II  - On Lantus 20 units qhs, Humalog 4-8 units qac + sliding scale. Dr Micky Abad following. \par \par Hypertension : Controlled on Carvedilol 25mg po bid, Nifedipine xl 60mg bid. \par \par Hyperparathyroidism - on Calcitriol 0.25mcg po daily. \par \par Hyperkalemia -  controlled, continue low K diet. \par \par Anxiety/OCD - On Prozac 80mg daily. Symptoms not well controlled.  Psychiatry evaluation scheduled 8/18/21. \par \par F/u in 2 weeks with Dr. Schuler \par

## 2021-08-13 LAB
BKV DNA SPEC QL NAA+PROBE: NEGATIVE COPIES/ML
LOG 10 BK QUANTITATION PCR: NORMAL

## 2021-08-16 ENCOUNTER — APPOINTMENT (OUTPATIENT)
Dept: PSYCHIATRY | Facility: CLINIC | Age: 71
End: 2021-08-16
Payer: MEDICARE

## 2021-08-16 PROCEDURE — 99215 OFFICE O/P EST HI 40 MIN: CPT

## 2021-08-25 ENCOUNTER — APPOINTMENT (OUTPATIENT)
Dept: TRANSPLANT | Facility: CLINIC | Age: 71
End: 2021-08-25
Payer: MEDICARE

## 2021-08-25 VITALS
HEIGHT: 65 IN | OXYGEN SATURATION: 94 % | SYSTOLIC BLOOD PRESSURE: 122 MMHG | RESPIRATION RATE: 12 BRPM | TEMPERATURE: 97 F | DIASTOLIC BLOOD PRESSURE: 65 MMHG | HEART RATE: 61 BPM | BODY MASS INDEX: 27.49 KG/M2 | WEIGHT: 165 LBS

## 2021-08-25 DIAGNOSIS — D84.9 IMMUNODEFICIENCY, UNSPECIFIED: ICD-10-CM

## 2021-08-25 LAB
ALBUMIN SERPL ELPH-MCNC: 4.5 G/DL
ALP BLD-CCNC: 120 U/L
ALT SERPL-CCNC: 10 U/L
ANION GAP SERPL CALC-SCNC: 13 MMOL/L
APPEARANCE: CLEAR
AST SERPL-CCNC: 11 U/L
BACTERIA: NEGATIVE
BASOPHILS # BLD AUTO: 0.06 K/UL
BASOPHILS NFR BLD AUTO: 0.9 %
BILIRUB SERPL-MCNC: 0.9 MG/DL
BILIRUBIN URINE: NEGATIVE
BLOOD URINE: NEGATIVE
BUN SERPL-MCNC: 28 MG/DL
CALCIUM SERPL-MCNC: 9.8 MG/DL
CHLORIDE SERPL-SCNC: 102 MMOL/L
CO2 SERPL-SCNC: 20 MMOL/L
COLOR: YELLOW
CREAT SERPL-MCNC: 1.3 MG/DL
CREAT SPEC-SCNC: 148 MG/DL
CREAT/PROT UR: 0.2 RATIO
EOSINOPHIL # BLD AUTO: 0.07 K/UL
EOSINOPHIL NFR BLD AUTO: 1 %
GLUCOSE QUALITATIVE U: NEGATIVE
GLUCOSE SERPL-MCNC: 210 MG/DL
HCT VFR BLD CALC: 37.3 %
HGB BLD-MCNC: 12.2 G/DL
HYALINE CASTS: 1 /LPF
IMM GRANULOCYTES NFR BLD AUTO: 0.3 %
KETONES URINE: NEGATIVE
LDH SERPL-CCNC: 236 U/L
LEUKOCYTE ESTERASE URINE: NEGATIVE
LYMPHOCYTES # BLD AUTO: 0.69 K/UL
LYMPHOCYTES NFR BLD AUTO: 10.3 %
MAGNESIUM SERPL-MCNC: 2 MG/DL
MAN DIFF?: NORMAL
MCHC RBC-ENTMCNC: 31.4 PG
MCHC RBC-ENTMCNC: 32.7 GM/DL
MCV RBC AUTO: 95.9 FL
MICROSCOPIC-UA: NORMAL
MONOCYTES # BLD AUTO: 0.48 K/UL
MONOCYTES NFR BLD AUTO: 7.2 %
NEUTROPHILS # BLD AUTO: 5.35 K/UL
NEUTROPHILS NFR BLD AUTO: 80.3 %
NITRITE URINE: NEGATIVE
PH URINE: 5.5
PHOSPHATE SERPL-MCNC: 2.8 MG/DL
PLATELET # BLD AUTO: 190 K/UL
POTASSIUM SERPL-SCNC: 5.1 MMOL/L
PROT SERPL-MCNC: 6.8 G/DL
PROT UR-MCNC: 25 MG/DL
PROTEIN URINE: ABNORMAL
RBC # BLD: 3.89 M/UL
RBC # FLD: 15.5 %
RED BLOOD CELLS URINE: 3 /HPF
SODIUM SERPL-SCNC: 136 MMOL/L
SPECIFIC GRAVITY URINE: 1.02
SQUAMOUS EPITHELIAL CELLS: 0 /HPF
TACROLIMUS SERPL-MCNC: 7.2 NG/ML
URATE SERPL-MCNC: 6.5 MG/DL
UROBILINOGEN URINE: NORMAL
WBC # FLD AUTO: 6.67 K/UL
WHITE BLOOD CELLS URINE: 3 /HPF

## 2021-08-25 PROCEDURE — 99214 OFFICE O/P EST MOD 30 MIN: CPT

## 2021-08-26 ENCOUNTER — APPOINTMENT (OUTPATIENT)
Dept: PSYCHIATRY | Facility: CLINIC | Age: 71
End: 2021-08-26
Payer: MEDICARE

## 2021-08-26 PROCEDURE — 99214 OFFICE O/P EST MOD 30 MIN: CPT

## 2021-08-29 PROBLEM — D84.9 IMMUNOSUPPRESSION: Status: ACTIVE | Noted: 2021-03-22

## 2021-08-29 NOTE — HISTORY OF PRESENT ILLNESS
[ Donor] :  donor [Basiliximab] : basiliximab [Steroids] : steroids [Negative/Negative] : Donor Negative/Recipient Negative [] : Yes [Other: ___] : [unfilled] [PHS Increased Risk] : no Public Health Service increased risk [Hepatitis C] : no hepatitis c [ABO Incompatible] : ABO compatible [DCD] : donation after cardiac death [Imported Organ] : not an imported organ [Received on Pump] : organ received on pump [Terminal Creatinine: ____] : Terminal Creatinine: [unfilled] [KDPI: ____] : Kidney Donor Profile Index: [unfilled] [TextBox_7] : 3/9/2021 [TextBox_52] : 48y/o [de-identified] : Continues to have significant anxiety.  Hoping to change psychiatric meds.\par No dysuria.  BP well controlled.

## 2021-08-29 NOTE — PHYSICAL EXAM
[Well Developed] : well developed [Well Nourished] : well nourished [Breathing Comfortably on RA] : breathing comfortably on room air [Normal Rate] : normal rate [Soft] : soft [Non-tender] : non-tender [] : right posterior tibial palpable [Alert] : alert [Responds to Questions Appropriately] : responds to questions appropriately [Oriented] : oriented [Appropriate] : appropriate [TextBox_34] : no edema [Site: ___] : Site: [unfilled] [FreeTextEntry1] : well healed

## 2021-08-29 NOTE — PLAN
[FreeTextEntry1] : * Graft -- Good function\par \par * Immuno -- Closely monitoring for rejection and adjusting levels. Envarsus goal 8-10, MMF 1gm BID, pred 5\par \par *HTN -  Carvedilol 25mg PO BID, Nifedipine inc to 60mg PO BID. \par \par *Anxiety - discussed case with Dr. Buchanan who will add anxiolytic agent\par \par * DM - followed by Dr. Abad, now has CGM\par \par * PPX - Valcyte qd,  Bactrim qd\par \par

## 2021-08-30 LAB
BKV DNA SPEC QL NAA+PROBE: NEGATIVE COPIES/ML
LOG 10 BK QUANTITATION PCR: NORMAL

## 2021-09-07 ENCOUNTER — APPOINTMENT (OUTPATIENT)
Dept: NEPHROLOGY | Facility: CLINIC | Age: 71
End: 2021-09-07
Payer: MEDICARE

## 2021-09-07 VITALS — DIASTOLIC BLOOD PRESSURE: 60 MMHG | WEIGHT: 161 LBS | SYSTOLIC BLOOD PRESSURE: 90 MMHG | BODY MASS INDEX: 26.79 KG/M2

## 2021-09-07 LAB
ALBUMIN SERPL ELPH-MCNC: 4.6 G/DL
ALP BLD-CCNC: 109 U/L
ALT SERPL-CCNC: 12 U/L
ANION GAP SERPL CALC-SCNC: 13 MMOL/L
APPEARANCE: CLEAR
AST SERPL-CCNC: 13 U/L
BACTERIA: NEGATIVE
BASOPHILS # BLD AUTO: 0.05 K/UL
BASOPHILS NFR BLD AUTO: 0.7 %
BILIRUB SERPL-MCNC: 1 MG/DL
BILIRUBIN URINE: NEGATIVE
BLOOD URINE: NEGATIVE
BUN SERPL-MCNC: 20 MG/DL
CALCIUM SERPL-MCNC: 9.7 MG/DL
CALCIUM SERPL-MCNC: 9.7 MG/DL
CHLORIDE SERPL-SCNC: 104 MMOL/L
CO2 SERPL-SCNC: 21 MMOL/L
COLOR: NORMAL
COVID-19 SPIKE DOMAIN ANTIBODY INTERPRETATION: POSITIVE
CREAT SERPL-MCNC: 1.17 MG/DL
CREAT SPEC-SCNC: 66 MG/DL
CREAT/PROT UR: 0.5 RATIO
EOSINOPHIL # BLD AUTO: 0.05 K/UL
EOSINOPHIL NFR BLD AUTO: 0.7 %
GLUCOSE QUALITATIVE U: NEGATIVE
GLUCOSE SERPL-MCNC: 216 MG/DL
HCT VFR BLD CALC: 37.5 %
HGB BLD-MCNC: 11.8 G/DL
HYALINE CASTS: 1 /LPF
IMM GRANULOCYTES NFR BLD AUTO: 0.4 %
KETONES URINE: NEGATIVE
LDH SERPL-CCNC: 231 U/L
LEUKOCYTE ESTERASE URINE: NEGATIVE
LYMPHOCYTES # BLD AUTO: 0.64 K/UL
LYMPHOCYTES NFR BLD AUTO: 9.5 %
MAGNESIUM SERPL-MCNC: 1.9 MG/DL
MAN DIFF?: NORMAL
MCHC RBC-ENTMCNC: 31.2 PG
MCHC RBC-ENTMCNC: 31.5 GM/DL
MCV RBC AUTO: 99.2 FL
MICROSCOPIC-UA: NORMAL
MONOCYTES # BLD AUTO: 0.49 K/UL
MONOCYTES NFR BLD AUTO: 7.3 %
NEUTROPHILS # BLD AUTO: 5.45 K/UL
NEUTROPHILS NFR BLD AUTO: 81.4 %
NITRITE URINE: NEGATIVE
PARATHYROID HORMONE INTACT: 132 PG/ML
PH URINE: 6
PHOSPHATE SERPL-MCNC: 2.6 MG/DL
PLATELET # BLD AUTO: 190 K/UL
POTASSIUM SERPL-SCNC: 4.9 MMOL/L
PROT SERPL-MCNC: 6.9 G/DL
PROT UR-MCNC: 30 MG/DL
PROTEIN URINE: ABNORMAL
RBC # BLD: 3.78 M/UL
RBC # FLD: 16.1 %
RED BLOOD CELLS URINE: 1 /HPF
SARS-COV-2 AB SERPL IA-ACNC: >250 U/ML
SODIUM SERPL-SCNC: 138 MMOL/L
SPECIFIC GRAVITY URINE: 1.01
SQUAMOUS EPITHELIAL CELLS: 0 /HPF
TACROLIMUS SERPL-MCNC: 8.4 NG/ML
URATE SERPL-MCNC: 5.6 MG/DL
UROBILINOGEN URINE: NORMAL
WBC # FLD AUTO: 6.71 K/UL
WHITE BLOOD CELLS URINE: 2 /HPF

## 2021-09-07 PROCEDURE — 99215 OFFICE O/P EST HI 40 MIN: CPT

## 2021-09-07 RX ORDER — VALGANCICLOVIR HYDROCHLORIDE 450 MG/1
450 TABLET ORAL
Qty: 90 | Refills: 2 | Status: DISCONTINUED | COMMUNITY
Start: 2021-03-10 | End: 2021-09-07

## 2021-09-07 NOTE — HISTORY OF PRESENT ILLNESS
[FreeTextEntry1] : \par 71 year old man with ESRD due to DM on PD since April 2020. S/p DDRT on 3/9/2021 \par \par PMH: DM type II and Hypertension diagnosed in his early 40’s. Hyperlipidemia, gout, Anxiety, Depression, OCD. No cardiac disease. cPRA 0%. \par \par Donor 49 yr old, DCD, KDPI 71%, Terminal Cr 8mg/dL, No HLA mismatch, CMV D-R-. \par Course complicated by DGF. Discharged on peritoneal dialysis. Graft function recovered and has been off PD 3/30/21. \par Completed 2nd dose of COVID vaccine in February 2021\par \par PD catheter and transplant ureteric stent removed on 4/22/21. \par He was hospitalized 4/30/21 with hyperkalemia, metabolic acidosis  and severe hyperglycemia. Bactrim was d/ame\par  \par He presents for scheduled follow up. \par He complains of worsening anxiety and depression symptoms. He was seen by Dr. Buchanan from psychiatry, Welbutrin added to prozac 2 weeks ago. He was told effect would take at least 3 weeks. Also advised to see therapist which he has not yet scheduled. He is not sleeping well. Wife is supportive. \par \par No urinary issues. No fever or chills. Appetite poor, energy poor. Lost 4lbs. \par Received booster dose of COVID vaccine - 3 weeks ago. No side effects. \par Blood pressure  systolic at home. Feels weak and fatigued. \par Blood glucose  < 200. Has CGM. He reduced Lantus dose b/c he was eating less. \par Not exercising on treadmill anymore b/c he has no energy. \par Has follow up appt with psych Sept 16th.

## 2021-09-07 NOTE — ASSESSMENT
[FreeTextEntry1] : \par 71 yr old man with ESRD due to DM, was on PD, s/p DDRT on 3/9/21 complicated by DGF discharged on PD. Graft function recovered and off PD since 3/30. \par \par S/p DDRT on 3/9/21 complicated by DGF , now has excellent graft function\par - Cr stable 1.17mg/dL. Mild proteinuria ~ 500mg/day. \par \par Immunosuppression \par - S/p Simulect induction \par - On Envarsus 2mg daily. Level 8.4 at goal. \par - Continue CellCept 500 bid - dose was lowered for leukopenia. WBC now stable. \par - Continue prednisone 5mg daily \par \par Infection prophylaxis \par - D/c Valcyte - completed 6 months prophylaxis  \par - Bactrim d/ame for hyperkalemia. Continue dapsone 100mg po daily. \par - Completed COVID vaccine. Damian Ab +ve . Took booster in August 2021.  \par \par Diabetes type II  - On Lantus 10-20 units qhs, Humalog 4-8 units qac + sliding scale. Dr Micky Abad following. \par \par Hypertension : BP low on Carvedilol 25mg po bid, Nifedipine xl 60mg bid.  Will reduce Nifedipine to 60mg po daily. \par \par Hyperparathyroidism - on Calcitriol 0.25mcg po daily. \par \par Hyperkalemia -  controlled, continue low K diet. \par \par Anxiety/OCD/Depression- On Prozac 80mg daily. and Welbutrin 150mg po daily - started 2 weeks ago. F/u with psychiatry. \par \par F/u here in 3-4 weeks.

## 2021-09-10 ENCOUNTER — EMERGENCY (EMERGENCY)
Facility: HOSPITAL | Age: 71
LOS: 1 days | Discharge: HOME | End: 2021-09-10
Admitting: EMERGENCY MEDICINE
Payer: MEDICARE

## 2021-09-10 VITALS
HEART RATE: 66 BPM | SYSTOLIC BLOOD PRESSURE: 133 MMHG | DIASTOLIC BLOOD PRESSURE: 79 MMHG | RESPIRATION RATE: 18 BRPM | TEMPERATURE: 98 F | HEIGHT: 66 IN | OXYGEN SATURATION: 96 %

## 2021-09-10 VITALS
HEART RATE: 66 BPM | TEMPERATURE: 98 F | DIASTOLIC BLOOD PRESSURE: 67 MMHG | OXYGEN SATURATION: 97 % | SYSTOLIC BLOOD PRESSURE: 155 MMHG | RESPIRATION RATE: 16 BRPM

## 2021-09-10 DIAGNOSIS — F42.9 OBSESSIVE-COMPULSIVE DISORDER, UNSPECIFIED: ICD-10-CM

## 2021-09-10 DIAGNOSIS — F41.9 ANXIETY DISORDER, UNSPECIFIED: ICD-10-CM

## 2021-09-10 DIAGNOSIS — Z94.0 KIDNEY TRANSPLANT STATUS: Chronic | ICD-10-CM

## 2021-09-10 DIAGNOSIS — F32.9 MAJOR DEPRESSIVE DISORDER, SINGLE EPISODE, UNSPECIFIED: ICD-10-CM

## 2021-09-10 LAB
ALBUMIN SERPL ELPH-MCNC: 4.6 G/DL — SIGNIFICANT CHANGE UP (ref 3.3–5)
ALP SERPL-CCNC: 106 U/L — SIGNIFICANT CHANGE UP (ref 40–120)
ALT FLD-CCNC: 11 U/L — SIGNIFICANT CHANGE UP (ref 4–41)
ANION GAP SERPL CALC-SCNC: 11 MMOL/L — SIGNIFICANT CHANGE UP (ref 7–14)
APPEARANCE UR: CLEAR — SIGNIFICANT CHANGE UP
AST SERPL-CCNC: 13 U/L — SIGNIFICANT CHANGE UP (ref 4–40)
BASOPHILS # BLD AUTO: 0.04 K/UL — SIGNIFICANT CHANGE UP (ref 0–0.2)
BASOPHILS NFR BLD AUTO: 0.6 % — SIGNIFICANT CHANGE UP (ref 0–2)
BILIRUB SERPL-MCNC: 1.1 MG/DL — SIGNIFICANT CHANGE UP (ref 0.2–1.2)
BILIRUB UR-MCNC: NEGATIVE — SIGNIFICANT CHANGE UP
BKV DNA SPEC QL NAA+PROBE: NEGATIVE COPIES/ML
BUN SERPL-MCNC: 26 MG/DL — HIGH (ref 7–23)
CALCIUM SERPL-MCNC: 9.6 MG/DL — SIGNIFICANT CHANGE UP (ref 8.4–10.5)
CHLORIDE SERPL-SCNC: 102 MMOL/L — SIGNIFICANT CHANGE UP (ref 98–107)
CO2 SERPL-SCNC: 22 MMOL/L — SIGNIFICANT CHANGE UP (ref 22–31)
COLOR SPEC: YELLOW — SIGNIFICANT CHANGE UP
CREAT SERPL-MCNC: 1.24 MG/DL — SIGNIFICANT CHANGE UP (ref 0.5–1.3)
DIFF PNL FLD: NEGATIVE — SIGNIFICANT CHANGE UP
EOSINOPHIL # BLD AUTO: 0.02 K/UL — SIGNIFICANT CHANGE UP (ref 0–0.5)
EOSINOPHIL NFR BLD AUTO: 0.3 % — SIGNIFICANT CHANGE UP (ref 0–6)
GLUCOSE SERPL-MCNC: 319 MG/DL — HIGH (ref 70–99)
GLUCOSE UR QL: ABNORMAL
HCT VFR BLD CALC: 34.4 % — LOW (ref 39–50)
HGB BLD-MCNC: 11.4 G/DL — LOW (ref 13–17)
IANC: 5.53 K/UL — SIGNIFICANT CHANGE UP (ref 1.5–8.5)
IMM GRANULOCYTES NFR BLD AUTO: 0.5 % — SIGNIFICANT CHANGE UP (ref 0–1.5)
KETONES UR-MCNC: NEGATIVE — SIGNIFICANT CHANGE UP
LEUKOCYTE ESTERASE UR-ACNC: NEGATIVE — SIGNIFICANT CHANGE UP
LOG 10 BK QUANTITATION PCR: NORMAL
LYMPHOCYTES # BLD AUTO: 0.55 K/UL — LOW (ref 1–3.3)
LYMPHOCYTES # BLD AUTO: 8.3 % — LOW (ref 13–44)
MCHC RBC-ENTMCNC: 32 PG — SIGNIFICANT CHANGE UP (ref 27–34)
MCHC RBC-ENTMCNC: 33.1 GM/DL — SIGNIFICANT CHANGE UP (ref 32–36)
MCV RBC AUTO: 96.6 FL — SIGNIFICANT CHANGE UP (ref 80–100)
MONOCYTES # BLD AUTO: 0.44 K/UL — SIGNIFICANT CHANGE UP (ref 0–0.9)
MONOCYTES NFR BLD AUTO: 6.7 % — SIGNIFICANT CHANGE UP (ref 2–14)
NEUTROPHILS # BLD AUTO: 5.53 K/UL — SIGNIFICANT CHANGE UP (ref 1.8–7.4)
NEUTROPHILS NFR BLD AUTO: 83.6 % — HIGH (ref 43–77)
NITRITE UR-MCNC: NEGATIVE — SIGNIFICANT CHANGE UP
NRBC # BLD: 0 /100 WBCS — SIGNIFICANT CHANGE UP
NRBC # FLD: 0 K/UL — SIGNIFICANT CHANGE UP
PCP SPEC-MCNC: SIGNIFICANT CHANGE UP
PH UR: 5.5 — SIGNIFICANT CHANGE UP (ref 5–8)
PLATELET # BLD AUTO: 163 K/UL — SIGNIFICANT CHANGE UP (ref 150–400)
POTASSIUM SERPL-MCNC: 4.8 MMOL/L — SIGNIFICANT CHANGE UP (ref 3.5–5.3)
POTASSIUM SERPL-SCNC: 4.8 MMOL/L — SIGNIFICANT CHANGE UP (ref 3.5–5.3)
PROT SERPL-MCNC: 6.9 G/DL — SIGNIFICANT CHANGE UP (ref 6–8.3)
PROT UR-MCNC: ABNORMAL
RBC # BLD: 3.56 M/UL — LOW (ref 4.2–5.8)
RBC # FLD: 15.6 % — HIGH (ref 10.3–14.5)
SARS-COV-2 RNA SPEC QL NAA+PROBE: SIGNIFICANT CHANGE UP
SODIUM SERPL-SCNC: 135 MMOL/L — SIGNIFICANT CHANGE UP (ref 135–145)
SP GR SPEC: 1.02 — SIGNIFICANT CHANGE UP (ref 1–1.05)
TOXICOLOGY SCREEN, DRUGS OF ABUSE, SERUM RESULT: SIGNIFICANT CHANGE UP
TSH SERPL-MCNC: 0.94 UIU/ML — SIGNIFICANT CHANGE UP (ref 0.27–4.2)
UROBILINOGEN FLD QL: SIGNIFICANT CHANGE UP
WBC # BLD: 6.61 K/UL — SIGNIFICANT CHANGE UP (ref 3.8–10.5)
WBC # FLD AUTO: 6.61 K/UL — SIGNIFICANT CHANGE UP (ref 3.8–10.5)

## 2021-09-10 PROCEDURE — 90792 PSYCH DIAG EVAL W/MED SRVCS: CPT

## 2021-09-10 PROCEDURE — 99284 EMERGENCY DEPT VISIT MOD MDM: CPT

## 2021-09-10 NOTE — ED ADULT TRIAGE NOTE - CHIEF COMPLAINT QUOTE
Pt sent by psychiatrist for further eval. Pt endorses increase anxiety and depression worsened due to multiple stressors at home. Admits to compliant to medication. Hx of kidney transplant this year. Denies SI/HI and auditory hallucinations
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
## 2021-09-10 NOTE — ED BEHAVIORAL HEALTH ASSESSMENT NOTE - VIOLENCE PROTECTIVE FACTORS:
Residential stability/Relationship stability/Sobriety/Engagement in treatment/Insight into violence risk and need for management/treatment/Good treatment response/compliance

## 2021-09-10 NOTE — ED ADULT NURSE NOTE - NSFALLRSKOUTCOME_ED_ALL_ED
Universal Safety Interventions What Type Of Note Output Would You Prefer (Optional)?: Standard Output How Severe Is Your Rash?: moderate Is This A New Presentation, Or A Follow-Up?: Rash

## 2021-09-10 NOTE — ED BEHAVIORAL HEALTH ASSESSMENT NOTE - DESCRIPTION
During course of ED visit patient was calm and cooperative. Patient was not aggressive or violent and did not require PRN medications.    ICU Vital Signs Last 24 Hrs  T(C): 36.9 (10 Sep 2021 15:18), Max: 36.9 (10 Sep 2021 15:18)  T(F): 98.4 (10 Sep 2021 15:18), Max: 98.4 (10 Sep 2021 15:18)  HR: 66 (10 Sep 2021 15:18) (66 - 66)  BP: 133/79 (10 Sep 2021 15:18) (133/79 - 133/79)  BP(mean): --  ABP: --  ABP(mean): --  RR: 18 (10 Sep 2021 15:18) (18 - 18)  SpO2: 96% (10 Sep 2021 15:18) (96% - 96%) 71 year old male. Live with wife and 11/16 year old sons. Has 3 adult children- 38, 29 & 26. He is a retired  and collects social security and owns 7 properties. HTN, CKD, Gout, IDDM, Right Kidney Transplant 3/21 71 year old male. Live with wife and 11/16 year old sons. Has 3 adult children- 38, 29 & 26. He is a retired , collects social security and owns 7 properties.
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
## 2021-09-10 NOTE — ED BEHAVIORAL HEALTH ASSESSMENT NOTE - CURRENT MEDICATION
Wellbutrin XL 150mg daily, Prozac 80mg daily Wellbutrin XL 150mg daily, Prozac 80mg daily    aspirin 81 mg oral tablet, chewable: 1 tab(s) orally once a day, atovaquone 750 mg/5 mL oral suspension: 10 milliliter(s) orally once a day, calcitriol 0.25 mcg oral capsule: 1 cap(s) orally once a day, carvedilol 25 mg oral tablet: 1 tab(s) orally every 12 hours, famotidine 20 mg oral tablet: 1 tab(s) orally once a day, insulin glargine: 21 unit(s) subcutaneous once a day (at bedtime), insulin lispro 100 units/mL injectable solution: 7 unit(s) injectable 3 times a day, Lokelma 10 g oral powder for reconstitution: 10 gram(s) orally 2 times a day , magnesium oxide 400 mg (241.3 mg elemental magnesium) oral tablet: 1 tab(s) orally 2 times a day, mycophenolate mofetil 250 mg oral capsule: 1000 milligram(s) orally 2 times a day, NIFEdipine 30 mg oral tablet, extended release: 1 tab(s) orally once a day, nystatin 100,000 units/mL oral suspension: 5 milliliter(s) orally 4 times a day, predniSONE 5 mg oral tablet: 1 tab(s) orally once a day, senna oral tablet: 2 tab(s) orally once a day (at bedtime), sodium bicarbonate 650 mg oral tablet: 3 tab(s) orally 2 times a day, sodium/potassium/phosphorus 160 mg-280 mg-250 mg oral powder for reconstitution: 1 packet(s) orally 2 times a day, sulfamethoxazole-trimethoprim 400 mg-80 mg oral tablet: 1 tab(s) orally once a day, tacrolimus 1 mg oral tablet, extended release: 7 tab(s) orally, tamsulosin 0.4 mg oral capsule: 1 cap(s) orally once a day (at bedtime), valGANciclovir 450 mg oral tablet: 1 tab(s) orally

## 2021-09-10 NOTE — ED ADULT NURSE NOTE - NSIMPLEMENTINTERV_GEN_ALL_ED
Implemented All Universal Safety Interventions:  [place] to call system. Call bell, personal items and telephone within reach. Instruct patient to call for assistance. Room bathroom lighting operational. Non-slip footwear when patient is off stretcher. Physically safe environment: no spills, clutter or unnecessary equipment. Stretcher in lowest position, wheels locked, appropriate side rails in place.

## 2021-09-10 NOTE — ED BEHAVIORAL HEALTH ASSESSMENT NOTE - THOUGHT ASSOCIATIONS
CERTIFICATE OF RETURN TO WORK    May 22, 2019      Gayle Rainey  [address] [place] [address]                      This is to certify that Gayle Rainey is currently under my care and was seen in the office today, Wednesday, May 22, 2019.  Please excuse Gayle from work today.    If you have any questions, please contact my office.        Sincerely,      Sami Cervantes, JENNYFER  [address] [facility] [phone]     Normal
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
## 2021-09-10 NOTE — ED BEHAVIORAL HEALTH ASSESSMENT NOTE - RISK ASSESSMENT
modifiable risk factors- anxiety, stress related to finances, depression    chronic risk factors- chronic medical issues    protective factors- no SI/HI/SIB/intent/plan, no palomo/hypomania, no violence/aggression, no substance abuse, no psychosis, no history of inpatient admissions, no suicide attempts, supportive family, future oriented, able to safety plan, treatment seeking and calm/cooperative throughout evaluation. Low Acute Suicide Risk modifiable risk factors- anxiety, stress related to finances, depression  chronic risk factors- chronic medical issues  protective factors- no SI/HI/SIB/intent/plan, no palomo/hypomania, no violence/aggression, no substance abuse, no psychosis, no history of inpatient admissions, no suicide attempts, supportive family, future oriented, able to safety plan, treatment seeking and calm/cooperative throughout evaluation.

## 2021-09-10 NOTE — ED BEHAVIORAL HEALTH ASSESSMENT NOTE - SAFETY PLAN ADDT'L DETAILS
Safety plan discussed with.../Education provided regarding environmental safety / lethal means restriction/Provision of National Suicide Prevention Lifeline 5-198-022-RBST (1738)
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
## 2021-09-10 NOTE — ED ADULT NURSE NOTE - TEMPLATE LIST FOR HEAD TO TOE ASSESSMENT
Psych/Behavioral Tissue Cultured Epidermal Autograft Text: The defect edges were debeveled with a #15 scalpel blade.  Given the location of the defect, shape of the defect and the proximity to free margins a tissue cultured epidermal autograft was deemed most appropriate.  The graft was then trimmed to fit the size of the defect.  The graft was then placed in the primary defect and oriented appropriately.

## 2021-09-10 NOTE — ED BEHAVIORAL HEALTH ASSESSMENT NOTE - OTHER PAST PSYCHIATRIC HISTORY (INCLUDE DETAILS REGARDING ONSET, COURSE OF ILLNESS, INPATIENT/OUTPATIENT TREATMENT)
PH MDD, OCD & Anxiety. No history of inpatient admissions or suicide attempts. Outpatient tx with Dr. Buchanan.

## 2021-09-10 NOTE — ED ADULT NURSE NOTE - OBJECTIVE STATEMENT
Break coverage- Pt received into . Pt c/o increased anxiety and feeling depressed. Pt states he has a lot of stressors at home and responsibilities that need to be taken care. Denies SI/HI. Denies A/V hallucinations. Pt states he is compliant with his medications. Awaiting evaluation from providers. Labs drawn and sent. Safety maintained, needs are met. no acute distress. Awaiting further plan of care.

## 2021-09-10 NOTE — ED BEHAVIORAL HEALTH ASSESSMENT NOTE - DETAILS
none with wife father- unknown mental illness self referred No suicidal ideation; verbal safety plan discussed (see above) with wife, adult children self referred; wife aware

## 2021-09-10 NOTE — ED PROVIDER NOTE - PATIENT PORTAL LINK FT
You can access the FollowMyHealth Patient Portal offered by [facility] by registering at the following website: [email or web address]. By joining [facility]’s FollowMyHealth portal, you will also be able to view your health information using other applications (apps) compatible with our system.

## 2021-09-10 NOTE — ED PROVIDER NOTE - CLINICAL SUMMARY MEDICAL DECISION MAKING FREE TEXT BOX
72 y/o M  hx  DM, HTN, MDD, Anxiety, Kidney Transplant      Labs, Urine Tox/UA. EKG   Medical evaluation performed. There is no clinical evidence of intoxication or any acute medical problem requiring immediate intervention. Patient is awaiting psychiatric consultation. Final disposition will be determined by psychiatrist.

## 2021-09-10 NOTE — ED BEHAVIORAL HEALTH ASSESSMENT NOTE - CASE SUMMARY
Patient is a 71 year old  retired male currently residing in a private residence with wife and 2 children (11 & 16). PPH MDD, OCD & Anxiety. No history of inpatient admissions or suicide attempts. No history of aggression, violence or legal issues. No substance abuse problems. PMH- HTN, CKD, Gout, IDDM, Right Kidney Transplant 3/21. BIB self for anxiety recommended by staff at psychiatrists office.   Patient was referred to the ED by  at Dr. Buchanan's office. He admits to presenting to the ED seeking a medication change as he continues to feel anxious about his recent financial stress. He acknowledges coming to the ED was "not the right thing to do" as he has not had any suicidal thoughts or change in his baseline functioning with his worsening anxiety. He has been adherent with both medical (recently received a kidney transplant) and psychotropic meds (prozac and wellbutrin). As per wife, patient has been more anxious but she adamantly denies pt making any suicidal statements and she has no safety concerns for her . She was able to safety plan and patient agreed to f/u with Dr. Buchanan on 09/16/21.

## 2021-09-10 NOTE — ED PROVIDER NOTE - CPE EDP ENMT NORM
Subjective:      Patient ID: Sisi Medel is a 57 y.o. female.    Chief Complaint: Diabetes Mellitus (03/01/19 dr eduardo) and Diabetic Foot Exam    Pt here today for multiple wounds on lower extremities. Denies any NVFCSOB chest or calf pain.     Past Medical History:   Diagnosis Date    Anemia in ESRD (end-stage renal disease) 3/7/2016    Anticoagulant long-term use     Cervical radiculopathy 7/20/2015    CHF (congestive heart failure)     Chronic combined systolic and diastolic heart failure 6/14/2013    EF 20% 2013, improved with Medical therapy, negative PET 2013    Chronic respiratory failure with hypoxia 04/22/2013    On home oxygen at 2-5 liters    Closed fracture of proximal end of right fibula 6/27/2016    Complications due to renal dialysis device, implant, and graft     DDD (degenerative disc disease), lumbar 6/17/2015    Dependence on renal dialysis     Diabetes mellitus type II, controlled 12/8/2017    ESRD on hemodialysis 2/23/2016    Essential hypertension     Gout     Lateral meniscal tear 5/31/2016    Lumbar stenosis 6/17/2015    Obesity, Class III, BMI 40-49.9 (morbid obesity)     Pacemaker     Paroxysmal atrial fibrillation 5/16/2014    Not on anticoagulation    Peripheral neuropathy 11/13/2013    Personal history of gastric ulcer 3/19/2013    Pneumonia of left lower lobe due to infectious organism 3/10/2019    Sarcoidosis, lung 2009    Diagnosed in 2009 with ocular manifestation, on 4L home O2 and PO steroids    Secondary pulmonary hypertension 4/7/2015    Seizure disorder 3/19/2013    Shingles 11/13/2013    Thyroid disease            No current facility-administered medications on file prior to visit.      Current Outpatient Medications on File Prior to Visit   Medication Sig Dispense Refill    aspirin (ECOTRIN) 81 MG EC tablet Take 81 mg by mouth once daily.      atorvastatin (LIPITOR) 80 MG tablet TAKE 1 TABLET BY MOUTH EVERY EVENING 90 tablet 1     blood sugar diagnostic Strp 1 each by Misc.(Non-Drug; Combo Route) route once daily. (Patient taking differently: 1 each by Misc.(Non-Drug; Combo Route) route 2 (two) times daily. ) 100 each 3    cinacalcet (SENSIPAR) 30 MG Tab Take 30 mg by mouth daily with breakfast.      clopidogrel (PLAVIX) 75 mg tablet TAKE 1 TABLET(75 MG) BY MOUTH EVERY DAY 90 tablet 0    fludrocortisone (FLORINEF) 0.1 mg Tab Take 1 tablet (100 mcg total) by mouth 2 (two) times daily. 60 tablet 5    gabapentin (NEURONTIN) 600 MG tablet Take 600 mg by mouth 2 (two) times daily.       lancets Misc 1 each by Misc.(Non-Drug; Combo Route) route once daily. (Patient taking differently: 1 each by Misc.(Non-Drug; Combo Route) route 2 (two) times daily. ) 100 each 3    levETIRAcetam (KEPPRA) 500 MG Tab TAKE 1 TABLET BY MOUTH TWICE DAILY 180 tablet 3    midodrine (PROAMATINE) 10 MG tablet Take 1 tablet (10 mg total) by mouth 3 (three) times daily. 90 tablet 11    nortriptyline (PAMELOR) 25 MG capsule Take 1 capsule by mouth nightly  1    omeprazole (PRILOSEC) 20 MG capsule TAKE 1 CAPSULE BY MOUTH EVERY DAY 90 capsule 4    patiromer calcium sorbitex (VELTASSA) 16.8 gram PwPk Take 33.6 g by mouth once daily.       prednisoLONE acetate (PRED FORTE) 1 % DrpS INSTILL ONE DROP INTO  LEFT EYE THREE TIMES A DAY  3    predniSONE (DELTASONE) 20 MG tablet Take 0.5 tablets (10 mg total) by mouth once daily. 30 tablet 2    pregabalin (LYRICA) 100 MG capsule Take 1 capsule (100 mg total) by mouth 3 (three) times daily. 270 capsule 1    DENISE-LINDA 0.8 mg Tab TAKE 1 TABLET BY MOUTH ONCE DAILY  0    RENVELA 800 mg Tab TAKE 1 TABLET BY MOUTH THREE TIMES DAILY WITH MEALS 90 tablet 0    tiZANidine (ZANAFLEX) 4 MG tablet TAKE 1 TABLET BY MOUTH EVERY DAY AS NEEDED FOR MUSCLE SPASMS (Patient taking differently: TAKE 1 TABLET BY MOUTH EVERY NIGHT  AS NEEDED FOR MUSCLE SPASMS) 90 tablet 0    TRADJENTA 5 mg Tab tablet TAKE 1 TABLET(5 MG) BY MOUTH EVERY DAY 90  tablet 0    warfarin (COUMADIN) 5 MG tablet TAKE 1 TABLET BY MOUTH EVERY DAY EXCEPT TH 1AND 1/2 TABLETS ON MONDAY AND FRIDAY OR AS DIRECTED (Patient taking differently: TAKE 1 TABLET BY MOUTH EVERY DAY OR AS DIRECTED) 120 tablet 0           Review of patient's allergies indicates:   Allergen Reactions    Bactrim [sulfamethoxazole-trimethoprim] Other (See Comments)     Causes renal failure    Nsaids (non-steroidal anti-inflammatory drug) Other (See Comments)     Renal failure           Social History     Socioeconomic History    Marital status: Single     Spouse name: Not on file    Number of children: Not on file    Years of education: Not on file    Highest education level: Not on file   Occupational History    Not on file   Social Needs    Financial resource strain: Not on file    Food insecurity:     Worry: Not on file     Inability: Not on file    Transportation needs:     Medical: Not on file     Non-medical: Not on file   Tobacco Use    Smoking status: Former Smoker     Packs/day: 0.50     Years: 10.00     Pack years: 5.00     Types: Cigarettes     Last attempt to quit: 1994     Years since quittin.9    Smokeless tobacco: Never Used   Substance and Sexual Activity    Alcohol use: No     Alcohol/week: 0.0 oz     Comment: rarely    Drug use: No    Sexual activity: Not Currently   Lifestyle    Physical activity:     Days per week: Not on file     Minutes per session: Not on file    Stress: Not on file   Relationships    Social connections:     Talks on phone: Not on file     Gets together: Not on file     Attends [employer] service: Not on file     Active member of club or organization: Not on file     Attends meetings of clubs or organizations: Not on file     Relationship status: Not on file   Other Topics Concern    Are you pregnant or think you may be? No    Breast-feeding No   Social History Narrative    Lives with boyfriend/partner who helps with her care.            Review of  "Systems   Constitution: Negative for chills, diaphoresis, fever, malaise/fatigue and night sweats.   Cardiovascular: Positive for leg swelling. Negative for claudication and cyanosis.   Skin: Positive for color change, dry skin, nail changes, poor wound healing and unusual hair distribution.   Musculoskeletal:        Abnormal gait, uses rolling walker.    Neurological: Positive for numbness and sensory change. Negative for paresthesias, tremors and weakness.           Objective:        Vitals:    04/03/19 0828   BP: 113/81   Pulse: 63   Weight: 117.9 kg (260 lb)   Height: 5' 9.6" (1.768 m)           Physical Exam   Cardiovascular:   Pulses:       Dorsalis pedis pulses are 0 on the right side, and 0 on the left side.        Posterior tibial pulses are 1+ on the right side, and 1+ on the left side.   Diffuse non pitting edema b/L     Musculoskeletal:    Amputations noted to right hallux and right 2nd digit   Feet:   Right Foot:   Protective Sensation: 5 sites tested. 0 sites sensed.   Left Foot:   Protective Sensation: 5 sites tested. 0 sites sensed.   Lymphadenopathy:   Negative lymphadenopathy bilateral popliteal fossa and tarsal tunnel.  Negative lymphangitic streaking bilateral foot/ankle bilateral.     Neurological:   Absent/loss of protective sensation all toes bilateral to 10 gram monofilament.     Skin: Lesion noted.   Nails x8 are elongated by  4-6mm's, thickened by 2-3 mm's, dystrophic, and are yellowish in  coloration .     Ulceration  Location: right lateral leg  Measurements: 1.0x 1.3x 0.3cm  Drainage: serous  Wound base: fibrogranular  SOI: none    Ulceration  Location: left medial hallux  Measurements: 1.0x 1.0x 0.1cm  Drainage: serous  Wound base: fibrogranlar  SOI: none    Ulceration  Location: right sub 2nd met  Measurements: 1.5x 1.0x 0.2cm  Drainage: serous  Wound base: fibrogranular  SOI: none                               Assessment:       Encounter Diagnoses   Name Primary?    Diabetic " polyneuropathy associated with type 2 diabetes mellitus Yes    Ulcer of right lower extremity with fat layer exposed     Skin ulcer of toe of left foot, limited to breakdown of skin     Skin ulcer of right foot with fat layer exposed     Onychomycosis due to dermatophyte     Onychomycosis          Plan:       Sisi was seen today for diabetes mellitus and diabetic foot exam.    Diagnoses and all orders for this visit:    Diabetic polyneuropathy associated with type 2 diabetes mellitus    Ulcer of right lower extremity with fat layer exposed    Skin ulcer of toe of left foot, limited to breakdown of skin    Skin ulcer of right foot with fat layer exposed    Onychomycosis due to dermatophyte    Onychomycosis      I counseled the patient on her conditions, their implications and medical management.    Ulceration on right foot and leg debrided through sub-q tissue using an tissue nipper. Ulceration debrided down to healthy tissue. Pt tolerated debridement well.   Football dressing applied to pts b/L foot and wrapping to right leg,  by Sadie Chauhan MA under my direct supervision. Pt tolerated dressing well.   RTC in 1 week or sooner if any new pedal problems arise, any signs of infection occur, or if condition worsens.          normal...

## 2021-09-11 LAB
COVID-19 SPIKE DOMAIN AB INTERP: POSITIVE
COVID-19 SPIKE DOMAIN ANTIBODY RESULT: >250 U/ML — HIGH
SARS-COV-2 IGG+IGM SERPL QL IA: >250 U/ML — HIGH
SARS-COV-2 IGG+IGM SERPL QL IA: POSITIVE

## 2021-09-15 NOTE — END OF VISIT
[Time Spent: ___ minutes] : I have spent [unfilled] minutes of time on the encounter.
There are no Wet Read(s) to document.

## 2021-09-16 ENCOUNTER — NON-APPOINTMENT (OUTPATIENT)
Age: 71
End: 2021-09-16

## 2021-09-16 ENCOUNTER — APPOINTMENT (OUTPATIENT)
Dept: PSYCHIATRY | Facility: CLINIC | Age: 71
End: 2021-09-16
Payer: MEDICARE

## 2021-09-16 PROCEDURE — 99214 OFFICE O/P EST MOD 30 MIN: CPT

## 2021-09-16 RX ORDER — NIFEDIPINE 60 MG/1
60 TABLET, EXTENDED RELEASE ORAL
Qty: 180 | Refills: 3 | Status: DISCONTINUED | COMMUNITY
Start: 2021-05-24 | End: 2021-09-16

## 2021-10-11 ENCOUNTER — APPOINTMENT (OUTPATIENT)
Dept: TRANSPLANT | Facility: CLINIC | Age: 71
End: 2021-10-11

## 2021-10-11 LAB
25(OH)D3 SERPL-MCNC: 23.6 NG/ML
ALBUMIN SERPL ELPH-MCNC: 4.2 G/DL
ALP BLD-CCNC: 95 U/L
ALT SERPL-CCNC: 10 U/L
ANION GAP SERPL CALC-SCNC: 11 MMOL/L
APPEARANCE: CLEAR
AST SERPL-CCNC: 14 U/L
BACTERIA: NEGATIVE
BASOPHILS # BLD AUTO: 0.06 K/UL
BASOPHILS NFR BLD AUTO: 0.9 %
BILIRUB SERPL-MCNC: 0.8 MG/DL
BILIRUBIN URINE: NEGATIVE
BLOOD URINE: NORMAL
BUN SERPL-MCNC: 21 MG/DL
CALCIUM SERPL-MCNC: 10.3 MG/DL
CALCIUM SERPL-MCNC: 10.3 MG/DL
CHLORIDE SERPL-SCNC: 104 MMOL/L
CHOLEST SERPL-MCNC: 168 MG/DL
CO2 SERPL-SCNC: 25 MMOL/L
COLOR: YELLOW
COVID-19 SPIKE DOMAIN ANTIBODY INTERPRETATION: POSITIVE
CREAT SERPL-MCNC: 1.09 MG/DL
CREAT SPEC-SCNC: 137 MG/DL
CREAT/PROT UR: 0.4 RATIO
EOSINOPHIL # BLD AUTO: 0.06 K/UL
EOSINOPHIL NFR BLD AUTO: 0.9 %
ESTIMATED AVERAGE GLUCOSE: 100 MG/DL
GLUCOSE QUALITATIVE U: NORMAL
GLUCOSE SERPL-MCNC: 171 MG/DL
HBA1C MFR BLD HPLC: 5.1 %
HCT VFR BLD CALC: 36.6 %
HDLC SERPL-MCNC: 64 MG/DL
HGB BLD-MCNC: 11.5 G/DL
HYALINE CASTS: 1 /LPF
IMM GRANULOCYTES NFR BLD AUTO: 0.1 %
KETONES URINE: NEGATIVE
LDH SERPL-CCNC: 244 U/L
LDLC SERPL CALC-MCNC: 87 MG/DL
LEUKOCYTE ESTERASE URINE: NEGATIVE
LYMPHOCYTES # BLD AUTO: 0.85 K/UL
LYMPHOCYTES NFR BLD AUTO: 12.7 %
MAGNESIUM SERPL-MCNC: 1.9 MG/DL
MAN DIFF?: NORMAL
MCHC RBC-ENTMCNC: 31.4 GM/DL
MCHC RBC-ENTMCNC: 33 PG
MCV RBC AUTO: 104.9 FL
MICROSCOPIC-UA: NORMAL
MONOCYTES # BLD AUTO: 0.64 K/UL
MONOCYTES NFR BLD AUTO: 9.6 %
NEUTROPHILS # BLD AUTO: 5.05 K/UL
NEUTROPHILS NFR BLD AUTO: 75.8 %
NITRITE URINE: NEGATIVE
NONHDLC SERPL-MCNC: 103 MG/DL
PARATHYROID HORMONE INTACT: 81 PG/ML
PH URINE: 6
PHOSPHATE SERPL-MCNC: 3.1 MG/DL
PLATELET # BLD AUTO: 185 K/UL
POTASSIUM SERPL-SCNC: 5.7 MMOL/L
PROT SERPL-MCNC: 6.2 G/DL
PROT UR-MCNC: 60 MG/DL
PROTEIN URINE: ABNORMAL
RBC # BLD: 3.49 M/UL
RBC # FLD: 13.4 %
RED BLOOD CELLS URINE: 2 /HPF
SARS-COV-2 AB SERPL IA-ACNC: >250 U/ML
SODIUM SERPL-SCNC: 140 MMOL/L
SPECIFIC GRAVITY URINE: 1.03
SQUAMOUS EPITHELIAL CELLS: 0 /HPF
TACROLIMUS SERPL-MCNC: 7.1 NG/ML
TRIGL SERPL-MCNC: 81 MG/DL
URATE SERPL-MCNC: 5.4 MG/DL
UROBILINOGEN URINE: NORMAL
WBC # FLD AUTO: 6.67 K/UL
WHITE BLOOD CELLS URINE: 2 /HPF

## 2021-10-12 ENCOUNTER — APPOINTMENT (OUTPATIENT)
Dept: NEPHROLOGY | Facility: CLINIC | Age: 71
End: 2021-10-12
Payer: MEDICARE

## 2021-10-12 VITALS — BODY MASS INDEX: 26.96 KG/M2 | WEIGHT: 162 LBS | SYSTOLIC BLOOD PRESSURE: 170 MMHG | DIASTOLIC BLOOD PRESSURE: 80 MMHG

## 2021-10-12 PROCEDURE — 99215 OFFICE O/P EST HI 40 MIN: CPT

## 2021-10-12 NOTE — PHYSICAL EXAM
[General Appearance - Alert] : alert [General Appearance - In No Acute Distress] : in no acute distress [Sclera] : the sclera and conjunctiva were normal [PERRL With Normal Accommodation] : pupils were equal in size, round, and reactive to light [Oropharynx] : the oropharynx was normal [Jugular Venous Distention Increased] : there was no jugular-venous distention [Respiration, Rhythm And Depth] : normal respiratory rhythm and effort [Exaggerated Use Of Accessory Muscles For Inspiration] : no accessory muscle use [Auscultation Breath Sounds / Voice Sounds] : lungs were clear to auscultation bilaterally [Heart Sounds] : normal S1 and S2 [Heart Sounds Gallop] : no gallops [Murmurs] : no murmurs [Edema] : there was no peripheral edema [Bowel Sounds] : normal bowel sounds [Abdomen Soft] : soft [Involuntary Movements] : no involuntary movements were seen [___ (cm) Fistula] : [unfilled] (cm) fistula [Bruit] : a bruit was present [Thrill] : a thrill was present [] : right posterior tibial palpable [No Focal Deficits] : no focal deficits [Oriented To Time, Place, And Person] : oriented to person, place, and time [FreeTextEntry1] : Mood better, more alert and not sluggish

## 2021-10-12 NOTE — HISTORY OF PRESENT ILLNESS
[FreeTextEntry1] : \par 71 year old man with ESRD due to DM on PD since April 2020. S/p DDRT on 3/9/2021 \par \par PMH: DM type II and Hypertension diagnosed in his early 40’s. Hyperlipidemia, gout, Anxiety, Depression, OCD. No cardiac disease. cPRA 0%. \par \par Donor 49 yr old, DCD, KDPI 71%, Terminal Cr 8mg/dL, No HLA mismatch, CMV D-R-. \par Course complicated by DGF. Discharged on peritoneal dialysis. Graft function recovered and has been off PD 3/30/21. \par Completed 2nd dose of COVID vaccine in February 2021. Received 3rd dose in Sept. 2021 \par \par PD catheter and transplant ureteric stent removed on 4/22/21. \par He was hospitalized 4/30/21 with hyperkalemia, metabolic acidosis  and severe hyperglycemia. Bactrim was d/ame\par Had worsening anxiety and depression symptoms. He was seen by Dr. Buchanan from psychiatry, Wellbutrin added. \par  \par He presents for scheduled follow up. \par Depression and anxiety symptoms slightly improving. \par His energy is improving, eating more, able to sleep well. \par Blood pressure and glucose were fluctuating due to stress and poor appetite/food intake - he cut down on insulin and d/ame Nifedipine. \par Reviewed records from the past week. Blood pressure 140-150 systolic. Blood glucose 140-200.\par Weight 162 lbs ( gained 6-8lbs in the past few weeks) \par Still not exercising on treadmill anymore b/c he has no energy. But feels better now and plans to resume. \par Followed by Psychiatry but told to follow with therapist, no psych appt for 6 months. Has not seen a therapist b/c he has not been driving to go out of the house. Brother in law drove him to office. Does not feel up to driving yet.  \par

## 2021-10-12 NOTE — ASSESSMENT
[FreeTextEntry1] : \par 71 yr old man with ESRD due to DM, was on PD, s/p DDRT on 3/9/21 complicated by DGF discharged on PD. Graft function recovered and off PD since 3/30. \par \par S/p DDRT on 3/9/21 complicated by DGF , now has excellent graft function\par - Cr stable 1.09mg/dL today, no proteinuria. \par \par Immunosuppression \par - S/p Simulect induction \par - On Envarsus 2mg daily. Level 7.1 acceptable. \par - Continue CellCept 500 bid - dose was lowered for leukopenia\par - Continue prednisone 5mg daily \par \par Infection prophylaxis \par - D/c Valcyte - completed 6 months prophylaxis  \par - Bactrim d/ame for hyperkalemia. Continue dapsone 100mg po daily. \par - Completed COVID vaccine.  Took booster in Sept.  Damian Ab +ve .\par - Flu shot received today in office. \par \par Diabetes type II  - On Lantus 6-10 units qhs, Humalog 4-8 units qac + sliding scale. Follow up with Dr Micky Abad. \par \par Hypertension :  On Carvedilol 25mg po bid. Discontinued Nifedipine due to low BP.  BP now elevated 140-150's systolic.  Resume Nifedipine xl 30mg po qhs. \par \par Hyperparathyroidism - on Calcitriol 0.25mcg po daily. \par \par Hyperkalemia -  controlled, continue low K diet. \par \par Anxiety/OCD - On Prozac 80mg daily. and Welbutrin 150mg po daily Followed by pych.  Advised to see therapist. \par \par Follow up Labs on 11/5 , clinic visit on 11/16 \par

## 2021-10-14 ENCOUNTER — APPOINTMENT (OUTPATIENT)
Dept: OPHTHALMOLOGY | Facility: CLINIC | Age: 71
End: 2021-10-14
Payer: MEDICARE

## 2021-10-14 ENCOUNTER — NON-APPOINTMENT (OUTPATIENT)
Age: 71
End: 2021-10-14

## 2021-10-14 PROCEDURE — 92134 CPTRZ OPH DX IMG PST SGM RTA: CPT

## 2021-10-14 PROCEDURE — 92014 COMPRE OPH EXAM EST PT 1/>: CPT

## 2021-10-19 ENCOUNTER — APPOINTMENT (OUTPATIENT)
Dept: OPHTHALMOLOGY | Facility: CLINIC | Age: 71
End: 2021-10-19
Payer: MEDICARE

## 2021-10-19 ENCOUNTER — NON-APPOINTMENT (OUTPATIENT)
Age: 71
End: 2021-10-19

## 2021-10-19 PROCEDURE — 92136 OPHTHALMIC BIOMETRY: CPT

## 2021-10-19 PROCEDURE — 99214 OFFICE O/P EST MOD 30 MIN: CPT

## 2021-11-05 ENCOUNTER — NON-APPOINTMENT (OUTPATIENT)
Age: 71
End: 2021-11-05

## 2021-11-05 ENCOUNTER — APPOINTMENT (OUTPATIENT)
Dept: OPHTHALMOLOGY | Facility: EYE CENTER | Age: 71
End: 2021-11-05
Payer: MEDICARE

## 2021-11-05 PROCEDURE — 66984 XCAPSL CTRC RMVL W/O ECP: CPT | Mod: RT

## 2021-11-06 ENCOUNTER — APPOINTMENT (OUTPATIENT)
Dept: OPHTHALMOLOGY | Facility: CLINIC | Age: 71
End: 2021-11-06
Payer: MEDICARE

## 2021-11-06 ENCOUNTER — NON-APPOINTMENT (OUTPATIENT)
Age: 71
End: 2021-11-06

## 2021-11-06 PROCEDURE — 99024 POSTOP FOLLOW-UP VISIT: CPT

## 2021-11-10 ENCOUNTER — NON-APPOINTMENT (OUTPATIENT)
Age: 71
End: 2021-11-10

## 2021-11-10 ENCOUNTER — APPOINTMENT (OUTPATIENT)
Dept: OPHTHALMOLOGY | Facility: CLINIC | Age: 71
End: 2021-11-10
Payer: MEDICARE

## 2021-11-10 PROCEDURE — 99024 POSTOP FOLLOW-UP VISIT: CPT

## 2021-11-15 ENCOUNTER — APPOINTMENT (OUTPATIENT)
Dept: TRANSPLANT | Facility: CLINIC | Age: 71
End: 2021-11-15

## 2021-11-15 LAB
ALBUMIN SERPL ELPH-MCNC: 4.6 G/DL
ALP BLD-CCNC: 119 U/L
ALT SERPL-CCNC: 26 U/L
ANION GAP SERPL CALC-SCNC: 12 MMOL/L
APPEARANCE: CLEAR
AST SERPL-CCNC: 15 U/L
BACTERIA: NEGATIVE
BASOPHILS # BLD AUTO: 0.07 K/UL
BASOPHILS NFR BLD AUTO: 0.8 %
BILIRUB SERPL-MCNC: 0.7 MG/DL
BILIRUBIN URINE: NEGATIVE
BLOOD URINE: NEGATIVE
BUN SERPL-MCNC: 24 MG/DL
CALCIUM SERPL-MCNC: 9.9 MG/DL
CALCIUM SERPL-MCNC: 9.9 MG/DL
CHLORIDE SERPL-SCNC: 102 MMOL/L
CO2 SERPL-SCNC: 24 MMOL/L
COLOR: YELLOW
CREAT SERPL-MCNC: 0.95 MG/DL
CREAT SPEC-SCNC: 135 MG/DL
CREAT/PROT UR: 0.5 RATIO
EOSINOPHIL # BLD AUTO: 0.07 K/UL
EOSINOPHIL NFR BLD AUTO: 0.8 %
ESTIMATED AVERAGE GLUCOSE: 108 MG/DL
GLUCOSE QUALITATIVE U: ABNORMAL
GLUCOSE SERPL-MCNC: 222 MG/DL
HBA1C MFR BLD HPLC: 5.4 %
HCT VFR BLD CALC: 38.2 %
HGB BLD-MCNC: 12.4 G/DL
HYALINE CASTS: 0 /LPF
IMM GRANULOCYTES NFR BLD AUTO: 0.8 %
KETONES URINE: NEGATIVE
LDH SERPL-CCNC: 242 U/L
LEUKOCYTE ESTERASE URINE: NEGATIVE
LYMPHOCYTES # BLD AUTO: 0.62 K/UL
LYMPHOCYTES NFR BLD AUTO: 7.3 %
MAGNESIUM SERPL-MCNC: 2 MG/DL
MAN DIFF?: NORMAL
MCHC RBC-ENTMCNC: 32.5 GM/DL
MCHC RBC-ENTMCNC: 33.2 PG
MCV RBC AUTO: 102.4 FL
MICROSCOPIC-UA: NORMAL
MONOCYTES # BLD AUTO: 0.72 K/UL
MONOCYTES NFR BLD AUTO: 8.5 %
NEUTROPHILS # BLD AUTO: 6.95 K/UL
NEUTROPHILS NFR BLD AUTO: 81.8 %
NITRITE URINE: NEGATIVE
PARATHYROID HORMONE INTACT: 103 PG/ML
PH URINE: 6
PHOSPHATE SERPL-MCNC: 2.9 MG/DL
PLATELET # BLD AUTO: 229 K/UL
POTASSIUM SERPL-SCNC: 6.2 MMOL/L
PROT SERPL-MCNC: 6.9 G/DL
PROT UR-MCNC: 67 MG/DL
PROTEIN URINE: ABNORMAL
RBC # BLD: 3.73 M/UL
RBC # FLD: 13 %
RED BLOOD CELLS URINE: 4 /HPF
SODIUM SERPL-SCNC: 138 MMOL/L
SPECIFIC GRAVITY URINE: 1.03
SQUAMOUS EPITHELIAL CELLS: 1 /HPF
TACROLIMUS SERPL-MCNC: 5.5 NG/ML
URATE SERPL-MCNC: 4.7 MG/DL
UROBILINOGEN URINE: NORMAL
WBC # FLD AUTO: 8.5 K/UL
WHITE BLOOD CELLS URINE: 5 /HPF

## 2021-11-16 ENCOUNTER — APPOINTMENT (OUTPATIENT)
Dept: NEPHROLOGY | Facility: CLINIC | Age: 71
End: 2021-11-16
Payer: MEDICARE

## 2021-11-16 VITALS
OXYGEN SATURATION: 94 % | SYSTOLIC BLOOD PRESSURE: 196 MMHG | HEART RATE: 63 BPM | HEIGHT: 65 IN | BODY MASS INDEX: 26.82 KG/M2 | RESPIRATION RATE: 12 BRPM | TEMPERATURE: 98 F | WEIGHT: 161 LBS | DIASTOLIC BLOOD PRESSURE: 83 MMHG

## 2021-11-16 LAB — CMV DNA SPEC QL NAA+PROBE: NOT DETECTED IU/ML

## 2021-11-16 PROCEDURE — 99215 OFFICE O/P EST HI 40 MIN: CPT

## 2021-11-16 NOTE — HISTORY OF PRESENT ILLNESS
[FreeTextEntry1] : \par 71 year old man with ESRD due to DM on PD since April 2020. S/p DDRT on 3/9/2021.\par \par PMH: DM type II and Hypertension diagnosed in his early 40’s. Hyperlipidemia, gout, Anxiety, Depression, OCD. No cardiac disease. cPRA 0%. \par \par Donor 49 yr old, DCD, KDPI 71%, Terminal Cr 8mg/dL, No HLA mismatch, CMV D-R-. \par Course complicated by DGF. Discharged on peritoneal dialysis. Graft function recovered and has been off PD 3/30/21. PD catheter and transplant ureteric stent removed on 4/22/21. \par Completed 2nd dose of COVID vaccine in February 2021. Received 3rd dose in Sept. 2021 \par Flu shot received in October. \par \par He was hospitalized 4/30/21 with hyperkalemia, metabolic acidosis  and severe hyperglycemia. Bactrim was d/ame \par  \par He presents for scheduled follow up. \par Depression and anxiety symptoms still persist. He feels stressed. He lives with his wife and 2 sons age 16 and 11. 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 \par He has not followed up with psych recently - but will call and schedule as his symptoms are not better. He is not suicidal. \par Home records reviewed. \par Blood pressure better with Nifedipine 30mg po qhs in addition to the Coreg. \par Blood glucose < 200. \par Weight 161 lbs stable. Appetite fair, sleeping fair. \par Trying to exercise - walking around the block, will do treadmill once it gets colder. \par K high -> was eating lots of tomato sauce,  states he has no control over the diet .  \par

## 2021-11-16 NOTE — ASSESSMENT
[FreeTextEntry1] : \par 71 yr old man with ESRD due to DM, was on PD, s/p DDRT on 3/9/21 complicated by DGF discharged on PD. Graft function recovered and off PD since 3/30. \par - Cr stable 0.9mg/dL today, Proteinuria ~ 500mg/day stable. \par \par Immunosuppression \par - S/p Simulect induction \par - On Envarsus 2mg daily. Level 5.5 low. Increase dose to 3mg daily.  \par - Continue CellCept 500 bid - dose was lowered for leukopenia\par - Continue prednisone 5mg daily \par \par Infection prophylaxis \par - Completed 6 months valcyte for prophylaxis, CMV PCR negative to date  \par - Bactrim d/ame for hyperkalemia. Continue dapsone 100mg po daily for PCP prophylaxis \par - Completed COVID vaccine.  Took booster in Sept.  Damian Ab +ve .\par - Flu shot received in Oct 2021 \par \par Diabetes type II  - On Lantus 6-10 units qhs, Humalog 4-8 units qac + sliding scale. HbA1c = 5.4%. \par Follow up with Dr Micky Abad - scheduled in 6 months. \par \par Hypertension :  BP controlled at home. Elevated in office today b/c of anxiety. Continue Carvedilol 25mg po bid. Nifedipine xl 30mg po qhs.  \par \par Hyperparathyroidism - on Calcitriol 0.25mcg po daily. \par \par Hyperkalemia - advised to limit K in diet as much as possible. Lokelma 10gm po daily. \par \par Anxiety/OCD - On Prozac 80mg daily. and Welbutrin 150mg po daily.  F/u with psychiathry and therapist. \par \par Labs next week. Check tac level and K \par F/u in Jan 4th. \par

## 2021-11-19 LAB
BKV DNA SPEC QL NAA+PROBE: NEGATIVE COPIES/ML
LOG 10 BK QUANTITATION PCR: NORMAL

## 2021-11-23 ENCOUNTER — APPOINTMENT (OUTPATIENT)
Dept: PSYCHIATRY | Facility: CLINIC | Age: 71
End: 2021-11-23
Payer: MEDICARE

## 2021-11-23 ENCOUNTER — APPOINTMENT (OUTPATIENT)
Dept: TRANSPLANT | Facility: CLINIC | Age: 71
End: 2021-11-23

## 2021-11-23 PROCEDURE — 99443: CPT | Mod: 95

## 2021-11-24 LAB
ALBUMIN SERPL ELPH-MCNC: 4.6 G/DL
ALP BLD-CCNC: 100 U/L
ALT SERPL-CCNC: 15 U/L
ANION GAP SERPL CALC-SCNC: 20 MMOL/L
APPEARANCE: CLEAR
AST SERPL-CCNC: 15 U/L
BACTERIA: NEGATIVE
BASOPHILS # BLD AUTO: 0.06 K/UL
BASOPHILS NFR BLD AUTO: 0.7 %
BILIRUB SERPL-MCNC: 0.8 MG/DL
BILIRUBIN URINE: NEGATIVE
BLOOD URINE: NEGATIVE
BUN SERPL-MCNC: 26 MG/DL
CALCIUM SERPL-MCNC: 9.6 MG/DL
CALCIUM SERPL-MCNC: 9.6 MG/DL
CHLORIDE SERPL-SCNC: 105 MMOL/L
CO2 SERPL-SCNC: 18 MMOL/L
COLOR: YELLOW
CREAT SERPL-MCNC: 1.01 MG/DL
CREAT SPEC-SCNC: 129 MG/DL
CREAT/PROT UR: 0.6 RATIO
EOSINOPHIL # BLD AUTO: 0.03 K/UL
EOSINOPHIL NFR BLD AUTO: 0.4 %
GLUCOSE QUALITATIVE U: ABNORMAL
GLUCOSE SERPL-MCNC: 245 MG/DL
HCT VFR BLD CALC: 35.9 %
HGB BLD-MCNC: 11.6 G/DL
HYALINE CASTS: 0 /LPF
IMM GRANULOCYTES NFR BLD AUTO: 0.6 %
KETONES URINE: NEGATIVE
LEUKOCYTE ESTERASE URINE: NEGATIVE
LYMPHOCYTES # BLD AUTO: 0.52 K/UL
LYMPHOCYTES NFR BLD AUTO: 6.4 %
MAGNESIUM SERPL-MCNC: 2 MG/DL
MAN DIFF?: NORMAL
MCHC RBC-ENTMCNC: 32.3 GM/DL
MCHC RBC-ENTMCNC: 33 PG
MCV RBC AUTO: 102 FL
MICROSCOPIC-UA: NORMAL
MONOCYTES # BLD AUTO: 0.51 K/UL
MONOCYTES NFR BLD AUTO: 6.3 %
NEUTROPHILS # BLD AUTO: 6.93 K/UL
NEUTROPHILS NFR BLD AUTO: 85.6 %
NITRITE URINE: NEGATIVE
PARATHYROID HORMONE INTACT: 64 PG/ML
PH URINE: 6
PHOSPHATE SERPL-MCNC: 2.6 MG/DL
PLATELET # BLD AUTO: 214 K/UL
POTASSIUM SERPL-SCNC: 5.1 MMOL/L
PROT SERPL-MCNC: 6.7 G/DL
PROT UR-MCNC: 83 MG/DL
PROTEIN URINE: ABNORMAL
RBC # BLD: 3.52 M/UL
RBC # FLD: 13.6 %
RED BLOOD CELLS URINE: 2 /HPF
SODIUM SERPL-SCNC: 143 MMOL/L
SPECIFIC GRAVITY URINE: 1.03
SQUAMOUS EPITHELIAL CELLS: 0 /HPF
TACROLIMUS SERPL-MCNC: 6.1 NG/ML
URATE SERPL-MCNC: 5.5 MG/DL
UROBILINOGEN URINE: NORMAL
WBC # FLD AUTO: 8.1 K/UL
WHITE BLOOD CELLS URINE: 3 /HPF

## 2021-12-01 ENCOUNTER — APPOINTMENT (OUTPATIENT)
Dept: OPHTHALMOLOGY | Facility: CLINIC | Age: 71
End: 2021-12-01
Payer: MEDICARE

## 2021-12-01 ENCOUNTER — NON-APPOINTMENT (OUTPATIENT)
Age: 71
End: 2021-12-01

## 2021-12-01 PROCEDURE — 99024 POSTOP FOLLOW-UP VISIT: CPT

## 2021-12-01 PROCEDURE — 92134 CPTRZ OPH DX IMG PST SGM RTA: CPT

## 2021-12-01 PROCEDURE — 67515 INJECT/TREAT EYE SOCKET: CPT | Mod: 79

## 2021-12-08 ENCOUNTER — APPOINTMENT (OUTPATIENT)
Dept: TRANSPLANT | Facility: CLINIC | Age: 71
End: 2021-12-08

## 2021-12-09 LAB
ALBUMIN SERPL ELPH-MCNC: 4.5 G/DL
ALP BLD-CCNC: 96 U/L
ALT SERPL-CCNC: 13 U/L
ANION GAP SERPL CALC-SCNC: 10 MMOL/L
APPEARANCE: CLEAR
AST SERPL-CCNC: 14 U/L
BACTERIA: NEGATIVE
BASOPHILS # BLD AUTO: 0.06 K/UL
BASOPHILS NFR BLD AUTO: 0.6 %
BILIRUB SERPL-MCNC: 0.8 MG/DL
BILIRUBIN URINE: NEGATIVE
BLOOD URINE: NEGATIVE
BUN SERPL-MCNC: 28 MG/DL
CALCIUM SERPL-MCNC: 10.1 MG/DL
CHLORIDE SERPL-SCNC: 101 MMOL/L
CO2 SERPL-SCNC: 28 MMOL/L
COLOR: YELLOW
COVID-19 SPIKE DOMAIN ANTIBODY INTERPRETATION: POSITIVE
CREAT SERPL-MCNC: 1.1 MG/DL
CREAT SPEC-SCNC: 134 MG/DL
CREAT/PROT UR: 0.5 RATIO
EOSINOPHIL # BLD AUTO: 0.03 K/UL
EOSINOPHIL NFR BLD AUTO: 0.3 %
GLUCOSE QUALITATIVE U: NEGATIVE
GLUCOSE SERPL-MCNC: 115 MG/DL
HCT VFR BLD CALC: 36.6 %
HGB BLD-MCNC: 11.5 G/DL
HYALINE CASTS: 1 /LPF
IMM GRANULOCYTES NFR BLD AUTO: 0.5 %
KETONES URINE: NEGATIVE
LEUKOCYTE ESTERASE URINE: NEGATIVE
LYMPHOCYTES # BLD AUTO: 0.6 K/UL
LYMPHOCYTES NFR BLD AUTO: 5.7 %
MAGNESIUM SERPL-MCNC: 2 MG/DL
MAN DIFF?: NORMAL
MCHC RBC-ENTMCNC: 31.4 GM/DL
MCHC RBC-ENTMCNC: 32.1 PG
MCV RBC AUTO: 102.2 FL
MICROSCOPIC-UA: NORMAL
MONOCYTES # BLD AUTO: 0.74 K/UL
MONOCYTES NFR BLD AUTO: 7.1 %
NEUTROPHILS # BLD AUTO: 8.99 K/UL
NEUTROPHILS NFR BLD AUTO: 85.8 %
NITRITE URINE: NEGATIVE
PH URINE: 6
PHOSPHATE SERPL-MCNC: 2.6 MG/DL
PLATELET # BLD AUTO: 221 K/UL
POTASSIUM SERPL-SCNC: 5.1 MMOL/L
PROT SERPL-MCNC: 6.7 G/DL
PROT UR-MCNC: 68 MG/DL
PROTEIN URINE: ABNORMAL
RBC # BLD: 3.58 M/UL
RBC # FLD: 14.2 %
RED BLOOD CELLS URINE: 2 /HPF
SARS-COV-2 AB SERPL IA-ACNC: 136 U/ML
SODIUM SERPL-SCNC: 139 MMOL/L
SPECIFIC GRAVITY URINE: 1.03
SQUAMOUS EPITHELIAL CELLS: 1 /HPF
TACROLIMUS SERPL-MCNC: 5.9 NG/ML
URATE SERPL-MCNC: 5 MG/DL
UROBILINOGEN URINE: NORMAL
WBC # FLD AUTO: 10.47 K/UL
WHITE BLOOD CELLS URINE: 2 /HPF

## 2021-12-10 LAB
BKV DNA SPEC QL NAA+PROBE: NOT DETECTED COPIES/ML
CMV DNA SPEC QL NAA+PROBE: NOT DETECTED IU/ML

## 2021-12-23 ENCOUNTER — NON-APPOINTMENT (OUTPATIENT)
Age: 71
End: 2021-12-23

## 2021-12-23 ENCOUNTER — APPOINTMENT (OUTPATIENT)
Dept: OPHTHALMOLOGY | Facility: CLINIC | Age: 71
End: 2021-12-23
Payer: MEDICARE

## 2021-12-23 PROCEDURE — 92134 CPTRZ OPH DX IMG PST SGM RTA: CPT

## 2021-12-23 PROCEDURE — 99024 POSTOP FOLLOW-UP VISIT: CPT

## 2021-12-29 ENCOUNTER — APPOINTMENT (OUTPATIENT)
Dept: TRANSPLANT | Facility: CLINIC | Age: 71
End: 2021-12-29

## 2022-01-03 ENCOUNTER — APPOINTMENT (OUTPATIENT)
Dept: TRANSPLANT | Facility: CLINIC | Age: 72
End: 2022-01-03

## 2022-01-03 ENCOUNTER — NON-APPOINTMENT (OUTPATIENT)
Age: 72
End: 2022-01-03

## 2022-01-03 LAB
ALBUMIN SERPL ELPH-MCNC: 4.5 G/DL
ALP BLD-CCNC: 90 U/L
ALT SERPL-CCNC: 17 U/L
ANION GAP SERPL CALC-SCNC: 10 MMOL/L
APPEARANCE: CLEAR
AST SERPL-CCNC: 14 U/L
BACTERIA: NEGATIVE
BASOPHILS # BLD AUTO: 0.05 K/UL
BASOPHILS NFR BLD AUTO: 0.6 %
BILIRUB SERPL-MCNC: 0.8 MG/DL
BILIRUBIN URINE: NEGATIVE
BLOOD URINE: NEGATIVE
BUN SERPL-MCNC: 33 MG/DL
CALCIUM SERPL-MCNC: 9.8 MG/DL
CHLORIDE SERPL-SCNC: 105 MMOL/L
CO2 SERPL-SCNC: 27 MMOL/L
COLOR: YELLOW
CREAT SERPL-MCNC: 1.26 MG/DL
CREAT SPEC-SCNC: 105 MG/DL
CREAT/PROT UR: 0.2 RATIO
EOSINOPHIL # BLD AUTO: 0.06 K/UL
EOSINOPHIL NFR BLD AUTO: 0.8 %
GLUCOSE QUALITATIVE U: NEGATIVE
GLUCOSE SERPL-MCNC: 87 MG/DL
HCT VFR BLD CALC: 38.4 %
HGB BLD-MCNC: 12.3 G/DL
HYALINE CASTS: 0 /LPF
IMM GRANULOCYTES NFR BLD AUTO: 0.4 %
KETONES URINE: NEGATIVE
LEUKOCYTE ESTERASE URINE: NEGATIVE
LYMPHOCYTES # BLD AUTO: 0.98 K/UL
LYMPHOCYTES NFR BLD AUTO: 12.6 %
MAGNESIUM SERPL-MCNC: 2 MG/DL
MAN DIFF?: NORMAL
MCHC RBC-ENTMCNC: 32 GM/DL
MCHC RBC-ENTMCNC: 32.7 PG
MCV RBC AUTO: 102.1 FL
MICROSCOPIC-UA: NORMAL
MONOCYTES # BLD AUTO: 0.84 K/UL
MONOCYTES NFR BLD AUTO: 10.8 %
NEUTROPHILS # BLD AUTO: 5.81 K/UL
NEUTROPHILS NFR BLD AUTO: 74.8 %
NITRITE URINE: NEGATIVE
PH URINE: 5.5
PHOSPHATE SERPL-MCNC: 3 MG/DL
PLATELET # BLD AUTO: 230 K/UL
POTASSIUM SERPL-SCNC: 4.7 MMOL/L
PROT SERPL-MCNC: 6.7 G/DL
PROT UR-MCNC: 25 MG/DL
PROTEIN URINE: NORMAL
RBC # BLD: 3.76 M/UL
RBC # FLD: 13.4 %
RED BLOOD CELLS URINE: 2 /HPF
SODIUM SERPL-SCNC: 142 MMOL/L
SPECIFIC GRAVITY URINE: 1.02
SQUAMOUS EPITHELIAL CELLS: 0 /HPF
TACROLIMUS SERPL-MCNC: 7.3 NG/ML
URATE SERPL-MCNC: 5.9 MG/DL
UROBILINOGEN URINE: NORMAL
WBC # FLD AUTO: 7.77 K/UL
WHITE BLOOD CELLS URINE: 1 /HPF

## 2022-01-04 ENCOUNTER — NON-APPOINTMENT (OUTPATIENT)
Age: 72
End: 2022-01-04

## 2022-01-04 ENCOUNTER — TRANSCRIPTION ENCOUNTER (OUTPATIENT)
Age: 72
End: 2022-01-04

## 2022-01-04 LAB
COVID-19 SPIKE DOMAIN ANTIBODY INTERPRETATION: POSITIVE
SARS-COV-2 AB SERPL IA-ACNC: 99 U/ML

## 2022-01-06 ENCOUNTER — OUTPATIENT (OUTPATIENT)
Dept: OUTPATIENT SERVICES | Facility: HOSPITAL | Age: 72
LOS: 1 days | End: 2022-01-06

## 2022-01-06 ENCOUNTER — APPOINTMENT (OUTPATIENT)
Dept: DISASTER EMERGENCY | Facility: HOSPITAL | Age: 72
End: 2022-01-06

## 2022-01-06 VITALS
WEIGHT: 160.94 LBS | RESPIRATION RATE: 17 BRPM | SYSTOLIC BLOOD PRESSURE: 136 MMHG | HEIGHT: 65 IN | HEART RATE: 67 BPM | OXYGEN SATURATION: 95 % | DIASTOLIC BLOOD PRESSURE: 64 MMHG | TEMPERATURE: 100 F

## 2022-01-06 VITALS
TEMPERATURE: 99 F | SYSTOLIC BLOOD PRESSURE: 149 MMHG | HEART RATE: 67 BPM | DIASTOLIC BLOOD PRESSURE: 81 MMHG | OXYGEN SATURATION: 96 % | RESPIRATION RATE: 16 BRPM

## 2022-01-06 DIAGNOSIS — Z94.0 KIDNEY TRANSPLANT STATUS: Chronic | ICD-10-CM

## 2022-01-06 DIAGNOSIS — U07.1 COVID-19: ICD-10-CM

## 2022-01-06 LAB
BKV DNA SPEC QL NAA+PROBE: NOT DETECTED COPIES/ML
CMV DNA SPEC QL NAA+PROBE: NOT DETECTED IU/ML

## 2022-01-06 RX ORDER — SODIUM CHLORIDE 9 MG/ML
250 INJECTION INTRAMUSCULAR; INTRAVENOUS; SUBCUTANEOUS
Refills: 0 | Status: COMPLETED | OUTPATIENT
Start: 2022-01-06 | End: 2022-01-06

## 2022-01-06 RX ORDER — SOTROVIMAB 62.5 MG/ML
500 INJECTION, SOLUTION, CONCENTRATE INTRAVENOUS ONCE
Refills: 0 | Status: COMPLETED | OUTPATIENT
Start: 2022-01-06 | End: 2022-01-06

## 2022-01-06 RX ADMIN — SODIUM CHLORIDE 25 MILLILITER(S): 9 INJECTION INTRAMUSCULAR; INTRAVENOUS; SUBCUTANEOUS at 11:17

## 2022-01-06 RX ADMIN — SOTROVIMAB 116 MILLIGRAM(S): 62.5 INJECTION, SOLUTION, CONCENTRATE INTRAVENOUS at 09:01

## 2022-01-06 NOTE — MONOCLONAL ANTIBODY INFUSION - HOME MEDICATIONS
atovaquone 750 mg/5 mL oral suspension , 10 milliliter(s) orally once a day  sodium bicarbonate 650 mg oral tablet , 3 tab(s) orally 2 times a day  sodium/potassium/phosphorus 160 mg-280 mg-250 mg oral powder for reconstitution , 1 packet(s) orally 2 times a day  magnesium oxide 400 mg (241.3 mg elemental magnesium) oral tablet , 1 tab(s) orally 2 times a day   tacrolimus 1 mg oral tablet, extended release , 7 tab(s) orally   Lokelma 10 g oral powder for reconstitution , 10 gram(s) orally 2 times a day   NIFEdipine 30 mg oral tablet, extended release , 1 tab(s) orally once a day  insulin lispro 100 units/mL injectable solution , 7 unit(s) injectable 3 times a day  insulin glargine , 21 unit(s) subcutaneous once a day (at bedtime)  tamsulosin 0.4 mg oral capsule , 1 cap(s) orally once a day (at bedtime)  predniSONE 5 mg oral tablet , 1 tab(s) orally once a day  mycophenolate mofetil 250 mg oral capsule , 1000 milligram(s) orally 2 times a day  calcitriol 0.25 mcg oral capsule , 1 cap(s) orally once a day  sulfamethoxazole-trimethoprim 400 mg-80 mg oral tablet , 1 tab(s) orally once a day  senna oral tablet , 2 tab(s) orally once a day (at bedtime)  famotidine 20 mg oral tablet , 1 tab(s) orally once a day  carvedilol 25 mg oral tablet , 1 tab(s) orally every 12 hours  valGANciclovir 450 mg oral tablet , 1 tab(s) orally   nystatin 100,000 units/mL oral suspension , 5 milliliter(s) orally 4 times a day  aspirin 81 mg oral tablet, chewable , 1 tab(s) orally once a day

## 2022-01-06 NOTE — MONOCLONAL ANTIBODY INFUSION - ASSESSMENT AND PLAN
ASSESSMENT:    Pt is a 71yMale COVID +  referred by Dr. Yg Noble who presents to infusion center for Monoclonal antibody infusion (Sotrovimab)  Symptoms/ Criteria: Malaise, cough  Risk Profile includes: Age >65, HTN, DM, ESRD s/p renal transplant on immunosuppressant     PLAN:  - Infusion procedure explained to patient   - Consent for monoclonal antibody infusion obtained   - Risk & benefits discussed/all questions answered  - Infuse Sotrovimab IV over 30 minutes  - Observe patient for one hour post infusion and discharge home ASSESSMENT:    Pt is a 71yMale COVID +  referred by Dr. Yg Noble who presents to infusion center for Monoclonal antibody infusion (Sotrovimab)  Symptoms/ Criteria: Malaise, cough  Risk Profile includes: Age >65, HTN, DM, ESRD s/p renal transplant on immunosuppressant , growth in ear receiving RT    PLAN:  - Infusion procedure explained to patient   - Consent for monoclonal antibody infusion obtained   - Risk & benefits discussed/all questions answered  - Infuse Sotrovimab IV over 30 minutes  - Observe patient for one hour post infusion and discharge home

## 2022-01-06 NOTE — MONOCLONAL ANTIBODY INFUSION - HIGH RISK FACTORS
Chronic Kidney Disease/Immune Suppressive Disease/Receiving Immune Suppressive Treatment Chronic Kidney Disease/Immune Suppressive Disease/Receiving Immune Suppressive Treatment/Age greater than or equal to 65

## 2022-01-06 NOTE — MONOCLONAL ANTIBODY INFUSION - EXAM
CC: Monoclonal Antibody Infusion/COVID 19 Positive    Exam/findings:  T(C): 37.6 (01-06-22 @ 08:37), Max: 37.6 (01-06-22 @ 08:37)  HR: 67 (01-06-22 @ 08:37) (67 - 67)  BP: 136/64 (01-06-22 @ 08:37) (136/64 - 136/64)  RR: 17 (01-06-22 @ 08:37) (17 - 17)  SpO2: 95% (01-06-22 @ 08:37) (95% - 95%)      PE:   Appearance: NAD	  HEENT:   Normal oral mucosa  Lymphatic: No lymphadenopathy  Cardiovascular: Normal S1 S2, No JVD, No murmurs, No edema  Respiratory: Lungs clear to auscultation	  Gastrointestinal:  Soft, Non-tender, + BS	  Skin: Warm and dry  Neurologic: Non-focal  Extremities: Normal range of motion

## 2022-01-08 ENCOUNTER — NON-APPOINTMENT (OUTPATIENT)
Age: 72
End: 2022-01-08

## 2022-01-11 ENCOUNTER — NON-APPOINTMENT (OUTPATIENT)
Age: 72
End: 2022-01-11

## 2022-01-11 ENCOUNTER — APPOINTMENT (OUTPATIENT)
Dept: OPHTHALMOLOGY | Facility: CLINIC | Age: 72
End: 2022-01-11
Payer: MEDICARE

## 2022-01-11 PROCEDURE — 92134 CPTRZ OPH DX IMG PST SGM RTA: CPT

## 2022-01-11 PROCEDURE — 99024 POSTOP FOLLOW-UP VISIT: CPT

## 2022-01-19 ENCOUNTER — APPOINTMENT (OUTPATIENT)
Dept: NEPHROLOGY | Facility: CLINIC | Age: 72
End: 2022-01-19
Payer: MEDICARE

## 2022-01-19 VITALS
TEMPERATURE: 98.5 F | SYSTOLIC BLOOD PRESSURE: 129 MMHG | OXYGEN SATURATION: 95 % | HEART RATE: 65 BPM | BODY MASS INDEX: 26.82 KG/M2 | HEIGHT: 65 IN | DIASTOLIC BLOOD PRESSURE: 55 MMHG | WEIGHT: 161 LBS | RESPIRATION RATE: 12 BRPM

## 2022-01-19 PROCEDURE — 99215 OFFICE O/P EST HI 40 MIN: CPT

## 2022-01-19 NOTE — PHYSICAL EXAM
[General Appearance - Alert] : alert [General Appearance - In No Acute Distress] : in no acute distress [Sclera] : the sclera and conjunctiva were normal [PERRL With Normal Accommodation] : pupils were equal in size, round, and reactive to light [Oropharynx] : the oropharynx was normal [Jugular Venous Distention Increased] : there was no jugular-venous distention [Respiration, Rhythm And Depth] : normal respiratory rhythm and effort [Exaggerated Use Of Accessory Muscles For Inspiration] : no accessory muscle use [Auscultation Breath Sounds / Voice Sounds] : lungs were clear to auscultation bilaterally [Heart Sounds] : normal S1 and S2 [Heart Sounds Gallop] : no gallops [Murmurs] : no murmurs [Edema] : there was no peripheral edema [Bowel Sounds] : normal bowel sounds [Abdomen Soft] : soft [Involuntary Movements] : no involuntary movements were seen [___ (cm) Fistula] : [unfilled] (cm) fistula [Bruit] : a bruit was present [Thrill] : a thrill was present [] : no rash [No Focal Deficits] : no focal deficits [Oriented To Time, Place, And Person] : oriented to person, place, and time [FreeTextEntry1] : Mood better

## 2022-01-19 NOTE — HISTORY OF PRESENT ILLNESS
[FreeTextEntry1] : \par 71 year old man with ESRD due to DM on PD since April 2020. S/p DDRT on 3/9/2021.\par \par PMH: DM type II and Hypertension diagnosed in his early 40’s. Hyperlipidemia, gout, Anxiety, Depression, OCD. No cardiac disease. cPRA 0%. \par \par Donor 49 yr old, DCD, KDPI 71%, Terminal Cr 8mg/dL, No HLA mismatch, CMV D-R-. \par Course complicated by DGF. Discharged on peritoneal dialysis. Graft function recovered and has been off PD 3/30/21. PD catheter and transplant ureteric stent removed on 4/22/21. He was hospitalized 4/30/21 with hyperkalemia, metabolic acidosis  and severe hyperglycemia. Managed with insulin. Bactrim d/ame \par Completed 2nd dose of COVID vaccine in February 2021. Received 3rd dose in Sept. 2021 \par Flu shot received in October. 2021.\par He lives with his wife and 2 sons age 16 and 11. 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 \par \par Last seen  2 months ago. He presents for scheduled follow up. \par Exposed to COVID 19 from his son, he tested positive,had no symptoms , received monoclonal antibody infusion on 01/06/2022. \par No symptoms of cough, fever, chills, shortness of breath. \par Voiding urine without difficulty. \par Depression and anxiety symptoms still persist. Saw therapist virtually during the holidays. Has appt with psychiatry in March. \par Saw opthalmology had cataract and laser procedures. \par Home records reviewed. Blood pressure 110-130 systolic. Blood glucose , takes 10-25 units of Lantus. \par Weight 161 stable. Appetite fair, sleeping good (states sleep is his only respite)\par Not exercising much.\par Adhering to low K diet

## 2022-01-19 NOTE — ASSESSMENT
[FreeTextEntry1] : \par 71 yr old man with ESRD due to DM, was on PD, s/p DDRT on 3/9/21 complicated by DGF discharged on PD. Graft function recovered and off PD since 3/30. \par \par S/p DDRT on 3/9/21 complicated by DGF , now has excellent graft function\par - Cr 1.26mg/dL stable , no proteinuria. \par \par Immunosuppression \par - S/p Simulect induction \par - On Envarsus 5mg daily. Level 7.3 acceptable. \par - Continue CellCept 500 bid - dose was lowered for leukopenia\par - Continue prednisone 5mg daily \par \par Infection prophylaxis \par - Completed 6 months Valcyte prophylaxis  \par - Bactrim d/ame for hyperkalemia. Continue dapsone 100mg po daily. \par - Completed COVID vaccine. 3rd shot given in Sept. 2021 Damian Ab +ve but titer declining. Advised to schedule 4th dose in March. \par - Flu shot given Nov 2021 \par \par Diabetes type II  - On Lantus 10-20 units qhs, Humalog 4-8 units qac + sliding scale. Follow up with Dr Micky Abad scheduled this month. \par \par Hypertension :  On Carvedilol 25mg po bid. Nifedipine xl 30mg po qhs. \par \par Hyperparathyroidism - on Calcitriol 0.25mcg po daily. \par \par Hyperkalemia -  controlled on lokelma every other day,  continue low K diet. \par \par Anxiety/OCD - On Prozac 80mg daily. and Welbutrin 150mg po daily Followed by pych.  F/u with psych scheduled in March.  \par \par Asymptomatic COVID-19 infection - January 4th, s/p MAB. No symptoms. Advised to get 4th dose in March 2022 \par \par F/u 3/15/22\par

## 2022-01-31 ENCOUNTER — APPOINTMENT (OUTPATIENT)
Dept: OPHTHALMOLOGY | Facility: CLINIC | Age: 72
End: 2022-01-31
Payer: MEDICARE

## 2022-01-31 ENCOUNTER — NON-APPOINTMENT (OUTPATIENT)
Age: 72
End: 2022-01-31

## 2022-01-31 PROCEDURE — 92134 CPTRZ OPH DX IMG PST SGM RTA: CPT

## 2022-01-31 PROCEDURE — 92014 COMPRE OPH EXAM EST PT 1/>: CPT | Mod: 24

## 2022-02-03 ENCOUNTER — APPOINTMENT (OUTPATIENT)
Dept: PSYCHIATRY | Facility: CLINIC | Age: 72
End: 2022-02-03

## 2022-02-03 RX ORDER — BUPROPION HYDROCHLORIDE 150 MG/1
150 TABLET, EXTENDED RELEASE ORAL DAILY
Refills: 0 | Status: DISCONTINUED | COMMUNITY
Start: 2021-08-26 | End: 2022-02-03

## 2022-03-09 ENCOUNTER — APPOINTMENT (OUTPATIENT)
Dept: PSYCHIATRY | Facility: CLINIC | Age: 72
End: 2022-03-09

## 2022-03-10 ENCOUNTER — APPOINTMENT (OUTPATIENT)
Dept: OPHTHALMOLOGY | Facility: CLINIC | Age: 72
End: 2022-03-10
Payer: MEDICARE

## 2022-03-10 ENCOUNTER — NON-APPOINTMENT (OUTPATIENT)
Age: 72
End: 2022-03-10

## 2022-03-10 PROCEDURE — 92014 COMPRE OPH EXAM EST PT 1/>: CPT

## 2022-03-10 PROCEDURE — 92134 CPTRZ OPH DX IMG PST SGM RTA: CPT

## 2022-03-17 ENCOUNTER — APPOINTMENT (OUTPATIENT)
Dept: TRANSPLANT | Facility: CLINIC | Age: 72
End: 2022-03-17

## 2022-03-18 ENCOUNTER — NON-APPOINTMENT (OUTPATIENT)
Age: 72
End: 2022-03-18

## 2022-03-18 LAB
ALBUMIN SERPL ELPH-MCNC: 4.5 G/DL
ALP BLD-CCNC: 81 U/L
ALT SERPL-CCNC: 22 U/L
ANION GAP SERPL CALC-SCNC: 12 MMOL/L
APPEARANCE: CLEAR
AST SERPL-CCNC: 16 U/L
BACTERIA: NEGATIVE
BASOPHILS # BLD AUTO: 0.05 K/UL
BASOPHILS NFR BLD AUTO: 0.7 %
BILIRUB SERPL-MCNC: 0.7 MG/DL
BILIRUBIN URINE: NEGATIVE
BLOOD URINE: NEGATIVE
BUN SERPL-MCNC: 34 MG/DL
CALCIUM SERPL-MCNC: 10.2 MG/DL
CHLORIDE SERPL-SCNC: 105 MMOL/L
CHOLEST SERPL-MCNC: 180 MG/DL
CO2 SERPL-SCNC: 24 MMOL/L
COLOR: YELLOW
COVID-19 SPIKE DOMAIN ANTIBODY INTERPRETATION: POSITIVE
CREAT SERPL-MCNC: 1.07 MG/DL
CREAT SPEC-SCNC: 156 MG/DL
CREAT/PROT UR: 0.3 RATIO
EGFR: 74 ML/MIN/1.73M2
EOSINOPHIL # BLD AUTO: 0.06 K/UL
EOSINOPHIL NFR BLD AUTO: 0.8 %
ESTIMATED AVERAGE GLUCOSE: 111 MG/DL
GLUCOSE QUALITATIVE U: NORMAL
GLUCOSE SERPL-MCNC: 77 MG/DL
HBA1C MFR BLD HPLC: 5.5 %
HCT VFR BLD CALC: 36.7 %
HDLC SERPL-MCNC: 70 MG/DL
HGB BLD-MCNC: 11.9 G/DL
HYALINE CASTS: 0 /LPF
IMM GRANULOCYTES NFR BLD AUTO: 0.4 %
KETONES URINE: NEGATIVE
LDH SERPL-CCNC: 225 U/L
LDLC SERPL CALC-MCNC: 88 MG/DL
LEUKOCYTE ESTERASE URINE: NEGATIVE
LYMPHOCYTES # BLD AUTO: 1.28 K/UL
LYMPHOCYTES NFR BLD AUTO: 17.2 %
MAGNESIUM SERPL-MCNC: 1.9 MG/DL
MAN DIFF?: NORMAL
MCHC RBC-ENTMCNC: 32.4 GM/DL
MCHC RBC-ENTMCNC: 32.8 PG
MCV RBC AUTO: 101.1 FL
MICROSCOPIC-UA: NORMAL
MONOCYTES # BLD AUTO: 0.84 K/UL
MONOCYTES NFR BLD AUTO: 11.3 %
NEUTROPHILS # BLD AUTO: 5.18 K/UL
NEUTROPHILS NFR BLD AUTO: 69.6 %
NITRITE URINE: NEGATIVE
NONHDLC SERPL-MCNC: 110 MG/DL
PH URINE: 6
PHOSPHATE SERPL-MCNC: 3.2 MG/DL
PLATELET # BLD AUTO: 214 K/UL
POTASSIUM SERPL-SCNC: 5 MMOL/L
PROT SERPL-MCNC: 6.9 G/DL
PROT UR-MCNC: 48 MG/DL
PROTEIN URINE: ABNORMAL
RBC # BLD: 3.63 M/UL
RBC # FLD: 13 %
RED BLOOD CELLS URINE: 2 /HPF
SARS-COV-2 AB SERPL IA-ACNC: >250 U/ML
SODIUM SERPL-SCNC: 141 MMOL/L
SPECIFIC GRAVITY URINE: 1.03
SQUAMOUS EPITHELIAL CELLS: 0 /HPF
TACROLIMUS SERPL-MCNC: 11.4 NG/ML
TRIGL SERPL-MCNC: 107 MG/DL
UROBILINOGEN URINE: NORMAL
WBC # FLD AUTO: 7.44 K/UL
WHITE BLOOD CELLS URINE: 2 /HPF

## 2022-03-22 ENCOUNTER — APPOINTMENT (OUTPATIENT)
Dept: NEPHROLOGY | Facility: CLINIC | Age: 72
End: 2022-03-22
Payer: MEDICARE

## 2022-03-22 VITALS
WEIGHT: 170 LBS | SYSTOLIC BLOOD PRESSURE: 93 MMHG | HEIGHT: 65 IN | RESPIRATION RATE: 12 BRPM | TEMPERATURE: 98.5 F | OXYGEN SATURATION: 98 % | BODY MASS INDEX: 28.32 KG/M2 | DIASTOLIC BLOOD PRESSURE: 55 MMHG | HEART RATE: 62 BPM

## 2022-03-22 LAB
BKV DNA SPEC QL NAA+PROBE: NOT DETECTED COPIES/ML
CMV DNA SPEC QL NAA+PROBE: NOT DETECTED IU/ML

## 2022-03-22 PROCEDURE — 99215 OFFICE O/P EST HI 40 MIN: CPT

## 2022-03-22 RX ORDER — SODIUM ZIRCONIUM CYCLOSILICATE 10 G/10G
10 POWDER, FOR SUSPENSION ORAL DAILY
Qty: 1 | Refills: 2 | Status: COMPLETED | COMMUNITY
Start: 2021-11-15 | End: 2022-03-22

## 2022-03-22 NOTE — ASSESSMENT
[FreeTextEntry1] : \par 72 yr old man with ESRD due to DM, was on PD, s/p DDRT on 3/9/21 complicated by DGF discharged on PD. Graft function recovered and off PD since 3/30. \par \par S/p DDRT on 3/9/21 complicated by DGF , now has excellent graft function\par - Cr 1.07mg/dL stable , no proteinuria. \par \par Immunosuppression \par - S/p Simulect induction \par - On Envarsus 4mg daily, reduced from 5mg last week for level of 11.4 \par - Continue CellCept 500 bid - dose was lowered for leukopenia\par - Continue prednisone 5mg daily \par \par Infection prophylaxis \par - Completed 6 months Valcyte prophylaxis  \par - Bactrim d/ame for hyperkalemia. Continue dapsone 100mg po daily. \par - Completed COVID vaccine. 3rd shot given in Sept. 2021 Damian Ab +ve but titer declining. Had COVID infection in January. OK to schedule 4th dose now. \par - Flu shot given Nov 2021 \par \par Diabetes type II  - On Lantus 10-20 units qhs, Humalog 4-8 units qac + sliding scale. Follow up with Dr Micky Abad. \par \par Hypertension :  On Carvedilol 25mg po bid. Nifedipine xl 30mg po qhs. \par \par Hyperparathyroidism - on Calcitriol 0.25mcg po daily. \par \par Hyperkalemia- on Lokelma every other day -  copay is too high. K = 5.0.  D/c Lokelma. Continue low K diet. \par \par Anxiety/OCD - On Prozac 80mg daily and Wellbutrin discontinued. Started Duloxetine 30mg daily. \par \par COVID-19 infection - January 4th, s/p MAB. No symptoms. Advised to get 4th dose Now. \par \par F/u with Dermatologist - annual follow up. He will make appt. \par \par F/u with primary nephrologist - Dr. Novak, return here in 6 months\par \par

## 2022-03-22 NOTE — HISTORY OF PRESENT ILLNESS
[FreeTextEntry1] : \par 72 year old man with ESRD due to DM on PD since April 2020. S/p DDRT on 3/9/2021.\par \par PMH: DM type II and Hypertension diagnosed in his early 40’s. Hyperlipidemia, gout, Anxiety, Depression, OCD. No cardiac disease. cPRA 0%. \par \par Donor 49 yr old, DCD, KDPI 71%, Terminal Cr 8mg/dL, No HLA mismatch, CMV D-R-. \par Course complicated by DGF. Discharged on peritoneal dialysis. Graft function recovered and has been off PD 3/30/21. PD catheter and transplant ureteric stent removed on 4/22/21. He was hospitalized 4/30/21 with hyperkalemia, metabolic acidosis  and severe hyperglycemia. Managed with insulin. Bactrim d/ame \par Completed 2nd dose of COVID vaccine in February 2021. Received 3rd dose in Sept. 2021 \par COVID + in Jan 2022, had no symptoms , received monoclonal antibody infusion on 01/06/2022. \par \par Flu shot received in October. 2021.\par He lives with his wife and 2 sons age 16 and 11. 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 \par \par \par Presents for scheduled follow up, last seen 2 months ago. \par No major events since last seen. BP ranging 120-150 systolic. Blood glucose 200-300, planning to schedule endocrine appointment. \par S/p cataract surgery in right eye, left eye scheduled nedt month. \par Urinating without difficulty. No dysuria or hematuria. No NVD. Appetite good. Sleeping well. No cough or fever. \par Energy poor, still feels depressed and anxious but managing ok. Seeing psych, meds changed. \par

## 2022-03-22 NOTE — END OF VISIT
[facility] Progress Note  Critical access hospital NICU    Follow up visit with mom, Audrey, and maternal grandmother. Shared in light conversation.  Audrey is upbeat at progress of twins today.  Expressed gratitude for cares provided by NICU staff.    Plan: [facility] remains available for additional emotional/spiritual support.    [place] MA  Staff   Pager: [phone]  Phone: [phone]         [Time Spent: ___ minutes] : I have spent [unfilled] minutes of time on the encounter.

## 2022-03-22 NOTE — PHYSICAL EXAM
[General Appearance - Alert] : alert [General Appearance - In No Acute Distress] : in no acute distress [Sclera] : the sclera and conjunctiva were normal [PERRL With Normal Accommodation] : pupils were equal in size, round, and reactive to light [Oropharynx] : the oropharynx was normal [Jugular Venous Distention Increased] : there was no jugular-venous distention [Respiration, Rhythm And Depth] : normal respiratory rhythm and effort [Exaggerated Use Of Accessory Muscles For Inspiration] : no accessory muscle use [Auscultation Breath Sounds / Voice Sounds] : lungs were clear to auscultation bilaterally [Heart Sounds] : normal S1 and S2 [Heart Sounds Gallop] : no gallops [Murmurs] : no murmurs [Edema] : there was no peripheral edema [Bowel Sounds] : normal bowel sounds [Abdomen Soft] : soft [Involuntary Movements] : no involuntary movements were seen [___ (cm) Fistula] : [unfilled] (cm) fistula [Bruit] : a bruit was present [Thrill] : a thrill was present [] : no rash [No Focal Deficits] : no focal deficits [Oriented To Time, Place, And Person] : oriented to person, place, and time [General Appearance - Well Nourished] : well nourished [General Appearance - Well Developed] : well developed [General Appearance - Well-Appearing] : healthy appearing [Impaired Insight] : insight and judgment were intact [FreeTextEntry1] : Mood better

## 2022-03-23 RX ORDER — TACROLIMUS 1 MG/1
1 TABLET, EXTENDED RELEASE ORAL
Qty: 90 | Refills: 3 | Status: DISCONTINUED | COMMUNITY
Start: 2021-12-09 | End: 2022-03-23

## 2022-03-30 ENCOUNTER — APPOINTMENT (OUTPATIENT)
Dept: OPHTHALMOLOGY | Facility: CLINIC | Age: 72
End: 2022-03-30
Payer: MEDICARE

## 2022-03-30 ENCOUNTER — NON-APPOINTMENT (OUTPATIENT)
Age: 72
End: 2022-03-30

## 2022-03-30 PROCEDURE — 92014 COMPRE OPH EXAM EST PT 1/>: CPT

## 2022-03-30 PROCEDURE — 92134 CPTRZ OPH DX IMG PST SGM RTA: CPT

## 2022-04-15 ENCOUNTER — APPOINTMENT (OUTPATIENT)
Dept: OPHTHALMOLOGY | Facility: EYE CENTER | Age: 72
End: 2022-04-15

## 2022-04-16 ENCOUNTER — APPOINTMENT (OUTPATIENT)
Dept: OPHTHALMOLOGY | Facility: CLINIC | Age: 72
End: 2022-04-16

## 2022-04-18 ENCOUNTER — APPOINTMENT (OUTPATIENT)
Dept: PSYCHIATRY | Facility: CLINIC | Age: 72
End: 2022-04-18
Payer: MEDICARE

## 2022-04-18 PROCEDURE — 99214 OFFICE O/P EST MOD 30 MIN: CPT | Mod: 95

## 2022-04-25 ENCOUNTER — APPOINTMENT (OUTPATIENT)
Dept: OPHTHALMOLOGY | Facility: CLINIC | Age: 72
End: 2022-04-25

## 2022-04-25 ENCOUNTER — APPOINTMENT (OUTPATIENT)
Dept: OPHTHALMOLOGY | Facility: CLINIC | Age: 72
End: 2022-04-25
Payer: MEDICARE

## 2022-04-25 ENCOUNTER — NON-APPOINTMENT (OUTPATIENT)
Age: 72
End: 2022-04-25

## 2022-04-25 PROCEDURE — 92012 INTRM OPH EXAM EST PATIENT: CPT

## 2022-05-18 ENCOUNTER — APPOINTMENT (OUTPATIENT)
Dept: PSYCHIATRY | Facility: CLINIC | Age: 72
End: 2022-05-18

## 2022-05-19 ENCOUNTER — APPOINTMENT (OUTPATIENT)
Dept: TRANSPLANT | Facility: CLINIC | Age: 72
End: 2022-05-19

## 2022-05-20 ENCOUNTER — APPOINTMENT (OUTPATIENT)
Dept: OPHTHALMOLOGY | Facility: EYE CENTER | Age: 72
End: 2022-05-20
Payer: MEDICARE

## 2022-05-20 ENCOUNTER — NON-APPOINTMENT (OUTPATIENT)
Age: 72
End: 2022-05-20

## 2022-05-20 LAB
ALBUMIN SERPL ELPH-MCNC: 4.5 G/DL
ALP BLD-CCNC: 80 U/L
ALT SERPL-CCNC: 21 U/L
ANION GAP SERPL CALC-SCNC: 11 MMOL/L
APPEARANCE: CLEAR
AST SERPL-CCNC: 17 U/L
BACTERIA: NEGATIVE
BASOPHILS # BLD AUTO: 0.05 K/UL
BASOPHILS NFR BLD AUTO: 0.7 %
BILIRUB SERPL-MCNC: 0.8 MG/DL
BILIRUBIN URINE: NEGATIVE
BLOOD URINE: NEGATIVE
BUN SERPL-MCNC: 27 MG/DL
CALCIUM SERPL-MCNC: 9.8 MG/DL
CHLORIDE SERPL-SCNC: 102 MMOL/L
CMV DNA SPEC QL NAA+PROBE: NOT DETECTED IU/ML
CMVPCR LOG: NOT DETECTED LOG10IU/ML
CO2 SERPL-SCNC: 25 MMOL/L
COLOR: NORMAL
COVID-19 SPIKE DOMAIN ANTIBODY INTERPRETATION: POSITIVE
CREAT SERPL-MCNC: 0.95 MG/DL
CREAT SPEC-SCNC: 79 MG/DL
CREAT/PROT UR: 0.4 RATIO
EGFR: 85 ML/MIN/1.73M2
EOSINOPHIL # BLD AUTO: 0.1 K/UL
EOSINOPHIL NFR BLD AUTO: 1.4 %
ESTIMATED AVERAGE GLUCOSE: 117 MG/DL
GLUCOSE QUALITATIVE U: ABNORMAL
GLUCOSE SERPL-MCNC: 354 MG/DL
HBA1C MFR BLD HPLC: 5.7 %
HCT VFR BLD CALC: 37.3 %
HGB BLD-MCNC: 11.3 G/DL
HYALINE CASTS: 0 /LPF
IMM GRANULOCYTES NFR BLD AUTO: 0.4 %
KETONES URINE: NEGATIVE
LEUKOCYTE ESTERASE URINE: NEGATIVE
LYMPHOCYTES # BLD AUTO: 0.89 K/UL
LYMPHOCYTES NFR BLD AUTO: 12.4 %
MAGNESIUM SERPL-MCNC: 1.8 MG/DL
MAN DIFF?: NORMAL
MCHC RBC-ENTMCNC: 30.3 GM/DL
MCHC RBC-ENTMCNC: 31.3 PG
MCV RBC AUTO: 103.3 FL
MICROSCOPIC-UA: NORMAL
MONOCYTES # BLD AUTO: 0.71 K/UL
MONOCYTES NFR BLD AUTO: 9.9 %
NEUTROPHILS # BLD AUTO: 5.38 K/UL
NEUTROPHILS NFR BLD AUTO: 75.2 %
NITRITE URINE: NEGATIVE
PH URINE: 6
PHOSPHATE SERPL-MCNC: 3.3 MG/DL
PLATELET # BLD AUTO: 197 K/UL
POTASSIUM SERPL-SCNC: 5.2 MMOL/L
PROT SERPL-MCNC: 6.6 G/DL
PROT UR-MCNC: 34 MG/DL
PROTEIN URINE: ABNORMAL
RBC # BLD: 3.61 M/UL
RBC # FLD: 13.8 %
RED BLOOD CELLS URINE: 1 /HPF
SARS-COV-2 AB SERPL IA-ACNC: >250 U/ML
SODIUM SERPL-SCNC: 138 MMOL/L
SPECIFIC GRAVITY URINE: 1.02
SQUAMOUS EPITHELIAL CELLS: 0 /HPF
TACROLIMUS SERPL-MCNC: 5.9 NG/ML
URATE SERPL-MCNC: 4.8 MG/DL
UROBILINOGEN URINE: NORMAL
WBC # FLD AUTO: 7.16 K/UL
WHITE BLOOD CELLS URINE: 1 /HPF

## 2022-05-20 PROCEDURE — 66984 XCAPSL CTRC RMVL W/O ECP: CPT | Mod: LT

## 2022-05-21 ENCOUNTER — APPOINTMENT (OUTPATIENT)
Dept: OPHTHALMOLOGY | Facility: CLINIC | Age: 72
End: 2022-05-21
Payer: MEDICARE

## 2022-05-21 ENCOUNTER — NON-APPOINTMENT (OUTPATIENT)
Age: 72
End: 2022-05-21

## 2022-05-21 PROCEDURE — 99024 POSTOP FOLLOW-UP VISIT: CPT

## 2022-05-24 LAB — BKV DNA SPEC QL NAA+PROBE: NOT DETECTED COPIES/ML

## 2022-05-26 ENCOUNTER — NON-APPOINTMENT (OUTPATIENT)
Age: 72
End: 2022-05-26

## 2022-05-26 ENCOUNTER — APPOINTMENT (OUTPATIENT)
Dept: OPHTHALMOLOGY | Facility: CLINIC | Age: 72
End: 2022-05-26
Payer: MEDICARE

## 2022-05-26 PROCEDURE — 99024 POSTOP FOLLOW-UP VISIT: CPT

## 2022-05-26 PROCEDURE — 92134 CPTRZ OPH DX IMG PST SGM RTA: CPT

## 2022-06-27 ENCOUNTER — NON-APPOINTMENT (OUTPATIENT)
Age: 72
End: 2022-06-27

## 2022-06-27 ENCOUNTER — APPOINTMENT (OUTPATIENT)
Dept: OPHTHALMOLOGY | Facility: CLINIC | Age: 72
End: 2022-06-27
Payer: MEDICARE

## 2022-06-27 PROCEDURE — 99024 POSTOP FOLLOW-UP VISIT: CPT

## 2022-06-27 PROCEDURE — 92134 CPTRZ OPH DX IMG PST SGM RTA: CPT

## 2022-07-13 ENCOUNTER — APPOINTMENT (OUTPATIENT)
Dept: OPHTHALMOLOGY | Facility: CLINIC | Age: 72
End: 2022-07-13

## 2022-07-26 ENCOUNTER — APPOINTMENT (OUTPATIENT)
Dept: PSYCHIATRY | Facility: CLINIC | Age: 72
End: 2022-07-26

## 2022-07-26 PROCEDURE — 99442: CPT

## 2022-07-26 RX ORDER — FLUOXETINE HYDROCHLORIDE 40 MG/1
40 CAPSULE ORAL
Qty: 90 | Refills: 1 | Status: DISCONTINUED | COMMUNITY
Start: 2021-08-10 | End: 2022-07-26

## 2022-07-27 ENCOUNTER — NON-APPOINTMENT (OUTPATIENT)
Age: 72
End: 2022-07-27

## 2022-07-27 ENCOUNTER — APPOINTMENT (OUTPATIENT)
Dept: OPHTHALMOLOGY | Facility: CLINIC | Age: 72
End: 2022-07-27

## 2022-07-27 PROCEDURE — 99024 POSTOP FOLLOW-UP VISIT: CPT

## 2022-07-27 PROCEDURE — 92134 CPTRZ OPH DX IMG PST SGM RTA: CPT

## 2022-07-27 PROCEDURE — 92015 DETERMINE REFRACTIVE STATE: CPT

## 2022-08-01 ENCOUNTER — APPOINTMENT (OUTPATIENT)
Dept: TRANSPLANT | Facility: CLINIC | Age: 72
End: 2022-08-01

## 2022-08-02 ENCOUNTER — NON-APPOINTMENT (OUTPATIENT)
Age: 72
End: 2022-08-02

## 2022-08-02 ENCOUNTER — APPOINTMENT (OUTPATIENT)
Dept: NEPHROLOGY | Facility: CLINIC | Age: 72
End: 2022-08-02

## 2022-08-02 VITALS
HEART RATE: 72 BPM | BODY MASS INDEX: 30.82 KG/M2 | WEIGHT: 185 LBS | SYSTOLIC BLOOD PRESSURE: 148 MMHG | RESPIRATION RATE: 12 BRPM | OXYGEN SATURATION: 95 % | DIASTOLIC BLOOD PRESSURE: 73 MMHG | TEMPERATURE: 98 F | HEIGHT: 65 IN

## 2022-08-02 LAB
ALBUMIN SERPL ELPH-MCNC: 4.4 G/DL
ALP BLD-CCNC: 80 U/L
ALT SERPL-CCNC: 18 U/L
ANION GAP SERPL CALC-SCNC: 12 MMOL/L
APPEARANCE: CLEAR
AST SERPL-CCNC: 14 U/L
BACTERIA: NEGATIVE
BASOPHILS # BLD AUTO: 0.06 K/UL
BASOPHILS NFR BLD AUTO: 0.8 %
BILIRUB SERPL-MCNC: 0.6 MG/DL
BILIRUBIN URINE: NEGATIVE
BKV DNA SPEC QL NAA+PROBE: NOT DETECTED IU/ML
BLOOD URINE: NEGATIVE
BUN SERPL-MCNC: 31 MG/DL
CALCIUM SERPL-MCNC: 10.4 MG/DL
CHLORIDE SERPL-SCNC: 105 MMOL/L
CMV DNA SPEC QL NAA+PROBE: NOT DETECTED IU/ML
CMVPCR LOG: NOT DETECTED LOG10IU/ML
CO2 SERPL-SCNC: 24 MMOL/L
COLOR: YELLOW
COVID-19 SPIKE DOMAIN ANTIBODY INTERPRETATION: POSITIVE
CREAT SERPL-MCNC: 1.36 MG/DL
CREAT SPEC-SCNC: 110 MG/DL
CREAT/PROT UR: 0.3 RATIO
EGFR: 55 ML/MIN/1.73M2
EOSINOPHIL # BLD AUTO: 0.09 K/UL
EOSINOPHIL NFR BLD AUTO: 1.3 %
GLUCOSE QUALITATIVE U: NORMAL
GLUCOSE SERPL-MCNC: 139 MG/DL
HCT VFR BLD CALC: 36.5 %
HGB BLD-MCNC: 12.1 G/DL
HYALINE CASTS: 0 /LPF
IMM GRANULOCYTES NFR BLD AUTO: 0.4 %
KETONES URINE: NEGATIVE
LEUKOCYTE ESTERASE URINE: ABNORMAL
LYMPHOCYTES # BLD AUTO: 1 K/UL
LYMPHOCYTES NFR BLD AUTO: 14.1 %
MAGNESIUM SERPL-MCNC: 1.7 MG/DL
MAN DIFF?: NORMAL
MCHC RBC-ENTMCNC: 32.4 PG
MCHC RBC-ENTMCNC: 33.2 GM/DL
MCV RBC AUTO: 97.9 FL
MICROSCOPIC-UA: NORMAL
MONOCYTES # BLD AUTO: 0.84 K/UL
MONOCYTES NFR BLD AUTO: 11.8 %
NEUTROPHILS # BLD AUTO: 5.08 K/UL
NEUTROPHILS NFR BLD AUTO: 71.6 %
NITRITE URINE: NEGATIVE
PH URINE: 6
PHOSPHATE SERPL-MCNC: 3.8 MG/DL
PLATELET # BLD AUTO: 192 K/UL
POTASSIUM SERPL-SCNC: 5.7 MMOL/L
PROT SERPL-MCNC: 6.5 G/DL
PROT UR-MCNC: 37 MG/DL
PROTEIN URINE: ABNORMAL
RBC # BLD: 3.73 M/UL
RBC # FLD: 13.3 %
RED BLOOD CELLS URINE: 1 /HPF
SARS-COV-2 AB SERPL IA-ACNC: >250 U/ML
SODIUM SERPL-SCNC: 141 MMOL/L
SPECIFIC GRAVITY URINE: 1.02
SQUAMOUS EPITHELIAL CELLS: 0 /HPF
TACROLIMUS SERPL-MCNC: 7 NG/ML
URATE SERPL-MCNC: 6 MG/DL
UROBILINOGEN URINE: NORMAL
WBC # FLD AUTO: 7.1 K/UL
WHITE BLOOD CELLS URINE: 4 /HPF

## 2022-08-02 PROCEDURE — 99215 OFFICE O/P EST HI 40 MIN: CPT

## 2022-08-02 RX ORDER — POLYETHYLENE GLYCOL 3350 17 G/17G
17 POWDER, FOR SOLUTION ORAL DAILY
Qty: 30 | Refills: 0 | Status: DISCONTINUED | COMMUNITY
Start: 2021-03-22 | End: 2022-08-02

## 2022-08-02 RX ORDER — PREDNISONE 5 MG/1
5 TABLET ORAL
Qty: 30 | Refills: 11 | Status: DISCONTINUED | COMMUNITY
Start: 2021-03-10 | End: 2022-08-02

## 2022-08-02 RX ORDER — SENNOSIDES 8.6 MG/1
8.6 CAPSULE, GELATIN COATED ORAL
Qty: 30 | Refills: 0 | Status: DISCONTINUED | COMMUNITY
Start: 2021-03-10 | End: 2022-08-02

## 2022-08-02 NOTE — ASSESSMENT
[FreeTextEntry1] : \par 72 yr old man with ESRD due to DM, was on PD, s/p DDRT on 3/9/21 complicated by DGF discharged on PD. Graft function recovered and off PD since 3/30. \par \par S/p DDRT on 3/9/21 complicated by DGF, recovered with baseline creatine 1.0mg/dL\par - Cr 1.36mg/dL up from 0.96mg/dL in May, no proteinuria. Likely due to dehydration in the setting of high ambient temp. and reduced fluid intake, Cr also 1.3 last august.   Advised to increase fluid intake. \par \par Immunosuppression \par - S/p Simulect induction \par - On Envarsus 4mg daily. Level 7.0 at goal. \par - Continue CellCept 500 bid - dose was lowered for leukopenia\par - On prednisone 2.5mg daily, would like to discontinue taking  prednisone b/c he believes it affects his mood and glucose. Will discontinue prednisone. \par \par Infection prophylaxis \par - Completed 6 months Valcyte prophylaxis  \par - Bactrim d/ame for hyperkalemia. Continue dapsone 100mg po daily. \par - Completed COVID vaccine. 3rd shot given in Sept. 2021  . Had COVID infection in January 2022 s/p MAB infusion. 4th dose Vaccine received March 2022. Damian Ab +ve\par - Flu shot given Nov 2021 \par \par Diabetes type II  - On Lantus 10-20 units qhs, Humalog 4-8 units qac + sliding scale. Follow up with Dr Micky Abad.  A1c 5.7% in May\par \par Hypertension :  On Carvedilol 25mg po bid. Nifedipine xl 30mg po qhs. \par \par Hyperparathyroidism - on Calcitriol 0.25mcg po daily. \par \par Hyperkalemia - mild. No longer taking Lokelma . \par \par Anxiety/OCD - On duloxetine and bupropion, followed by psych \par \par F/u with Dermatologist - annual follow up. He will make appt. \par \par F/u with primary nephrologist - Dr. Novak no longer takes insurance. He will find one and return here every 6 months once he establishes care with primary nephrologist. \par \par F/u here in 3 months

## 2022-08-02 NOTE — PHYSICAL EXAM
[General Appearance - Alert] : alert [General Appearance - In No Acute Distress] : in no acute distress [General Appearance - Well Nourished] : well nourished [General Appearance - Well Developed] : well developed [General Appearance - Well-Appearing] : healthy appearing [Sclera] : the sclera and conjunctiva were normal [PERRL With Normal Accommodation] : pupils were equal in size, round, and reactive to light [Oropharynx] : the oropharynx was normal [Jugular Venous Distention Increased] : there was no jugular-venous distention [] : no respiratory distress [Respiration, Rhythm And Depth] : normal respiratory rhythm and effort [Exaggerated Use Of Accessory Muscles For Inspiration] : no accessory muscle use [Auscultation Breath Sounds / Voice Sounds] : lungs were clear to auscultation bilaterally [Heart Sounds] : normal S1 and S2 [Heart Sounds Gallop] : no gallops [Murmurs] : no murmurs [Edema] : there was no peripheral edema [Bowel Sounds] : normal bowel sounds [Abdomen Soft] : soft [Involuntary Movements] : no involuntary movements were seen [___ (cm) Fistula] : [unfilled] (cm) fistula [Bruit] : a bruit was present [Thrill] : a thrill was present [No Focal Deficits] : no focal deficits [Oriented To Time, Place, And Person] : oriented to person, place, and time [Impaired Insight] : insight and judgment were intact [FreeTextEntry1] : Mood better

## 2022-08-02 NOTE — HISTORY OF PRESENT ILLNESS
[FreeTextEntry1] : \par 72 year old man with ESRD due to DM on PD since April 2020. S/p DDRT on 3/9/2021.\par \par PMH: DM type II and Hypertension diagnosed in his early 40’s. Hyperlipidemia, gout, Anxiety, Depression, OCD. No cardiac disease. cPRA 0%. \par \par Donor 49 yr old, DCD, KDPI 71%, Terminal Cr 8mg/dL, No HLA mismatch, CMV D-R-. \par Course complicated by DGF. Discharged on peritoneal dialysis. Graft function recovered and has been off PD 3/30/21. PD catheter and transplant ureteric stent removed on 4/22/21. He was hospitalized 4/30/21 with hyperkalemia, metabolic acidosis  and severe hyperglycemia. Managed with insulin. Bactrim d/ame \par Completed 2nd dose of COVID vaccine in February 2021. Received 3rd dose in Sept. 2021 \par COVID + in Jan 2022, had no symptoms , received monoclonal antibody infusion on 01/06/2022. \par \par Flu shot received in October. 2021.\par He lives with his wife and 2 sons age 16 and 11. 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 \par \par Presents for scheduled follow up, last seen 5 months ago. \par S/p cataract surgery b/l eyes. Vision better. \par No major events since last seen. No hospitalizations or acute illness. Continues to suffer severe depression. Talking to psych every month. Medication changes  made - now on Duloxetine and bupropion, feels slightly better. \par Energy poor, still feels depressed and anxious but managing ok. \par Has difficulty ambulating, gets fatigued easily, uses a cane. \par BP ranging 120-150 systolic. Blood glucose 150-200 remains on Lantus takes 18-22 units. Followed closely with Endocrinology. \par Urinating without difficulty. No dysuria or hematuria. No NVD. Appetite good. Sleeping well. No cough or fever. \par Takes aspirin 5 times a week for bruising in arms. \par Takes 2.5mg prednisone - feels much better in terms of his mood on lower dose. Would like to stop taking prednisone. \par \par

## 2022-08-09 RX ORDER — NIFEDIPINE 60 MG/1
60 TABLET, EXTENDED RELEASE ORAL
Qty: 90 | Refills: 3 | Status: DISCONTINUED | COMMUNITY
Start: 2021-10-12 | End: 2022-08-09

## 2022-08-16 ENCOUNTER — APPOINTMENT (OUTPATIENT)
Dept: PSYCHIATRY | Facility: CLINIC | Age: 72
End: 2022-08-16

## 2022-09-27 ENCOUNTER — APPOINTMENT (OUTPATIENT)
Dept: OPHTHALMOLOGY | Facility: CLINIC | Age: 72
End: 2022-09-27

## 2022-09-27 ENCOUNTER — NON-APPOINTMENT (OUTPATIENT)
Age: 72
End: 2022-09-27

## 2022-09-27 PROCEDURE — 92134 CPTRZ OPH DX IMG PST SGM RTA: CPT

## 2022-09-27 PROCEDURE — 92014 COMPRE OPH EXAM EST PT 1/>: CPT

## 2022-09-27 PROCEDURE — 92286 ANT SGM IMG I&R SPECLR MIC: CPT

## 2022-10-03 ENCOUNTER — APPOINTMENT (OUTPATIENT)
Dept: PSYCHIATRY | Facility: CLINIC | Age: 72
End: 2022-10-03

## 2022-10-11 ENCOUNTER — APPOINTMENT (OUTPATIENT)
Dept: PSYCHIATRY | Facility: CLINIC | Age: 72
End: 2022-10-11

## 2022-10-11 PROCEDURE — 99214 OFFICE O/P EST MOD 30 MIN: CPT | Mod: 95

## 2022-10-11 NOTE — FAMILY HISTORY
[FreeTextEntry1] : Patient born in [place] parents  of old age he has a sister 94.  On the father side of the family there is a history of bipolar depression and substance abuse.

## 2022-10-11 NOTE — DISCUSSION/SUMMARY
[FreeTextEntry1] : 72-year-old male with depression anxiety.  Plan Cymbalta 60 mg Wellbutrin  mg.  Begin Abilify 5 mg.  Follow-up in 3 to 4 weeks.

## 2022-10-11 NOTE — SOCIAL HISTORY
[FreeTextEntry1] : At 7 years of age the patient moved to [facility].  He attended [employer] graduating in 1968.  High school was okay he had a few friends he did not participate in any activities his grades were in the 90s.  He then went to [employer] graduating in 1972 with a degree in chemical engineering.  He was a good student.  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  Patient last worked in 1998.  Patient was  the first time from 1982 until 2007.  He has a daughter 28 and 2 sons ages 37 and 23 by that marriage.  Patient  his current wife in 2010.

## 2022-10-11 NOTE — CURRENT PSYCHIATRIC SYMPTOMS
[Depressed Mood] : depressed mood [Anhedonia] : no anhedonia [Psychomotor Retardation] : no psychomotor retardation [Anorexia] : no anorexia [Excessive Worry] : excessive worry [Ruminations] : no rumination disorder [de-identified] : Denied [de-identified] : Denied [de-identified] : None [de-identified] : None [de-identified] : None

## 2022-10-11 NOTE — PAST MEDICAL HISTORY
[FreeTextEntry1] : Patient's first depressive episode occurred in college.  He was not treated he stayed in his bed for couple of weeks.  He was not treated with any medication until 1986 when he was put on Prozac.  He had a difficult time functioning at work when just sitting in his office.  He went on disability in 1998 while working because he felt he could no longer function.  The patient had been as high as 80 mg of Prozac in the past he then decreased it to 40 mg but reappeared shortly after the renal transplant.  Patient currently taking 80 mg of Prozac.  Patient has never been in a psychiatric hospital.  Currently not taking any vitamin D.  In the past when he was taking vitamin D levels were in the normal range.

## 2022-10-11 NOTE — HISTORY OF PRESENT ILLNESS
[FreeTextEntry1] : Still feeling depressed with lack of energy and motivation.  Has a lot of stress from 2 younger children at home.  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  Does not want hospitalization. [Home] : at home, [unfilled] , at the time of the visit. [Medical Office: ([facility])___] : at the medical office located in  [Verbal consent obtained from patient] : the patient, [unfilled] [de-identified] : Patient is a 71-year-old male, , retired on disability for psychiatric reasons.  Patient lives at home with wife aged 48 who takes care of his properties there is a 16-year-old son and 11-year-old son at home along with the mother-in-law.  Patient states he is here because it is difficult just living.  He stays in the house a lot.  Patient states that he had a renal transplant done in March of this past year.  He feels that the transplantation went very well he has no medical problems.  However because of the coronavirus the patient has been afraid to go out of the house.  Patient has received his vaccinations but still does not want to do anything.  He has had a lot of anxiety.  He feels that there are times when he feels shaky he feels that he cannot take it anymore.  The plasty had a lot of obsessive-compulsive disorder symptoms.  When asked about this the patient described himself as having ruminating about things thinking things over and over not being able to get thoughts out of his mind.  Right now he constantly worries about the coronavirus.  He also has some stressors in that because of the rent moratorium his 7 properties that he has the tendons are not paying rent.  The wife is been handling these issues.  He states he has no concentration he has no energy he felt so badly this weekend he was going to go to the emergency room but decided not to.  He is not interested in watching TV cannot read he has a lot of distressing dreams.  His diet is fair he is on a diabetic diet he occasionally walks inside on a treadmill he rarely goes out except to doctors appointments.\par \par Patient gets up at about 8:30 in the morning in the morning she will have something to eat it is the worst time a day for his depression and anxiety pill they lay down in the afternoon he will do some paperwork spend time on the computer in the evenings he will have dinner he laid down because he is tired he goes to bed about 9:00 at night.  He has no problem falling asleep he awakens once or twice during the night.  Energy level decreased height 5 feet 6 inches weight 168 pounds.  Appetite has been decreased.  Patient no longer enjoys things.

## 2022-11-10 NOTE — ED PROVIDER NOTE - BIRTH SEX
Male
[facility]  Otolaryngology (ENT)  [address] [place] [address]  Phone: [phone]  Fax: [phone]

## 2022-11-14 ENCOUNTER — INPATIENT (INPATIENT)
Facility: HOSPITAL | Age: 72
LOS: 2 days | Discharge: HOME HEALTH | DRG: 314 | End: 2022-11-17
Attending: HOSPITALIST | Admitting: HOSPITALIST
Payer: MEDICARE

## 2022-11-14 VITALS
TEMPERATURE: 98 F | HEIGHT: 65 IN | SYSTOLIC BLOOD PRESSURE: 195 MMHG | RESPIRATION RATE: 20 BRPM | WEIGHT: 195.11 LBS | HEART RATE: 71 BPM | OXYGEN SATURATION: 95 % | DIASTOLIC BLOOD PRESSURE: 85 MMHG

## 2022-11-14 DIAGNOSIS — Z94.0 KIDNEY TRANSPLANT STATUS: Chronic | ICD-10-CM

## 2022-11-14 PROCEDURE — 93010 ELECTROCARDIOGRAM REPORT: CPT

## 2022-11-14 PROCEDURE — 99285 EMERGENCY DEPT VISIT HI MDM: CPT

## 2022-11-15 DIAGNOSIS — E11.9 TYPE 2 DIABETES MELLITUS WITHOUT COMPLICATIONS: ICD-10-CM

## 2022-11-15 DIAGNOSIS — Z94.0 KIDNEY TRANSPLANT STATUS: ICD-10-CM

## 2022-11-15 DIAGNOSIS — Z29.9 ENCOUNTER FOR PROPHYLACTIC MEASURES, UNSPECIFIED: ICD-10-CM

## 2022-11-15 DIAGNOSIS — J81.0 ACUTE PULMONARY EDEMA: ICD-10-CM

## 2022-11-15 DIAGNOSIS — J96.01 ACUTE RESPIRATORY FAILURE WITH HYPOXIA: ICD-10-CM

## 2022-11-15 DIAGNOSIS — I10 ESSENTIAL (PRIMARY) HYPERTENSION: ICD-10-CM

## 2022-11-15 DIAGNOSIS — A41.9 SEPSIS, UNSPECIFIED ORGANISM: ICD-10-CM

## 2022-11-15 LAB
ALBUMIN SERPL ELPH-MCNC: 4 G/DL — SIGNIFICANT CHANGE UP (ref 3.3–5)
ALP SERPL-CCNC: 101 U/L — SIGNIFICANT CHANGE UP (ref 40–120)
ALT FLD-CCNC: 18 U/L — SIGNIFICANT CHANGE UP (ref 10–45)
ANION GAP SERPL CALC-SCNC: 10 MMOL/L — SIGNIFICANT CHANGE UP (ref 5–17)
ANION GAP SERPL CALC-SCNC: 11 MMOL/L — SIGNIFICANT CHANGE UP (ref 5–17)
APPEARANCE UR: CLEAR — SIGNIFICANT CHANGE UP
APTT BLD: 31.7 SEC — SIGNIFICANT CHANGE UP (ref 27.5–35.5)
AST SERPL-CCNC: 20 U/L — SIGNIFICANT CHANGE UP (ref 10–40)
BACTERIA # UR AUTO: NEGATIVE — SIGNIFICANT CHANGE UP
BASE EXCESS BLDV CALC-SCNC: 1.5 MMOL/L — SIGNIFICANT CHANGE UP (ref -2–3)
BASOPHILS # BLD AUTO: 0.04 K/UL — SIGNIFICANT CHANGE UP (ref 0–0.2)
BASOPHILS NFR BLD AUTO: 0.7 % — SIGNIFICANT CHANGE UP (ref 0–2)
BILIRUB SERPL-MCNC: 0.7 MG/DL — SIGNIFICANT CHANGE UP (ref 0.2–1.2)
BILIRUB UR-MCNC: NEGATIVE — SIGNIFICANT CHANGE UP
BLD GP AB SCN SERPL QL: NEGATIVE — SIGNIFICANT CHANGE UP
BUN SERPL-MCNC: 21 MG/DL — SIGNIFICANT CHANGE UP (ref 7–23)
BUN SERPL-MCNC: 28 MG/DL — HIGH (ref 7–23)
CA-I SERPL-SCNC: 1.23 MMOL/L — SIGNIFICANT CHANGE UP (ref 1.15–1.33)
CALCIUM SERPL-MCNC: 9.5 MG/DL — SIGNIFICANT CHANGE UP (ref 8.4–10.5)
CALCIUM SERPL-MCNC: 9.6 MG/DL — SIGNIFICANT CHANGE UP (ref 8.4–10.5)
CHLORIDE BLDV-SCNC: 105 MMOL/L — SIGNIFICANT CHANGE UP (ref 96–108)
CHLORIDE SERPL-SCNC: 105 MMOL/L — SIGNIFICANT CHANGE UP (ref 96–108)
CHLORIDE SERPL-SCNC: 105 MMOL/L — SIGNIFICANT CHANGE UP (ref 96–108)
CO2 BLDV-SCNC: 29 MMOL/L — HIGH (ref 22–26)
CO2 SERPL-SCNC: 24 MMOL/L — SIGNIFICANT CHANGE UP (ref 22–31)
CO2 SERPL-SCNC: 25 MMOL/L — SIGNIFICANT CHANGE UP (ref 22–31)
COLOR SPEC: YELLOW — SIGNIFICANT CHANGE UP
CREAT SERPL-MCNC: 1.03 MG/DL — SIGNIFICANT CHANGE UP (ref 0.5–1.3)
CREAT SERPL-MCNC: 1.15 MG/DL — SIGNIFICANT CHANGE UP (ref 0.5–1.3)
DIFF PNL FLD: NEGATIVE — SIGNIFICANT CHANGE UP
EGFR: 68 ML/MIN/1.73M2 — SIGNIFICANT CHANGE UP
EGFR: 77 ML/MIN/1.73M2 — SIGNIFICANT CHANGE UP
EOSINOPHIL # BLD AUTO: 0.13 K/UL — SIGNIFICANT CHANGE UP (ref 0–0.5)
EOSINOPHIL NFR BLD AUTO: 2.4 % — SIGNIFICANT CHANGE UP (ref 0–6)
EPI CELLS # UR: 1 /HPF — SIGNIFICANT CHANGE UP
FLUAV AG NPH QL: SIGNIFICANT CHANGE UP
FLUBV AG NPH QL: SIGNIFICANT CHANGE UP
GAS PNL BLDV: 136 MMOL/L — SIGNIFICANT CHANGE UP (ref 136–145)
GAS PNL BLDV: SIGNIFICANT CHANGE UP
GAS PNL BLDV: SIGNIFICANT CHANGE UP
GLUCOSE BLDC GLUCOMTR-MCNC: 151 MG/DL — HIGH (ref 70–99)
GLUCOSE BLDC GLUCOMTR-MCNC: 187 MG/DL — HIGH (ref 70–99)
GLUCOSE BLDC GLUCOMTR-MCNC: 231 MG/DL — HIGH (ref 70–99)
GLUCOSE BLDV-MCNC: 160 MG/DL — HIGH (ref 70–99)
GLUCOSE SERPL-MCNC: 163 MG/DL — HIGH (ref 70–99)
GLUCOSE SERPL-MCNC: 167 MG/DL — HIGH (ref 70–99)
GLUCOSE UR QL: NEGATIVE — SIGNIFICANT CHANGE UP
HCO3 BLDV-SCNC: 27 MMOL/L — SIGNIFICANT CHANGE UP (ref 22–29)
HCT VFR BLD CALC: 34.8 % — LOW (ref 39–50)
HCT VFR BLD CALC: 34.9 % — LOW (ref 39–50)
HCT VFR BLDA CALC: 32 % — LOW (ref 39–51)
HGB BLD CALC-MCNC: 10.6 G/DL — LOW (ref 12.6–17.4)
HGB BLD-MCNC: 11 G/DL — LOW (ref 13–17)
HGB BLD-MCNC: 11.1 G/DL — LOW (ref 13–17)
HYALINE CASTS # UR AUTO: 4 /LPF — HIGH (ref 0–2)
IMM GRANULOCYTES NFR BLD AUTO: 0.4 % — SIGNIFICANT CHANGE UP (ref 0–0.9)
INR BLD: 1.18 RATIO — HIGH (ref 0.88–1.16)
KETONES UR-MCNC: NEGATIVE — SIGNIFICANT CHANGE UP
LACTATE BLDV-MCNC: 1.3 MMOL/L — SIGNIFICANT CHANGE UP (ref 0.5–2)
LEUKOCYTE ESTERASE UR-ACNC: ABNORMAL
LYMPHOCYTES # BLD AUTO: 0.94 K/UL — LOW (ref 1–3.3)
LYMPHOCYTES # BLD AUTO: 17.2 % — SIGNIFICANT CHANGE UP (ref 13–44)
MAGNESIUM SERPL-MCNC: 1.9 MG/DL — SIGNIFICANT CHANGE UP (ref 1.6–2.6)
MCHC RBC-ENTMCNC: 31.5 GM/DL — LOW (ref 32–36)
MCHC RBC-ENTMCNC: 31.6 PG — SIGNIFICANT CHANGE UP (ref 27–34)
MCHC RBC-ENTMCNC: 31.8 PG — SIGNIFICANT CHANGE UP (ref 27–34)
MCHC RBC-ENTMCNC: 31.9 GM/DL — LOW (ref 32–36)
MCV RBC AUTO: 100.9 FL — HIGH (ref 80–100)
MCV RBC AUTO: 99.1 FL — SIGNIFICANT CHANGE UP (ref 80–100)
MONOCYTES # BLD AUTO: 0.79 K/UL — SIGNIFICANT CHANGE UP (ref 0–0.9)
MONOCYTES NFR BLD AUTO: 14.5 % — HIGH (ref 2–14)
NEUTROPHILS # BLD AUTO: 3.54 K/UL — SIGNIFICANT CHANGE UP (ref 1.8–7.4)
NEUTROPHILS NFR BLD AUTO: 64.8 % — SIGNIFICANT CHANGE UP (ref 43–77)
NITRITE UR-MCNC: NEGATIVE — SIGNIFICANT CHANGE UP
NRBC # BLD: 0 /100 WBCS — SIGNIFICANT CHANGE UP (ref 0–0)
NRBC # BLD: 0 /100 WBCS — SIGNIFICANT CHANGE UP (ref 0–0)
NT-PROBNP SERPL-SCNC: 960 PG/ML — HIGH (ref 0–300)
PCO2 BLDV: 47 MMHG — SIGNIFICANT CHANGE UP (ref 42–55)
PH BLDV: 7.37 — SIGNIFICANT CHANGE UP (ref 7.32–7.43)
PH UR: 5.5 — SIGNIFICANT CHANGE UP (ref 5–8)
PHOSPHATE SERPL-MCNC: 3.5 MG/DL — SIGNIFICANT CHANGE UP (ref 2.5–4.5)
PLATELET # BLD AUTO: 186 K/UL — SIGNIFICANT CHANGE UP (ref 150–400)
PLATELET # BLD AUTO: 198 K/UL — SIGNIFICANT CHANGE UP (ref 150–400)
PO2 BLDV: 53 MMHG — HIGH (ref 25–45)
POTASSIUM BLDV-SCNC: 4.6 MMOL/L — SIGNIFICANT CHANGE UP (ref 3.5–5.1)
POTASSIUM SERPL-MCNC: 4.7 MMOL/L — SIGNIFICANT CHANGE UP (ref 3.5–5.3)
POTASSIUM SERPL-MCNC: 4.7 MMOL/L — SIGNIFICANT CHANGE UP (ref 3.5–5.3)
POTASSIUM SERPL-SCNC: 4.7 MMOL/L — SIGNIFICANT CHANGE UP (ref 3.5–5.3)
POTASSIUM SERPL-SCNC: 4.7 MMOL/L — SIGNIFICANT CHANGE UP (ref 3.5–5.3)
PROT SERPL-MCNC: 7.1 G/DL — SIGNIFICANT CHANGE UP (ref 6–8.3)
PROT UR-MCNC: ABNORMAL
PROTHROM AB SERPL-ACNC: 13.6 SEC — HIGH (ref 10.5–13.4)
RBC # BLD: 3.46 M/UL — LOW (ref 4.2–5.8)
RBC # BLD: 3.51 M/UL — LOW (ref 4.2–5.8)
RBC # FLD: 13 % — SIGNIFICANT CHANGE UP (ref 10.3–14.5)
RBC # FLD: 13.1 % — SIGNIFICANT CHANGE UP (ref 10.3–14.5)
RBC CASTS # UR COMP ASSIST: 2 /HPF — SIGNIFICANT CHANGE UP (ref 0–4)
RH IG SCN BLD-IMP: POSITIVE — SIGNIFICANT CHANGE UP
RSV RNA NPH QL NAA+NON-PROBE: SIGNIFICANT CHANGE UP
SAO2 % BLDV: 81.7 % — SIGNIFICANT CHANGE UP (ref 67–88)
SARS-COV-2 RNA SPEC QL NAA+PROBE: SIGNIFICANT CHANGE UP
SODIUM SERPL-SCNC: 140 MMOL/L — SIGNIFICANT CHANGE UP (ref 135–145)
SODIUM SERPL-SCNC: 140 MMOL/L — SIGNIFICANT CHANGE UP (ref 135–145)
SP GR SPEC: 1.02 — SIGNIFICANT CHANGE UP (ref 1.01–1.02)
UROBILINOGEN FLD QL: ABNORMAL
WBC # BLD: 5.39 K/UL — SIGNIFICANT CHANGE UP (ref 3.8–10.5)
WBC # BLD: 5.46 K/UL — SIGNIFICANT CHANGE UP (ref 3.8–10.5)
WBC # FLD AUTO: 5.39 K/UL — SIGNIFICANT CHANGE UP (ref 3.8–10.5)
WBC # FLD AUTO: 5.46 K/UL — SIGNIFICANT CHANGE UP (ref 3.8–10.5)
WBC UR QL: 11 /HPF — HIGH (ref 0–5)

## 2022-11-15 PROCEDURE — 71045 X-RAY EXAM CHEST 1 VIEW: CPT | Mod: 26

## 2022-11-15 PROCEDURE — 99223 1ST HOSP IP/OBS HIGH 75: CPT | Mod: GC

## 2022-11-15 PROCEDURE — 71275 CT ANGIOGRAPHY CHEST: CPT | Mod: 26,MA

## 2022-11-15 RX ORDER — DEXTROSE 50 % IN WATER 50 %
25 SYRINGE (ML) INTRAVENOUS ONCE
Refills: 0 | Status: DISCONTINUED | OUTPATIENT
Start: 2022-11-15 | End: 2022-11-17

## 2022-11-15 RX ORDER — TACROLIMUS 5 MG/1
4 CAPSULE ORAL DAILY
Refills: 0 | Status: DISCONTINUED | OUTPATIENT
Start: 2022-11-15 | End: 2022-11-17

## 2022-11-15 RX ORDER — CARVEDILOL PHOSPHATE 80 MG/1
25 CAPSULE, EXTENDED RELEASE ORAL EVERY 12 HOURS
Refills: 0 | Status: DISCONTINUED | OUTPATIENT
Start: 2022-11-15 | End: 2022-11-17

## 2022-11-15 RX ORDER — DEXTROSE 50 % IN WATER 50 %
15 SYRINGE (ML) INTRAVENOUS ONCE
Refills: 0 | Status: DISCONTINUED | OUTPATIENT
Start: 2022-11-15 | End: 2022-11-17

## 2022-11-15 RX ORDER — NIFEDIPINE 30 MG
60 TABLET, EXTENDED RELEASE 24 HR ORAL DAILY
Refills: 0 | Status: DISCONTINUED | OUTPATIENT
Start: 2022-11-15 | End: 2022-11-17

## 2022-11-15 RX ORDER — DAPSONE 100 MG/1
1 TABLET ORAL
Qty: 0 | Refills: 0 | DISCHARGE

## 2022-11-15 RX ORDER — CALCITRIOL 0.5 UG/1
0.25 CAPSULE ORAL DAILY
Refills: 0 | Status: DISCONTINUED | OUTPATIENT
Start: 2022-11-15 | End: 2022-11-17

## 2022-11-15 RX ORDER — DAPSONE 100 MG/1
100 TABLET ORAL DAILY
Refills: 0 | Status: DISCONTINUED | OUTPATIENT
Start: 2022-11-15 | End: 2022-11-17

## 2022-11-15 RX ORDER — BUPROPION HYDROCHLORIDE 150 MG/1
0 TABLET, EXTENDED RELEASE ORAL
Qty: 0 | Refills: 0 | DISCHARGE

## 2022-11-15 RX ORDER — SODIUM CHLORIDE 9 MG/ML
1000 INJECTION, SOLUTION INTRAVENOUS
Refills: 0 | Status: DISCONTINUED | OUTPATIENT
Start: 2022-11-15 | End: 2022-11-17

## 2022-11-15 RX ORDER — POLYETHYLENE GLYCOL 3350 17 G/17G
17 POWDER, FOR SOLUTION ORAL DAILY
Refills: 0 | Status: DISCONTINUED | OUTPATIENT
Start: 2022-11-15 | End: 2022-11-17

## 2022-11-15 RX ORDER — DULOXETINE HYDROCHLORIDE 30 MG/1
60 CAPSULE, DELAYED RELEASE ORAL DAILY
Refills: 0 | Status: DISCONTINUED | OUTPATIENT
Start: 2022-11-15 | End: 2022-11-17

## 2022-11-15 RX ORDER — SENNA PLUS 8.6 MG/1
2 TABLET ORAL AT BEDTIME
Refills: 0 | Status: DISCONTINUED | OUTPATIENT
Start: 2022-11-15 | End: 2022-11-17

## 2022-11-15 RX ORDER — ACETAMINOPHEN 500 MG
650 TABLET ORAL EVERY 6 HOURS
Refills: 0 | Status: DISCONTINUED | OUTPATIENT
Start: 2022-11-15 | End: 2022-11-17

## 2022-11-15 RX ORDER — ASPIRIN/CALCIUM CARB/MAGNESIUM 324 MG
81 TABLET ORAL DAILY
Refills: 0 | Status: DISCONTINUED | OUTPATIENT
Start: 2022-11-15 | End: 2022-11-17

## 2022-11-15 RX ORDER — TAMSULOSIN HYDROCHLORIDE 0.4 MG/1
0.4 CAPSULE ORAL AT BEDTIME
Refills: 0 | Status: DISCONTINUED | OUTPATIENT
Start: 2022-11-15 | End: 2022-11-17

## 2022-11-15 RX ORDER — INSULIN LISPRO 100/ML
6 VIAL (ML) SUBCUTANEOUS
Refills: 0 | Status: DISCONTINUED | OUTPATIENT
Start: 2022-11-15 | End: 2022-11-17

## 2022-11-15 RX ORDER — FUROSEMIDE 40 MG
20 TABLET ORAL DAILY
Refills: 0 | Status: DISCONTINUED | OUTPATIENT
Start: 2022-11-16 | End: 2022-11-16

## 2022-11-15 RX ORDER — MYCOPHENOLATE MOFETIL 250 MG/1
500 CAPSULE ORAL
Refills: 0 | Status: DISCONTINUED | OUTPATIENT
Start: 2022-11-15 | End: 2022-11-17

## 2022-11-15 RX ORDER — INSULIN GLARGINE 100 [IU]/ML
16 INJECTION, SOLUTION SUBCUTANEOUS AT BEDTIME
Refills: 0 | Status: DISCONTINUED | OUTPATIENT
Start: 2022-11-15 | End: 2022-11-17

## 2022-11-15 RX ORDER — LANOLIN ALCOHOL/MO/W.PET/CERES
3 CREAM (GRAM) TOPICAL AT BEDTIME
Refills: 0 | Status: DISCONTINUED | OUTPATIENT
Start: 2022-11-15 | End: 2022-11-17

## 2022-11-15 RX ORDER — DEXTROSE 50 % IN WATER 50 %
12.5 SYRINGE (ML) INTRAVENOUS ONCE
Refills: 0 | Status: DISCONTINUED | OUTPATIENT
Start: 2022-11-15 | End: 2022-11-17

## 2022-11-15 RX ORDER — DULOXETINE HYDROCHLORIDE 30 MG/1
1 CAPSULE, DELAYED RELEASE ORAL
Qty: 0 | Refills: 0 | DISCHARGE

## 2022-11-15 RX ORDER — INSULIN LISPRO 100/ML
VIAL (ML) SUBCUTANEOUS
Refills: 0 | Status: DISCONTINUED | OUTPATIENT
Start: 2022-11-15 | End: 2022-11-17

## 2022-11-15 RX ORDER — FUROSEMIDE 40 MG
20 TABLET ORAL ONCE
Refills: 0 | Status: COMPLETED | OUTPATIENT
Start: 2022-11-15 | End: 2022-11-15

## 2022-11-15 RX ORDER — POLYETHYLENE GLYCOL 3350 17 G/17G
17 POWDER, FOR SOLUTION ORAL
Qty: 0 | Refills: 0 | DISCHARGE

## 2022-11-15 RX ORDER — ASPIRIN/CALCIUM CARB/MAGNESIUM 324 MG
1 TABLET ORAL
Qty: 0 | Refills: 0 | DISCHARGE

## 2022-11-15 RX ORDER — GLUCAGON INJECTION, SOLUTION 0.5 MG/.1ML
1 INJECTION, SOLUTION SUBCUTANEOUS ONCE
Refills: 0 | Status: DISCONTINUED | OUTPATIENT
Start: 2022-11-15 | End: 2022-11-17

## 2022-11-15 RX ADMIN — Medication 1: at 15:45

## 2022-11-15 RX ADMIN — CARVEDILOL PHOSPHATE 25 MILLIGRAM(S): 80 CAPSULE, EXTENDED RELEASE ORAL at 17:02

## 2022-11-15 RX ADMIN — SENNA PLUS 2 TABLET(S): 8.6 TABLET ORAL at 21:35

## 2022-11-15 RX ADMIN — INSULIN GLARGINE 16 UNIT(S): 100 INJECTION, SOLUTION SUBCUTANEOUS at 21:35

## 2022-11-15 RX ADMIN — Medication 6 UNIT(S): at 15:47

## 2022-11-15 RX ADMIN — DULOXETINE HYDROCHLORIDE 60 MILLIGRAM(S): 30 CAPSULE, DELAYED RELEASE ORAL at 16:59

## 2022-11-15 RX ADMIN — Medication 20 MILLIGRAM(S): at 13:35

## 2022-11-15 RX ADMIN — TAMSULOSIN HYDROCHLORIDE 0.4 MILLIGRAM(S): 0.4 CAPSULE ORAL at 21:35

## 2022-11-15 RX ADMIN — MYCOPHENOLATE MOFETIL 500 MILLIGRAM(S): 250 CAPSULE ORAL at 20:47

## 2022-11-15 NOTE — ED ADULT NURSE NOTE - OBJECTIVE STATEMENT
73 y/o male with hx of kidney transplant in 2001, DM1, and HTN presents to the ED with concerns for HTN. Pt is a&ox4, spontaneous respirations and equal chest rise. He reports his BP being elevated in the 190s at home. Upon arrival to the ED, pt found to be hypoxic on room air and was placed on 2L per nasal cannula. Pt's o2 sats now WNL on 2L. VSS (see flowsheet). Pt reports that he has been feeling more short of breath with exertion and has noticed his fingersticks at home have been elevated for the past 2 weeks. He denies CP, fever, chills, n/v/d, abdominal pain, cough. +2 pitting edema noted to BLE. LUE fistula noted; positive bruit and thrill. 20G PIV inserted to right forearm. Labs drawn and sent per MD order. Bed locked and in lowest position and call bell within reach. Will continue to closely monitor pt. 71 y/o male with hx of kidney transplant in 2001, DM1, and HTN presents to the ED with concerns for HTN. Pt is a&ox4, spontaneous respirations and equal chest rise. He reports his BP being elevated in the 190s at home. Upon arrival to the ED, pt found to be hypoxic on room air and was placed on 2L per nasal cannula. Pt's o2 sats now WNL on 2L. VSS (see flowsheet); NSR on cardiac monitor. Pt reports that he has been feeling more short of breath with exertion and has noticed his fingersticks at home have been elevated for the past 2 weeks. He denies CP, fever, chills, n/v/d, abdominal pain, cough. +2 pitting edema noted to BLE. LUE fistula noted; positive bruit and thrill. 20G PIV inserted to right forearm. Labs drawn and sent per MD order. Bed locked and in lowest position and call bell within reach. Will continue to closely monitor pt.

## 2022-11-15 NOTE — H&P ADULT - PROBLEM SELECTOR PLAN 4
- Basal/bolus insulin 20 basal at home with 8 at meals  Glucose here acceptable so far.    Plan  -16U Glargine qHS  -6U Mealtime  -CTM basal glucose

## 2022-11-15 NOTE — ED PROVIDER NOTE - PROGRESS NOTE DETAILS
Mohsen,PGY3: Discussed case w/ transplant; will continue with PE study given hypoxia Mohsen, PGY3: Discussed care w/ transplant; will require medical admission. Hospitalist informed and will accept

## 2022-11-15 NOTE — ED PROVIDER NOTE - CLINICAL SUMMARY MEDICAL DECISION MAKING FREE TEXT BOX
71 y/o M  hx  DM, HTN, MDD, Anxiety, Kidney Transplant (3/2021) coming in w/ worsening LE edema and hypertension found to be hypoxic on RA; will evaluate labs + likely send for PE study. Will discuss with transplant

## 2022-11-15 NOTE — H&P ADULT - PROBLEM SELECTOR PLAN 1
- IV Lasix 20mg now, will reassess  - Strict Is/Os  - Daily weights  - TTE  - Wean O2 as tolerated No O2 rec at home, on O2 here    Plan  - IV Lasix 20mg now, will reassess  - Strict Is/Os  - Daily weights  - TTE  - Wean O2 as tolerated

## 2022-11-15 NOTE — ED PROVIDER NOTE - PHYSICAL EXAMINATION
GENERAL: well appearing in no acute distress, non-toxic appearing  HEAD: normocephalic, atraumatic  HENT: airway intact  EYES: normal conjunctiva  CARDIAC: regular rate and rhythm, normal S1S2, no appreciable murmurs, 2+ pulses in UE/LE b/l  PULM: normal breath sounds, clear to ascultation bilaterally, no rales, rhonchi, wheezing  GI: abdomen nondistended, soft, nontender, no guarding, rebound tenderness  NEURO: no focal motor or sensory deficits,  MSK: 2+ pitting edema b/l  SKIN: well-perfused, extremities warm, no visible rashes

## 2022-11-15 NOTE — H&P ADULT - PROBLEM SELECTOR PLAN 2
- Continue home anti-hypertensives sBPs at home up to 200s..  Here sBP 124<-121<-151<-148<-160<-195  Latest 124/62  Home regimen: Coreg 25mg BID, Nifedipine 60mg qHS    Plan:  -Continue home cored  -Continue home Nifedipine

## 2022-11-15 NOTE — H&P ADULT - HISTORY OF PRESENT ILLNESS
71 y/o M  hx  DM, HTN, MDD, Anxiety, Kidney Transplant (3/2021) coming in w/ worsening LE edema and hypertension. Patient states BP was in the 180s-200s at home, prompting patient to come to ED. Patient endorses some fatigue but no other symptoms including any chest pain, nausea, vomiting or diarrhea, Questionably worsening vision iso cataracts without any acute issues. Notes worsening energy levels today. Denies headaches.

## 2022-11-15 NOTE — H&P ADULT - ATTENDING COMMENTS
-LE edema and pulmonary edema. Possibly undiagnosed CHF. -Echo pending. Tele. -Gave lasix 20mg iv push daily to start.   -F/u renal txp recs. F/u tacro levels.   -BP control.   -Wean O2 as tolerated.

## 2022-11-15 NOTE — ED PROVIDER NOTE - OBJECTIVE STATEMENT
71 y/o M  hx  DM, HTN, MDD, Anxiety, Kidney Transplant (3/2021) coming in w/ worsening LE edema and hypertension. Patient states BP was in the 190s-200s at home, prompting patient to come to ED. 73 y/o M  hx  DM, HTN, MDD, Anxiety, Kidney Transplant (3/2021) coming in w/ worsening LE edema and hypertension. Patient states BP was in the 190s-200s at home, prompting patient to come to ED. Patient denies any chest pain, nausea, vomiting or diarrhea and has otherwise been in his normal state of health

## 2022-11-15 NOTE — ED PROVIDER NOTE - NS ED ROS FT
General: denies fever, chills  HENT: denies nasal congestion, rhinorrhea  Eyes: denies visual changes, blurred vision  CV: denies chest pain, palpitations  Resp: denies difficulty breathing, cough  Abdominal: denies nausea, vomiting, diarrhea, abdominal pain  : denies urinary pain or discharge  MSK: + leg swelling  Neuro: denies headaches, numbness, tingling  Skin: denies rashes, bruises

## 2022-11-15 NOTE — ED PROVIDER NOTE - ATTENDING CONTRIBUTION TO CARE
Attending MD Jovel: I personally have seen and examined this patient.  Resident note reviewed and agree on plan of care and except where noted.  See below for details.     seen in [place] 15    72M with     upon sitting in bed, RR 30s, Sat low 90s, placed on 2L improved to mid to high 90s on 2L NC    TO BE COMPLETED Attending MD Jovel: I personally have seen and examined this patient.  Resident note reviewed and agree on plan of care and except where noted.  See below for details.     seen in [place] 15    72M with PMH/PSH including HTN, DM on insulin, ESRD s/p DDRT (Dr Schuler, 3/9/21), gout, anxiety, OCD presents to the ED with worsening LE edema and two weeks of persistently elevated blood pressures.  Reports SBPs have been [id number] when logging them at home over the last two weeks.  Reports previously if he had elevated BP readings, doctors would modify meds.  Reports that has also noted his lower legs are more swollen.  Denies chest pain, reports occasional shortness of breath with exertion, reports decreased exercise tolerance.  Denies abdominal pain, nausea, vomiting, diarrhea, bloody or black stools.  Denies change in urinary habitus, dysuria, hematuria.  Denies fevers, URI symptoms.      Exam:   General: NAD  HENT: head NCAT, airway patent  Eyes: anicteric, no conjunctival injection   Lungs: lungs CTAB with good inspiratory effort, no wheezing, no rhonchi, no rales, upon sitting in bed, RR 30s, Sat high 80s-low 90s on RA, placed on 2L improved to mid to high 90s on 2L NC  Cardiac: +S1S2, no obvious m/r/g, bilateral LE pitting edema  GI: abdomen soft with +BS, NT, exam limited secondary to body habitus  : no CVAT  MSK: ranging neck and extremities freely  Neuro: moving all extremities spontaneously, nonfocal  Psych: normal mood and affect     A/P: 72M with     TO BE COMPLETED         TO BE COMPLETED

## 2022-11-15 NOTE — H&P ADULT - NSCORESITESY/N_GEN_A_CORE_RD
1. Have you been to the ER, urgent care clinic since your last visit? Hospitalized since your last visit? Yes, [facility], on file. 2. Have you seen or consulted any other health care providers outside of the [address] [place] [address] since your last visit? Include any pap smears or colon screening.  No    Health Maintenance Due   Topic Date Due    COVID-19 Vaccine (1) Never done     Chief Complaint   Patient presents with   [facility] Follow Up    Form Completion     Summer camp No

## 2022-11-15 NOTE — H&P ADULT - NSHPPHYSICALEXAM_GEN_ALL_CORE
VITALS:   T(C): 36.7 (11-14-22 @ 23:49), Max: 36.7 (11-14-22 @ 23:49)  HR: 62 (11-15-22 @ 07:30) (62 - 71)  BP: 151/76 (11-15-22 @ 07:30) (148/71 - 195/85)  RR: 20 (11-15-22 @ 07:30) (16 - 20)  SpO2: 96% (11-15-22 @ 07:30) (95% - 96%)    GENERAL: NAD, lying in bed comfortably  HEAD:  Atraumatic, Normocephalic  EYES: EOMI, PERRLA, conjunctiva and sclera clear  ENT: Moist mucous membranes  NECK: Supple, No JVD  CHEST/LUNG: Clear to auscultation bilaterally; No rales, rhonchi, wheezing, or rubs. Unlabored respirations  HEART: Regular rate and rhythm; No murmurs, rubs, or gallops  ABDOMEN: BSx4; Soft, nontender, nondistended  EXTREMITIES:  2+ Peripheral Pulses, brisk capillary refill. No clubbing, cyanosis, or edema  NERVOUS SYSTEM:  A&Ox3, no focal deficits   SKIN: No rashes or lesions  Psych: Normal speech, normal behavior, normal affect

## 2022-11-15 NOTE — PATIENT PROFILE ADULT - FALL HARM RISK - HARM RISK INTERVENTIONS

## 2022-11-15 NOTE — H&P ADULT - NSHPLABSRESULTS_GEN_ALL_CORE
11.1   5.39  )-----------( 186      ( 15 Nov 2022 14:17 )             34.8       11-15    140  |  105  |  21  ----------------------------<  163<H>  4.7   |  25  |  1.03    Ca    9.6      15 Nov 2022 14:17  Phos  3.5     11-15  Mg     1.9     -15    TPro  7.1  /  Alb  4.0  /  TBili  0.7  /  DBili  x   /  AST  20  /  ALT  18  /  AlkPhos  101  11-15              Urinalysis Basic - ( 15 Nov 2022 02:19 )    Color: Yellow / Appearance: Clear / S.025 / pH: x  Gluc: x / Ketone: Negative  / Bili: Negative / Urobili: 2 mg/dL   Blood: x / Protein: 30 mg/dL / Nitrite: Negative   Leuk Esterase: Small / RBC: 2 /hpf / WBC 11 /HPF   Sq Epi: x / Non Sq Epi: 1 /hpf / Bacteria: Negative        PT/INR - ( 15 Nov 2022 01:48 )   PT: 13.6 sec;   INR: 1.18 ratio         PTT - ( 15 Nov 2022 01:48 )  PTT:31.7 sec          CAPILLARY BLOOD GLUCOSE      POCT Blood Glucose.: 151 mg/dL (15 Nov 2022 13:25)

## 2022-11-15 NOTE — H&P ADULT - ASSESSMENT
73 y/o M  hx  DM, HTN, MDD, Anxiety, Kidney Transplant (3/2021)w/ worsening LE edema and hypertension without symptoms. BPs resolved without intervention

## 2022-11-15 NOTE — ED ADULT NURSE NOTE - CHIEF COMPLAINT QUOTE
C/o HTN (190's/110's at home). Took prescribed BP meds today. C/o B/L LE swelling x2 weeks. Denies fever, SOB, CP, NV. PMH kidney transplant 2021, HTN, DM

## 2022-11-15 NOTE — ED ADULT NURSE REASSESSMENT NOTE - NS ED NURSE REASSESS COMMENT FT1
Contacted admitting team; MD Errol Smith regarding patient's home medications. Instructed patient to hold taking medications he brought with him and that the doctor will order the medication for him. Denies chest pain, SOB, headache, N/V/D, abdominal pain, fever/chills, burning upon urination or difficulty urinating currently. Plan of care discussed. Safety and comfort measures maintained.
Pt asleep but arouses to voice. Spontaneous respirations and equal chest rise. VSS on 2L NC. Pt offers no complaints at this time. Safety precautions maintained. Will continue to monitor.
Went to draw ordered blood work; patient made nurse aware that he took his home medication including Cellcept and Envarsus XR. Made MD Errol Smith aware that patient took his home dose.
WDL

## 2022-11-15 NOTE — H&P ADULT - PROBLEM SELECTOR PLAN 3
- Transplant ID  - Continue home immunosuppressants  - Daily tacro level  - Continue home prophylaxis Transplanted 3/21  On tacro, mycophenolate,     Plan:  - Continue home immunosuppressants (tacro, mycophen)  - Daily tacro level  - Continue home prophylaxis

## 2022-11-16 ENCOUNTER — APPOINTMENT (OUTPATIENT)
Dept: CARDIOLOGY | Facility: CLINIC | Age: 72
End: 2022-11-16

## 2022-11-16 ENCOUNTER — APPOINTMENT (OUTPATIENT)
Dept: NEPHROLOGY | Facility: CLINIC | Age: 72
End: 2022-11-16

## 2022-11-16 DIAGNOSIS — Z79.899 OTHER LONG TERM (CURRENT) DRUG THERAPY: ICD-10-CM

## 2022-11-16 LAB
A1C WITH ESTIMATED AVERAGE GLUCOSE RESULT: 5.4 % — SIGNIFICANT CHANGE UP (ref 4–5.6)
ANION GAP SERPL CALC-SCNC: 10 MMOL/L — SIGNIFICANT CHANGE UP (ref 5–17)
BUN SERPL-MCNC: 31 MG/DL — HIGH (ref 7–23)
CALCIUM SERPL-MCNC: 9.3 MG/DL — SIGNIFICANT CHANGE UP (ref 8.4–10.5)
CHLORIDE SERPL-SCNC: 103 MMOL/L — SIGNIFICANT CHANGE UP (ref 96–108)
CO2 SERPL-SCNC: 26 MMOL/L — SIGNIFICANT CHANGE UP (ref 22–31)
CREAT SERPL-MCNC: 1.28 MG/DL — SIGNIFICANT CHANGE UP (ref 0.5–1.3)
EGFR: 59 ML/MIN/1.73M2 — LOW
ESTIMATED AVERAGE GLUCOSE: 108 MG/DL — SIGNIFICANT CHANGE UP (ref 68–114)
GLUCOSE BLDC GLUCOMTR-MCNC: 153 MG/DL — HIGH (ref 70–99)
GLUCOSE BLDC GLUCOMTR-MCNC: 164 MG/DL — HIGH (ref 70–99)
GLUCOSE BLDC GLUCOMTR-MCNC: 247 MG/DL — HIGH (ref 70–99)
GLUCOSE BLDC GLUCOMTR-MCNC: 259 MG/DL — HIGH (ref 70–99)
GLUCOSE BLDC GLUCOMTR-MCNC: 263 MG/DL — HIGH (ref 70–99)
GLUCOSE BLDC GLUCOMTR-MCNC: 97 MG/DL — SIGNIFICANT CHANGE UP (ref 70–99)
GLUCOSE SERPL-MCNC: 175 MG/DL — HIGH (ref 70–99)
HCT VFR BLD CALC: 32.8 % — LOW (ref 39–50)
HGB BLD-MCNC: 10.5 G/DL — LOW (ref 13–17)
MAGNESIUM SERPL-MCNC: 1.8 MG/DL — SIGNIFICANT CHANGE UP (ref 1.6–2.6)
MCHC RBC-ENTMCNC: 31.7 PG — SIGNIFICANT CHANGE UP (ref 27–34)
MCHC RBC-ENTMCNC: 32 GM/DL — SIGNIFICANT CHANGE UP (ref 32–36)
MCV RBC AUTO: 99.1 FL — SIGNIFICANT CHANGE UP (ref 80–100)
NRBC # BLD: 0 /100 WBCS — SIGNIFICANT CHANGE UP (ref 0–0)
PHOSPHATE SERPL-MCNC: 4.1 MG/DL — SIGNIFICANT CHANGE UP (ref 2.5–4.5)
PLATELET # BLD AUTO: 202 K/UL — SIGNIFICANT CHANGE UP (ref 150–400)
POTASSIUM SERPL-MCNC: 4.1 MMOL/L — SIGNIFICANT CHANGE UP (ref 3.5–5.3)
POTASSIUM SERPL-SCNC: 4.1 MMOL/L — SIGNIFICANT CHANGE UP (ref 3.5–5.3)
RBC # BLD: 3.31 M/UL — LOW (ref 4.2–5.8)
RBC # FLD: 12.8 % — SIGNIFICANT CHANGE UP (ref 10.3–14.5)
SODIUM SERPL-SCNC: 139 MMOL/L — SIGNIFICANT CHANGE UP (ref 135–145)
TACROLIMUS SERPL-MCNC: 5.8 NG/ML — SIGNIFICANT CHANGE UP
TACROLIMUS SERPL-MCNC: 8.4 NG/ML — SIGNIFICANT CHANGE UP
WBC # BLD: 5.21 K/UL — SIGNIFICANT CHANGE UP (ref 3.8–10.5)
WBC # FLD AUTO: 5.21 K/UL — SIGNIFICANT CHANGE UP (ref 3.8–10.5)

## 2022-11-16 PROCEDURE — 99222 1ST HOSP IP/OBS MODERATE 55: CPT | Mod: GC

## 2022-11-16 PROCEDURE — 93306 TTE W/DOPPLER COMPLETE: CPT | Mod: 26

## 2022-11-16 PROCEDURE — 99233 SBSQ HOSP IP/OBS HIGH 50: CPT | Mod: GC

## 2022-11-16 RX ORDER — FUROSEMIDE 40 MG
40 TABLET ORAL DAILY
Refills: 0 | Status: DISCONTINUED | OUTPATIENT
Start: 2022-11-16 | End: 2022-11-17

## 2022-11-16 RX ORDER — FUROSEMIDE 40 MG
20 TABLET ORAL ONCE
Refills: 0 | Status: COMPLETED | OUTPATIENT
Start: 2022-11-16 | End: 2022-11-16

## 2022-11-16 RX ADMIN — Medication 20 MILLIGRAM(S): at 05:20

## 2022-11-16 RX ADMIN — CARVEDILOL PHOSPHATE 25 MILLIGRAM(S): 80 CAPSULE, EXTENDED RELEASE ORAL at 18:03

## 2022-11-16 RX ADMIN — Medication 2.5 MILLIGRAM(S): at 05:20

## 2022-11-16 RX ADMIN — Medication 20 MILLIGRAM(S): at 13:56

## 2022-11-16 RX ADMIN — MYCOPHENOLATE MOFETIL 500 MILLIGRAM(S): 250 CAPSULE ORAL at 08:40

## 2022-11-16 RX ADMIN — MYCOPHENOLATE MOFETIL 500 MILLIGRAM(S): 250 CAPSULE ORAL at 20:12

## 2022-11-16 RX ADMIN — INSULIN GLARGINE 16 UNIT(S): 100 INJECTION, SOLUTION SUBCUTANEOUS at 21:56

## 2022-11-16 RX ADMIN — Medication 6 UNIT(S): at 12:40

## 2022-11-16 RX ADMIN — Medication 81 MILLIGRAM(S): at 12:39

## 2022-11-16 RX ADMIN — POLYETHYLENE GLYCOL 3350 17 GRAM(S): 17 POWDER, FOR SOLUTION ORAL at 12:38

## 2022-11-16 RX ADMIN — Medication 6 UNIT(S): at 08:40

## 2022-11-16 RX ADMIN — Medication 1: at 08:40

## 2022-11-16 RX ADMIN — DAPSONE 100 MILLIGRAM(S): 100 TABLET ORAL at 12:39

## 2022-11-16 RX ADMIN — TAMSULOSIN HYDROCHLORIDE 0.4 MILLIGRAM(S): 0.4 CAPSULE ORAL at 21:58

## 2022-11-16 RX ADMIN — Medication 6 UNIT(S): at 18:02

## 2022-11-16 RX ADMIN — CALCITRIOL 0.25 MICROGRAM(S): 0.5 CAPSULE ORAL at 12:39

## 2022-11-16 RX ADMIN — SENNA PLUS 2 TABLET(S): 8.6 TABLET ORAL at 21:57

## 2022-11-16 RX ADMIN — Medication 60 MILLIGRAM(S): at 05:19

## 2022-11-16 RX ADMIN — DULOXETINE HYDROCHLORIDE 60 MILLIGRAM(S): 30 CAPSULE, DELAYED RELEASE ORAL at 12:39

## 2022-11-16 RX ADMIN — TACROLIMUS 4 MILLIGRAM(S): 5 CAPSULE ORAL at 08:40

## 2022-11-16 RX ADMIN — Medication 1: at 12:40

## 2022-11-16 RX ADMIN — CARVEDILOL PHOSPHATE 25 MILLIGRAM(S): 80 CAPSULE, EXTENDED RELEASE ORAL at 05:20

## 2022-11-16 NOTE — CONSULT NOTE ADULT - PROBLEM SELECTOR RECOMMENDATION 9
S/p DCD DDRT 3/9/2021 c/b DGF requiring PD until 3/30/2021. cPRA: 0%, KDPI: 71%, HLA Mismatch 0,0,0; Terminal Cr. 8.0; CMV -/-; EBV +/+. Pt. admitted with SCr. of 1.1 trending to 1.2 today which is around baseline of patient. On last outpatient visit with Dr. Patino Scr. was 1.3. Has been fluctuating from 1.0-1.3 since transplant. Can give Lasix 20mg PO x 3 days. No objections from renal standpoint for discharge. Please refer to Dr. Lamb or Dr. Nieves for cardiology evaluation. HTN likely caused by acute stressors at home.

## 2022-11-16 NOTE — CONSULT NOTE ADULT - ASSESSMENT
Pt. is a 72 y.o. M w/ PMHx of HTN, HLD, Severe Depression/Anxiety, Gout, DM and Hxo of DDRT (3/2021) presenting for hypertension and lower extremity edema. Transplant Nephrology consulted for transplant management.

## 2022-11-16 NOTE — CHART NOTE - NSCHARTNOTEFT_GEN_A_CORE
Patient will require home oxygen based on a diagnosis of pulmonary hypertension. Acute conditions and exacerbations have been treated, are currently resolved and patient is in a chronic stable state.    Room air saturation at rest is 92%.    Room air oxygen saturation with ambulation is 83%.  Oxygen saturation on 2 liters with ambulation is 94%.    PRESCRIPTION  Home oxygen 2 liters continuous with portable via nasal cannula.    83 % room air with ambulation.  94 % on -- liters with ambulation.    Diagnosis with ICD-10: I27.20 Pulmonary hypertension  PING 99 mo  Height and Weight (see sunrise)

## 2022-11-16 NOTE — CONSULT NOTE ADULT - PROBLEM SELECTOR RECOMMENDATION 2
Simulect induction. Maintained on MMF 500mg BID and Envarsus 4mg. Off Steroids at patient request because of worsening depression and uncontrolled glucose. Please obtain tacrolimus level 30min before AM dose.     If you have any questions, please feel free to contact me  Darin Olson  Nephrology Fellow  [phone]; Prefer [employer] TEAMS  (After 5pm or on weekends please page the on-call fellow)

## 2022-11-16 NOTE — CONSULT NOTE ADULT - SUBJECTIVE AND OBJECTIVE BOX
[facility] DIVISION OF KIDNEY DISEASES AND HYPERTENSION -- INITIAL CONSULT NOTE  --------------------------------------------------------------------------------    HPI:  Pt. is a 72 y.o. M w/ PMHx of HTN, HLD, Severe Depression/Anxiety, Gout, DM and Hxo of DDRT (3/2021) presenting for hypertension and lower extremity edema. Transplant Nephrology consulted for transplant management. Pt states that at home his BP was reaching 190s-200s/ 90s despite taking his medication appropriately. Denies any  OTC medication except dulcolax. No diarrhea, fever or chills. No chest pain or significant changes in his vision. Does get injections of Aflibercept, last dose about 1 week ago, but has been receiving these injections for years. Currently denies an changes in appetite. Breathing has improved and swelling has improved. Pt. does endorse significant social stressors at home with his 5 kids. Financial issues are also a concern. Pt. had appoinment with Cardiologist after extended difficulty finding an appropriate time.       PAST HISTORY  --------------------------------------------------------------------------------  PAST MEDICAL & SURGICAL HISTORY:  Chronic kidney disease (CKD)      HTN (hypertension)      DM (diabetes mellitus)      H/O kidney transplant  R, 3/11/2021        FAMILY HISTORY:  No pertinent family history in first degree relatives      Social History:    No toxic habits.  with 5 children    ALLERGIES & MEDICATIONS  --------------------------------------------------------------------------------  Allergies    ACE inhibitors (Angioedema)  ibuprofen (Nephrotoxicity)  Levaquin (Angioedema)    Intolerances      Standing Inpatient Medications  aspirin  chewable 81 milliGRAM(s) Oral daily  calcitriol   Capsule 0.25 MICROGram(s) Oral daily  carvedilol 25 milliGRAM(s) Oral every 12 hours  dapsone 100 milliGRAM(s) Oral daily  dextrose 5%. 1000 milliLiter(s) IV Continuous <Continuous>  dextrose 5%. 1000 milliLiter(s) IV Continuous <Continuous>  dextrose 50% Injectable 25 Gram(s) IV Push once  dextrose 50% Injectable 12.5 Gram(s) IV Push once  dextrose 50% Injectable 25 Gram(s) IV Push once  DULoxetine 60 milliGRAM(s) Oral daily  furosemide   Injectable 20 milliGRAM(s) IV Push daily  glucagon  Injectable 1 milliGRAM(s) IntraMuscular once  insulin glargine Injectable (LANTUS) 16 Unit(s) SubCutaneous at bedtime  insulin lispro (ADMELOG) corrective regimen sliding scale   SubCutaneous three times a day before meals  insulin lispro Injectable (ADMELOG) 6 Unit(s) SubCutaneous three times a day before meals  mycophenolate mofetil 500 milliGRAM(s) Oral two times a day  NIFEdipine XL 60 milliGRAM(s) Oral daily  polyethylene glycol 3350 17 Gram(s) Oral daily  predniSONE   Tablet 2.5 milliGRAM(s) Oral daily  senna 2 Tablet(s) Oral at bedtime  tacrolimus ER Tablet (ENVARSUS XR) 4 milliGRAM(s) Oral daily  tamsulosin 0.4 milliGRAM(s) Oral at bedtime    PRN Inpatient Medications  acetaminophen     Tablet .. 650 milliGRAM(s) Oral every 6 hours PRN  dextrose Oral Gel 15 Gram(s) Oral once PRN  melatonin 3 milliGRAM(s) Oral at bedtime PRN      REVIEW OF SYSTEMS  --------------------------------------------------------------------------------  As per HPI        VITALS/PHYSICAL EXAM  --------------------------------------------------------------------------------  T(C): 37.1 (11-16-22 @ 11:54), Max: 37.2 (11-15-22 @ 21:53)  HR: 70 (11-16-22 @ 11:54) (62 - 70)  BP: 124/70 (11-16-22 @ 11:54) (124/62 - 152/68)  RR: 18 (11-16-22 @ 11:54) (18 - 18)  SpO2: 92% (11-16-22 @ 11:54) (92% - 95%)  Wt(kg): --  Height (cm): 165.1 (11-15-22 @ 18:30)  Weight (kg): 89.6 (11-15-22 @ 18:30)  BMI (kg/m2): 32.9 (11-15-22 @ 18:30)  BSA (m2): 1.97 (11-15-22 @ 18:30)      11-15-22 @ 07:01  -  11-16-22 @ 07:00  --------------------------------------------------------  IN: 480 mL / OUT: 1000 mL / NET: -520 mL    11-16-22 @ 07:01  -  11-16-22 @ 12:01  --------------------------------------------------------  IN: 240 mL / OUT: 0 mL / NET: 240 mL      Physical Exam:  	Gen: Not in distress, well-appearing  	HEENT: no scleral icterus, moist oral mucosa. No thrush. Supple neck, no JVD  	Pulm: normal respiratory effort, lungs clear to auscultation bilaterally   	CV: regular rate and rhythm, S1 and S2 normal, no murmur   	Abd: normoactive bowel sounds, soft and non distended abdomen. No tenderness, guarding or rigidity                    Transplant non tender, no bruit  	: No suprapubic tenderness          Back: No spinal or CVA tenderness; no pre sacral edema          Extremities: trace edema. Distal pulses 2+ bilaterally           Skin: warm no rash, no cyanosis   	Neuro: Alert and oriented to person, place and time. Normal speech. Normal affect.       LABS/STUDIES  --------------------------------------------------------------------------------              10.5   5.21  >-----------<  202      [11-16-22 @ 06:16]              32.8     139  |  103  |  31  ----------------------------<  175      [11-16-22 @ 06:15]  4.1   |  26  |  1.28        Ca     9.3     [11-16-22 @ 06:15]      Mg     1.8     [11-16-22 @ 06:15]      Phos  4.1     [11-16-22 @ 06:15]    TPro  7.1  /  Alb  4.0  /  TBili  0.7  /  DBili  x   /  AST  20  /  ALT  18  /  AlkPhos  101  [11-15-22 @ 01:49]    PT/INR: PT 13.6 , INR 1.18       [11-15-22 @ 01:48]  PTT: 31.7       [11-15-22 @ 01:48]      Creatinine Trend:  SCr 1.28 [11-16 @ 06:15]  SCr 1.03 [11-15 @ 14:17]  SCr 1.15 [11-15 @ 01:49]    Urinalysis - [11-15-22 @ 02:19]      Color Yellow / Appearance Clear / SG 1.025 / pH 5.5      Gluc Negative / Ketone Negative  / Bili Negative / Urobili 2 mg/dL       Blood Negative / Protein 30 mg/dL / Leuk Est Small / Nitrite Negative      RBC 2 / WBC 11 / Hyaline 4 / Gran  / Sq Epi  / Non Sq Epi 1 / Bacteria Negative        HBsAb 56.4      [03-09-21 @ 17:13]  HBsAg Nonreact      [03-09-21 @ 17:13]  HBcAb Nonreact      [03-09-21 @ 17:13]  HCV 0.32, Nonreact      [03-09-21 @ 17:13]  HIV Nonreact      [03-09-21 @ 18:20]      TacrolimusTacrolimus (), Serum: 8.4 ng/mL (11-16 @ 06:16)  Tacrolimus (), Serum: 5.8 ng/mL (11-15 @ 14:17)    Cyclosporine  Sirolimus  Mycophenolate  BK PCR  CMV PCR  Parvo PCR  EBV PCR

## 2022-11-17 ENCOUNTER — TRANSCRIPTION ENCOUNTER (OUTPATIENT)
Age: 72
End: 2022-11-17

## 2022-11-17 VITALS
TEMPERATURE: 98 F | SYSTOLIC BLOOD PRESSURE: 124 MMHG | DIASTOLIC BLOOD PRESSURE: 54 MMHG | RESPIRATION RATE: 18 BRPM | HEART RATE: 67 BPM | OXYGEN SATURATION: 93 %

## 2022-11-17 LAB
ANION GAP SERPL CALC-SCNC: 11 MMOL/L — SIGNIFICANT CHANGE UP (ref 5–17)
BUN SERPL-MCNC: 36 MG/DL — HIGH (ref 7–23)
CALCIUM SERPL-MCNC: 9.1 MG/DL — SIGNIFICANT CHANGE UP (ref 8.4–10.5)
CHLORIDE SERPL-SCNC: 101 MMOL/L — SIGNIFICANT CHANGE UP (ref 96–108)
CO2 SERPL-SCNC: 27 MMOL/L — SIGNIFICANT CHANGE UP (ref 22–31)
CREAT SERPL-MCNC: 1.32 MG/DL — HIGH (ref 0.5–1.3)
EGFR: 57 ML/MIN/1.73M2 — LOW
GLUCOSE BLDC GLUCOMTR-MCNC: 152 MG/DL — HIGH (ref 70–99)
GLUCOSE BLDC GLUCOMTR-MCNC: 159 MG/DL — HIGH (ref 70–99)
GLUCOSE BLDC GLUCOMTR-MCNC: 166 MG/DL — HIGH (ref 70–99)
GLUCOSE SERPL-MCNC: 164 MG/DL — HIGH (ref 70–99)
HCT VFR BLD CALC: 35.3 % — LOW (ref 39–50)
HGB BLD-MCNC: 11.3 G/DL — LOW (ref 13–17)
MAGNESIUM SERPL-MCNC: 1.8 MG/DL — SIGNIFICANT CHANGE UP (ref 1.6–2.6)
MCHC RBC-ENTMCNC: 31.7 PG — SIGNIFICANT CHANGE UP (ref 27–34)
MCHC RBC-ENTMCNC: 32 GM/DL — SIGNIFICANT CHANGE UP (ref 32–36)
MCV RBC AUTO: 99.2 FL — SIGNIFICANT CHANGE UP (ref 80–100)
NRBC # BLD: 0 /100 WBCS — SIGNIFICANT CHANGE UP (ref 0–0)
PHOSPHATE SERPL-MCNC: 4.1 MG/DL — SIGNIFICANT CHANGE UP (ref 2.5–4.5)
PLATELET # BLD AUTO: 201 K/UL — SIGNIFICANT CHANGE UP (ref 150–400)
POTASSIUM SERPL-MCNC: 3.9 MMOL/L — SIGNIFICANT CHANGE UP (ref 3.5–5.3)
POTASSIUM SERPL-SCNC: 3.9 MMOL/L — SIGNIFICANT CHANGE UP (ref 3.5–5.3)
RBC # BLD: 3.56 M/UL — LOW (ref 4.2–5.8)
RBC # FLD: 12.6 % — SIGNIFICANT CHANGE UP (ref 10.3–14.5)
SODIUM SERPL-SCNC: 139 MMOL/L — SIGNIFICANT CHANGE UP (ref 135–145)
TACROLIMUS SERPL-MCNC: 9.6 NG/ML — SIGNIFICANT CHANGE UP
WBC # BLD: 5.87 K/UL — SIGNIFICANT CHANGE UP (ref 3.8–10.5)
WBC # FLD AUTO: 5.87 K/UL — SIGNIFICANT CHANGE UP (ref 3.8–10.5)

## 2022-11-17 PROCEDURE — 83880 ASSAY OF NATRIURETIC PEPTIDE: CPT

## 2022-11-17 PROCEDURE — 83036 HEMOGLOBIN GLYCOSYLATED A1C: CPT

## 2022-11-17 PROCEDURE — 99239 HOSP IP/OBS DSCHRG MGMT >30: CPT | Mod: GC

## 2022-11-17 PROCEDURE — 85027 COMPLETE CBC AUTOMATED: CPT

## 2022-11-17 PROCEDURE — 86850 RBC ANTIBODY SCREEN: CPT

## 2022-11-17 PROCEDURE — 82330 ASSAY OF CALCIUM: CPT

## 2022-11-17 PROCEDURE — 85018 HEMOGLOBIN: CPT

## 2022-11-17 PROCEDURE — 83605 ASSAY OF LACTIC ACID: CPT

## 2022-11-17 PROCEDURE — 86901 BLOOD TYPING SEROLOGIC RH(D): CPT

## 2022-11-17 PROCEDURE — 84295 ASSAY OF SERUM SODIUM: CPT

## 2022-11-17 PROCEDURE — 84132 ASSAY OF SERUM POTASSIUM: CPT

## 2022-11-17 PROCEDURE — 71045 X-RAY EXAM CHEST 1 VIEW: CPT

## 2022-11-17 PROCEDURE — 71275 CT ANGIOGRAPHY CHEST: CPT | Mod: MA

## 2022-11-17 PROCEDURE — 85025 COMPLETE CBC W/AUTO DIFF WBC: CPT

## 2022-11-17 PROCEDURE — 82803 BLOOD GASES ANY COMBINATION: CPT

## 2022-11-17 PROCEDURE — 80053 COMPREHEN METABOLIC PANEL: CPT

## 2022-11-17 PROCEDURE — 80048 BASIC METABOLIC PNL TOTAL CA: CPT

## 2022-11-17 PROCEDURE — 86900 BLOOD TYPING SEROLOGIC ABO: CPT

## 2022-11-17 PROCEDURE — 85014 HEMATOCRIT: CPT

## 2022-11-17 PROCEDURE — 82947 ASSAY GLUCOSE BLOOD QUANT: CPT

## 2022-11-17 PROCEDURE — 82962 GLUCOSE BLOOD TEST: CPT

## 2022-11-17 PROCEDURE — 80180 DRUG SCRN QUAN MYCOPHENOLATE: CPT

## 2022-11-17 PROCEDURE — 82435 ASSAY OF BLOOD CHLORIDE: CPT

## 2022-11-17 PROCEDURE — 83735 ASSAY OF MAGNESIUM: CPT

## 2022-11-17 PROCEDURE — 85730 THROMBOPLASTIN TIME PARTIAL: CPT

## 2022-11-17 PROCEDURE — 84100 ASSAY OF PHOSPHORUS: CPT

## 2022-11-17 PROCEDURE — 99285 EMERGENCY DEPT VISIT HI MDM: CPT

## 2022-11-17 PROCEDURE — 81001 URINALYSIS AUTO W/SCOPE: CPT

## 2022-11-17 PROCEDURE — 87637 SARSCOV2&INF A&B&RSV AMP PRB: CPT

## 2022-11-17 PROCEDURE — 36415 COLL VENOUS BLD VENIPUNCTURE: CPT

## 2022-11-17 PROCEDURE — 80197 ASSAY OF TACROLIMUS: CPT

## 2022-11-17 PROCEDURE — 93306 TTE W/DOPPLER COMPLETE: CPT

## 2022-11-17 PROCEDURE — 85610 PROTHROMBIN TIME: CPT

## 2022-11-17 RX ORDER — FUROSEMIDE 10 MG/ML
2 INJECTION INTRAMUSCULAR; INTRAVENOUS
Qty: 3 | Refills: 0 | DISCHARGE
Start: 2022-11-17 | End: 2022-11-19

## 2022-11-17 RX ORDER — FUROSEMIDE 40 MG
20 TABLET ORAL DAILY
Refills: 0 | Status: DISCONTINUED | OUTPATIENT
Start: 2022-11-18 | End: 2022-11-17

## 2022-11-17 RX ORDER — FUROSEMIDE 40 MG
1 TABLET ORAL
Qty: 3 | Refills: 0
Start: 2022-11-17 | End: 2022-11-19

## 2022-11-17 RX ORDER — MAGNESIUM SULFATE 500 MG/ML
2 VIAL (ML) INJECTION ONCE
Refills: 0 | Status: COMPLETED | OUTPATIENT
Start: 2022-11-17 | End: 2022-11-17

## 2022-11-17 RX ORDER — POTASSIUM CHLORIDE 20 MEQ
40 PACKET (EA) ORAL ONCE
Refills: 0 | Status: COMPLETED | OUTPATIENT
Start: 2022-11-17 | End: 2022-11-17

## 2022-11-17 RX ADMIN — Medication 60 MILLIGRAM(S): at 05:21

## 2022-11-17 RX ADMIN — MYCOPHENOLATE MOFETIL 500 MILLIGRAM(S): 250 CAPSULE ORAL at 07:35

## 2022-11-17 RX ADMIN — Medication 25 GRAM(S): at 07:36

## 2022-11-17 RX ADMIN — Medication 40 MILLIEQUIVALENT(S): at 07:37

## 2022-11-17 RX ADMIN — Medication 6 UNIT(S): at 08:55

## 2022-11-17 RX ADMIN — Medication 6 UNIT(S): at 17:54

## 2022-11-17 RX ADMIN — CARVEDILOL PHOSPHATE 25 MILLIGRAM(S): 80 CAPSULE, EXTENDED RELEASE ORAL at 05:21

## 2022-11-17 RX ADMIN — DULOXETINE HYDROCHLORIDE 60 MILLIGRAM(S): 30 CAPSULE, DELAYED RELEASE ORAL at 12:34

## 2022-11-17 RX ADMIN — DAPSONE 100 MILLIGRAM(S): 100 TABLET ORAL at 12:34

## 2022-11-17 RX ADMIN — TACROLIMUS 4 MILLIGRAM(S): 5 CAPSULE ORAL at 07:35

## 2022-11-17 RX ADMIN — Medication 1: at 08:55

## 2022-11-17 RX ADMIN — Medication 1: at 17:55

## 2022-11-17 RX ADMIN — Medication 81 MILLIGRAM(S): at 12:34

## 2022-11-17 RX ADMIN — Medication 40 MILLIGRAM(S): at 05:21

## 2022-11-17 RX ADMIN — Medication 1: at 12:35

## 2022-11-17 RX ADMIN — Medication 6 UNIT(S): at 12:34

## 2022-11-17 RX ADMIN — POLYETHYLENE GLYCOL 3350 17 GRAM(S): 17 POWDER, FOR SOLUTION ORAL at 12:34

## 2022-11-17 RX ADMIN — CALCITRIOL 0.25 MICROGRAM(S): 0.5 CAPSULE ORAL at 12:34

## 2022-11-17 NOTE — PROGRESS NOTE ADULT - PROBLEM SELECTOR PLAN 5
DVT ppx: Lovenox  Diet: Renal replacement/CC  Dispo: Home pending O2 availability
DVT ppx: Lovenox  Diet: Renal replacement/CC  Dispo: Pending medical management

## 2022-11-17 NOTE — PROGRESS NOTE ADULT - SUBJECTIVE AND OBJECTIVE BOX
PROGRESS NOTE:       Patient is a 72y old  Male who presents with a chief complaint of COVID, Cough (16 Nov 2022 13:02)      SUBJECTIVE / OVERNIGHT EVENTS: Notes improvement of LE Edema denies other symptoms denies SOB f/c/n/v eating sleeping voiding well    ADDITIONAL REVIEW OF SYSTEMS: as above    PHYSICAL EXAM:  Vital Signs Last 24 Hrs  T(C): 36.5 (17 Nov 2022 12:28), Max: 37 (16 Nov 2022 21:56)  T(F): 97.7 (17 Nov 2022 12:28), Max: 98.6 (16 Nov 2022 21:56)  HR: 72 (17 Nov 2022 12:28) (67 - 75)  BP: 136/78 (17 Nov 2022 12:28) (131/67 - 136/78)  BP(mean): --  RR: 18 (17 Nov 2022 12:28) (18 - 18)  SpO2: 96% (17 Nov 2022 12:28) (93% - 96%)    Parameters below as of 17 Nov 2022 12:28  Patient On (Oxygen Delivery Method): nasal cannula  O2 Flow (L/min): 2    GENERAL: NAD, laying in bed  HEAD:  Atraumatic, Normocephalic  EYES: EOMI, conjunctiva and sclera clear  ENT: Moist mucous membranes  CHEST/LUNG: Clear to auscultation bilaterally; No rales, rhonchi, wheezing, or rubs. Unlabored respirations  HEART: Regular rate and rhythm; No murmurs, rubs, or gallops  ABDOMEN: +BS; Soft, nontender, nondistended  EXTREMITIES:  Trace LE to mid shins   NERVOUS SYSTEM:  A&Ox3, no focal deficits   SKIN: No rashes or lesions  Psych: Normal speech, normal behavior, normal affect      MEDICATIONS  (STANDING):  aspirin  chewable 81 milliGRAM(s) Oral daily  calcitriol   Capsule 0.25 MICROGram(s) Oral daily  carvedilol 25 milliGRAM(s) Oral every 12 hours  dapsone 100 milliGRAM(s) Oral daily  dextrose 5%. 1000 milliLiter(s) (50 mL/Hr) IV Continuous <Continuous>  dextrose 5%. 1000 milliLiter(s) (100 mL/Hr) IV Continuous <Continuous>  dextrose 50% Injectable 25 Gram(s) IV Push once  dextrose 50% Injectable 12.5 Gram(s) IV Push once  dextrose 50% Injectable 25 Gram(s) IV Push once  DULoxetine 60 milliGRAM(s) Oral daily  glucagon  Injectable 1 milliGRAM(s) IntraMuscular once  insulin glargine Injectable (LANTUS) 16 Unit(s) SubCutaneous at bedtime  insulin lispro (ADMELOG) corrective regimen sliding scale   SubCutaneous three times a day before meals  insulin lispro Injectable (ADMELOG) 6 Unit(s) SubCutaneous three times a day before meals  mycophenolate mofetil 500 milliGRAM(s) Oral two times a day  NIFEdipine XL 60 milliGRAM(s) Oral daily  polyethylene glycol 3350 17 Gram(s) Oral daily  senna 2 Tablet(s) Oral at bedtime  tacrolimus ER Tablet (ENVARSUS XR) 4 milliGRAM(s) Oral daily  tamsulosin 0.4 milliGRAM(s) Oral at bedtime    MEDICATIONS  (PRN):  acetaminophen     Tablet .. 650 milliGRAM(s) Oral every 6 hours PRN Temp greater or equal to 38C (100.4F), Mild Pain (1 - 3)  dextrose Oral Gel 15 Gram(s) Oral once PRN Blood Glucose LESS THAN 70 milliGRAM(s)/deciliter  melatonin 3 milliGRAM(s) Oral at bedtime PRN Insomnia        I&O's Summary    16 Nov 2022 07:01  -  17 Nov 2022 07:00  --------------------------------------------------------  IN: 1320 mL / OUT: 400 mL / NET: 920 mL          LABS:  CAPILLARY BLOOD GLUCOSE      POCT Blood Glucose.: 152 mg/dL (17 Nov 2022 12:12)  POCT Blood Glucose.: 166 mg/dL (17 Nov 2022 08:50)  POCT Blood Glucose.: 263 mg/dL (16 Nov 2022 21:39)  POCT Blood Glucose.: 247 mg/dL (16 Nov 2022 21:38)  POCT Blood Glucose.: 259 mg/dL (16 Nov 2022 21:37)  POCT Blood Glucose.: 97 mg/dL (16 Nov 2022 17:22)                          11.3   5.87  )-----------( 201      ( 17 Nov 2022 06:34 )             35.3     11-17    139  |  101  |  36<H>  ----------------------------<  164<H>  3.9   |  27  |  1.32<H>    Ca    9.1      17 Nov 2022 06:34  Phos  4.1     11-17  Mg     1.8     11-17                      RADIOLOGY & ADDITIONAL TESTS:      COORDINATION OF CARE:    
PROGRESS NOTE:       Patient is a 72y old  Male who presents with a chief complaint of     SUBJECTIVE / OVERNIGHT EVENTS: NAOEN Improved ON. Denies symptoms. Denies lethargy noting day prior was 2/2 not sleeping in ED. Denies blurry vison headaches    ADDITIONAL REVIEW OF SYSTEMS: as above       PHYSICAL EXAM:  Vital Signs Last 24 Hrs  T(C): 37.1 (2022 11:54), Max: 37.2 (15 Nov 2022 21:53)  T(F): 98.7 (2022 11:54), Max: 99 (15 Nov 2022 21:53)  HR: 70 (2022 11:54) (62 - 70)  BP: 124/70 (2022 11:54) (124/62 - 152/68)  BP(mean): --  RR: 18 (2022 11:54) (18 - 18)  SpO2: 94% (2022 12:18) (83% - 95%)    Parameters below as of 2022 12:18  Patient On (Oxygen Delivery Method): nasal cannula  O2 Flow (L/min): 2    GENERAL: NAD, sitting up eating on RA (flowsheetins noting NC)  HEAD:  Atraumatic, Normocephalic  EYES: EOMI, conjunctiva and sclera clear  ENT: Moist mucous membranes  CHEST/LUNG: Clear to auscultation bilaterally; No rales, rhonchi, wheezing, or rubs. Unlabored respirations  HEART: Regular rate and rhythm; No murmurs, rubs, or gallops  ABDOMEN: +BS; Soft, nontender, nondistended  EXTREMITIES:  GREG 2+ to knee on L, 1+ to knees on R.  NERVOUS SYSTEM:  A&Ox3, no focal deficits   SKIN: No rashes or lesions  Psych: Normal speech, normal behavior, normal affect      MEDICATIONS  (STANDING):  aspirin  chewable 81 milliGRAM(s) Oral daily  calcitriol   Capsule 0.25 MICROGram(s) Oral daily  carvedilol 25 milliGRAM(s) Oral every 12 hours  dapsone 100 milliGRAM(s) Oral daily  dextrose 5%. 1000 milliLiter(s) (50 mL/Hr) IV Continuous <Continuous>  dextrose 5%. 1000 milliLiter(s) (100 mL/Hr) IV Continuous <Continuous>  dextrose 50% Injectable 25 Gram(s) IV Push once  dextrose 50% Injectable 12.5 Gram(s) IV Push once  dextrose 50% Injectable 25 Gram(s) IV Push once  DULoxetine 60 milliGRAM(s) Oral daily  furosemide   Injectable 40 milliGRAM(s) IV Push daily  furosemide   Injectable 20 milliGRAM(s) IV Push once  glucagon  Injectable 1 milliGRAM(s) IntraMuscular once  insulin glargine Injectable (LANTUS) 16 Unit(s) SubCutaneous at bedtime  insulin lispro (ADMELOG) corrective regimen sliding scale   SubCutaneous three times a day before meals  insulin lispro Injectable (ADMELOG) 6 Unit(s) SubCutaneous three times a day before meals  mycophenolate mofetil 500 milliGRAM(s) Oral two times a day  NIFEdipine XL 60 milliGRAM(s) Oral daily  polyethylene glycol 3350 17 Gram(s) Oral daily  predniSONE   Tablet 2.5 milliGRAM(s) Oral daily  senna 2 Tablet(s) Oral at bedtime  tacrolimus ER Tablet (ENVARSUS XR) 4 milliGRAM(s) Oral daily  tamsulosin 0.4 milliGRAM(s) Oral at bedtime    MEDICATIONS  (PRN):  acetaminophen     Tablet .. 650 milliGRAM(s) Oral every 6 hours PRN Temp greater or equal to 38C (100.4F), Mild Pain (1 - 3)  dextrose Oral Gel 15 Gram(s) Oral once PRN Blood Glucose LESS THAN 70 milliGRAM(s)/deciliter  melatonin 3 milliGRAM(s) Oral at bedtime PRN Insomnia        I&O's Summary    15 Nov 2022 07:  -  2022 07:00  --------------------------------------------------------  IN: 480 mL / OUT: 1000 mL / NET: -520 mL    :  -  2022 13:03  --------------------------------------------------------  IN: 240 mL / OUT: 0 mL / NET: 240 mL          LABS:  CAPILLARY BLOOD GLUCOSE      POCT Blood Glucose.: 153 mg/dL (2022 12:27)  POCT Blood Glucose.: 164 mg/dL (2022 08:33)  POCT Blood Glucose.: 231 mg/dL (15 Nov 2022 21:04)  POCT Blood Glucose.: 187 mg/dL (15 Nov 2022 15:42)  POCT Blood Glucose.: 151 mg/dL (15 Nov 2022 13:25)                          10.5   5.21  )-----------( 202      ( 2022 06:16 )             32.8     11-16    139  |  103  |  31<H>  ----------------------------<  175<H>  4.1   |  26  |  1.28    Ca    9.3      2022 06:15  Phos  4.1     11-16  Mg     1.8     -16    TPro  7.1  /  Alb  4.0  /  TBili  0.7  /  DBili  x   /  AST  20  /  ALT  18  /  AlkPhos  101  11-15    PT/INR - ( 15 Nov 2022 01:48 )   PT: 13.6 sec;   INR: 1.18 ratio         PTT - ( 15 Nov 2022 01:48 )  PTT:31.7 sec        Urinalysis Basic - ( 15 Nov 2022 02:19 )    Color: Yellow / Appearance: Clear / S.025 / pH: x  Gluc: x / Ketone: Negative  / Bili: Negative / Urobili: 2 mg/dL   Blood: x / Protein: 30 mg/dL / Nitrite: Negative   Leuk Esterase: Small / RBC: 2 /hpf / WBC 11 /HPF   Sq Epi: x / Non Sq Epi: 1 /hpf / Bacteria: Negative            RADIOLOGY & ADDITIONAL TESTS:      COORDINATION OF CARE:

## 2022-11-17 NOTE — DISCHARGE NOTE PROVIDER - NSDCCPCAREPLAN_GEN_ALL_CORE_FT
PRINCIPAL DISCHARGE DIAGNOSIS  Diagnosis: Hypertension  Assessment and Plan of Treatment: You presented with high blood pressure and lower extremity edema. Your measures systolic blood pressures as high as 200 and home and we measured systolic blood pressures as high as 195 in the emergency department. You noted missing some doses of your antihypertensives though this was after your blood pressures began to rise. When we resumed your home regimen here, your blood pressure resolved. Your lower extremity edema resolved with medication g(Furosemide aka Lasix given intravenously) You should continue to take Furosemide/Lasix 20mg orally for the next three days and follow up with your Nephrologist. You should continue your home medications beginning tonight.  If you notice a recurrence of high blood pressure especially with weakness, fatigue, headaches please seek medical attention. If your legs begin swelling again, please reach out to your nephrologist who may advise returning to the emergency department.      SECONDARY DISCHARGE DIAGNOSES  Diagnosis: Hypoxia  Assessment and Plan of Treatment: You required oxygen therapy while in the hospital. Based on your oxgyen levels (oxygen saturation) while walking, you would benefit from continuous home oxygen. An echocardiogram noted mild pulmonary hypertension (high blood pressure in the arteries leading to the lung). This may be due to obstructive sleep apnea. You should follow up with a sleep specialist (the number for the [facility] has been provided, though you may seek care with any sleep doctor) who may reccomend a sleep study.  If you become short of breath, especially at rest, please seek medical attention.

## 2022-11-17 NOTE — DISCHARGE NOTE PROVIDER - NSDCCPTREATMENT_GEN_ALL_CORE_FT
PRINCIPAL PROCEDURE  Procedure: Echo 2D  Findings and Treatment: Conclusions:  Endocardial visualization enhanced with intravenous  injection of Ultrasonic Enhancing Agent (Lumason).  Normal left ventricular systolic function. No segmental  wall motion abnormalities.  Left ventricular filling pressure is elevated.  Mild pulmonary hypertension.  EF (Visual Estimate): 70 %

## 2022-11-17 NOTE — DISCHARGE NOTE PROVIDER - NSDCFUSCHEDAPPT_GEN_ALL_CORE_FT
SANAM ZHANG Physician [facility]  OPHTHALM [address]  Scheduled Appointment: 11/28/2022

## 2022-11-17 NOTE — PROGRESS NOTE ADULT - PROBLEM/PLAN-1
Problem: Labor (Cervical Ripen, Induct, Augment) (Adult,Obstetrics,Pediatric)  Intervention: Prevent/Manage Maternal Infection   19 155   Safety Management   Infection Prevention single patient room provided;rest/sleep promoted   Safety Interventions   Infection Management aseptic technique maintained     Intervention: Position/Reposition to Promote Fetal Well-Being/Optimize Contraction Pattern   19 155   Maternal Comfort/Care   Activity In Labor bedrest with bathroom privileges     Intervention: Monitor/Manage Labor Dystocia   19 155   Nutrition Interventions   Fluid/Electrolyte Management intravenous fluid replacement initiated     Intervention: Monitor/Manage Labor Pain   19 155   Promote Oxygenation/Perfusion   Pain Management Interventions pain management plan reviewed with patient/caregiver;see MAR     Intervention: Provide Emotional Support and Encouragement   19 155   Interventions   Trust Relationship/Rapport care explained;choices provided;emotional support provided;empathic listening provided     Intervention: Monitor/Manage Maternal Hemodynamic Stability   19 155   Reproductive Interventions    Bleed Management Rh status confirmed       Goal: Signs and Symptoms of Listed Potential Problems Will be Absent, Minimized or Managed (Labor)  Outcome: Ongoing (interventions implemented as appropriate)   19   Goal/Outcome Evaluation   Problems Assessed (Labor) all   Problems Present (Labor) pain         
Problem: Patient Care Overview  Goal: Plan of Care Review  Outcome: Ongoing (interventions implemented as appropriate)   01/18/19 0900   Coping/Psychosocial   Plan of Care Reviewed With patient;mother   OTHER   Outcome Summary Baby nursed well bilaterally with assistance. Instructed to feed baby on demand and no longer than 3 hours before attempting.         
Problem: Patient Care Overview  Goal: Plan of Care Review  Outcome: Ongoing (interventions implemented as appropriate)   19   Coping/Psychosocial   Plan of Care Reviewed With patient;significant other;mother   Plan of Care Review   Progress improving     Goal: Individualization and Mutuality  Outcome: Ongoing (interventions implemented as appropriate)   19   Individualization   Patient Specific Preferences epidural, breastfeeding   Patient Specific Goals (Include Timeframe) have happy healthy baby boy   Patient Specific Interventions pit per protocol induction     Goal: Discharge Needs Assessment  Outcome: Ongoing (interventions implemented as appropriate)   19   Discharge Needs Assessment   Concerns to be Addressed denies needs/concerns at this time       Problem: Labor (Cervical Ripen, Induct, Augment) (Adult,Obstetrics,Pediatric)  Intervention: Position/Reposition to Promote Fetal Well-Being/Optimize Contraction Pattern   19   Maternal Comfort/Care   Activity In Labor bedrest with bathroom privileges     Intervention: Monitor/Manage Labor Dystocia   19   Nutrition Interventions   Fluid/Electrolyte Management fluids provided  (iv/ice chips)     Intervention: Monitor/Manage Labor Pain   19   Promote Oxygenation/Perfusion   Pain Management Interventions pain management plan reviewed with patient/caregiver     Intervention: Monitor/Manage Maternal Hemodynamic Stability   19   Reproductive Interventions    Bleed Management Rh status confirmed       Goal: Signs and Symptoms of Listed Potential Problems Will be Absent, Minimized or Managed (Labor)  Outcome: Ongoing (interventions implemented as appropriate)   19   Goal/Outcome Evaluation   Problems Assessed (Labor) all   Problems Present (Labor) none         
DISPLAY PLAN FREE TEXT
DISPLAY PLAN FREE TEXT

## 2022-11-17 NOTE — DISCHARGE NOTE NURSING/CASE MANAGEMENT/SOCIAL WORK - PATIENT PORTAL LINK FT
You can access the FollowMyHealth Patient Portal offered by [facility] by registering at the following website: [email or web address]. By joining [employer]’s FollowMyHealth portal, you will also be able to view your health information using other applications (apps) compatible with our system.

## 2022-11-17 NOTE — PROGRESS NOTE ADULT - ASSESSMENT
71 y/o M  hx  DM, HTN, MDD, Anxiety, Kidney Transplant (3/2021)w/ worsening LE edema and hypertension without symptoms. BPs resolved without intervention  
71 y/o M  hx  DM, HTN, MDD, Anxiety, Kidney Transplant (3/2021)w/ worsening LE edema and hypertension without symptoms. BPs resolved without intervention

## 2022-11-17 NOTE — DISCHARGE NOTE PROVIDER - CARE PROVIDER_API CALL
Bryant Patino (MD; MPH)  Internal Medicine; Nephrology  [address] [place] [address]  Phone: [phone]  Fax: [phone]  Established Patient  Follow Up Time: 2 weeks

## 2022-11-17 NOTE — PROGRESS NOTE ADULT - PROBLEM SELECTOR PLAN 3
Transplanted 3/21  On tacro, mycophenolate  Tacto 11/17: 9.7    Plan:  - Continue home immunosuppressants (tacro, mycophen)  - Daily tacro level  - Continue home prophylaxis
Transplanted 3/21  On tacro, mycophenolate,     Plan:  - Continue home immunosuppressants (tacro, mycophen)  - Daily tacro level  - Continue home prophylaxis

## 2022-11-17 NOTE — PROGRESS NOTE ADULT - PROBLEM SELECTOR PLAN 2
sBPs at home up to 200s..  Here sBP initially 195, down to 120s, some in 150s.124<-121<-151<-148<-160<-195  Latest 124/62  Home regimen: Coreg 25mg BID, Nifedipine 60mg qHS    Plan:  -Continue home coreg  -Continue home Nifedipine, of note, has been dosed in am here while qHS at home. Will monitor BPs today and possibly resume nightly dosing tn
sBPs at home up to 200s..  Here sBP initially 195, down to 120s, some in 150s overall accepable  Home regimen: Coreg 25mg BID, Nifedipine 60mg qHS    Plan:  -Continue home coreg  -Continue home Nifedipine

## 2022-11-17 NOTE — PROGRESS NOTE ADULT - PROBLEM SELECTOR PLAN 1
No O2 rec at home, on O2 here  Ambulatory O2 83-86 on RA, 93-94 on 2L NC  TTE wnl    Plan  - IV Lasix 20mg this am, addl 20mg now, then 40mg QD starting tomorrow  - Strict Is/Os  - Daily weights  - Wean O2 as tolerated  - Neph rec Lasix 20mg PO x 3 days o dischargebut given desat ambulating likely more inpatient diuresis rec
No O2 rec at home, on O2 here  Ambulatory O2 83-86 on RA, 93-94 on 2L NC  TTE wnl    Plan  - C/W Lasix 40mg QD today, can DC with 3 days 20mg PO lasix   - DC with home O2  - Strict Is/Os  - Daily weights  - Wean O2 as tolerated  - Neph rec Lasix 20mg PO x 3 days o dischargebut given desat ambulating likely more inpatient diuresis rec

## 2022-11-17 NOTE — PROGRESS NOTE ADULT - PROBLEM SELECTOR PLAN 4
20 basal at home with 8 at meals  Glucose here acceptable so far.    Plan  -16U Glargine qHS  -6U Mealtime  -CTM basal glucose
20 basal at home with 8 at meals  Glucose here acceptable so far.    Plan  -16U Glargine qHS  -6U Mealtime  -CTM basal glucose  DC on home regimen

## 2022-11-17 NOTE — PROGRESS NOTE ADULT - ATTENDING COMMENTS
-LE edema better with IV lasix. Noted to have supplemental O2 requirement. Pulmonary edema likely contributing. -Will increase lasix to 40mg IV daily for now. -Echo showed some mild pulmonary hypertension and elevated LV filling pressure. Also, patient reports a history consistent with undiagnosed sleep apnea, which could be contributing to this whole clinical picture. -Recommended he have a sleep study outpatient.  -DCP for tomorrow on oral lasix and with home O2.
-Mild JOJO today in setting of IV lasix. Will give 20mg po lasix x 3 days after DC per renal recs. Advised patient see renal within 1-2 weeks for lab check and reassessment of fluid status and Cr.   -Home O2 arranged. -Recommended pulm referral for outpatient sleep study.   -Improved for DC with outpatient follow up. -35 minutes spent on the DC process.

## 2022-11-17 NOTE — DISCHARGE NOTE PROVIDER - HOSPITAL COURSE
73 y/o M  hx  DM, HTN, MDD, Anxiety, Kidney Transplant (3/2021)w/ worsening LE edema and hypertension without symptoms. Found to have elevated BNP and mild pulmonary HTN. BPs resolved without intervention. LE edema resolved with diuresis. Pt had oxygen requirements while here and had O2sats 83% room air with ambulation. and 94 % on 2 liters NC with ambulation. indicating home oxygen would be beneficial. Echo with EF 70, and mild pulmonary HTN.    Pt medically cleared for discharge and should follow up with transplant nephrology and sleep medicine for possible JOSE L

## 2022-11-17 NOTE — DISCHARGE NOTE PROVIDER - NSDCMRMEDTOKEN_GEN_ALL_CORE_FT
aspirin 81 mg oral tablet, chewable: 1 tab(s) orally once a day  calcitriol 0.25 mcg oral capsule: 1 cap(s) orally once a day  carvedilol 25 mg oral tablet: 1 tab(s) orally every 12 hours  dapsone 100 mg oral tablet: 1 tab(s) orally once a day  DULoxetine 60 mg oral delayed release capsule: 1 cap(s) orally once a day  insulin glargine: 20 unit(s) subcutaneous once a day (at bedtime)  insulin lispro 100 units/mL injectable solution: 8 unit(s) injectable 3 times a day  MiraLax oral powder for reconstitution: 17 gram(s) orally once a day, As Needed  mycophenolate mofetil 250 mg oral capsule: 500 milligram(s) orally 2 times a day  NIFEdipine 30 mg oral tablet, extended release: 2 tab(s) orally once a day  senna oral tablet: 2 tab(s) orally once a day (at bedtime)  tacrolimus 1 mg oral tablet, extended release: 4 tab(s) orally once a day  tamsulosin 0.4 mg oral capsule: 1 cap(s) orally once a day (at bedtime)  Wellbutrin: orally once a day   aspirin 81 mg oral tablet, chewable: 1 tab(s) orally once a day  calcitriol 0.25 mcg oral capsule: 1 cap(s) orally once a day  carvedilol 25 mg oral tablet: 1 tab(s) orally every 12 hours  dapsone 100 mg oral tablet: 1 tab(s) orally once a day  DULoxetine 60 mg oral delayed release capsule: 1 cap(s) orally once a day  furosemide 20 mg oral tablet: 1 tab(s) orally once a day  insulin glargine: 20 unit(s) subcutaneous once a day (at bedtime)  insulin lispro 100 units/mL injectable solution: 8 unit(s) injectable 3 times a day  MiraLax oral powder for reconstitution: 17 gram(s) orally once a day, As Needed  mycophenolate mofetil 250 mg oral capsule: 500 milligram(s) orally 2 times a day  NIFEdipine 30 mg oral tablet, extended release: 2 tab(s) orally once a day  senna oral tablet: 2 tab(s) orally once a day (at bedtime)  tacrolimus 1 mg oral tablet, extended release: 4 tab(s) orally once a day  tamsulosin 0.4 mg oral capsule: 1 cap(s) orally once a day (at bedtime)  Wellbutrin: orally once a day

## 2022-11-17 NOTE — DISCHARGE NOTE PROVIDER - NSFOLLOWUPCLINICS_GEN_ALL_ED_FT
[facility]  Pulmonology  [address] [place] [address], [place] [address]  Phone: [phone]  Fax:

## 2022-11-17 NOTE — DISCHARGE NOTE NURSING/CASE MANAGEMENT/SOCIAL WORK - NSDCPEFALRISK_GEN_ALL_CORE
For information on Fall & Injury Prevention, visit: [email or web address] OR  [email or web address] OR  https://www.cdc.gov/steadi/patient.html

## 2022-11-28 ENCOUNTER — APPOINTMENT (OUTPATIENT)
Dept: OPHTHALMOLOGY | Facility: CLINIC | Age: 72
End: 2022-11-28

## 2022-11-28 ENCOUNTER — NON-APPOINTMENT (OUTPATIENT)
Age: 72
End: 2022-11-28

## 2022-11-28 PROCEDURE — 92134 CPTRZ OPH DX IMG PST SGM RTA: CPT

## 2022-11-28 PROCEDURE — 99214 OFFICE O/P EST MOD 30 MIN: CPT

## 2022-11-29 LAB
MYCOPHENOLATE SERPL-MCNC: 7.7 UG/ML — HIGH (ref 1–3.5)
MYCOPHENOLIC ACID GLUCURONIDE: 44 UG/ML — SIGNIFICANT CHANGE UP (ref 15–125)

## 2022-11-30 ENCOUNTER — APPOINTMENT (OUTPATIENT)
Dept: NEPHROLOGY | Facility: CLINIC | Age: 72
End: 2022-11-30

## 2022-11-30 VITALS
RESPIRATION RATE: 16 BRPM | HEART RATE: 69 BPM | TEMPERATURE: 98.1 F | SYSTOLIC BLOOD PRESSURE: 139 MMHG | HEIGHT: 65 IN | OXYGEN SATURATION: 95 % | WEIGHT: 196 LBS | DIASTOLIC BLOOD PRESSURE: 63 MMHG | BODY MASS INDEX: 32.65 KG/M2

## 2022-11-30 PROCEDURE — 99215 OFFICE O/P EST HI 40 MIN: CPT

## 2022-11-30 RX ORDER — ARIPIPRAZOLE 5 MG/1
5 TABLET ORAL DAILY
Qty: 30 | Refills: 0 | Status: DISCONTINUED | COMMUNITY
Start: 2022-10-11 | End: 2022-11-30

## 2022-11-30 NOTE — PHYSICAL EXAM
[General Appearance - Alert] : alert [General Appearance - In No Acute Distress] : in no acute distress [General Appearance - Well Nourished] : well nourished [General Appearance - Well Developed] : well developed [General Appearance - Well-Appearing] : healthy appearing [Sclera] : the sclera and conjunctiva were normal [PERRL With Normal Accommodation] : pupils were equal in size, round, and reactive to light [Oropharynx] : the oropharynx was normal [Jugular Venous Distention Increased] : there was no jugular-venous distention [] : no respiratory distress [Respiration, Rhythm And Depth] : normal respiratory rhythm and effort [Exaggerated Use Of Accessory Muscles For Inspiration] : no accessory muscle use [Auscultation Breath Sounds / Voice Sounds] : lungs were clear to auscultation bilaterally [Heart Sounds] : normal S1 and S2 [Heart Sounds Gallop] : no gallops [Murmurs] : no murmurs [Bowel Sounds] : normal bowel sounds [Abdomen Soft] : soft [Involuntary Movements] : no involuntary movements were seen [___ (cm) Fistula] : [unfilled] (cm) fistula [Bruit] : a bruit was present [Thrill] : a thrill was present [No Focal Deficits] : no focal deficits [Oriented To Time, Place, And Person] : oriented to person, place, and time [Impaired Insight] : insight and judgment were intact [FreeTextEntry1] : Mood better

## 2022-11-30 NOTE — HISTORY OF PRESENT ILLNESS
[FreeTextEntry1] : \par 72 year old man with ESRD due to DM on PD since April 2020. S/p DDRT on 3/9/2021.\par \par PMH: DM type II dx in 40's, complicated by retinopathy and nephropathy,  Hypertension diagnosed in his early 40’s. Hyperlipidemia, gout, Anxiety, Depression, OCD. No cardiac disease. cPRA 0%. \par \par Donor 49 yr old, DCD, KDPI 71%, Terminal Cr 8mg/dL, No HLA mismatch, CMV D-R-. \par \par Course complicated by DGF. Discharged on peritoneal dialysis. Graft function recovered and has been off PD 3/30/21. PD catheter and transplant ureteric stent removed on 4/22/21. He was hospitalized 4/30/21 with hyperkalemia, metabolic acidosis  and severe hyperglycemia. Managed with insulin. Bactrim d/ame \par Completed 2nd dose of COVID vaccine in February 2021. Received 3rd dose in Sept. 2021 \par COVID + in Jan 2022, had no symptoms , received monoclonal antibody infusion on 01/06/2022. \par S/p bilateral Cataract surgery 5/2022 \par Prednisone discontinued  pt felt it was contributing to his psych symptoms.\par Takes aspirin 5 times a week, bruising improved \par \par Presents for scheduled follow up, last seen 3 months ago. \par Hospitalized 11/15/22-11/17/22 after presenting with high BP in 200's systolic associated with LE edema. \par Noted to have hypoxia to 83% on room air and Congestion on x ray. Infectious w/u negative. No prior cardiac or lung problems. He does nto take NSAIDs. No new meds or changes in diet. \par Rx with IV Lasix and d/c home with oxygen and Lasix 20mg po daily. Echo with EF of 70% with mild pul HTN. \par \par He has been taking lasix 20mg daily. BP still erratic 130-180 systolic. LE edema is improving but still present. \par No fever, cough, chest pain, nasal congestion or myalgia. \par Feels better on oxygen, able to walk, drive, independent with ADLs. \par Voiding a lot. Wakes up 2-3 times to void. No trouble voiding.  No dysuria or hematuria. \par Has cardiology appt with Dr. Christiansen on Dec 15th. \par He is followed by psych for depression -depression symptoms are improving - now tolerable but his 17 yr old son was recently dx with depression which has been difficult for him. \par Blood glucose  120-150, on Lantus takes 18-22 units. Followed closely with Endocrinology.\par No NVD. Takes Dulcolax for constipation. Appetite good. Sleeping well. 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 \par \par \par

## 2022-11-30 NOTE — ASSESSMENT
[FreeTextEntry1] : \par 72 yr old man with ESRD due to DM, was on PD, s/p DDRT on 3/9/21 complicated by DGF discharged on PD. Graft function recovered and off PD since 3/30 with baseline creatinine of 1.2mg/dL. \par \par S/p DDRT on 3/9/21 with baseline creatinine of 1.2mg/dL. \par New onset worsening hypertension, pulmonary edema and LE edema in the setting of normal Echocardiogram concerning for Transplant renal artery stenosis. \par Obtain transplant kidney ultrasound to assess for TRAS.  \par \par Immunosuppression \par - S/p Simulect induction \par - On Envarsus 4mg daily. Trough goal 6-8. Will do labs later this week - took Envarsus this am\par - Continue CellCept 500 bid - dose was lowered for leukopenia\par \par Infection prophylaxis \par - Completed 6 months Valcyte prophylaxis  \par - Bactrim d/ame for hyperkalemia. Continue dapsone 100mg po daily. \par - Completed COVID vaccine. Had COVID infection in January 2022.  s/p MAB. \par - Flu shot and COVID booster received in  October 2022\par \par Diabetes type II  - On Lantus 18-22 units qhs, Humalog 4-8 units qac + sliding scale. Regularly followed by  Dr Micky Abad.   A1c 5.5%. Followed by opthalmology for diabetic retinopathy \par \par Hypertension :  On Carvedilol 25mg po bid. Nifedipine xl 60mg po qhs. BP uncontrolled possibly due to TRAS\par \par BPH: continue Flomax 0.4mg po qhs \par \par Hyperparathyroidism - on Calcitriol 0.25mcg po daily. \par \par Anxiety/OCD and depression - followed by psych. Symptoms improving. On Wellbutrin 300mg daily, \par Duloxetine  60mg po daily   \par \par F/u with Dermatologist - annual follow up. He will make appt. \par \par Primary nephrologist - Dr. Novak no longer takes insurance. He will continue f/u here for now. \par \par Labs later this week (not done today b/c he took Envarsus)\par Transplant renal US to evaluate for TRAS\par F/u here on 1/18/23. \par

## 2022-12-02 ENCOUNTER — APPOINTMENT (OUTPATIENT)
Dept: ULTRASOUND IMAGING | Facility: HOSPITAL | Age: 72
End: 2022-12-02

## 2022-12-02 ENCOUNTER — OUTPATIENT (OUTPATIENT)
Dept: OUTPATIENT SERVICES | Facility: HOSPITAL | Age: 72
LOS: 1 days | End: 2022-12-02
Payer: MEDICARE

## 2022-12-02 DIAGNOSIS — Z94.0 KIDNEY TRANSPLANT STATUS: Chronic | ICD-10-CM

## 2022-12-02 DIAGNOSIS — Z11.52 ENCOUNTER FOR SCREENING FOR COVID-19: ICD-10-CM

## 2022-12-02 DIAGNOSIS — Z94.0 KIDNEY TRANSPLANT STATUS: ICD-10-CM

## 2022-12-02 PROCEDURE — 76776 US EXAM K TRANSPL W/DOPPLER: CPT

## 2022-12-02 PROCEDURE — 76776 US EXAM K TRANSPL W/DOPPLER: CPT | Mod: 26,RT

## 2022-12-05 ENCOUNTER — NON-APPOINTMENT (OUTPATIENT)
Age: 72
End: 2022-12-05

## 2022-12-05 LAB
25(OH)D3 SERPL-MCNC: 21.6 NG/ML
ALBUMIN SERPL ELPH-MCNC: 4.6 G/DL
ALP BLD-CCNC: 103 U/L
ALT SERPL-CCNC: 11 U/L
ANION GAP SERPL CALC-SCNC: 12 MMOL/L
APPEARANCE: CLEAR
AST SERPL-CCNC: 13 U/L
BACTERIA: NEGATIVE
BASOPHILS # BLD AUTO: 0.04 K/UL
BASOPHILS NFR BLD AUTO: 0.6 %
BILIRUB SERPL-MCNC: 0.8 MG/DL
BILIRUBIN URINE: NEGATIVE
BLOOD URINE: NEGATIVE
BUN SERPL-MCNC: 38 MG/DL
CALCIUM SERPL-MCNC: 9.9 MG/DL
CALCIUM SERPL-MCNC: 9.9 MG/DL
CHLORIDE SERPL-SCNC: 102 MMOL/L
CHOLEST SERPL-MCNC: 161 MG/DL
CMV DNA SPEC QL NAA+PROBE: NOT DETECTED IU/ML
CMVPCR LOG: NOT DETECTED LOG10IU/ML
CO2 SERPL-SCNC: 28 MMOL/L
COLOR: NORMAL
CREAT SERPL-MCNC: 1.46 MG/DL
CREAT SPEC-SCNC: 53 MG/DL
CREAT/PROT UR: 0.3 RATIO
EGFR: 51 ML/MIN/1.73M2
EOSINOPHIL # BLD AUTO: 0.08 K/UL
EOSINOPHIL NFR BLD AUTO: 1.2 %
ESTIMATED AVERAGE GLUCOSE: 114 MG/DL
GLUCOSE QUALITATIVE U: NEGATIVE
GLUCOSE SERPL-MCNC: 149 MG/DL
HBA1C MFR BLD HPLC: 5.6 %
HCT VFR BLD CALC: 38.6 %
HDLC SERPL-MCNC: 59 MG/DL
HGB BLD-MCNC: 11.8 G/DL
HYALINE CASTS: 0 /LPF
IMM GRANULOCYTES NFR BLD AUTO: 0.3 %
KETONES URINE: NEGATIVE
LDH SERPL-CCNC: 248 U/L
LDLC SERPL CALC-MCNC: 80 MG/DL
LEUKOCYTE ESTERASE URINE: ABNORMAL
LYMPHOCYTES # BLD AUTO: 1.06 K/UL
LYMPHOCYTES NFR BLD AUTO: 16.5 %
MAGNESIUM SERPL-MCNC: 1.9 MG/DL
MAN DIFF?: NORMAL
MCHC RBC-ENTMCNC: 30.6 GM/DL
MCHC RBC-ENTMCNC: 31.1 PG
MCV RBC AUTO: 101.6 FL
MICROSCOPIC-UA: NORMAL
MONOCYTES # BLD AUTO: 0.78 K/UL
MONOCYTES NFR BLD AUTO: 12.1 %
NEUTROPHILS # BLD AUTO: 4.46 K/UL
NEUTROPHILS NFR BLD AUTO: 69.3 %
NITRITE URINE: NEGATIVE
NONHDLC SERPL-MCNC: 102 MG/DL
PARATHYROID HORMONE INTACT: 116 PG/ML
PH URINE: 6
PHOSPHATE SERPL-MCNC: 4.2 MG/DL
PLATELET # BLD AUTO: 185 K/UL
POTASSIUM SERPL-SCNC: 4.8 MMOL/L
PROT SERPL-MCNC: 7.3 G/DL
PROT UR-MCNC: 16 MG/DL
PROTEIN URINE: NORMAL
RBC # BLD: 3.8 M/UL
RBC # FLD: 12.8 %
RED BLOOD CELLS URINE: 1 /HPF
SODIUM SERPL-SCNC: 141 MMOL/L
SPECIFIC GRAVITY URINE: 1.01
SQUAMOUS EPITHELIAL CELLS: 0 /HPF
TACROLIMUS SERPL-MCNC: 6.9 NG/ML
TRIGL SERPL-MCNC: 109 MG/DL
URATE SERPL-MCNC: 7.7 MG/DL
UROBILINOGEN URINE: NORMAL
WBC # FLD AUTO: 6.44 K/UL
WHITE BLOOD CELLS URINE: 2 /HPF

## 2022-12-06 ENCOUNTER — APPOINTMENT (OUTPATIENT)
Dept: TRANSPLANT | Facility: CLINIC | Age: 72
End: 2022-12-06

## 2022-12-06 LAB — BKV DNA SPEC QL NAA+PROBE: NOT DETECTED IU/ML

## 2022-12-14 ENCOUNTER — NON-APPOINTMENT (OUTPATIENT)
Age: 72
End: 2022-12-14

## 2022-12-14 ENCOUNTER — APPOINTMENT (OUTPATIENT)
Dept: CARDIOLOGY | Facility: CLINIC | Age: 72
End: 2022-12-14

## 2022-12-14 VITALS
HEART RATE: 67 BPM | DIASTOLIC BLOOD PRESSURE: 60 MMHG | OXYGEN SATURATION: 93 % | BODY MASS INDEX: 31.95 KG/M2 | SYSTOLIC BLOOD PRESSURE: 130 MMHG | WEIGHT: 192 LBS

## 2022-12-14 DIAGNOSIS — Z92.89 PERSONAL HISTORY OF OTHER MEDICAL TREATMENT: ICD-10-CM

## 2022-12-14 PROCEDURE — 99215 OFFICE O/P EST HI 40 MIN: CPT | Mod: 25

## 2022-12-14 PROCEDURE — 93000 ELECTROCARDIOGRAM COMPLETE: CPT

## 2022-12-14 NOTE — REASON FOR VISIT
[Other: ____] : [unfilled] [FreeTextEntry1] : 2022 - Patient returns today for follow-up for the first time since 2020. In 2021, he received a  donor renal transplant. \par In 2022, he was admitted to [facility] with poorly controlled hypertension and worsening lower extremity edema that he attributes to forgetfulness about his medication regimen. His blood pressure improved with his home medications. He was given diuretics for lower extremity edema. \par TTE showed normal LV systolic function with mild pulmonary pressure (PASP 40 mmHg). He was noted to be hypoxic and he was discharged home with supplemental oxygen. He is seen today on 2L by nasal cannula.

## 2022-12-14 NOTE — PHYSICAL EXAM
[General Appearance - Well Developed] : well developed [Normal Appearance] : normal appearance [Well Groomed] : well groomed [General Appearance - Well Nourished] : well nourished [No Deformities] : no deformities [General Appearance - In No Acute Distress] : no acute distress [Normal Oral Mucosa] : normal oral mucosa [No Oral Pallor] : no oral pallor [No Oral Cyanosis] : no oral cyanosis [Normal Oropharynx] : normal oropharynx [Normal Jugular Venous A Waves Present] : normal jugular venous A waves present [Normal Jugular Venous V Waves Present] : normal jugular venous V waves present [No Jugular Venous Weiss A Waves] : no jugular venous weiss A waves [] : no respiratory distress [Respiration, Rhythm And Depth] : normal respiratory rhythm and effort [Exaggerated Use Of Accessory Muscles For Inspiration] : no accessory muscle use [Auscultation Breath Sounds / Voice Sounds] : lungs were clear to auscultation bilaterally [Bowel Sounds] : normal bowel sounds [Abdomen Soft] : soft [Abnormal Walk] : normal gait [Abdomen Tenderness] : non-tender [Gait - Sufficient For Exercise Testing] : the gait was sufficient for exercise testing [Nail Clubbing] : no clubbing of the fingernails [Cyanosis, Localized] : no localized cyanosis [Skin Color & Pigmentation] : normal skin color and pigmentation [No Venous Stasis] : no venous stasis [No Xanthoma] : no  xanthoma was observed [Oriented To Time, Place, And Person] : oriented to person, place, and time [Impaired Insight] : insight and judgment were intact [Affect] : the affect was normal [Mood] : the mood was normal [No Anxiety] : not feeling anxious [Conjunctiva] : the conjunctiva were normal in both eyes [PERRL] : pupils were equal in size, round, and reactive to light [EOM Intact] : extraocular movements were intact [5th Left ICS - MCL] : palpated at the 5th LICS in the midclavicular line [Normal] : normal [No Precordial Heave] : no precordial heave was noted [Bradycardia] : bradycardic [Rhythm Regular] : regular [Normal S1] : normal S1 [Normal S2] : normal S2 [No Gallop] : no gallop heard [No Murmur] : no murmurs heard [1+] : right 1+ [No Pitting Edema] : no pitting edema present [Yellow Sclera (Icteric)] : no scleral icterus was seen [Right Carotid Bruit] : no bruit heard over the right carotid [Left Carotid Bruit] : no bruit heard over the left carotid

## 2022-12-14 NOTE — HISTORY OF PRESENT ILLNESS
[FreeTextEntry1] : Patient is a 72 year-old gentleman with known cardiovascular risk factors of hypertension, dyslipidemia, insulin dependent type II diabetes, but no known cardiac disease who presents today for cardiac evaluation prior to possible renal transplant.\par Patient has recently started exercising with elliptical machine at his home.\par Recently lost 17 lbs by changing his diet to a plant based diet. Of note, he also had the flu in January 2019. Patient is requiring less insulin since changing his diet.\par No significant family history of coronary artery disease - both parents lived into their mid 90s.\par \par Patient had echo and pharmacologic nuclear stress test with his cardiologist in January 2019.\par Patient has daytime somnolence, but he has not yet been evaluated for sleep apnea.\par \par PMD/nephrologist: Demetrio Novak MD [phone]\par Cardiologist: Isaac Hinojosa MD [phone]\par Endocrinologist: Micky Abad MD [phone]

## 2022-12-14 NOTE — CARDIOLOGY SUMMARY
[de-identified] : 2/26/2019, sinus bradycardia at 54 bpm [de-identified] : 1/2/2019, no Ischemia, pharmacologic nuclear stress test with inferior defect consistent with diaphragmatic attenuation; LVEF 70% and LVEDV 101 mL\par \par 9/21/2020, normal myocardial perfusion imaging, LVEF 63%, LVEDV 142 mL [de-identified] : 1/2/2019, mild aortic sclerosis, normal LV function, no pulmonary hypertension, normal LA size, no mitral regurgitation LVEF 55-60%\par \par 11/16/2022, mild pulmonary hypertension (PASP 40 mmHg), normal RV size and function, normal LV systolic function, LVEF 70%

## 2022-12-14 NOTE — DISCUSSION/SUMMARY
[EKG obtained to assist in diagnosis and management of assessed problem(s)] : EKG obtained to assist in diagnosis and management of assessed problem(s) [FreeTextEntry1] : Patient is a 72 year-old white gentleman with history as above including DDRT on 3/9/2021, now presents for follow-up after a recent hospitalization for hypertensive urgency and lower extremity edema.\par \par His blood pressure is better controlled on his home regimen.\par He remains on supplemental oxygen for hypoxia noted during the hospitalization. Follow-up with pulmonary - patient is scheduled to see Dr. Yi Phillips in January.\par \par No evidence of transplant renal artery stenosis on ultrasound.\par \par Continue current antihypertensive regimen.\par Resume furosemide 40 mg QAM as prescribed while hospitalized. This will help with his lower extremity edema and elevated pulmonary pressures.

## 2022-12-15 ENCOUNTER — APPOINTMENT (OUTPATIENT)
Dept: CARDIOLOGY | Facility: CLINIC | Age: 72
End: 2022-12-15

## 2022-12-28 NOTE — HISTORY OF PRESENT ILLNESS
[FreeTextEntry1] : \par 72 year old man with ESRD due to DM on PD since April 2020. S/p DDRT on 3/9/2021.\par \par PMH: DM type II and Hypertension diagnosed in his early 40’s. Hyperlipidemia, gout, Anxiety, Depression, OCD. No cardiac disease. cPRA 0%. \par \par Donor 49 yr old, DCD, KDPI 71%, Terminal Cr 8mg/dL, No HLA mismatch, CMV D-R-. \par Course complicated by DGF. Discharged on peritoneal dialysis. Graft function recovered and has been off PD 3/30/21. PD catheter and transplant ureteric stent removed on 4/22/21. He was hospitalized 4/30/21 with hyperkalemia, metabolic acidosis  and severe hyperglycemia. Managed with insulin. Bactrim d/ame \par Completed 2nd dose of COVID vaccine in February 2021. Received 3rd dose in Sept. 2021 \par COVID + in Jan 2022, had no symptoms , received monoclonal antibody infusion on 01/06/2022. \par Cataract surgery b/l &/2022 \par \par \par Presents for scheduled follow up, last seen 3 months ago. \par Prednisone discontinued on last visit b/c pt felt it was contributing to his psych symptoms. Lokelma discontinued due to high cost. \par No major events since last seen. No hospitalizations or acute illness. \par Saw psychiatry for severe depression. Continued Welbutrin and Duloxetine. Added Abilify. \par Energy poor, still feels depressed and anxious but managing ok. \par Has difficulty ambulating, gets fatigued easily, uses a cane. \par BP ranging \par Blood glucose  on Lantus takes 18-22 units. Followed closely with Endocrinology. \par Urinating without difficulty. No dysuria or hematuria. No NVD. Appetite good. Sleeping well. No cough or fever. \par He lives with his wife and sons. 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 \par \par \par

## 2022-12-28 NOTE — ASSESSMENT
[FreeTextEntry1] : \par 72 yr old man with ESRD due to DM, was on PD, s/p DDRT on 3/9/21 complicated by DGF discharged on PD. Graft function recovered and off PD since 3/30. \par \par S/p DDRT on 3/9/21 complicated by DGF , now has excellent graft function\par -  no proteinuria. \par \par Immunosuppression \par - S/p Simulect induction \par - On Envarsus 4mg daily. Level\par - Continue CellCept 500 bid - dose was lowered for leukopenia\par \par Infection prophylaxis \par - Bactrim d/ame for hyperkalemia. Continue dapsone 100mg po daily. \par - Completed COVID vaccine x 4 doses - last dose in March 2022?. Had COVID infeciton in January 2022 rx with MAB.  \par - Flu shot today \par \par Diabetes type II  - On Lantus 10-20 units qhs, Humalog 4-8 units qac + sliding scale. Follow up with Dr Micky Abad scheduled this month.  A1c 5.7% in May 2022 \par \par Hypertension :  On Carvedilol 25mg po bid. Nifedipine xl 30mg po qhs. \par \par Hyperparathyroidism - on Calcitriol 0.25mcg po daily. \par \par Anxiety/OCD - Followed by psychiatry. On Duolexitine, Welbutrin and Abilify \par \par F/u with Dermatologist - annual follow up. He will make appt. \par \par Primary nephrologist - Dr. Novak no longer takes insurance.\par He will continue follow up here every 3 months \par \par \par

## 2022-12-30 ENCOUNTER — APPOINTMENT (OUTPATIENT)
Dept: PSYCHIATRY | Facility: CLINIC | Age: 72
End: 2022-12-30

## 2023-01-03 ENCOUNTER — APPOINTMENT (OUTPATIENT)
Dept: CARDIOLOGY | Facility: CLINIC | Age: 73
End: 2023-01-03

## 2023-01-04 NOTE — PRE-ANESTHESIA EVALUATION ADULT - NSANTHPMHFT_GEN_ALL_CORE
Detail Level: Simple Additional Notes: Patient consent was obtained to proceed with the visit and recommended plan of care after discussion of all risks and benefits, including the risks of COVID-19 exposure. Render Risk Assessment In Note?: no kidney transplan

## 2023-01-18 ENCOUNTER — APPOINTMENT (OUTPATIENT)
Dept: NEPHROLOGY | Facility: CLINIC | Age: 73
End: 2023-01-18
Payer: MEDICARE

## 2023-01-18 VITALS
OXYGEN SATURATION: 95 % | RESPIRATION RATE: 17 BRPM | SYSTOLIC BLOOD PRESSURE: 143 MMHG | HEIGHT: 65 IN | HEART RATE: 66 BPM | DIASTOLIC BLOOD PRESSURE: 65 MMHG | WEIGHT: 198 LBS | TEMPERATURE: 98 F | BODY MASS INDEX: 32.99 KG/M2

## 2023-01-18 LAB
ALBUMIN SERPL ELPH-MCNC: 4.5 G/DL
ALP BLD-CCNC: 110 U/L
ALT SERPL-CCNC: 11 U/L
ANION GAP SERPL CALC-SCNC: 12 MMOL/L
APPEARANCE: CLEAR
AST SERPL-CCNC: 13 U/L
BACTERIA: NEGATIVE
BASOPHILS # BLD AUTO: 0.05 K/UL
BASOPHILS NFR BLD AUTO: 0.8 %
BILIRUB SERPL-MCNC: 0.8 MG/DL
BILIRUBIN URINE: NEGATIVE
BLOOD URINE: NEGATIVE
BUN SERPL-MCNC: 35 MG/DL
CALCIUM SERPL-MCNC: 10 MG/DL
CHLORIDE SERPL-SCNC: 105 MMOL/L
CMV DNA SPEC QL NAA+PROBE: NOT DETECTED IU/ML
CMVPCR LOG: NOT DETECTED LOG10IU/ML
CO2 SERPL-SCNC: 27 MMOL/L
COLOR: YELLOW
COVID-19 SPIKE DOMAIN ANTIBODY INTERPRETATION: POSITIVE
CREAT SERPL-MCNC: 1.56 MG/DL
CREAT SPEC-SCNC: 146 MG/DL
CREAT/PROT UR: 0.3 RATIO
EGFR: 47 ML/MIN/1.73M2
EOSINOPHIL # BLD AUTO: 0.12 K/UL
EOSINOPHIL NFR BLD AUTO: 1.9 %
GLUCOSE QUALITATIVE U: NEGATIVE
GLUCOSE SERPL-MCNC: 79 MG/DL
HCT VFR BLD CALC: 36.6 %
HGB BLD-MCNC: 11.8 G/DL
HYALINE CASTS: 0 /LPF
IMM GRANULOCYTES NFR BLD AUTO: 0.5 %
KETONES URINE: NEGATIVE
LDH SERPL-CCNC: 236 U/L
LEUKOCYTE ESTERASE URINE: NEGATIVE
LYMPHOCYTES # BLD AUTO: 0.96 K/UL
LYMPHOCYTES NFR BLD AUTO: 15.5 %
MAGNESIUM SERPL-MCNC: 2 MG/DL
MAN DIFF?: NORMAL
MCHC RBC-ENTMCNC: 31 PG
MCHC RBC-ENTMCNC: 32.2 GM/DL
MCV RBC AUTO: 96.1 FL
MICROSCOPIC-UA: NORMAL
MONOCYTES # BLD AUTO: 0.88 K/UL
MONOCYTES NFR BLD AUTO: 14.2 %
NEUTROPHILS # BLD AUTO: 4.17 K/UL
NEUTROPHILS NFR BLD AUTO: 67.1 %
NITRITE URINE: NEGATIVE
PH URINE: 6
PHOSPHATE SERPL-MCNC: 3.8 MG/DL
PLATELET # BLD AUTO: 211 K/UL
POTASSIUM SERPL-SCNC: 4.9 MMOL/L
PROT SERPL-MCNC: 7.1 G/DL
PROT UR-MCNC: 43 MG/DL
PROTEIN URINE: ABNORMAL
RBC # BLD: 3.81 M/UL
RBC # FLD: 14 %
RED BLOOD CELLS URINE: 2 /HPF
SARS-COV-2 AB SERPL IA-ACNC: >250 U/ML
SODIUM SERPL-SCNC: 143 MMOL/L
SPECIFIC GRAVITY URINE: 1.02
SQUAMOUS EPITHELIAL CELLS: 0 /HPF
TACROLIMUS SERPL-MCNC: 9 NG/ML
URATE SERPL-MCNC: 7.8 MG/DL
UROBILINOGEN URINE: NORMAL
WBC # FLD AUTO: 6.21 K/UL
WHITE BLOOD CELLS URINE: 3 /HPF

## 2023-01-18 PROCEDURE — 99215 OFFICE O/P EST HI 40 MIN: CPT

## 2023-01-18 RX ORDER — DOCUSATE SODIUM 100 MG/1
100 CAPSULE ORAL AT BEDTIME
Refills: 0 | Status: COMPLETED | COMMUNITY
Start: 2022-08-02 | End: 2023-01-18

## 2023-01-18 NOTE — HISTORY OF PRESENT ILLNESS
[FreeTextEntry1] : \par 72 year old man with ESRD due to DM on PD since April 2020. S/p DDRT on 3/9/2021.\par \par PMH: DM type II dx in 40's, complicated by retinopathy and nephropathy,  Hypertension diagnosed in his early 40’s. Hyperlipidemia, gout, Anxiety, Depression, OCD. No cardiac disease. cPRA 0%. \par \par Donor 49 yr old, DCD, KDPI 71%, Terminal Cr 8mg/dL, No HLA mismatch, CMV D-R-. \par \par Course complicated by DGF. Discharged on peritoneal dialysis. Graft function recovered and has been off PD 3/30/21. PD catheter and transplant ureteric stent removed on 4/22/21. He was hospitalized 4/30/21 with hyperkalemia, metabolic acidosis  and severe hyperglycemia. Managed with insulin. Bactrim d/ame \par Completed 2nd dose of COVID vaccine in February 2021. Received 3rd dose in Sept. 2021 \par COVID + in Jan 2022, had no symptoms , received monoclonal antibody infusion on 01/06/2022. \par S/p bilateral Cataract surgery 5/2022 \par Prednisone discontinued  pt felt it was contributing to his psych symptoms.\par Takes aspirin 5 times a week, bruising improved \par \par Hospitalized 11/15/22-11/17/22 after presenting with high BP in 200's systolic associated with LE edema. \par Noted to have hypoxia to 83% on room air and Congestion on x ray. Infectious w/u negative. No prior cardiac or lung problems. Rx with IV Lasix and d/c home with oxygen and Lasix 20mg po daily. Echo with EF of 70% with mild pul HTN. Saw Dr. Nieves on 12/14. Lasix 40mg qam prescribed. \par Scheduled to see pulmonary - Dr. Phillips On Jan 24th, for possible sleep study. \par \par Presents for scheduled follow up. \par He reports feeling better. Takes lasix 40gm daily, leg swelling persists. Prescribed oxygen 2 liters, but no longer using it all the time, oxygen is 91% on room air and ~ 95% on 2 liters. \par Blood glucose fluctuating, 150-250. \par Prescribed Nifedipine 30qam and 60mg qhs - cutting 60mg in half for AM dose. \par Voiding well. No dysuria. No NVD.No fever. No cough, No chest pain. \par Weight 198lbs, up 6lbs, trying to lose weight but not successful yet. \par Feels better in terms of his depression\par \par \par \par

## 2023-01-18 NOTE — ASSESSMENT
[FreeTextEntry1] : \par 72 yr old man with ESRD due to DM, was on PD, s/p DDRT on 3/9/21 complicated by DGF discharged on PD. Graft function recovered and off PD since 3/30. \par \par S/p DDRT on 3/9/21 complicated by DGF, recovered to baseline cr of 1.2mg/dL\par Cr now up 1.56mg/dL ? Tac toxicity vs diuretics?\par US done 12/2022 with homogenous flow, no hydro and no TRAS \par \par Immunosuppression \par - S/p Simulect induction \par - On Envarsus 4mg daily. Level 9.0 - will reduce to 2mg daily, goal 4-6 \par - Continue CellCept 500 bid - dose was lowered for leukopenia\par - Continue prednisone 5mg daily \par \par Infection prophylaxis\par - Completed 6 months Valcyte prophylaxis \par - Bactrim d/ame for hyperkalemia. Continue dapsone 100mg po daily. Consider changing back to bactrim on next visit as hyperkalemia has improved. \par - Completed COVID vaccine. Had COVID infection in January 2022. s/p MAB. \par - Flu shot and COVID booster received in October 2022\par \par Diabetes type II  - On Lantus 20 units qhs, Humalog 4-8 units qac + sliding scale. Follow up with Dr Micky Abad.  A1c 5.6%\par \par Hypertension :  On Carvedilol 25mg po bid. Nifedipine xl 60mg po qhs and Nifedipine xl 30mg qm. \par Continue lasix 40mg po daily for LE edema \par \par Hyperparathyroidism - on Calcitriol 0.25mcg po daily. \par \par Anxiety/OCD and depression - followed by psych. Symptoms improving. On Wellbutrin 300mg daily, \par Duloxetine 60mg po daily \par \par HTN, edema and hypoxemia - possible sleep apnea. Cardiac w/u unremarkable, No transplant renal artery stenosis. F/u with pulmonary this month. \par \par F/u with Dermatologist - annual follow up. \par \par Labs in 2 weeks \par Follow up in 3 months \par \par \par

## 2023-01-19 LAB — BKV DNA SPEC QL NAA+PROBE: NOT DETECTED IU/ML

## 2023-01-24 ENCOUNTER — APPOINTMENT (OUTPATIENT)
Dept: PULMONOLOGY | Facility: CLINIC | Age: 73
End: 2023-01-24
Payer: MEDICARE

## 2023-01-24 VITALS
OXYGEN SATURATION: 93 % | TEMPERATURE: 97.6 F | HEIGHT: 65 IN | RESPIRATION RATE: 16 BRPM | BODY MASS INDEX: 32.65 KG/M2 | WEIGHT: 196 LBS | SYSTOLIC BLOOD PRESSURE: 119 MMHG | HEART RATE: 65 BPM | DIASTOLIC BLOOD PRESSURE: 64 MMHG

## 2023-01-24 DIAGNOSIS — R09.02 HYPOXEMIA: ICD-10-CM

## 2023-01-24 DIAGNOSIS — K59.00 CONSTIPATION, UNSPECIFIED: ICD-10-CM

## 2023-01-24 DIAGNOSIS — Z86.39 PERSONAL HISTORY OF OTHER ENDOCRINE, NUTRITIONAL AND METABOLIC DISEASE: ICD-10-CM

## 2023-01-24 DIAGNOSIS — Z86.79 PERSONAL HISTORY OF OTHER DISEASES OF THE CIRCULATORY SYSTEM: ICD-10-CM

## 2023-01-24 DIAGNOSIS — R06.83 SNORING: ICD-10-CM

## 2023-01-24 DIAGNOSIS — N18.30 CHRONIC KIDNEY DISEASE, STAGE 3 UNSPECIFIED: ICD-10-CM

## 2023-01-24 PROCEDURE — 99205 OFFICE O/P NEW HI 60 MIN: CPT

## 2023-01-24 NOTE — ASSESSMENT
[FreeTextEntry1] : ATTENDING ATTESTATION\par \par This is a 73 y/o male w/ a PMHx of DM2, HTN, ESRD s/p renal transplant  3/2021, anxiety, MDD, and mild pulmonary HTN, COVID (1/2022 - s/p MAB no hospitalization). Patient was hospitalized in November 2022 and discharged on oxygen for O2 sats 83% on room air with ambulation. 94%on 2L with nasal cannula. Echo with EF 70. \par Patient presents today for initial evaluation of sleep disordered breathing. He was referred by hospitalist at [facility]. \par \par Main complaints include severe snoring, fatigue, EDS w/ ESS 17, dry mouth, parasomnias, hypnogognic and hypnopompic hallucinations. Patient also reports falling asleep while working/doing paperwork, drowsy driving. \par \par Sleep schedule: Patient goes to bed around 1 am, takes melatonin gummies that help him fall asleep. Awakens 2-3 times void, falls back to sleep without issues. Up at 8 am to start his day. Some days he sleep later. Feels well rested when he wakes up. \par Daytime sleep/Naps: takes daily nap lasting 1-2 hours. \par \par r/o JOSE L\par The patient has signs and symptoms of sleep-disordered breathing including obesity, snoring, dry mouth, parasomnias, EDS with ESS 17. Discussed with patient the rationale for treatment of JOSE L including improved quality of life, daytime function and to decrease cardiovascular risks that are associated with untreated JOSE L. The patient verbalized understanding.\par - Provided patient with contact info for [facility] at [facility] and instructed to schedule Split Study. \par -Will follow up with patient once the results of the above study become available. \par \par \par PULM: patient reports O2 are usually 91-92%.  Has POC and stationary concentrator at home. \par Patient becomes SOB with stairs. +LE swelling, takes furosemide. \par Patient hypoxic in office to 88% on room air. \par -  Advised patient use his supplemental O2 to maintain O2 sats > 92%. \par - Complete PFT ordered to r/o any underlying pulmonary conditions that may be contributing to patient's hypoxia. \par \par PSYCH: Anxiety and MDD - reports thoughts of self harm over the last year, his children and wife are his motivation to continue living. Does not have a plan to harm himself at this time. \par - Support provided. \par - Continue f/u w/ psychiatrist. \par \par

## 2023-01-24 NOTE — HISTORY OF PRESENT ILLNESS
[FreeTextEntry1] : This is a 73 y/o male w/ a PMHx of DM2, HTN, ESRD s/p renal transplant  3/2021, anxiety, MDD, and mild pulmonary HTN, COVID (1/2022 - s/p MAB no hospitalization). Patient was hospitalized in November 2022 and discharged on oxygen for O2 sats 83% on room air with ambulation. 94%on 2L with nasal cannula. Echo with EF 70. \par Patient presents today for initial evaluation of sleep disordered breathing. He was referred by hospitalist at [facility]. \par \par Main complaints include severe snoring, fatigue, EDS w/ ESS 17, dry mouth, parasomnias, hypnogognic and hypnopompic hallucinations. Patient also reports falling asleep while working/doing paperwork, drowsy driving. \par \par Sleep schedule: Patient goes to bed around 1 am, takes melatonin gummies that help him fall asleep. Awakens 2-3 times void, falls back to sleep without issues. Up at 8 am to start his day. Some days he sleep later. Feels well rested when he wakes up. \par Daytime sleep/Naps: takes daily nap lasting 1-2 hours. \par \par ESS today: 17\par Smoking hx: never \par \par PULM: patient reports O2 are usually 91-92%.  Has POC and stationary concentrator at home. \par Patient becomes SOB with stairs. Denies wheeze, chest tightness, cough, frequent URI's. \par +LE swelling, takes furosemide. \par \par PSYCH: Anxiety and MDD - reports thoughts of self harm over the last year, his children and wife are his motivation to continue living. Does not have a plan to harm himself. \par Patient has a psychiatrist. \par Does not do talk therapy. \par \par

## 2023-01-24 NOTE — PHYSICAL EXAM
[Normal Appearance] : normal appearance [General Appearance - In No Acute Distress] : no acute distress [Normal Conjunctiva] : the conjunctiva exhibited no abnormalities [Eyelids - No Xanthelasma] : the eyelids demonstrated no xanthelasmas [Neck Cervical Mass (___cm)] : no neck mass was observed [Heart Rate And Rhythm] : heart rate was normal and rhythm regular [Heart Sounds] : normal S1 and S2 [Murmurs] : no murmurs [Respiration, Rhythm And Depth] : normal respiratory rhythm and effort [Exaggerated Use Of Accessory Muscles For Inspiration] : no accessory muscle use [Auscultation Breath Sounds / Voice Sounds] : lungs were clear to auscultation bilaterally [Abnormal Walk] : normal gait [Nail Clubbing] : no clubbing of the fingernails [Skin Color & Pigmentation] : normal skin color and pigmentation [] : no rash [No Focal Deficits] : no focal deficits [Oriented To Time, Place, And Person] : oriented to person, place, and time [1+ Pitting] : 1+  pitting

## 2023-01-24 NOTE — REVIEW OF SYSTEMS
[EDS: ESS=____] : daytime somnolence: ESS=[unfilled] [Fatigue] : fatigue [Snoring] : snoring [Shortness Of Breath] : shortness of breath [A.M. Dry Mouth] : a.m. dry mouth [Obesity] : obesity [Diabetes] : diabetes  [Depression] : depression [Anxious] : anxious [Difficulty Initiating Sleep] : difficulty falling asleep [Unusual Sleep Behavior] : unusual sleep behavior [Hypnogogic Hallucinations] : hypnogogic hallucinations [Hypnopompic Hallucinations] : hypnopompic hallucinations  [Negative] : Gastrointestinal [Witnessed Apneas] : no witnessed apnea [Thyroid Disease] : no thyroid disease [A.M. Headache] : no headache present upon awakening [Difficulty Maintaining Sleep] : no difficulty maintaining sleep [Lower Extremity Discomfort] : no lower extremity discomfort [Irresistible urge to move legs] : no irresistible urge to move legs because of lower extremity discomfort [LE discomfort relieved by movement] : lower extremity discomfort not relieved by movement [Late day/ Evening symptoms] : no late day/evening symptoms [Sleep Disturbances due to LE symptoms] : ~T no sleep disturbances due to lower extremity symptoms [Sleep Paralysis] : no sleep paralysis [FreeTextEntry6] : sciatica

## 2023-01-24 NOTE — END OF VISIT
[FreeTextEntry3] : I, Dr. Yi Phillips personally performed the evaluation and management (E/M) services for this new patient.  That E/M includes conducting the initial examination, assessing all conditions, and establishing the plan of care.  Today, Kusum Ribera ACP was here to observe my evaluation and management services for this patient to be followed going forward.\par \par 71 y/o male w/ a PMHx of DM2, HTN, ESRD s/p renal transplant  3/2021, anxiety, MDD, and mild pulmonary HTN, COVID (1/2022 - s/p MAB no hospitalization).  Patient was discharged from hospital with portable oxygen concentrator, however patient has not been using it.  He periodically checks his pulse ox at home and reports that as long as he deep breathes, it is in the mid to high 90s.  This is suggestive of a component of hypoventilation, however I do not see any evidence from current labs or hospital labs of hypercapnia.  THC E from 11/16/2022 demonstrated moderately dilated LA, normal LV systolic function, normal right atrium, normal RV size and function.  Estimated PASP of 40, mild pulmonary hypertension.  CT PA 11/15/2022 shows no PE, small right pleural effusion with round atelectasis in the right lower lobe.  Mild diffuse bronchial wall thickening, mild bilateral interlobular septal thickening, suggestive of pulmonary edema.\par Patient clear on lung exam today, has bilateral leg edema, on Lasix.\par Hypoxemia appears to be at rest and with exertion.  No underlying history of COPD or asthma.  Differential diagnosis likely multifactorial and includes diastolic heart failure, pulmonary hypertension, OJSE L/obesity hypoventilation.  PE was ruled out on CTPA in November, 2022.\par Advised patient to schedule in lab split PSG/Pap titration study, complete PFT, encouraged him to use his portable O2 concentrator and continue to monitor his pulse ox at home, especially with exertion.  Also discussed benefits of weight loss which patient is agreeable to.\par \par 60 minutes time spent for patient education related to comorbidities and medications, medical records/labs/radiology reviews, preventative care, documentation, coordination of care.\par 2pm to 3 PM

## 2023-02-01 ENCOUNTER — APPOINTMENT (OUTPATIENT)
Dept: TRANSPLANT | Facility: CLINIC | Age: 73
End: 2023-02-01

## 2023-02-02 ENCOUNTER — APPOINTMENT (OUTPATIENT)
Dept: TRANSPLANT | Facility: CLINIC | Age: 73
End: 2023-02-02

## 2023-02-02 ENCOUNTER — APPOINTMENT (OUTPATIENT)
Dept: PULMONOLOGY | Facility: CLINIC | Age: 73
End: 2023-02-02
Payer: MEDICARE

## 2023-02-02 VITALS
DIASTOLIC BLOOD PRESSURE: 62 MMHG | OXYGEN SATURATION: 94 % | HEIGHT: 66.5 IN | HEART RATE: 60 BPM | TEMPERATURE: 97.3 F | BODY MASS INDEX: 31.76 KG/M2 | WEIGHT: 200 LBS | SYSTOLIC BLOOD PRESSURE: 129 MMHG

## 2023-02-02 PROCEDURE — 94729 DIFFUSING CAPACITY: CPT

## 2023-02-02 PROCEDURE — 94726 PLETHYSMOGRAPHY LUNG VOLUMES: CPT

## 2023-02-02 PROCEDURE — 94010 BREATHING CAPACITY TEST: CPT

## 2023-02-03 ENCOUNTER — NON-APPOINTMENT (OUTPATIENT)
Age: 73
End: 2023-02-03

## 2023-02-03 LAB
ALBUMIN SERPL ELPH-MCNC: 4.5 G/DL
ALP BLD-CCNC: 104 U/L
ALT SERPL-CCNC: 11 U/L
ANION GAP SERPL CALC-SCNC: 16 MMOL/L
APPEARANCE: CLEAR
AST SERPL-CCNC: 17 U/L
BACTERIA: NEGATIVE
BASOPHILS # BLD AUTO: 0.06 K/UL
BASOPHILS NFR BLD AUTO: 0.9 %
BILIRUB SERPL-MCNC: 0.8 MG/DL
BILIRUBIN URINE: NEGATIVE
BLOOD URINE: NEGATIVE
BUN SERPL-MCNC: 37 MG/DL
CALCIUM SERPL-MCNC: 9.8 MG/DL
CHLORIDE SERPL-SCNC: 100 MMOL/L
CMV DNA SPEC QL NAA+PROBE: NOT DETECTED IU/ML
CMVPCR LOG: NOT DETECTED LOG10IU/ML
CO2 SERPL-SCNC: 25 MMOL/L
COLOR: YELLOW
CREAT SERPL-MCNC: 1.5 MG/DL
CREAT SPEC-SCNC: 67 MG/DL
CREAT/PROT UR: 0.3 RATIO
EGFR: 49 ML/MIN/1.73M2
EOSINOPHIL # BLD AUTO: 0.16 K/UL
EOSINOPHIL NFR BLD AUTO: 2.5 %
GLUCOSE QUALITATIVE U: NEGATIVE
GLUCOSE SERPL-MCNC: 158 MG/DL
HCT VFR BLD CALC: 38.9 %
HGB BLD-MCNC: 12 G/DL
HYALINE CASTS: 0 /LPF
IMM GRANULOCYTES NFR BLD AUTO: 0.3 %
KETONES URINE: NEGATIVE
LEUKOCYTE ESTERASE URINE: NEGATIVE
LYMPHOCYTES # BLD AUTO: 1.15 K/UL
LYMPHOCYTES NFR BLD AUTO: 18 %
MAGNESIUM SERPL-MCNC: 2 MG/DL
MAN DIFF?: NORMAL
MCHC RBC-ENTMCNC: 30.8 GM/DL
MCHC RBC-ENTMCNC: 32.3 PG
MCV RBC AUTO: 104.6 FL
MICROSCOPIC-UA: NORMAL
MONOCYTES # BLD AUTO: 0.8 K/UL
MONOCYTES NFR BLD AUTO: 12.5 %
NEUTROPHILS # BLD AUTO: 4.19 K/UL
NEUTROPHILS NFR BLD AUTO: 65.8 %
NITRITE URINE: NEGATIVE
PH URINE: 6
PHOSPHATE SERPL-MCNC: 4.1 MG/DL
PLATELET # BLD AUTO: 189 K/UL
POTASSIUM SERPL-SCNC: 4.7 MMOL/L
PROT SERPL-MCNC: 7.1 G/DL
PROT UR-MCNC: 21 MG/DL
PROTEIN URINE: NORMAL
RBC # BLD: 3.72 M/UL
RBC # FLD: 14.4 %
RED BLOOD CELLS URINE: 3 /HPF
SODIUM SERPL-SCNC: 141 MMOL/L
SPECIFIC GRAVITY URINE: 1.01
SQUAMOUS EPITHELIAL CELLS: 0 /HPF
TACROLIMUS SERPL-MCNC: 4.8 NG/ML
URATE SERPL-MCNC: 8.8 MG/DL
UROBILINOGEN URINE: NORMAL
WBC # FLD AUTO: 6.38 K/UL
WHITE BLOOD CELLS URINE: 0 /HPF

## 2023-02-06 LAB — BKV DNA SPEC QL NAA+PROBE: NOT DETECTED IU/ML

## 2023-02-09 ENCOUNTER — NON-APPOINTMENT (OUTPATIENT)
Age: 73
End: 2023-02-09

## 2023-02-09 ENCOUNTER — APPOINTMENT (OUTPATIENT)
Dept: CARDIOLOGY | Facility: CLINIC | Age: 73
End: 2023-02-09
Payer: MEDICARE

## 2023-02-09 VITALS
BODY MASS INDEX: 31.16 KG/M2 | DIASTOLIC BLOOD PRESSURE: 74 MMHG | OXYGEN SATURATION: 92 % | WEIGHT: 196 LBS | SYSTOLIC BLOOD PRESSURE: 130 MMHG | HEART RATE: 69 BPM

## 2023-02-09 DIAGNOSIS — E87.5 HYPERKALEMIA: ICD-10-CM

## 2023-02-09 DIAGNOSIS — N52.9 MALE ERECTILE DYSFUNCTION, UNSPECIFIED: ICD-10-CM

## 2023-02-09 PROCEDURE — 93000 ELECTROCARDIOGRAM COMPLETE: CPT

## 2023-02-09 PROCEDURE — 99215 OFFICE O/P EST HI 40 MIN: CPT | Mod: 25

## 2023-02-09 RX ORDER — SILDENAFIL 50 MG/1
50 TABLET ORAL
Qty: 30 | Refills: 2 | Status: ACTIVE | COMMUNITY
Start: 2023-02-09 | End: 1900-01-01

## 2023-02-09 NOTE — HISTORY OF PRESENT ILLNESS
[FreeTextEntry1] : Patient is a 72 year-old gentleman with known cardiovascular risk factors of hypertension, dyslipidemia, insulin dependent type II diabetes, but no known cardiac disease who presents today for cardiac evaluation prior to possible renal transplant.\par Patient has recently started exercising with elliptical machine at his home.\par Recently lost 17 lbs by changing his diet to a plant based diet. Of note, he also had the flu in 2019. Patient is requiring less insulin since changing his diet.\par No significant family history of coronary artery disease - both parents lived into their mid 90s.\par \par Patient had echo and pharmacologic nuclear stress test with his cardiologist in 2019.\par Patient has daytime somnolence, but he has not yet been evaluated for sleep apnea.\par \par 2022 - Patient returns today for follow-up for the first time since 2020. In 2021, he received a  donor renal transplant. \par In 2022, he was admitted to [facility] with poorly controlled hypertension and worsening lower extremity edema that he attributes to forgetfulness about his medication regimen. His blood pressure improved with his home medications. He was given diuretics for lower extremity edema. \par TTE showed normal LV systolic function with mild pulmonary pressure (PASP 40 mmHg). He was noted to be hypoxic and he was discharged home with supplemental oxygen. He is seen today on 2L by nasal cannula. \par \par PMD/nephrologist: Demetrio Novak MD [phone]\par Cardiologist: Isaac Hinojosa MD [phone]\par Endocrinologist: Micky Abad MD [phone]

## 2023-02-09 NOTE — DISCUSSION/SUMMARY
[EKG obtained to assist in diagnosis and management of assessed problem(s)] : EKG obtained to assist in diagnosis and management of assessed problem(s) [FreeTextEntry1] : Patient is a 73 year-old white gentleman with history as above including DDRT on 3/9/2021, now presents for follow-up after a recent hospitalization for hypertensive urgency and lower extremity edema.\par \par His blood pressure is better controlled on his home regimen.\par He remains on supplemental oxygen for hypoxia noted during the hospitalization. Follow-up with pulmonary - patient follows with Dr. Yi Phillips.\par \par No evidence of transplant renal artery stenosis on ultrasound.\par \par Continue current antihypertensive regimen. He remains on carvedilol and nifedipine.\par Continue furosemide 40 mg BID. This will help with his lower extremity edema and elevated pulmonary pressures.

## 2023-02-09 NOTE — CARDIOLOGY SUMMARY
[de-identified] : 2/26/2019, sinus bradycardia at 54 bpm [de-identified] : 1/2/2019, no Ischemia, pharmacologic nuclear stress test with inferior defect consistent with diaphragmatic attenuation; LVEF 70% and LVEDV 101 mL\par \par 9/21/2020, normal myocardial perfusion imaging, LVEF 63%, LVEDV 142 mL [de-identified] : 1/2/2019, mild aortic sclerosis, normal LV function, no pulmonary hypertension, normal LA size, no mitral regurgitation LVEF 55-60%\par \par 11/16/2022, mild pulmonary hypertension (PASP 40 mmHg), normal RV size and function, normal LV systolic function, LVEF 70%

## 2023-02-09 NOTE — PHYSICAL EXAM
[General Appearance - Well Developed] : well developed [Normal Appearance] : normal appearance [Well Groomed] : well groomed [General Appearance - Well Nourished] : well nourished [No Deformities] : no deformities [General Appearance - In No Acute Distress] : no acute distress [Normal Oral Mucosa] : normal oral mucosa [No Oral Pallor] : no oral pallor [No Oral Cyanosis] : no oral cyanosis [Normal Oropharynx] : normal oropharynx [Normal Jugular Venous A Waves Present] : normal jugular venous A waves present [Normal Jugular Venous V Waves Present] : normal jugular venous V waves present [No Jugular Venous Weiss A Waves] : no jugular venous weiss A waves [] : no respiratory distress [Respiration, Rhythm And Depth] : normal respiratory rhythm and effort [Exaggerated Use Of Accessory Muscles For Inspiration] : no accessory muscle use [Auscultation Breath Sounds / Voice Sounds] : lungs were clear to auscultation bilaterally [Bowel Sounds] : normal bowel sounds [Abdomen Soft] : soft [Abdomen Tenderness] : non-tender [Abnormal Walk] : normal gait [Gait - Sufficient For Exercise Testing] : the gait was sufficient for exercise testing [Nail Clubbing] : no clubbing of the fingernails [Cyanosis, Localized] : no localized cyanosis [Skin Color & Pigmentation] : normal skin color and pigmentation [No Venous Stasis] : no venous stasis [No Xanthoma] : no  xanthoma was observed [Oriented To Time, Place, And Person] : oriented to person, place, and time [Impaired Insight] : insight and judgment were intact [Affect] : the affect was normal [Mood] : the mood was normal [No Anxiety] : not feeling anxious [Conjunctiva] : the conjunctiva were normal in both eyes [PERRL] : pupils were equal in size, round, and reactive to light [EOM Intact] : extraocular movements were intact [5th Left ICS - MCL] : palpated at the 5th LICS in the midclavicular line [Normal] : normal [No Precordial Heave] : no precordial heave was noted [Bradycardia] : bradycardic [Rhythm Regular] : regular [Normal S1] : normal S1 [Normal S2] : normal S2 [No Gallop] : no gallop heard [No Murmur] : no murmurs heard [1+] : right 1+ [No Pitting Edema] : no pitting edema present [Yellow Sclera (Icteric)] : no scleral icterus was seen [Right Carotid Bruit] : no bruit heard over the right carotid [Left Carotid Bruit] : no bruit heard over the left carotid

## 2023-02-23 ENCOUNTER — APPOINTMENT (OUTPATIENT)
Dept: PSYCHIATRY | Facility: CLINIC | Age: 73
End: 2023-02-23
Payer: MEDICARE

## 2023-02-23 PROCEDURE — 99214 OFFICE O/P EST MOD 30 MIN: CPT | Mod: 95

## 2023-02-23 NOTE — SOCIAL HISTORY
[FreeTextEntry1] : At 7 years of age the patient moved to [facility].  He attended [employer] graduating in 1968.  High school was okay he had a few friends he did not participate in any activities his grades were in the 90s.  He then went to [employer] graduating in 1972 with a degree in chemical engineering.  He was a good student.  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  Patient last worked in 1998.  Patient was  the first time from 1982 until 2007.  He has a daughter 28 and 2 sons ages 37 and 23 by that marriage.  Patient  his current wife in 2010.

## 2023-02-23 NOTE — DISCUSSION/SUMMARY
[FreeTextEntry1] : 73-year-old male with depression anxiety.  Plan continue Cymbalta 60 mg Wellbutrin  mg.  Follow-up in 6 months.

## 2023-02-23 NOTE — CURRENT PSYCHIATRIC SYMPTOMS
[Depressed Mood] : no depressed mood [Anhedonia] : no anhedonia [Psychomotor Retardation] : no psychomotor retardation [Anorexia] : no anorexia [Excessive Worry] : no excessive worries [Ruminations] : no rumination disorder [de-identified] : Denied [de-identified] : Denied [de-identified] : Denied [de-identified] : Denied [de-identified] : None [de-identified] : None [de-identified] : None

## 2023-02-23 NOTE — FAMILY HISTORY
[FreeTextEntry1] : Patient born in [place] parents  of old age he has a sister 94.  On the father side of the family there is a history of bipolar depression and substance abuse.

## 2023-04-04 ENCOUNTER — FORM ENCOUNTER (OUTPATIENT)
Age: 73
End: 2023-04-04

## 2023-04-05 ENCOUNTER — APPOINTMENT (OUTPATIENT)
Dept: SLEEP CENTER | Facility: CLINIC | Age: 73
End: 2023-04-05
Payer: MEDICARE

## 2023-04-05 ENCOUNTER — OUTPATIENT (OUTPATIENT)
Dept: OUTPATIENT SERVICES | Facility: HOSPITAL | Age: 73
LOS: 1 days | End: 2023-04-05
Payer: MEDICARE

## 2023-04-05 ENCOUNTER — FORM ENCOUNTER (OUTPATIENT)
Age: 73
End: 2023-04-05

## 2023-04-05 PROCEDURE — 95811 POLYSOM 6/>YRS CPAP 4/> PARM: CPT

## 2023-04-05 PROCEDURE — 95811 POLYSOM 6/>YRS CPAP 4/> PARM: CPT | Mod: 26

## 2023-04-18 ENCOUNTER — APPOINTMENT (OUTPATIENT)
Dept: PULMONOLOGY | Facility: CLINIC | Age: 73
End: 2023-04-18
Payer: MEDICARE

## 2023-04-18 ENCOUNTER — APPOINTMENT (OUTPATIENT)
Dept: TRANSPLANT | Facility: CLINIC | Age: 73
End: 2023-04-18

## 2023-04-18 ENCOUNTER — LABORATORY RESULT (OUTPATIENT)
Age: 73
End: 2023-04-18

## 2023-04-18 VITALS
RESPIRATION RATE: 17 BRPM | HEIGHT: 66.5 IN | TEMPERATURE: 97.5 F | DIASTOLIC BLOOD PRESSURE: 79 MMHG | SYSTOLIC BLOOD PRESSURE: 152 MMHG | BODY MASS INDEX: 31.76 KG/M2 | WEIGHT: 200 LBS | HEART RATE: 73 BPM | OXYGEN SATURATION: 91 %

## 2023-04-18 DIAGNOSIS — Z99.81 HYPOXEMIA: ICD-10-CM

## 2023-04-18 DIAGNOSIS — I27.20 PULMONARY HYPERTENSION, UNSPECIFIED: ICD-10-CM

## 2023-04-18 DIAGNOSIS — M79.89 OTHER SPECIFIED SOFT TISSUE DISORDERS: ICD-10-CM

## 2023-04-18 DIAGNOSIS — G47.33 OBSTRUCTIVE SLEEP APNEA (ADULT) (PEDIATRIC): ICD-10-CM

## 2023-04-18 DIAGNOSIS — R09.02 HYPOXEMIA: ICD-10-CM

## 2023-04-18 DIAGNOSIS — R06.00 DYSPNEA, UNSPECIFIED: ICD-10-CM

## 2023-04-18 PROCEDURE — 76604 US EXAM CHEST: CPT

## 2023-04-18 PROCEDURE — 99215 OFFICE O/P EST HI 40 MIN: CPT | Mod: 25

## 2023-04-18 PROCEDURE — 99417 PROLNG OP E/M EACH 15 MIN: CPT | Mod: 25

## 2023-04-19 ENCOUNTER — APPOINTMENT (OUTPATIENT)
Dept: NEPHROLOGY | Facility: CLINIC | Age: 73
End: 2023-04-19
Payer: MEDICARE

## 2023-04-19 VITALS
BODY MASS INDEX: 32.14 KG/M2 | TEMPERATURE: 98 F | OXYGEN SATURATION: 93 % | RESPIRATION RATE: 17 BRPM | HEIGHT: 66 IN | DIASTOLIC BLOOD PRESSURE: 75 MMHG | SYSTOLIC BLOOD PRESSURE: 139 MMHG | WEIGHT: 200 LBS | HEART RATE: 71 BPM

## 2023-04-19 LAB
25(OH)D3 SERPL-MCNC: 23.5 NG/ML
ALBUMIN SERPL ELPH-MCNC: 4.8 G/DL
ALP BLD-CCNC: 139 U/L
ALT SERPL-CCNC: 15 U/L
ANION GAP SERPL CALC-SCNC: 13 MMOL/L
APPEARANCE: CLEAR
AST SERPL-CCNC: 17 U/L
BASOPHILS # BLD AUTO: 0.07 K/UL
BASOPHILS NFR BLD AUTO: 1 %
BILIRUB SERPL-MCNC: 0.9 MG/DL
BILIRUBIN URINE: NEGATIVE
BLOOD URINE: NEGATIVE
BUN SERPL-MCNC: 37 MG/DL
CALCIUM SERPL-MCNC: 10.2 MG/DL
CALCIUM SERPL-MCNC: 10.2 MG/DL
CHLORIDE SERPL-SCNC: 102 MMOL/L
CHOLEST SERPL-MCNC: 157 MG/DL
CMV DNA SPEC QL NAA+PROBE: NOT DETECTED IU/ML
CMVPCR LOG: NOT DETECTED LOG10IU/ML
CO2 SERPL-SCNC: 26 MMOL/L
COLOR: YELLOW
COVID-19 SPIKE DOMAIN ANTIBODY INTERPRETATION: POSITIVE
CREAT SERPL-MCNC: 1.38 MG/DL
CREAT SPEC-SCNC: 121 MG/DL
CREAT/PROT UR: 0.5 RATIO
EGFR: 54 ML/MIN/1.73M2
EOSINOPHIL # BLD AUTO: 0.14 K/UL
EOSINOPHIL NFR BLD AUTO: 2 %
ESTIMATED AVERAGE GLUCOSE: 103 MG/DL
GLUCOSE QUALITATIVE U: NEGATIVE
GLUCOSE SERPL-MCNC: 118 MG/DL
HBA1C MFR BLD HPLC: 5.2 %
HCT VFR BLD CALC: 42.9 %
HDLC SERPL-MCNC: 56 MG/DL
HGB BLD-MCNC: 13.3 G/DL
IMM GRANULOCYTES NFR BLD AUTO: 0.3 %
KETONES URINE: NEGATIVE
LDH SERPL-CCNC: 291 U/L
LDLC SERPL CALC-MCNC: 81 MG/DL
LEUKOCYTE ESTERASE URINE: NEGATIVE
LYMPHOCYTES # BLD AUTO: 0.95 K/UL
LYMPHOCYTES NFR BLD AUTO: 13.9 %
MAGNESIUM SERPL-MCNC: 2.1 MG/DL
MAN DIFF?: NORMAL
MCHC RBC-ENTMCNC: 31 GM/DL
MCHC RBC-ENTMCNC: 31.5 PG
MCV RBC AUTO: 101.7 FL
MONOCYTES # BLD AUTO: 0.95 K/UL
MONOCYTES NFR BLD AUTO: 13.9 %
NEUTROPHILS # BLD AUTO: 4.7 K/UL
NEUTROPHILS NFR BLD AUTO: 68.9 %
NITRITE URINE: NEGATIVE
NONHDLC SERPL-MCNC: 101 MG/DL
PARATHYROID HORMONE INTACT: 113 PG/ML
PH URINE: 6
PHOSPHATE SERPL-MCNC: 3.6 MG/DL
PLATELET # BLD AUTO: 191 K/UL
POTASSIUM SERPL-SCNC: 5.1 MMOL/L
PROT SERPL-MCNC: 7.4 G/DL
PROT UR-MCNC: 55 MG/DL
PROTEIN URINE: ABNORMAL
RBC # BLD: 4.22 M/UL
RBC # FLD: 13.1 %
SARS-COV-2 AB SERPL IA-ACNC: >250 U/ML
SODIUM SERPL-SCNC: 140 MMOL/L
SPECIFIC GRAVITY URINE: 1.02
TACROLIMUS SERPL-MCNC: 5.5 NG/ML
TRIGL SERPL-MCNC: 99 MG/DL
URATE SERPL-MCNC: 8.2 MG/DL
UROBILINOGEN URINE: NORMAL
WBC # FLD AUTO: 6.83 K/UL

## 2023-04-19 PROCEDURE — 99215 OFFICE O/P EST HI 40 MIN: CPT

## 2023-04-19 NOTE — ASSESSMENT
[FreeTextEntry1] : \par 73 yr old man with ESRD due to DM, was on PD, s/p DDRT on 3/9/21 complicated by DGF discharged on PD. Graft function recovered and off PD since 3/30. \par \par S/p DDRT on 3/9/21 complicated by DGF improved. \par - Cr 1.38mg/dL stable , no significant proteinuria UPCR 0.5\par \par Immunosuppression \par - S/p Simulect induction \par - On Envarsus 2mg daily, tac level 5.5 at goal. Target trough 5-7. \par - Continue CellCept 500 bid - dose was lowered for leukopenia\par - Prednisone discontinued \par - Bactrim d/ame for hyperkalemia. Continue dapsone 100mg po daily for PCP prophylaxis. \par \par \par Diabetes type II  - On Lantus 22-24 units qhs, Humalog ~8 units qac + sliding scale. Follow up with Dr Micky Abad scheduled this month.  A1c 5.2%\par \par Hypertension :  On Carvedilol 25mg po bid. Nifedipine xl 60mg po bid \par \par Pulmonary HTN/ JOSE L :- Furosemide 40mg po bid. Awaiting CPAP titration. Continue oxgen via [place]. Pulmonary follow up with Dr. Phillips  \par \par Hyperparathyroidism - on Calcitriol 0.25mcg po daily. \par \par BPH: Flomax 0.4mg po qhs \par \par Anxiety/OCD - On Wellbutrin 300mg po daily and Duloxetine 60mg po daily . Followed by  psych. \par \par Health maintenance \par COVID shot x 3, had bivalent booster. Had COVID infection Jan 2023 s/p MAB. \par Flu vaccine Fall 2022 \par Shingles vaccine - not sure \par F/u with Dermatologist - annual follow up. He will make appt. \par \par Dr. Novak (primary nephrologist) no longer takes insurance. F/u here in 2 months \par \par \par \par

## 2023-04-19 NOTE — HISTORY OF PRESENT ILLNESS
[FreeTextEntry1] : \par 73 year old man with ESRD due to DM on PD since April 2020. S/p DDRT on 3/9/2021.\par \par PMH: DM type II dx in 40's, complicated by retinopathy and nephropathy,  Hypertension diagnosed in his early 40’s. Hyperlipidemia, gout, Anxiety, Depression, OCD. No cardiac disease. cPRA 0%. \par \par Donor 49 yr old, DCD, KDPI 71%, Terminal Cr 8mg/dL, No HLA mismatch, CMV D-R-. \par \par Course complicated by DGF. Discharged on peritoneal dialysis. Graft function recovered and has been off PD 3/30/21. PD catheter and transplant ureteric stent removed on 4/22/21. He was hospitalized 4/30/21 with hyperkalemia, metabolic acidosis  and severe hyperglycemia. Managed with insulin. Bactrim d/ame \par Completed 2nd dose of COVID vaccine in February 2021. Received 3rd dose in Sept. 2021 \par COVID + in Jan 2022, had no symptoms , received monoclonal antibody infusion on 01/06/2022. \par S/p bilateral Cataract surgery 5/2022 \par Prednisone discontinued  pt felt it was contributing to his psych symptoms.\par Takes aspirin 5 times a week, bruising improved \par \par Hospitalized 11/15/22-11/17/22 after presenting with fluid overload and hypertension. Echo with EF 70%, elevated pul pressures. Treated with diuretics and sent home on oxygen.\par Saw Pul Dr. Phillips , Sleep study showed severe sleep apnea. Referred for PAP titration. \par Followed by Dr. Nieves - seen 2/2023, Lasix continued. \par Seen by psychiatry Dr. Buchanan, continued Wellbutrin and Cymbalta \par \par Uses oxygen on and off. Oxygenation improves when he takes was a deep breath. \par PAP titration is planned \par Taking Lasix, leg swelling controlled. \par Voiding urine without difficulty. No dysuria or hematuria. \par No abdominal pain, nausea, vomiting or diarrhea. Appetite too good. \par No fever, chills. Energy is fair. \par No chest pain. Has shortness of breath on exertion.. \par Taking all medications as prescribed \par /80 - 150/80. \par Blood glucose well controlled < 200 for the most part. \par Weight 200 lbs unchanged\par Physical activity: no exercise b/c of shortness of breath and low energy. \par \par \par \par \par

## 2023-04-20 LAB — BKV DNA SPEC QL NAA+PROBE: NOT DETECTED IU/ML

## 2023-04-23 ENCOUNTER — OUTPATIENT (OUTPATIENT)
Dept: OUTPATIENT SERVICES | Facility: HOSPITAL | Age: 73
LOS: 1 days | End: 2023-04-23
Payer: MEDICARE

## 2023-04-23 ENCOUNTER — APPOINTMENT (OUTPATIENT)
Dept: ULTRASOUND IMAGING | Facility: IMAGING CENTER | Age: 73
End: 2023-04-23
Payer: MEDICARE

## 2023-04-23 DIAGNOSIS — M79.89 OTHER SPECIFIED SOFT TISSUE DISORDERS: ICD-10-CM

## 2023-04-23 DIAGNOSIS — Z94.0 KIDNEY TRANSPLANT STATUS: Chronic | ICD-10-CM

## 2023-04-23 PROCEDURE — 93970 EXTREMITY STUDY: CPT

## 2023-04-23 PROCEDURE — 93970 EXTREMITY STUDY: CPT | Mod: 26

## 2023-04-24 ENCOUNTER — NON-APPOINTMENT (OUTPATIENT)
Age: 73
End: 2023-04-24

## 2023-05-02 ENCOUNTER — NON-APPOINTMENT (OUTPATIENT)
Age: 73
End: 2023-05-02

## 2023-05-02 ENCOUNTER — APPOINTMENT (OUTPATIENT)
Dept: CARDIOLOGY | Facility: CLINIC | Age: 73
End: 2023-05-02
Payer: MEDICARE

## 2023-05-02 VITALS
HEIGHT: 66 IN | OXYGEN SATURATION: 91 % | DIASTOLIC BLOOD PRESSURE: 74 MMHG | SYSTOLIC BLOOD PRESSURE: 132 MMHG | HEART RATE: 68 BPM

## 2023-05-02 DIAGNOSIS — I10 ESSENTIAL (PRIMARY) HYPERTENSION: ICD-10-CM

## 2023-05-02 DIAGNOSIS — G47.33 OBSTRUCTIVE SLEEP APNEA (ADULT) (PEDIATRIC): ICD-10-CM

## 2023-05-02 PROCEDURE — 93000 ELECTROCARDIOGRAM COMPLETE: CPT

## 2023-05-02 PROCEDURE — 99215 OFFICE O/P EST HI 40 MIN: CPT | Mod: 25

## 2023-05-02 NOTE — REASON FOR VISIT
[Other: ____] : [unfilled] [FreeTextEntry1] : May 2023 - Patient returns today for follow-up in his usual state of health. He remains on carvedilol 25 mg BID and nifedipine 60 mg BID for blood pressure management. He has tried to wean himself off oxygen, but he found that he was fatigued the past couple of days, so he restarted the oxygen. He uses 2 L by nasal cannula. He has been diagnosed with sleep apnea, but he has not yet started therapy for that. \par He wakes up with no edema in the legs, but they swell as the days progresses. He remains on furosemide 40 mg BID.\par \par Patient mentioned erectile dysfunction that is concerning to him because his wife is 49 years old.

## 2023-05-02 NOTE — HISTORY OF PRESENT ILLNESS
[FreeTextEntry1] : Patient is a 73 year-old gentleman with known cardiovascular risk factors of hypertension, dyslipidemia, insulin dependent type II diabetes, but no known cardiac disease who presents today for cardiac evaluation prior to possible renal transplant.\par Patient has recently started exercising with elliptical machine at his home.\par Recently lost 17 lbs by changing his diet to a plant based diet. Of note, he also had the flu in 2019. Patient is requiring less insulin since changing his diet.\par No significant family history of coronary artery disease - both parents lived into their mid 90s.\par \par Patient had echo and pharmacologic nuclear stress test with his cardiologist in 2019.\par Patient has daytime somnolence, but he has not yet been evaluated for sleep apnea.\par \par 2022 - Patient returns today for follow-up for the first time since 2020. In 2021, he received a  donor renal transplant. \par In 2022, he was admitted to [facility] with poorly controlled hypertension and worsening lower extremity edema that he attributes to forgetfulness about his medication regimen. His blood pressure improved with his home medications. He was given diuretics for lower extremity edema. \par TTE showed normal LV systolic function with mild pulmonary pressure (PASP 40 mmHg). He was noted to be hypoxic and he was discharged home with supplemental oxygen. He is seen today on 2L by nasal cannula. \par \par 2023 - Patient returns today for follow-up in his usual state of health. He continues to report fatigue, dyspnea on exertion, and sleepiness. He sees Yi Phillips MD for pulmonary medicine, and he is maintained on supplemental oxygen around the clock. \par He does notice significant mood improvement since discontinuing prednisone last year.\par \par PMD/nephrologist: Demetrio Novak MD [phone]\par Cardiologist: Isaac Hinojosa MD [phone]\par Endocrinologist: Micky Abad MD [phone]

## 2023-05-02 NOTE — PHYSICAL EXAM
[General Appearance - Well Developed] : well developed [Normal Appearance] : normal appearance [Well Groomed] : well groomed [General Appearance - Well Nourished] : well nourished [No Deformities] : no deformities [General Appearance - In No Acute Distress] : no acute distress [Normal Oral Mucosa] : normal oral mucosa [No Oral Pallor] : no oral pallor [No Oral Cyanosis] : no oral cyanosis [Normal Oropharynx] : normal oropharynx [Normal Jugular Venous A Waves Present] : normal jugular venous A waves present [Normal Jugular Venous V Waves Present] : normal jugular venous V waves present [No Jugular Venous Weiss A Waves] : no jugular venous weiss A waves [] : no respiratory distress [Respiration, Rhythm And Depth] : normal respiratory rhythm and effort [Exaggerated Use Of Accessory Muscles For Inspiration] : no accessory muscle use [Auscultation Breath Sounds / Voice Sounds] : lungs were clear to auscultation bilaterally [Bowel Sounds] : normal bowel sounds [Abdomen Soft] : soft [Abdomen Tenderness] : non-tender [Abnormal Walk] : normal gait [Gait - Sufficient For Exercise Testing] : the gait was sufficient for exercise testing [Nail Clubbing] : no clubbing of the fingernails [Cyanosis, Localized] : no localized cyanosis [Skin Color & Pigmentation] : normal skin color and pigmentation [No Venous Stasis] : no venous stasis [No Xanthoma] : no  xanthoma was observed [Impaired Insight] : insight and judgment were intact [Oriented To Time, Place, And Person] : oriented to person, place, and time [Affect] : the affect was normal [Mood] : the mood was normal [No Anxiety] : not feeling anxious [Conjunctiva] : the conjunctiva were normal in both eyes [PERRL] : pupils were equal in size, round, and reactive to light [EOM Intact] : extraocular movements were intact [5th Left ICS - MCL] : palpated at the 5th LICS in the midclavicular line [Normal] : normal [No Precordial Heave] : no precordial heave was noted [Bradycardia] : bradycardic [Rhythm Regular] : regular [Normal S1] : normal S1 [Normal S2] : normal S2 [No Gallop] : no gallop heard [No Murmur] : no murmurs heard [1+] : right 1+ [No Pitting Edema] : no pitting edema present [Yellow Sclera (Icteric)] : no scleral icterus was seen [Right Carotid Bruit] : no bruit heard over the right carotid [Left Carotid Bruit] : no bruit heard over the left carotid

## 2023-05-02 NOTE — DISCUSSION/SUMMARY
[EKG obtained to assist in diagnosis and management of assessed problem(s)] : EKG obtained to assist in diagnosis and management of assessed problem(s) [FreeTextEntry1] : Patient is a 73 year-old white gentleman with history as above including DDRT on 3/9/2021, now presents for follow-up after a recent hospitalization for hypertensive urgency and lower extremity edema.\par \par His blood pressure is better controlled on his home regimen.\par He remains on supplemental oxygen for hypoxia noted during the hospitalization. Follow-up with pulmonary - patient follows with Dr. Yi Phillips for pulmonary and sleep medicine. \par \par No evidence of transplant renal artery stenosis on ultrasound.\par \par Continue current antihypertensive regimen. He remains on carvedilol and nifedipine.\par Continue furosemide 40 mg BID. This will help with his lower extremity edema and elevated pulmonary pressures.\par \par Recommend urologic evaluation for erectile dysfunction.

## 2023-05-02 NOTE — CARDIOLOGY SUMMARY
[de-identified] : 2/26/2019, sinus bradycardia at 54 bpm [de-identified] : 1/2/2019, no Ischemia, pharmacologic nuclear stress test with inferior defect consistent with diaphragmatic attenuation; LVEF 70% and LVEDV 101 mL\par \par 9/21/2020, normal myocardial perfusion imaging, LVEF 63%, LVEDV 142 mL [de-identified] : 1/2/2019, mild aortic sclerosis, normal LV function, no pulmonary hypertension, normal LA size, no mitral regurgitation LVEF 55-60%\par \par 11/16/2022, mild pulmonary hypertension (PASP 40 mmHg), normal RV size and function, normal LV systolic function, LVEF 70%

## 2023-05-18 ENCOUNTER — APPOINTMENT (OUTPATIENT)
Dept: SLEEP CENTER | Facility: CLINIC | Age: 73
End: 2023-05-18
Payer: MEDICARE

## 2023-05-18 ENCOUNTER — OUTPATIENT (OUTPATIENT)
Dept: OUTPATIENT SERVICES | Facility: HOSPITAL | Age: 73
LOS: 1 days | End: 2023-05-18
Payer: MEDICARE

## 2023-05-18 DIAGNOSIS — Z94.0 KIDNEY TRANSPLANT STATUS: Chronic | ICD-10-CM

## 2023-05-18 PROCEDURE — 95811 POLYSOM 6/>YRS CPAP 4/> PARM: CPT | Mod: 26

## 2023-05-18 PROCEDURE — 95811 POLYSOM 6/>YRS CPAP 4/> PARM: CPT

## 2023-05-26 DIAGNOSIS — G47.33 OBSTRUCTIVE SLEEP APNEA (ADULT) (PEDIATRIC): ICD-10-CM

## 2023-05-31 ENCOUNTER — APPOINTMENT (OUTPATIENT)
Dept: OPHTHALMOLOGY | Facility: CLINIC | Age: 73
End: 2023-05-31
Payer: MEDICARE

## 2023-05-31 ENCOUNTER — NON-APPOINTMENT (OUTPATIENT)
Age: 73
End: 2023-05-31

## 2023-05-31 PROCEDURE — 92014 COMPRE OPH EXAM EST PT 1/>: CPT

## 2023-05-31 PROCEDURE — 92202 OPSCPY EXTND ON/MAC DRAW: CPT

## 2023-06-13 ENCOUNTER — APPOINTMENT (OUTPATIENT)
Dept: OPHTHALMOLOGY | Facility: CLINIC | Age: 73
End: 2023-06-13
Payer: MEDICARE

## 2023-06-13 ENCOUNTER — NON-APPOINTMENT (OUTPATIENT)
Age: 73
End: 2023-06-13

## 2023-06-13 PROCEDURE — 66821 AFTER CATARACT LASER SURGERY: CPT | Mod: RT

## 2023-06-13 PROCEDURE — 92134 CPTRZ OPH DX IMG PST SGM RTA: CPT

## 2023-06-21 ENCOUNTER — NON-APPOINTMENT (OUTPATIENT)
Age: 73
End: 2023-06-21

## 2023-06-21 DIAGNOSIS — E66.2 MORBID (SEVERE) OBESITY WITH ALVEOLAR HYPOVENTILATION: ICD-10-CM

## 2023-06-21 NOTE — HISTORY OF PRESENT ILLNESS
[FreeTextEntry1] : This is a 72 y/o male w/ a PMHx of DM2, HTN, ESRD s/p renal transplant  3/2021, anxiety, MDD, and mild pulmonary HTN, COVID (1/2022 - s/p MAB no hospitalization). Patient was hospitalized in November 2022 and discharged on oxygen for O2 sats 83% on room air with ambulation. 94%on 2L with nasal cannula. Echo with EF 70. \par Patient presents today for f/u of sleep disordered breathing and hypoxia. Sleep study completed 4/5/23; severe JOSE L with AHI 33.7. Neymar SpO2 79% with 6.5% of study spent below 90%. \par \par Main sleep complaints remain severe snoring, fatigue, EDS w/ ESS 17, dry mouth, parasomnias, hypnogognic and hypnopompic hallucinations. Patient also reports falling asleep while working/doing paperwork, drowsy driving. \par \par Sleep schedule: Patient goes to bed around 1 am, takes melatonin gummies that help him fall asleep. He has trouble initiating sleep otherwise. Awakens 2-3 times void, falls back to sleep without issues. Up at 8 am to start his day. Some days he sleep later. Feels well rested when he wakes up. \par Daytime sleep/Naps: takes daily nap lasting 1-2 hours. \par \par ESS today: 17\par Smoking hx: never \par \par PULM: patient reports O2 are usually 91-92%.  Has POC and stationary concentrator at home. \par Patient becomes SOB with stairs. Denies wheeze, chest tightness, cough, frequent URI's. \par +LE swelling, takes furosemide. Recently increased to 40 mg TWICE daily. \par \par PSYCH: Anxiety and MDD - reports thoughts of self harm over the last year, his children and wife are his motivation to continue living. Does not have a plan to harm himself. \par Patient has a psychiatrist. \par Does not do talk therapy.

## 2023-06-21 NOTE — END OF VISIT
[FreeTextEntry3] : I, Dr. Yi Phillips, personally performed the evaluation and management (E/M) services for this established patient who presents today with (a) new problem(s)/exacerbation of (an) existing condition(s).  That E/M includes conducting the examination, assessing all new/exacerbated conditions, and establishing a new plan of care.  Today, Kusum Ribera ACP, was here to observe my evaluation and management services for this new problem/exacerbated condition to be followed going forward.\par \par \par 73 y/o male w/ a PMHx of DM2, HTN, ESRD s/p renal transplant  3/2021, anxiety, MDD, and mild pulmonary HTN, COVID (1/2022 - s/p MAB no hospitalization).  Patient was discharged from hospital with portable oxygen concentrator, however patient has not been using it.  He periodically checks his pulse ox at home and reports that as long as he deep breathes, it is in the mid to high 90s. In office today, SpO2 increased from 92% to 97% with deep breathing  This is suggestive of a component of hypoventilation. I do not see any evidence from current labs or hospital labs of hypercapnia.  TTE from 11/16/2022 demonstrated moderately dilated LA, normal LV systolic function, normal right atrium, normal RV size and function.  Estimated PASP of 40, mild pulmonary hypertension.  CT PA 11/15/2022 shows no PE, small right pleural effusion with round atelectasis in the right lower lobe.  Mild diffuse bronchial wall thickening, mild bilateral interlobular septal thickening, suggestive of pulmonary edema.\par Patient clear on lung exam today, has bilateral leg edema, on Lasix.\par LUNG US today 4/18/23 - A lines predominant, no large pleural effusions bilaterally. possible small pocket pleural fluid on right lower lung field, more centrally located which corresponds with CT imaging and has been stable since 2021. \par No underlying history of COPD or asthma.  Hypoxemia is probably most contributed by hypoventilation, with severe JOSE L, but he also has diastolic heart failure, mild pulmonary hypertension, JOSE L/obesity hypoventilation.  PE was ruled out on CTPA in November, 2022.\par Ordered Duplex bilateral LEs, schedule in-lab CPAP titration with possible O2 supplement. I encouraged him to use his portable O2 concentrator and continue to monitor his pulse ox at home, especially with exertion.  Also discussed benefits of weight loss which patient is agreeable to. Gave him weight management clinic, he will consider. \par \par 60 minutes time spent for patient education related to comorbidities and medications, medical records/labs/radiology reviews, preventative care, documentation, coordination of care.\par 3:30 PM to 4:30 pm

## 2023-06-21 NOTE — ADDENDUM
[FreeTextEntry1] : This is a 73 y/o male w/ a PMHx of DM2, HTN, ESRD s/p renal transplant  3/2021, anxiety, MDD, and mild pulmonary HTN, COVID (1/2022 - s/p MAB no hospitalization).  Patient was discharged from hospital with portable oxygen concentrator, however patient has not been using it.  He periodically checks his pulse ox at home and reports that as long as he deep breathes, it is in the mid to high 90s. In office today, SpO2 increased from 92% to 97% with deep breathing  This is suggestive of a component of HYPOVENTILATION. I do not see any evidence from current labs or hospital labs of hypercapnia.  \par TTE from 11/16/2022 demonstrated moderately dilated LA, normal LV systolic function, normal right atrium, normal RV size and function.  Estimated PASP of 40, mild pulmonary hypertension.  \par CT PA 11/15/2022 shows no PE, small right pleural effusion with round atelectasis in the right lower lobe.  Mild diffuse bronchial wall thickening, mild bilateral interlobular septal thickening, suggestive of pulmonary edema.\par LUNG US today 4/18/23 - A lines predominant, no large pleural effusions bilaterally. possible small pocket pleural fluid on right lower lung field, more centrally located which corresponds with CT imaging and has been stable since 2021. \par No underlying history of COPD or asthma.  Hypoxemia is probably most contributed by HYPOVENTILATION, with severe JOSE L, but he also has diastolic heart failure, mild pulmonary hypertension, JOSE L/obesity HYPOVENTILATION, PE was ruled out on CTPA in November, 2022.\par \par Obesity Hypoventilation Syndrome - \par ---Patient completed titration study on 5/18/23. The frequency of sleep disordered breathing was reduced to near normal limits with a bilevel ST setting of 19/12 cm H2O, back up respiratory rate of 12. Oxygen saturation was improved with bilevel ST.

## 2023-06-21 NOTE — ASSESSMENT
[FreeTextEntry1] : ATTENDING ATTESTATION\par \par This is a 74 y/o male w/ a PMHx of DM2, HTN, ESRD s/p renal transplant  3/2021, anxiety, MDD, and mild pulmonary HTN, COVID (1/2022 - s/p MAB no hospitalization). Patient was hospitalized in November 2022 and discharged on oxygen for O2 sats 83% on room air with ambulation. 94%on 2L with nasal cannula. Echo with EF 70. He was referred by hospitalist at [facility]. He presents today for f/u of sleep disordered breathing and hypoxia. \par \par Severe JOSE L - sleep study completed on 4/5/23 with AHI 33.7 \par - EDS with ESS 17. \par - Instructed patient to schedule PAP titration with instructions to switch to bilevel if patient is persistently hypoxemic. Add O2 if hypoxemia continues to persist. \par \par PULM: patient reports O2 are usually 91-92%.  Has POC and stationary concentrator at home. \par Patient becomes SOB with stairs. +LE swelling, takes furosemide 40mg BID.\par - b/l LE duplex ordered to r/o DVT. \par - O2 sats in office 90-92% at rest.Iincreased to 97% with deep breathing. Hypoxia may be related to hypoventilation and obesity. \par - Encouraged weight loss. Provided patient with information for Weight management clinic. \par - Advised continued use of supplemental O2 to maintain O2 sats > 92%, especially when walking. \par - Right lung ultrasound completed in office. No evidence of b-lines No pleural effusions. \par - PFT completed 2/2023; limited test due to effort. ?restrictive ventilatory defect. Moderately reduced DlCO. \par \par PSYCH: Anxiety and MDD - reports thoughts of self harm over the last year, his children and wife are his motivation to continue living. Does not have a plan to harm himself at this time. \par - Support provided. \par - Continue f/u w/ psychiatrist. \par \par - f/u OV after patient completes PAP titration. \par \par

## 2023-06-21 NOTE — PHYSICAL EXAM
[Normal Appearance] : normal appearance [General Appearance - In No Acute Distress] : no acute distress [Normal Conjunctiva] : the conjunctiva exhibited no abnormalities [Eyelids - No Xanthelasma] : the eyelids demonstrated no xanthelasmas [IV] : IV [Neck Appearance] : the appearance of the neck was normal [Neck Cervical Mass (___cm)] : no neck mass was observed [Heart Rate And Rhythm] : heart rate was normal and rhythm regular [Heart Sounds] : normal S1 and S2 [Murmurs] : no murmurs [Respiration, Rhythm And Depth] : normal respiratory rhythm and effort [Exaggerated Use Of Accessory Muscles For Inspiration] : no accessory muscle use [Auscultation Breath Sounds / Voice Sounds] : lungs were clear to auscultation bilaterally [Abnormal Walk] : normal gait [Nail Clubbing] : no clubbing of the fingernails [1+ Pitting] : 1+  pitting [Skin Color & Pigmentation] : normal skin color and pigmentation [] : no rash [No Focal Deficits] : no focal deficits [Oriented To Time, Place, And Person] : oriented to person, place, and time [FreeTextEntry1] : dry oropharynx

## 2023-06-21 NOTE — REASON FOR VISIT
[Initial Evaluation] : an initial evaluation [Shortness of Breath] : shortness of Breath [FreeTextEntry2] : snoring, hypoxia

## 2023-06-21 NOTE — REVIEW OF SYSTEMS
[EDS: ESS=____] : daytime somnolence: ESS=[unfilled] [Fatigue] : fatigue [Snoring] : snoring [Shortness Of Breath] : shortness of breath [A.M. Dry Mouth] : a.m. dry mouth [Obesity] : obesity [Diabetes] : diabetes  [Depression] : depression [Anxious] : anxious [Difficulty Initiating Sleep] : difficulty falling asleep [Unusual Sleep Behavior] : unusual sleep behavior [Hypnogogic Hallucinations] : hypnogogic hallucinations [Hypnopompic Hallucinations] : hypnopompic hallucinations  [Negative] : Gastrointestinal [Witnessed Apneas] : no witnessed apnea [Thyroid Disease] : no thyroid disease [A.M. Headache] : no headache present upon awakening [Difficulty Maintaining Sleep] : no difficulty maintaining sleep [Lower Extremity Discomfort] : no lower extremity discomfort [Irresistible urge to move legs] : no irresistible urge to move legs because of lower extremity discomfort [LE discomfort relieved by movement] : lower extremity discomfort not relieved by movement [Late day/ Evening symptoms] : no late day/evening symptoms [Sleep Disturbances due to LE symptoms] : ~T no sleep disturbances due to lower extremity symptoms [Sleep Paralysis] : no sleep paralysis [FreeTextEntry6] : sciatica  [de-identified] : takes melatonin gummies nightly

## 2023-06-22 ENCOUNTER — NON-APPOINTMENT (OUTPATIENT)
Age: 73
End: 2023-06-22

## 2023-06-23 ENCOUNTER — NON-APPOINTMENT (OUTPATIENT)
Age: 73
End: 2023-06-23

## 2023-06-26 ENCOUNTER — APPOINTMENT (OUTPATIENT)
Dept: OPHTHALMOLOGY | Facility: CLINIC | Age: 73
End: 2023-06-26
Payer: MEDICARE

## 2023-06-26 ENCOUNTER — NON-APPOINTMENT (OUTPATIENT)
Age: 73
End: 2023-06-26

## 2023-06-26 PROCEDURE — 66821 AFTER CATARACT LASER SURGERY: CPT | Mod: 79,LT

## 2023-06-27 ENCOUNTER — APPOINTMENT (OUTPATIENT)
Dept: OPHTHALMOLOGY | Facility: CLINIC | Age: 73
End: 2023-06-27

## 2023-07-17 ENCOUNTER — LABORATORY RESULT (OUTPATIENT)
Age: 73
End: 2023-07-17

## 2023-07-17 NOTE — PRE-ANESTHESIA EVALUATION ADULT - NSANTHASARD_GEN_ALL_CORE
3 Detail Level: Simple Additional Notes: Patient consent was obtained to proceed with the visit and recommended plan of care after discussion of all risks and benefits, including the risks of COVID-19 exposure.

## 2023-07-19 ENCOUNTER — APPOINTMENT (OUTPATIENT)
Dept: NEPHROLOGY | Facility: CLINIC | Age: 73
End: 2023-07-19
Payer: MEDICARE

## 2023-07-19 VITALS
TEMPERATURE: 98 F | HEART RATE: 66 BPM | WEIGHT: 202.5 LBS | DIASTOLIC BLOOD PRESSURE: 71 MMHG | OXYGEN SATURATION: 95 % | SYSTOLIC BLOOD PRESSURE: 147 MMHG | HEIGHT: 66 IN | RESPIRATION RATE: 17 BRPM | BODY MASS INDEX: 32.54 KG/M2

## 2023-07-19 LAB
25(OH)D3 SERPL-MCNC: 18.5 NG/ML
ALBUMIN SERPL ELPH-MCNC: 4.9 G/DL
ALP BLD-CCNC: 131 U/L
ALT SERPL-CCNC: 15 U/L
ANION GAP SERPL CALC-SCNC: 15 MMOL/L
APPEARANCE: CLEAR
AST SERPL-CCNC: 14 U/L
BACTERIA: NEGATIVE /HPF
BILIRUB SERPL-MCNC: 1 MG/DL
BILIRUBIN URINE: NEGATIVE
BKV DNA SPEC QL NAA+PROBE: NOT DETECTED IU/ML
BLOOD URINE: NEGATIVE
BUN SERPL-MCNC: 39 MG/DL
CALCIUM OXALATE CRYSTALS: PRESENT
CALCIUM SERPL-MCNC: 9.8 MG/DL
CALCIUM SERPL-MCNC: 9.8 MG/DL
CAST: 5 /LPF
CHLORIDE SERPL-SCNC: 100 MMOL/L
CHOLEST SERPL-MCNC: 167 MG/DL
CMV DNA SPEC QL NAA+PROBE: NOT DETECTED IU/ML
CMVPCR LOG: NOT DETECTED LOG10IU/ML
CO2 SERPL-SCNC: 25 MMOL/L
COLOR: YELLOW
COVID-19 SPIKE DOMAIN ANTIBODY INTERPRETATION: POSITIVE
CREAT SERPL-MCNC: 1.46 MG/DL
CREAT SPEC-SCNC: 81 MG/DL
CREAT/PROT UR: 0.4 RATIO
EGFR: 50 ML/MIN/1.73M2
EPITHELIAL CELLS: 0 /HPF
ESTIMATED AVERAGE GLUCOSE: 105 MG/DL
GLUCOSE QUALITATIVE U: NEGATIVE MG/DL
GLUCOSE SERPL-MCNC: 197 MG/DL
HBA1C MFR BLD HPLC: 5.3 %
HDLC SERPL-MCNC: 57 MG/DL
HYALINE CASTS: PRESENT
KETONES URINE: NEGATIVE MG/DL
LDH SERPL-CCNC: 296 U/L
LDLC SERPL CALC-MCNC: 96 MG/DL
LEUKOCYTE ESTERASE URINE: NEGATIVE
MAGNESIUM SERPL-MCNC: 2.4 MG/DL
MICROSCOPIC-UA: NORMAL
NITRITE URINE: NEGATIVE
NONHDLC SERPL-MCNC: 110 MG/DL
PARATHYROID HORMONE INTACT: 184 PG/ML
PH URINE: 5.5
PHOSPHATE SERPL-MCNC: 3.7 MG/DL
POTASSIUM SERPL-SCNC: 5.2 MMOL/L
PROT SERPL-MCNC: 7.1 G/DL
PROT UR-MCNC: 31 MG/DL
PROTEIN URINE: 30 MG/DL
RED BLOOD CELLS URINE: NORMAL /HPF
SARS-COV-2 AB SERPL IA-ACNC: >250 U/ML
SODIUM SERPL-SCNC: 140 MMOL/L
SPECIFIC GRAVITY URINE: 1.01
T3RU NFR SERPL: 1 TBI
TACROLIMUS SERPL-MCNC: 5 NG/ML
TRIGL SERPL-MCNC: 71 MG/DL
TSH SERPL-ACNC: 1.66 UIU/ML
URATE SERPL-MCNC: 9.7 MG/DL
UROBILINOGEN URINE: 1 MG/DL
WHITE BLOOD CELLS URINE: 1 /HPF

## 2023-07-19 PROCEDURE — 99215 OFFICE O/P EST HI 40 MIN: CPT

## 2023-07-19 NOTE — ASSESSMENT
[FreeTextEntry1] : \par 73 yr old man with ESRD due to DM, was on PD, s/p DDRT on 3/9/21 initially complicated by DGF. Graft function recovered and off PD since 3/30. \par \par S/p DDRT on 3/9/21 complicated by DGF improved.  Serum creatinine was ~ 1mg/dL until August 2022 when he was started on diuretics for pulmonary edema. Cr remains stable 1.3-1.5mg/dL, No significant proteinuria UPCR 0.4 \par Continue lasix 40mg po bid \par \par Immunosuppression \par - S/p Simulect induction \par - On Envarsus 2mg daily, level 5.0 at goal. Target trough 5-7. \par - Continue CellCept 500 bid - dose was lowered for leukopenia\par - Prednisone discontinued due to side effects \par - Bactrim d/ame for hyperkalemia. Continue dapsone 100mg po daily for PCP prophylaxis. \par \par Diabetes type II  - On Lantus 22-24 units qhs, Humalog ~8 units qac + sliding scale. Followed closely by Dr Micky Abad .  A1c 5.3%\par \par Hypertension :  On Carvedilol 25mg po bid. Nifedipine xl 60mg po bid \par \par Pulmonary HTN/ JOSE L :- Furosemide 40mg po bid. On BiPAP, not using oxygen anymore during the day.  Pulmonary follow up with Dr. Phillips  \par \par LE edema - likely due to Nifedipine +/- Pul HTN. Managed with compression socks and lasix \par \par Hyperparathyroidism - on Calcitriol 0.25mcg po daily. \par \par BPH: Flomax 0.4mg po qhs \par \par Anxiety/OCD - On Wellbutrin 300mg po daily and Duloxetine 60mg po daily . Followed by  psych. \par \par Vitamin D deficiency - start vitamin D supplement 1000 units po daily \par Health maintenance \par COVID shot x 3, had bivalent booster. Had COVID infection Jan 2023 s/p MAB. \par Flu vaccine Fall 2022 \par Shingles vaccine - follow up with PMD \par F/u with Dermatologist - annual follow up. He will make appt. \par \par Dr. Novak (primary nephrologist) no longer takes insurance. F/u here in 3 months \par \par \par \par

## 2023-07-19 NOTE — HISTORY OF PRESENT ILLNESS
[FreeTextEntry1] : \par 73 year old man with ESRD due to DM on PD since April 2020. S/p DDRT on 3/9/2021.\par \par PMH: DM type II dx in 40's, complicated by retinopathy and nephropathy,  Hypertension diagnosed in his early 40’s. Hyperlipidemia, gout, Anxiety, Depression, OCD. No cardiac disease. cPRA 0%. \par \par Donor 49 yr old, DCD, KDPI 71%, Terminal Cr 8mg/dL, No HLA mismatch, CMV D-R-. \par \par Course complicated by DGF. Discharged on peritoneal dialysis. Graft function recovered and has been off PD 3/30/21. PD catheter and transplant ureteric stent removed on 4/22/21.\par \par Hospitalized 4/30/21 with hyperkalemia, metabolic acidosis  and severe hyperglycemia. Managed with insulin. \par Received 3rd dose in Sept. 2021. COVID + in Jan 2022, had no symptoms , received monoclonal antibody infusion on 01/06/2022. \par S/p bilateral Cataract surgery 5/2022 \par Hospitalized 11/15/22-11/17/22 after presenting with fluid overload and hypertension. Echo with EF 70%, elevated pul pressures. Treated with diuretics and sent home on oxygen.\par Saw Pul Dr. Phillips , Sleep study showed severe sleep apnea. Started CPAP. \par Followed by Dr. Nieves [facility] - Continued on lasix \par Seen by psychiatry Dr. Buchanan, continued Wellbutrin and Cymbalta \par \par Presents for scheduled follow up \par Started wearing BiPAP machine, for 4 hours at night. \par Not using oxygen during the day anymore. He feels ok. Oxygen is ~ 92%. \par LE edema i still present, on Lasix 40mg po bid  \par Shortness of breath on exertion, flight of stairs. \par Voiding urine without difficulty. No dysuria or hematuria. \par No abdominal pain, nausea, vomiting or diarrhea. Constipation is controlled with Prune juice.  Appetite too good. \par No fever, chills. Energy is fair. No chest pain. \par Taking all medications as prescribed \par BP  at home is 140/80 -150/80\par Blood glucose well controlled < 200 for the most part. Has CGM. \par Weight remains 200, plans to start an exercise regimen - has machines at home. \par \par \par \par \par

## 2023-07-25 ENCOUNTER — APPOINTMENT (OUTPATIENT)
Dept: PULMONOLOGY | Facility: CLINIC | Age: 73
End: 2023-07-25

## 2023-07-27 ENCOUNTER — APPOINTMENT (OUTPATIENT)
Dept: OPHTHALMOLOGY | Facility: CLINIC | Age: 73
End: 2023-07-27
Payer: MEDICARE

## 2023-07-27 ENCOUNTER — NON-APPOINTMENT (OUTPATIENT)
Age: 73
End: 2023-07-27

## 2023-07-27 PROCEDURE — 92014 COMPRE OPH EXAM EST PT 1/>: CPT | Mod: 24

## 2023-07-27 PROCEDURE — 92134 CPTRZ OPH DX IMG PST SGM RTA: CPT

## 2023-09-05 ENCOUNTER — APPOINTMENT (OUTPATIENT)
Dept: CARDIOLOGY | Facility: CLINIC | Age: 73
End: 2023-09-05

## 2023-09-08 ENCOUNTER — TRANSCRIPTION ENCOUNTER (OUTPATIENT)
Age: 73
End: 2023-09-08

## 2023-09-09 ENCOUNTER — TRANSCRIPTION ENCOUNTER (OUTPATIENT)
Age: 73
End: 2023-09-09

## 2023-09-12 NOTE — ED ADULT NURSE NOTE - CAS EDP DISCH DISPOSITION ADMI
23w5d      Patient calling with some bright red spotting present on the toilet tissue  Stomach pain that started last night, comes and goes, but is not timeable.    Bilateral lower back pain, 7/10 that comes and goes, but is acutely worse when changing positions.  Feels baby moving.    Verbal report given to Dr. Simon, who recommends patient go to EM location ASAP.    Patient verbalizes understanding, agrees with the plan, will have someone drive her to EM clinic.       Discharge Instructions from Dr. Stephenie Qunitero    I will call you with the pathology results, typically within 1 week from today. You may shower, but no hot tubs, swimming pools, or baths until your incision is healed. No heavy lifting with the affected extremity (nothing greater than 5 pounds), and limit its use for the next 4-5 days. You may use an ice pack for comfort for the next couple of days, but do not place ice directly on the skin. Rather, use a towel or clothing to serve as a barrier between skin and ice to prevent injury. If I placed a drain, follow the drain instructions provided, especially as you keep a record of the drain output. Follow medication instructions carefully. No aspirin, ibuprofen or aleve x 1 week. may take tylenol  Wear surgical bra for 24 hours, then remove. Wear supportive bra at all times. You will have bruising and swelling  Watch for signs of infection as listed below. Redness  Swelling  Drainage from the incision or from your nipple that appears infected  Fever over 101.5 degrees for consecutive readings, or over 99.5 if you are currently undergoing chemotherapy. Call our office (number is below) for a follow-up appointment. If you have any problems, our phone number is [phone]      bupivacaine liposome  Pronunciation:  natalie steen some  Brand:  Exparel  What is the most important information I should know about bupivacaine liposome? You may still feel numb or be unable to move the numbed area for up to 5 days after you are treated with bupivacaine liposome. What is bupivacaine liposome? Bupivacaine is an anesthetic (numbing medicine) that blocks nerve impulses in your body. Bupivacaine liposome is used as a local (in only one area) anesthetic to numb an area of your body for a minor surgery such as bunion removal or hemorrhoid surgery.   Bupivacine liposome is also used as a nerve block after surgery on your shoulder or upper arm, to provide pain relief to the Surgery

## 2023-09-22 ENCOUNTER — APPOINTMENT (OUTPATIENT)
Dept: PSYCHIATRY | Facility: CLINIC | Age: 73
End: 2023-09-22
Payer: MEDICARE

## 2023-09-22 DIAGNOSIS — F43.20 ADJUSTMENT DISORDER, UNSPECIFIED: ICD-10-CM

## 2023-09-22 PROCEDURE — 99214 OFFICE O/P EST MOD 30 MIN: CPT | Mod: 95

## 2023-10-16 ENCOUNTER — APPOINTMENT (OUTPATIENT)
Dept: TRANSPLANT | Facility: CLINIC | Age: 73
End: 2023-10-16

## 2023-10-18 ENCOUNTER — APPOINTMENT (OUTPATIENT)
Dept: NEPHROLOGY | Facility: CLINIC | Age: 73
End: 2023-10-18
Payer: MEDICARE

## 2023-10-18 VITALS
BODY MASS INDEX: 32.18 KG/M2 | DIASTOLIC BLOOD PRESSURE: 78 MMHG | HEIGHT: 67 IN | TEMPERATURE: 98 F | WEIGHT: 205 LBS | OXYGEN SATURATION: 93 % | SYSTOLIC BLOOD PRESSURE: 142 MMHG | HEART RATE: 75 BPM

## 2023-10-18 DIAGNOSIS — E56.9 VITAMIN DEFICIENCY, UNSPECIFIED: ICD-10-CM

## 2023-10-18 LAB
ALBUMIN SERPL ELPH-MCNC: 4.6 G/DL
ALP BLD-CCNC: 134 U/L
ALT SERPL-CCNC: 11 U/L
ANION GAP SERPL CALC-SCNC: 13 MMOL/L
APPEARANCE: CLEAR
AST SERPL-CCNC: 12 U/L
BACTERIA: NEGATIVE /HPF
BASOPHILS # BLD AUTO: 0.07 K/UL
BASOPHILS NFR BLD AUTO: 1.2 %
BILIRUB SERPL-MCNC: 0.6 MG/DL
BILIRUBIN URINE: NEGATIVE
BKV DNA SPEC QL NAA+PROBE: NOT DETECTED IU/ML
BLOOD URINE: NEGATIVE
BUN SERPL-MCNC: 42 MG/DL
CALCIUM SERPL-MCNC: 9.7 MG/DL
CAST: 0 /LPF
CHLORIDE SERPL-SCNC: 101 MMOL/L
CMV DNA SPEC QL NAA+PROBE: NOT DETECTED IU/ML
CMVPCR LOG: NOT DETECTED LOG10IU/ML
CO2 SERPL-SCNC: 27 MMOL/L
COLOR: YELLOW
COVID-19 SPIKE DOMAIN ANTIBODY INTERPRETATION: POSITIVE
CREAT SERPL-MCNC: 1.42 MG/DL
CREAT SPEC-SCNC: 58 MG/DL
CREAT/PROT UR: 0.5 RATIO
EGFR: 52 ML/MIN/1.73M2
EOSINOPHIL # BLD AUTO: 0.1 K/UL
EOSINOPHIL NFR BLD AUTO: 1.7 %
EPITHELIAL CELLS: 0 /HPF
GLUCOSE QUALITATIVE U: NEGATIVE MG/DL
GLUCOSE SERPL-MCNC: 212 MG/DL
HCT VFR BLD CALC: 41 %
HGB BLD-MCNC: 13.1 G/DL
IMM GRANULOCYTES NFR BLD AUTO: 0.3 %
KETONES URINE: NEGATIVE MG/DL
LEUKOCYTE ESTERASE URINE: ABNORMAL
LYMPHOCYTES # BLD AUTO: 0.99 K/UL
LYMPHOCYTES NFR BLD AUTO: 16.6 %
MAGNESIUM SERPL-MCNC: 2.1 MG/DL
MAN DIFF?: NORMAL
MCHC RBC-ENTMCNC: 32 GM/DL
MCHC RBC-ENTMCNC: 32.1 PG
MCV RBC AUTO: 100.5 FL
MICROSCOPIC-UA: NORMAL
MONOCYTES # BLD AUTO: 0.67 K/UL
MONOCYTES NFR BLD AUTO: 11.2 %
NEUTROPHILS # BLD AUTO: 4.11 K/UL
NEUTROPHILS NFR BLD AUTO: 69 %
NITRITE URINE: NEGATIVE
PH URINE: 5.5
PHOSPHATE SERPL-MCNC: 3.6 MG/DL
PLATELET # BLD AUTO: 186 K/UL
POTASSIUM SERPL-SCNC: 5.1 MMOL/L
PROT SERPL-MCNC: 7.4 G/DL
PROT UR-MCNC: 26 MG/DL
PROTEIN URINE: 30 MG/DL
RBC # BLD: 4.08 M/UL
RBC # FLD: 13.2 %
RED BLOOD CELLS URINE: 0 /HPF
SARS-COV-2 AB SERPL IA-ACNC: >250 U/ML
SODIUM SERPL-SCNC: 141 MMOL/L
SPECIFIC GRAVITY URINE: 1.01
TACROLIMUS SERPL-MCNC: 4.9 NG/ML
URATE SERPL-MCNC: 8 MG/DL
UROBILINOGEN URINE: 1 MG/DL
WBC # FLD AUTO: 5.96 K/UL
WHITE BLOOD CELLS URINE: 2 /HPF

## 2023-10-18 PROCEDURE — 99215 OFFICE O/P EST HI 40 MIN: CPT

## 2023-10-18 RX ORDER — DAPSONE 100 MG/1
100 TABLET ORAL DAILY
Qty: 90 | Refills: 3 | Status: DISCONTINUED | COMMUNITY
Start: 2021-05-04 | End: 2023-10-18

## 2023-10-18 RX ORDER — INSULIN GLARGINE 100 [IU]/ML
100 INJECTION, SOLUTION SUBCUTANEOUS AT BEDTIME
Qty: 1 | Refills: 3 | Status: ACTIVE | COMMUNITY
Start: 2021-03-10 | End: 1900-01-01

## 2023-10-18 RX ORDER — FAMOTIDINE 20 MG/1
20 TABLET, FILM COATED ORAL
Qty: 30 | Refills: 11 | Status: DISCONTINUED | COMMUNITY
Start: 2021-04-13 | End: 2023-10-18

## 2023-10-18 RX ORDER — CHROMIUM 200 MCG
25 MCG TABLET ORAL DAILY
Qty: 90 | Refills: 3 | Status: ACTIVE | COMMUNITY
Start: 2023-10-18 | End: 1900-01-01

## 2023-10-18 RX ORDER — NIFEDIPINE 60 MG/1
60 TABLET, EXTENDED RELEASE ORAL
Qty: 180 | Refills: 3 | Status: DISCONTINUED | COMMUNITY
Start: 2023-01-18 | End: 2023-10-18

## 2023-11-20 RX ORDER — MYCOPHENOLATE MOFETIL 500 MG/1
500 TABLET ORAL TWICE DAILY
Qty: 180 | Refills: 3 | Status: ACTIVE | COMMUNITY
Start: 2021-03-10 | End: 1900-01-01

## 2023-11-20 RX ORDER — CALCITRIOL 0.25 UG/1
0.25 CAPSULE, LIQUID FILLED ORAL
Qty: 30 | Refills: 11 | Status: ACTIVE | COMMUNITY
Start: 2021-03-22 | End: 1900-01-01

## 2023-11-30 ENCOUNTER — APPOINTMENT (OUTPATIENT)
Dept: PULMONOLOGY | Facility: CLINIC | Age: 73
End: 2023-11-30

## 2023-12-13 RX ORDER — CARVEDILOL 25 MG/1
25 TABLET, FILM COATED ORAL TWICE DAILY
Qty: 60 | Refills: 11 | Status: ACTIVE | COMMUNITY
Start: 2021-03-11 | End: 1900-01-01

## 2024-01-09 NOTE — ED PROVIDER NOTE - PRO INTERPRETER NEED 2
Erivedge Pregnancy And Lactation Text: This medication is Pregnancy Category X and is absolutely contraindicated during pregnancy. It is unknown if it is excreted in breast milk. Hydroquinone Pregnancy And Lactation Text: This medication has not been assigned a Pregnancy Risk Category but animal studies failed to show danger with the topical medication. It is unknown if the medication is excreted in breast milk. Ivermectin Pregnancy And Lactation Text: This medication is Pregnancy Category C and it isn't known if it is safe during pregnancy. It is also excreted in breast milk. Cimetidine Counseling:  I discussed with the patient the risks of Cimetidine including but not limited to gynecomastia, headache, diarrhea, nausea, drowsiness, arrhythmias, pancreatitis, skin rashes, psychosis, bone marrow suppression and kidney toxicity. Zoryve Pregnancy And Lactation Text: It is unknown if this medication can cause problems during pregnancy and breastfeeding. Tazorac Counseling:  Patient advised that medication is irritating and drying.  Patient may need to apply sparingly and wash off after an hour before eventually leaving it on overnight.  The patient verbalized understanding of the proper use and possible adverse effects of tazorac.  All of the patient's questions and concerns were addressed. Stelara Counseling:  I discussed with the patient the risks of ustekinumab including but not limited to immunosuppression, malignancy, posterior leukoencephalopathy syndrome, and serious infections.  The patient understands that monitoring is required including a PPD at baseline and must alert us or the primary physician if symptoms of infection or other concerning signs are noted. Cantharidin Pregnancy And Lactation Text: This medication has not been proven safe during pregnancy. It is unknown if this medication is excreted in breast milk. Clofazimine Pregnancy And Lactation Text: This medication is Pregnancy Category C and isn't considered safe during pregnancy. It is excreted in breast milk. Qbrexza Pregnancy And Lactation Text: There is no available data on Qbrexza use in pregnant women.  There is no available data on Qbrexza use in lactation. Acitretin Counseling:  I discussed with the patient the risks of acitretin including but not limited to hair loss, dry lips/skin/eyes, liver damage, hyperlipidemia, depression/suicidal ideation, photosensitivity.  Serious rare side effects can include but are not limited to pancreatitis, pseudotumor cerebri, bony changes, clot formation/stroke/heart attack.  Patient understands that alcohol is contraindicated since it can result in liver toxicity and significantly prolong the elimination of the drug by many years. Opzelura Counseling:  I discussed with the patient the risks of Opzelura including but not limited to nasopharngitis, bronchitis, ear infection, eosinophila, hives, diarrhea, folliculitis, tonsillitis, and rhinorrhea.  Taken orally, this medication has been linked to serious infections; higher rate of mortality; malignancy and lymphoproliferative disorders; major adverse cardiovascular events; thrombosis; thrombocytopenia, anemia, and neutropenia; and lipid elevations. Aklief Pregnancy And Lactation Text: It is unknown if this medication is safe to use during pregnancy.  It is unknown if this medication is excreted in breast milk.  Breastfeeding women should use the topical cream on the smallest area of the skin for the shortest time needed while breastfeeding.  Do not apply to nipple and areola. Tranexamic Acid Counseling:  Patient advised of the small risk of bleeding problems with tranexamic acid. They were also instructed to call if they developed any nausea, vomiting or diarrhea. All of the patient's questions and concerns were addressed. Itraconazole Counseling:  I discussed with the patient the risks of itraconazole including but not limited to liver damage, nausea/vomiting, neuropathy, and severe allergy.  The patient understands that this medication is best absorbed when taken with acidic beverages such as non-diet cola or ginger ale.  The patient understands that monitoring is required including baseline LFTs and repeat LFTs at intervals.  The patient understands that they are to contact us or the primary physician if concerning signs are noted. Opzelura Pregnancy And Lactation Text: There is insufficient data to evaluate drug-associated risk for major birth defects, miscarriage, or other adverse maternal or fetal outcomes.  There is a pregnancy registry that monitors pregnancy outcomes in pregnant persons exposed to the medication during pregnancy.  It is unknown if this medication is excreted in breast milk.  Do not breastfeed during treatment and for about 4 weeks after the last dose. Acitretin Pregnancy And Lactation Text: This medication is Pregnancy Category X and should not be given to women who are pregnant or may become pregnant in the future. This medication is excreted in breast milk. Tazorac Pregnancy And Lactation Text: This medication is not safe during pregnancy. It is unknown if this medication is excreted in breast milk. Infliximab Pregnancy And Lactation Text: This medication is Pregnancy Category B and is considered safe during pregnancy. It is unknown if this medication is excreted in breast milk. Hydroxychloroquine Pregnancy And Lactation Text: This medication has been shown to cause fetal harm but it isn't assigned a Pregnancy Risk Category. There are small amounts excreted in breast milk. Rifampin Pregnancy And Lactation Text: This medication is Pregnancy Category C and it isn't know if it is safe during pregnancy. It is also excreted in breast milk and should not be used if you are breast feeding. Topical Sulfur Applications Counseling: Topical Sulfur Counseling: Patient counseled that this medication may cause skin irritation or allergic reactions.  In the event of skin irritation, the patient was advised to reduce the amount of the drug applied or use it less frequently.   The patient verbalized understanding of the proper use and possible adverse effects of topical sulfur application.  All of the patient's questions and concerns were addressed. Birth Control Pills Counseling: Birth Control Pill Counseling: I discussed with the patient the potential side effects of OCPs including but not limited to increased risk of stroke, heart attack, thrombophlebitis, deep venous thrombosis, hepatic adenomas, breast changes, GI upset, headaches, and depression.  The patient verbalized understanding of the proper use and possible adverse effects of OCPs. All of the patient's questions and concerns were addressed. Dupixent Counseling: I discussed with the patient the risks of dupilumab including but not limited to eye infection and irritation, cold sores, injection site reactions, worsening of asthma, allergic reactions and increased risk of parasitic infection.  Live vaccines should be avoided while taking dupilumab. Dupilumab will also interact with certain medications such as warfarin and cyclosporine. The patient understands that monitoring is required and they must alert us or the primary physician if symptoms of infection or other concerning signs are noted. Cyclosporine Pregnancy And Lactation Text: This medication is Pregnancy Category C and it isn't know if it is safe during pregnancy. This medication is excreted in breast milk. Oral Minoxidil Counseling- I discussed with the patient the risks of oral minoxidil including but not limited to shortness of breath, swelling of the feet or ankles, dizziness, lightheadedness, unwanted hair growth and allergic reaction.  The patient verbalized understanding of the proper use and possible adverse effects of oral minoxidil.  All of the patient's questions and concerns were addressed. Olumiant Counseling: I discussed with the patient the risks of Olumiant therapy including but not limited to upper respiratory tract infections, shingles, cold sores, and nausea. Live vaccines should be avoided.  This medication has been linked to serious infections; higher rate of mortality; malignancy and lymphoproliferative disorders; major adverse cardiovascular events; thrombosis; gastrointestinal perforations; neutropenia; lymphopenia; anemia; liver enzyme elevations; and lipid elevations. Erythromycin Counseling:  I discussed with the patient the risks of erythromycin including but not limited to GI upset, allergic reaction, drug rash, diarrhea, increase in liver enzymes, and yeast infections. 5-Fu Counseling: 5-Fluorouracil Counseling:  I discussed with the patient the risks of 5-fluorouracil including but not limited to erythema, scaling, itching, weeping, crusting, and pain. Sarecycline Counseling: Patient advised regarding possible photosensitivity and discoloration of the teeth, skin, lips, tongue and gums.  Patient instructed to avoid sunlight, if possible.  When exposed to sunlight, patients should wear protective clothing, sunglasses, and sunscreen.  The patient was instructed to call the office immediately if the following severe adverse effects occur:  hearing changes, easy bruising/bleeding, severe headache, or vision changes.  The patient verbalized understanding of the proper use and possible adverse effects of sarecycline.  All of the patient's questions and concerns were addressed. Erythromycin Pregnancy And Lactation Text: This medication is Pregnancy Category B and is considered safe during pregnancy. It is also excreted in breast milk. 5-Fu Pregnancy And Lactation Text: This medication is Pregnancy Category X and contraindicated in pregnancy and in women who may become pregnant. It is unknown if this medication is excreted in breast milk. Libtayo Counseling- I discussed with the patient the risks of Libtayo including but not limited to nausea, vomiting, diarrhea, and bone or muscle pain.  The patient verbalized understanding of the proper use and possible adverse effects of Libtayo.  All of the patient's questions and concerns were addressed. Methotrexate Counseling:  Patient counseled regarding adverse effects of methotrexate including but not limited to nausea, vomiting, abnormalities in liver function tests. Patients may develop mouth sores, rash, diarrhea, and abnormalities in blood counts. The patient understands that monitoring is required including LFT's and blood counts.  There is a rare possibility of scarring of the liver and lung problems that can occur when taking methotrexate. Persistent nausea, loss of appetite, pale stools, dark urine, cough, and shortness of breath should be reported immediately. Patient advised to discontinue methotrexate treatment at least three months before attempting to become pregnant.  I discussed the need for folate supplements while taking methotrexate.  These supplements can decrease side effects during methotrexate treatment. The patient verbalized understanding of the proper use and possible adverse effects of methotrexate.  All of the patient's questions and concerns were addressed. Rituxan Counseling:  I discussed with the patient the risks of Rituxan infusions. Side effects can include infusion reactions, severe drug rashes including mucocutaneous reactions, reactivation of latent hepatitis and other infections and rarely progressive multifocal leukoencephalopathy.  All of the patient's questions and concerns were addressed. Oral Minoxidil Pregnancy And Lactation Text: This medication should only be used when clearly needed if you are pregnant, attempting to become pregnant or breast feeding. Azithromycin Pregnancy And Lactation Text: This medication is considered safe during pregnancy and is also secreted in breast milk. Rhofade Counseling: Rhofade is a topical medication which can decrease superficial blood flow where applied. Side effects are uncommon and include stinging, redness and allergic reactions. Cimetidine Pregnancy And Lactation Text: This medication is Pregnancy Category B and is considered safe during pregnancy. It is also excreted in breast milk and breast feeding isn't recommended. Zyclara Counseling:  I discussed with the patient the risks of imiquimod including but not limited to erythema, scaling, itching, weeping, crusting, and pain.  Patient understands that the inflammatory response to imiquimod is variable from person to person and was educated regarded proper titration schedule.  If flu-like symptoms develop, patient knows to discontinue the medication and contact us. Colchicine Counseling:  Patient counseled regarding adverse effects including but not limited to stomach upset (nausea, vomiting, stomach pain, or diarrhea).  Patient instructed to limit alcohol consumption while taking this medication.  Colchicine may reduce blood counts especially with prolonged use.  The patient understands that monitoring of kidney function and blood counts may be required, especially at baseline. The patient verbalized understanding of the proper use and possible adverse effects of colchicine.  All of the patient's questions and concerns were addressed. Imiquimod Counseling:  I discussed with the patient the risks of imiquimod including but not limited to erythema, scaling, itching, weeping, crusting, and pain.  Patient understands that the inflammatory response to imiquimod is variable from person to person and was educated regarded proper titration schedule.  If flu-like symptoms develop, patient knows to discontinue the medication and contact us. Bactrim Counseling:  I discussed with the patient the risks of sulfa antibiotics including but not limited to GI upset, allergic reaction, drug rash, diarrhea, dizziness, photosensitivity, and yeast infections.  Rarely, more serious reactions can occur including but not limited to aplastic anemia, agranulocytosis, methemoglobinemia, blood dyscrasias, liver or kidney failure, lung infiltrates or desquamative/blistering drug rashes. Rhofade Pregnancy And Lactation Text: This medication has not been assigned a Pregnancy Risk Category. It is unknown if the medication is excreted in breast milk. Picato Counseling:  I discussed with the patient the risks of Picato including but not limited to erythema, scaling, itching, weeping, crusting, and pain. Bexarotene Counseling:  I discussed with the patient the risks of bexarotene including but not limited to hair loss, dry lips/skin/eyes, liver abnormalities, hyperlipidemia, pancreatitis, depression/suicidal ideation, photosensitivity, drug rash/allergic reactions, hypothyroidism, anemia, leukopenia, infection, cataracts, and teratogenicity.  Patient understands that they will need regular blood tests to check lipid profile, liver function tests, white blood cell count, thyroid function tests and pregnancy test if applicable. Adbry Counseling: I discussed with the patient the risks of tralokinumab including but not limited to eye infection and irritation, cold sores, injection site reactions, worsening of asthma, allergic reactions and increased risk of parasitic infection.  Live vaccines should be avoided while taking tralokinumab. The patient understands that monitoring is required and they must alert us or the primary physician if symptoms of infection or other concerning signs are noted. Itraconazole Pregnancy And Lactation Text: This medication is Pregnancy Category C and it isn't know if it is safe during pregnancy. It is also excreted in breast milk. Dupixent Pregnancy And Lactation Text: This medication likely crosses the placenta but the risk for the fetus is uncertain. This medication is excreted in breast milk. Topical Sulfur Applications Pregnancy And Lactation Text: This medication is Pregnancy Category C and has an unknown safety profile during pregnancy. It is unknown if this topical medication is excreted in breast milk. Low Dose Naltrexone Counseling- I discussed with the patient the potential risks and side effects of low dose naltrexone including but not limited to: more vivid dreams, headaches, nausea, vomiting, abdominal pain, fatigue, dizziness, and anxiety. Topical Clindamycin Counseling: Patient counseled that this medication may cause skin irritation or allergic reactions.  In the event of skin irritation, the patient was advised to reduce the amount of the drug applied or use it less frequently.   The patient verbalized understanding of the proper use and possible adverse effects of clindamycin.  All of the patient's questions and concerns were addressed. Olumiant Pregnancy And Lactation Text: Based on animal studies, Olumiant may cause embryo-fetal harm when administered to pregnant women.  The medication should not be used in pregnancy.  Breastfeeding is not recommended during treatment. Birth Control Pills Pregnancy And Lactation Text: This medication should be avoided if pregnant and for the first 30 days post-partum. Azelaic Acid Counseling: Patient counseled that medicine may cause skin irritation and to avoid applying near the eyes.  In the event of skin irritation, the patient was advised to reduce the amount of the drug applied or use it less frequently.   The patient verbalized understanding of the proper use and possible adverse effects of azelaic acid.  All of the patient's questions and concerns were addressed. English Tranexamic Acid Pregnancy And Lactation Text: It is unknown if this medication is safe during pregnancy or breast feeding. Spironolactone Counseling: Patient advised regarding risks of diarrhea, abdominal pain, hyperkalemia, birth defects (for female patients), liver toxicity and renal toxicity. The patient may need blood work to monitor liver and kidney function and potassium levels while on therapy. The patient verbalized understanding of the proper use and possible adverse effects of spironolactone.  All of the patient's questions and concerns were addressed. Methotrexate Pregnancy And Lactation Text: This medication is Pregnancy Category X and is known to cause fetal harm. This medication is excreted in breast milk. Drysol Counseling:  I discussed with the patient the risks of drysol/aluminum chloride including but not limited to skin rash, itching, irritation, burning. Azelaic Acid Pregnancy And Lactation Text: This medication is considered safe during pregnancy and breast feeding. Ketoconazole Counseling:   Patient counseled regarding improving absorption with orange juice.  Adverse effects include but are not limited to breast enlargement, headache, diarrhea, nausea, upset stomach, liver function test abnormalities, taste disturbance, and stomach pain.  There is a rare possibility of liver failure that can occur when taking ketoconazole. The patient understands that monitoring of LFTs may be required, especially at baseline. The patient verbalized understanding of the proper use and possible adverse effects of ketoconazole.  All of the patient's questions and concerns were addressed. Wartpeel Counseling:  I discussed with the patient the risks of Wartpeel including but not limited to erythema, scaling, itching, weeping, crusting, and pain. Libtayo Pregnancy And Lactation Text: This medication is contraindicated in pregnancy and when breast feeding. Oxybutynin Counseling:  I discussed with the patient the risks of oxybutynin including but not limited to skin rash, drowsiness, dry mouth, difficulty urinating, and blurred vision. Doxepin Counseling:  Patient advised that the medication is sedating and not to drive a car after taking this medication. Patient informed of potential adverse effects including but not limited to dry mouth, urinary retention, and blurry vision.  The patient verbalized understanding of the proper use and possible adverse effects of doxepin.  All of the patient's questions and concerns were addressed. Zyclara Pregnancy And Lactation Text: This medication is Pregnancy Category C. It is unknown if this medication is excreted in breast milk. Otezla Counseling: The side effects of Otezla were discussed with the patient, including but not limited to worsening or new depression, weight loss, diarrhea, nausea, upper respiratory tract infection, and headache. Patient instructed to call the office should any adverse effect occur.  The patient verbalized understanding of the proper use and possible adverse effects of Otezla.  All the patient's questions and concerns were addressed. Rituxan Pregnancy And Lactation Text: This medication is Pregnancy Category C and it isn't know if it is safe during pregnancy. It is unknown if this medication is excreted in breast milk but similar antibodies are known to be excreted. Taltz Counseling: I discussed with the patient the risks of ixekizumab including but not limited to immunosuppression, serious infections, worsening of inflammatory bowel disease and drug reactions.  The patient understands that monitoring is required including a PPD at baseline and must alert us or the primary physician if symptoms of infection or other concerning signs are noted. Sarecycline Pregnancy And Lactation Text: This medication is Pregnancy Category D and not consider safe during pregnancy. It is also excreted in breast milk. Taltz Pregnancy And Lactation Text: The risk during pregnancy and breastfeeding is uncertain with this medication. Odomzo Counseling- I discussed with the patient the risks of Odomzo including but not limited to nausea, vomiting, diarrhea, constipation, weight loss, changes in the sense of taste, decreased appetite, muscle spasms, and hair loss.  The patient verbalized understanding of the proper use and possible adverse effects of Odomzo.  All of the patient's questions and concerns were addressed. Tetracycline Counseling: Patient counseled regarding possible photosensitivity and increased risk for sunburn.  Patient instructed to avoid sunlight, if possible.  When exposed to sunlight, patients should wear protective clothing, sunglasses, and sunscreen.  The patient was instructed to call the office immediately if the following severe adverse effects occur:  hearing changes, easy bruising/bleeding, severe headache, or vision changes.  The patient verbalized understanding of the proper use and possible adverse effects of tetracycline.  All of the patient's questions and concerns were addressed. Patient understands to avoid pregnancy while on therapy due to potential birth defects. Klisyri Counseling:  I discussed with the patient the risks of Klisyri including but not limited to erythema, scaling, itching, weeping, crusting, and pain. Oxybutynin Pregnancy And Lactation Text: This medication is Pregnancy Category B and is considered safe during pregnancy. It is unknown if it is excreted in breast milk. Doxepin Pregnancy And Lactation Text: This medication is Pregnancy Category C and it isn't known if it is safe during pregnancy. It is also excreted in breast milk and breast feeding isn't recommended. Enbrel Counseling:  I discussed with the patient the risks of etanercept including but not limited to myelosuppression, immunosuppression, autoimmune hepatitis, demyelinating diseases, lymphoma, and infections.  The patient understands that monitoring is required including a PPD at baseline and must alert us or the primary physician if symptoms of infection or other concerning signs are noted. Valtrex Counseling: I discussed with the patient the risks of valacyclovir including but not limited to kidney damage, nausea, vomiting and severe allergy.  The patient understands that if the infection seems to be worsening or is not improving, they are to call. Dapsone Counseling: I discussed with the patient the risks of dapsone including but not limited to hemolytic anemia, agranulocytosis, rashes, methemoglobinemia, kidney failure, peripheral neuropathy, headaches, GI upset, and liver toxicity.  Patients who start dapsone require monitoring including baseline LFTs and weekly CBCs for the first month, then every month thereafter.  The patient verbalized understanding of the proper use and possible adverse effects of dapsone.  All of the patient's questions and concerns were addressed. Metronidazole Counseling:  I discussed with the patient the risks of metronidazole including but not limited to seizures, nausea/vomiting, a metallic taste in the mouth, nausea/vomiting and severe allergy. Rinvoq Counseling: I discussed with the patient the risks of Rinvoq therapy including but not limited to upper respiratory tract infections, shingles, cold sores, bronchitis, nausea, cough, fever, acne, and headache. Live vaccines should be avoided.  This medication has been linked to serious infections; higher rate of mortality; malignancy and lymphoproliferative disorders; major adverse cardiovascular events; thrombosis; thrombocytopenia, anemia, and neutropenia; lipid elevations; liver enzyme elevations; and gastrointestinal perforations. Adbry Pregnancy And Lactation Text: It is unknown if this medication will adversely affect pregnancy or breast feeding. Solaraze Counseling:  I discussed with the patient the risks of Solaraze including but not limited to erythema, scaling, itching, weeping, crusting, and pain. Low Dose Naltrexone Pregnancy And Lactation Text: Naltrexone is pregnancy category C.  There have been no adequate and well-controlled studies in pregnant women.  It should be used in pregnancy only if the potential benefit justifies the potential risk to the fetus.   Limited data indicates that naltrexone is minimally excreted into breastmilk. Bexarotene Pregnancy And Lactation Text: This medication is Pregnancy Category X and should not be given to women who are pregnant or may become pregnant. This medication should not be used if you are breast feeding. Bactrim Pregnancy And Lactation Text: This medication is Pregnancy Category D and is known to cause fetal risk.  It is also excreted in breast milk. Metronidazole Pregnancy And Lactation Text: This medication is Pregnancy Category B and considered safe during pregnancy.  It is also excreted in breast milk. Topical Ketoconazole Counseling: Patient counseled that this medication may cause skin irritation or allergic reactions.  In the event of skin irritation, the patient was advised to reduce the amount of the drug applied or use it less frequently.   The patient verbalized understanding of the proper use and possible adverse effects of ketoconazole.  All of the patient's questions and concerns were addressed. Niacinamide Counseling: I recommended taking niacin or niacinamide, also know as vitamin B3, twice daily. Recent evidence suggests that taking vitamin B3 (500 mg twice daily) can reduce the risk of actinic keratoses and non-melanoma skin cancers. Side effects of vitamin B3 include flushing and headache. Benzoyl Peroxide Counseling: Patient counseled that medicine may cause skin irritation and bleach clothing.  In the event of skin irritation, the patient was advised to reduce the amount of the drug applied or use it less frequently.   The patient verbalized understanding of the proper use and possible adverse effects of benzoyl peroxide.  All of the patient's questions and concerns were addressed. Opioid Counseling: I discussed with the patient the potential side effects of opioids including but not limited to addiction, altered mental status, and depression. I stressed avoiding alcohol, benzodiazepines, muscle relaxants and sleep aids unless specifically okayed by a physician. The patient verbalized understanding of the proper use and possible adverse effects of opioids. All of the patient's questions and concerns were addressed. They were instructed to flush the remaining pills down the toilet if they did not need them for pain. Cephalexin Counseling: I counseled the patient regarding use of cephalexin as an antibiotic for prophylactic and/or therapeutic purposes. Cephalexin (commonly prescribed under brand name Keflex) is a cephalosporin antibiotic which is active against numerous classes of bacteria, including most skin bacteria. Side effects may include nausea, diarrhea, gastrointestinal upset, rash, hives, yeast infections, and in rare cases, hepatitis, kidney disease, seizures, fever, confusion, neurologic symptoms, and others. Patients with severe allergies to penicillin medications are cautioned that there is about a 10% incidence of cross-reactivity with cephalosporins. When possible, patients with penicillin allergies should use alternatives to cephalosporins for antibiotic therapy. Azathioprine Pregnancy And Lactation Text: This medication is Pregnancy Category D and isn't considered safe during pregnancy. It is unknown if this medication is excreted in breast milk. Rinvoq Pregnancy And Lactation Text: Based on animal studies, Rinvoq may cause embryo-fetal harm when administered to pregnant women.  The medication should not be used in pregnancy.  Breastfeeding is not recommended during treatment and for 6 days after the last dose. Siliq Counseling:  I discussed with the patient the risks of Siliq including but not limited to new or worsening depression, suicidal thoughts and behavior, immunosuppression, malignancy, posterior leukoencephalopathy syndrome, and serious infections.  The patient understands that monitoring is required including a PPD at baseline and must alert us or the primary physician if symptoms of infection or other concerning signs are noted. There is also a special program designed to monitor depression which is required with Siliq. Spironolactone Pregnancy And Lactation Text: This medication can cause feminization of the male fetus and should be avoided during pregnancy. The active metabolite is also found in breast milk. Otezla Pregnancy And Lactation Text: This medication is Pregnancy Category C and it isn't known if it is safe during pregnancy. It is unknown if it is excreted in breast milk. Ketoconazole Pregnancy And Lactation Text: This medication is Pregnancy Category C and it isn't know if it is safe during pregnancy. It is also excreted in breast milk and breast feeding isn't recommended. Prednisone Counseling:  I discussed with the patient the risks of prolonged use of prednisone including but not limited to weight gain, insomnia, osteoporosis, mood changes, diabetes, susceptibility to infection, glaucoma and high blood pressure.  In cases where prednisone use is prolonged, patients should be monitored with blood pressure checks, serum glucose levels and an eye exam.  Additionally, the patient may need to be placed on GI prophylaxis, PCP prophylaxis, and calcium and vitamin D supplementation and/or a bisphosphonate.  The patient verbalized understanding of the proper use and the possible adverse effects of prednisone.  All of the patient's questions and concerns were addressed. Klisyri Pregnancy And Lactation Text: It is unknown if this medication can harm a developing fetus or if it is excreted in breast milk. Elidel Counseling: Patient may experience a mild burning sensation during topical application. Elidel is not approved in children less than 2 years of age. There have been case reports of hematologic and skin malignancies in patients using topical calcineurin inhibitors although causality is questionable. Valtrex Pregnancy And Lactation Text: this medication is Pregnancy Category B and is considered safe during pregnancy. This medication is not directly found in breast milk but it's metabolite acyclovir is present. Bimzelx Counseling:  I discussed with the patient the risks of Bimzelx including but not limited to depression, immunosuppression, allergic reactions and infections.  The patient understands that monitoring is required including a PPD at baseline and must alert us or the primary physician if symptoms of infection or other concerning signs are noted. Tremfya Counseling: I discussed with the patient the risks of guselkumab including but not limited to immunosuppression, serious infections, and drug reactions.  The patient understands that monitoring is required including a PPD at baseline and must alert us or the primary physician if symptoms of infection or other concerning signs are noted. Hydroxyzine Counseling: Patient advised that the medication is sedating and not to drive a car after taking this medication.  Patient informed of potential adverse effects including but not limited to dry mouth, urinary retention, and blurry vision.  The patient verbalized understanding of the proper use and possible adverse effects of hydroxyzine.  All of the patient's questions and concerns were addressed. Solaraze Pregnancy And Lactation Text: This medication is Pregnancy Category B and is considered safe. There is some data to suggest avoiding during the third trimester. It is unknown if this medication is excreted in breast milk. Dapsone Pregnancy And Lactation Text: This medication is Pregnancy Category C and is not considered safe during pregnancy or breast feeding. Isotretinoin Counseling: Patient should get monthly blood tests, not donate blood, not drive at night if vision affected, not share medication, and not undergo elective surgery for 6 months after tx completed. Side effects reviewed, pt to contact office should one occur. Propranolol Counseling:  I discussed with the patient the risks of propranolol including but not limited to low heart rate, low blood pressure, low blood sugar, restlessness and increased cold sensitivity. They should call the office if they experience any of these side effects. Cephalexin Pregnancy And Lactation Text: This medication is Pregnancy Category B and considered safe during pregnancy.  It is also excreted in breast milk but can be used safely for shorter doses. Benzoyl Peroxide Pregnancy And Lactation Text: This medication is Pregnancy Category C. It is unknown if benzoyl peroxide is excreted in breast milk. Azathioprine Counseling:  I discussed with the patient the risks of azathioprine including but not limited to myelosuppression, immunosuppression, hepatotoxicity, lymphoma, and infections.  The patient understands that monitoring is required including baseline LFTs, Creatinine, possible TPMP genotyping and weekly CBCs for the first month and then every 2 weeks thereafter.  The patient verbalized understanding of the proper use and possible adverse effects of azathioprine.  All of the patient's questions and concerns were addressed. Cellcept Counseling:  I discussed with the patient the risks of mycophenolate mofetil including but not limited to infection/immunosuppression, GI upset, hypokalemia, hypercholesterolemia, bone marrow suppression, lymphoproliferative disorders, malignancy, GI ulceration/bleed/perforation, colitis, interstitial lung disease, kidney failure, progressive multifocal leukoencephalopathy, and birth defects.  The patient understands that monitoring is required including a baseline creatinine and regular CBC testing. In addition, patient must alert us immediately if symptoms of infection or other concerning signs are noted. Isotretinoin Pregnancy And Lactation Text: This medication is Pregnancy Category X and is considered extremely dangerous during pregnancy. It is unknown if it is excreted in breast milk. Detail Level: Simple Opioid Pregnancy And Lactation Text: These medications can lead to premature delivery and should be avoided during pregnancy. These medications are also present in breast milk in small amounts. Use Enhanced Medication Counseling?: No Bimzelx Pregnancy And Lactation Text: This medication crosses the placenta and the safety is uncertain during pregnancy. It is unknown if this medication is present in breast milk. Winlevi Counseling:  I discussed with the patient the risks of topical clascoterone including but not limited to erythema, scaling, itching, and stinging. Patient voiced their understanding. Niacinamide Pregnancy And Lactation Text: These medications are considered safe during pregnancy. Humira Counseling:  I discussed with the patient the risks of adalimumab including but not limited to myelosuppression, immunosuppression, autoimmune hepatitis, demyelinating diseases, lymphoma, and serious infections.  The patient understands that monitoring is required including a PPD at baseline and must alert us or the primary physician if symptoms of infection or other concerning signs are noted. Sotyktu Counseling:  I discussed the most common side effects of Sotyktu including: common cold, sore throat, sinus infections, cold sores, canker sores, folliculitis, and acne.? I also discussed more serious side effects of Sotyktu including but not limited to: serious allergic reactions; increased risk for infections such as TB; cancers such as lymphomas; rhabdomyolysis and elevated CPK; and elevated triglycerides and liver enzymes.? Minocycline Counseling: Patient advised regarding possible photosensitivity and discoloration of the teeth, skin, lips, tongue and gums.  Patient instructed to avoid sunlight, if possible.  When exposed to sunlight, patients should wear protective clothing, sunglasses, and sunscreen.  The patient was instructed to call the office immediately if the following severe adverse effects occur:  hearing changes, easy bruising/bleeding, severe headache, or vision changes.  The patient verbalized understanding of the proper use and possible adverse effects of minocycline.  All of the patient's questions and concerns were addressed. Winlevi Pregnancy And Lactation Text: This medication is considered safe during pregnancy and breastfeeding. Simponi Counseling:  I discussed with the patient the risks of golimumab including but not limited to myelosuppression, immunosuppression, autoimmune hepatitis, demyelinating diseases, lymphoma, and serious infections.  The patient understands that monitoring is required including a PPD at baseline and must alert us or the primary physician if symptoms of infection or other concerning signs are noted. Propranolol Pregnancy And Lactation Text: This medication is Pregnancy Category C and it isn't known if it is safe during pregnancy. It is excreted in breast milk. Soolantra Counseling: I discussed with the patients the risks of topial Soolantra. This is a medicine which decreases the number of mites and inflammation in the skin. You experience burning, stinging, eye irritation or allergic reactions.  Please call our office if you develop any problems from using this medication. Hydroxyzine Pregnancy And Lactation Text: This medication is not safe during pregnancy and should not be taken. It is also excreted in breast milk and breast feeding isn't recommended. Gabapentin Counseling: I discussed with the patient the risks of gabapentin including but not limited to dizziness, somnolence, fatigue and ataxia. Minoxidil Counseling: Minoxidil is a topical medication which can increase blood flow where it is applied. It is uncertain how this medication increases hair growth. Side effects are uncommon and include stinging and allergic reactions. Xolair Counseling:  Patient informed of potential adverse effects including but not limited to fever, muscle aches, rash and allergic reactions.  The patient verbalized understanding of the proper use and possible adverse effects of Xolair.  All of the patient's questions and concerns were addressed. Clindamycin Counseling: I counseled the patient regarding use of clindamycin as an antibiotic for prophylactic and/or therapeutic purposes. Clindamycin is active against numerous classes of bacteria, including skin bacteria. Side effects may include nausea, diarrhea, gastrointestinal upset, rash, hives, yeast infections, and in rare cases, colitis. SSKI Counseling:  I discussed with the patient the risks of SSKI including but not limited to thyroid abnormalities, metallic taste, GI upset, fever, headache, acne, arthralgias, paraesthesias, lymphadenopathy, easy bleeding, arrhythmias, and allergic reaction. Fluconazole Counseling:  Patient counseled regarding adverse effects of fluconazole including but not limited to headache, diarrhea, nausea, upset stomach, liver function test abnormalities, taste disturbance, and stomach pain.  There is a rare possibility of liver failure that can occur when taking fluconazole.  The patient understands that monitoring of LFTs and kidney function test may be required, especially at baseline. The patient verbalized understanding of the proper use and possible adverse effects of fluconazole.  All of the patient's questions and concerns were addressed. High Dose Vitamin A Counseling: Side effects reviewed, pt to contact office should one occur. Cibinqo Counseling: I discussed with the patient the risks of Cibinqo therapy including but not limited to common cold, nausea, headache, cold sores, increased blood CPK levels, dizziness, UTIs, fatigue, acne, and vomitting. Live vaccines should be avoided.  This medication has been linked to serious infections; higher rate of mortality; malignancy and lymphoproliferative disorders; major adverse cardiovascular events; thrombosis; thrombocytopenia and lymphopenia; lipid elevations; and retinal detachment. Cimzia Counseling:  I discussed with the patient the risks of Cimzia including but not limited to immunosuppression, allergic reactions and infections.  The patient understands that monitoring is required including a PPD at baseline and must alert us or the primary physician if symptoms of infection or other concerning signs are noted. Soolantra Pregnancy And Lactation Text: This medication is Pregnancy Category C. This medication is considered safe during breast feeding. Dutasteride Male Counseling: Dustasteride Counseling:  I discussed with the patient the risks of use of dutasteride including but not limited to decreased libido, decreased ejaculate volume, and gynecomastia. Women who can become pregnant should not handle medication.  All of the patient's questions and concerns were addressed. Sotyktu Pregnancy And Lactation Text: There is insufficient data to evaluate whether or not Sotyktu is safe to use during pregnancy.? ?It is not known if Sotyktu passes into breast milk and whether or not it is safe to use when breastfeeding.?? Topical Metronidazole Counseling: Metronidazole is a topical antibiotic medication. You may experience burning, stinging, redness, or allergic reactions.  Please call our office if you develop any problems from using this medication. Nsaids Counseling: NSAID Counseling: I discussed with the patient that NSAIDs should be taken with food. Prolonged use of NSAIDs can result in the development of stomach ulcers.  Patient advised to stop taking NSAIDs if abdominal pain occurs.  The patient verbalized understanding of the proper use and possible adverse effects of NSAIDs.  All of the patient's questions and concerns were addressed. Carac Counseling:  I discussed with the patient the risks of Carac including but not limited to erythema, scaling, itching, weeping, crusting, and pain. Quinolones Counseling:  I discussed with the patient the risks of fluoroquinolones including but not limited to GI upset, allergic reaction, drug rash, diarrhea, dizziness, photosensitivity, yeast infections, liver function test abnormalities, tendonitis/tendon rupture. Eucrisa Counseling: Patient may experience a mild burning sensation during topical application. Eucrisa is not approved in children less than 2 years of age. Albendazole Counseling:  I discussed with the patient the risks of albendazole including but not limited to cytopenia, kidney damage, nausea/vomiting and severe allergy.  The patient understands that this medication is being used in an off-label manner. Xeljanz Counseling: I discussed with the patient the risks of Xeljanz therapy including increased risk of infection, liver issues, headache, diarrhea, or cold symptoms. Live vaccines should be avoided. They were instructed to call if they have any problems. Cyclophosphamide Counseling:  I discussed with the patient the risks of cyclophosphamide including but not limited to hair loss, hormonal abnormalities, decreased fertility, abdominal pain, diarrhea, nausea and vomiting, bone marrow suppression and infection. The patient understands that monitoring is required while taking this medication. Ilumya Counseling: I discussed with the patient the risks of tildrakizumab including but not limited to immunosuppression, malignancy, posterior leukoencephalopathy syndrome, and serious infections.  The patient understands that monitoring is required including a PPD at baseline and must alert us or the primary physician if symptoms of infection or other concerning signs are noted. Nsaids Pregnancy And Lactation Text: These medications are considered safe up to 30 weeks gestation. It is excreted in breast milk. Protopic Counseling: Patient may experience a mild burning sensation during topical application. Protopic is not approved in children less than 2 years of age. There have been case reports of hematologic and skin malignancies in patients using topical calcineurin inhibitors although causality is questionable. Arava Counseling:  Patient counseled regarding adverse effects of Arava including but not limited to nausea, vomiting, abnormalities in liver function tests. Patients may develop mouth sores, rash, diarrhea, and abnormalities in blood counts. The patient understands that monitoring is required including LFTs and blood counts.  There is a rare possibility of scarring of the liver and lung problems that can occur when taking methotrexate. Persistent nausea, loss of appetite, pale stools, dark urine, cough, and shortness of breath should be reported immediately. Patient advised to discontinue Arava treatment and consult with a physician prior to attempting conception. The patient will have to undergo a treatment to eliminate Arava from the body prior to conception. VTAMA Counseling: I discussed with the patient that VTAMA is not for use in the eyes, mouth or mouth. They should call the office if they develop any signs of allergic reactions to VTAMA. The patient verbalized understanding of the proper use and possible adverse effects of VTAMA.  All of the patient's questions and concerns were addressed. Mirvaso Counseling: Mirvaso is a topical medication which can decrease superficial blood flow where applied. Side effects are uncommon and include stinging, redness and allergic reactions. Sski Pregnancy And Lactation Text: This medication is Pregnancy Category D and isn't considered safe during pregnancy. It is excreted in breast milk. Topical Metronidazole Pregnancy And Lactation Text: This medication is Pregnancy Category B and considered safe during pregnancy.  It is also considered safe to use while breastfeeding. Glycopyrrolate Counseling:  I discussed with the patient the risks of glycopyrrolate including but not limited to skin rash, drowsiness, dry mouth, difficulty urinating, and blurred vision. Xolair Pregnancy And Lactation Text: This medication is Pregnancy Category B and is considered safe during pregnancy. This medication is excreted in breast milk. Cimzia Pregnancy And Lactation Text: This medication crosses the placenta but can be considered safe in certain situations. Cimzia may be excreted in breast milk. Dutasteride Pregnancy And Lactation Text: This medication is absolutely contraindicated in women, especially during pregnancy and breast feeding. Feminization of male fetuses is possible if taking while pregnant. Topical Retinoid counseling:  Patient advised to apply a pea-sized amount only at bedtime and wait 30 minutes after washing their face before applying.  If too drying, patient may add a non-comedogenic moisturizer. The patient verbalized understanding of the proper use and possible adverse effects of retinoids.  All of the patient's questions and concerns were addressed. High Dose Vitamin A Pregnancy And Lactation Text: High dose vitamin A therapy is contraindicated during pregnancy and breast feeding. Cibinqo Pregnancy And Lactation Text: It is unknown if this medication will adversely affect pregnancy or breast feeding.  You should not take this medication if you are currently pregnant or planning a pregnancy or while breastfeeding. Terbinafine Counseling: Patient counseling regarding adverse effects of terbinafine including but not limited to headache, diarrhea, rash, upset stomach, liver function test abnormalities, itching, taste/smell disturbance, nausea, abdominal pain, and flatulence.  There is a rare possibility of liver failure that can occur when taking terbinafine.  The patient understands that a baseline LFT and kidney function test may be required. The patient verbalized understanding of the proper use and possible adverse effects of terbinafine.  All of the patient's questions and concerns were addressed. Clindamycin Pregnancy And Lactation Text: This medication can be used in pregnancy if certain situations. Clindamycin is also present in breast milk. Finasteride Male Counseling: Finasteride Counseling:  I discussed with the patient the risks of use of finasteride including but not limited to decreased libido, decreased ejaculate volume, gynecomastia, and depression. Women should not handle medication.  All of the patient's questions and concerns were addressed. Cyclophosphamide Pregnancy And Lactation Text: This medication is Pregnancy Category D and it isn't considered safe during pregnancy. This medication is excreted in breast milk. Olanzapine Counseling- I discussed with the patient the common side effects of olanzapine including but are not limited to: lack of energy, dry mouth, increased appetite, sleepiness, tremor, constipation, dizziness, changes in behavior, or restlessness.  Explained that teenagers are more likely to experience headaches, abdominal pain, pain in the arms or legs, tiredness, and sleepiness.  Serious side effects include but are not limited: increased risk of death in elderly patients who are confused, have memory loss, or dementia-related psychosis; hyperglycemia; increased cholesterol and triglycerides; and weight gain. Calcipotriene Counseling:  I discussed with the patient the risks of calcipotriene including but not limited to erythema, scaling, itching, and irritation. Xelvinniez Pregnancy And Lactation Text: This medication is Pregnancy Category D and is not considered safe during pregnancy.  The risk during breast feeding is also uncertain. Topical Steroids Counseling: I discussed with the patient that prolonged use of topical steroids can result in the increased appearance of superficial blood vessels (telangiectasias), lightening (hypopigmentation) and thinning of the skin (atrophy).  Patient understands to avoid using high potency steroids in skin folds, the groin or the face.  The patient verbalized understanding of the proper use and possible adverse effects of topical steroids.  All of the patient's questions and concerns were addressed. Griseofulvin Counseling:  I discussed with the patient the risks of griseofulvin including but not limited to photosensitivity, cytopenia, liver damage, nausea/vomiting and severe allergy.  The patient understands that this medication is best absorbed when taken with a fatty meal (e.g., ice cream or french fries). Skyrizi Counseling: I discussed with the patient the risks of risankizumab-rzaa including but not limited to immunosuppression, and serious infections.  The patient understands that monitoring is required including a PPD at baseline and must alert us or the primary physician if symptoms of infection or other concerning signs are noted. Protopic Pregnancy And Lactation Text: This medication is Pregnancy Category C. It is unknown if this medication is excreted in breast milk when applied topically. Clofazimine Counseling:  I discussed with the patient the risks of clofazimine including but not limited to skin and eye pigmentation, liver damage, nausea/vomiting, gastrointestinal bleeding and allergy. Hydroquinone Counseling:  Patient advised that medication may result in skin irritation, lightening (hypopigmentation), dryness, and burning.  In the event of skin irritation, the patient was advised to reduce the amount of the drug applied or use it less frequently.  Rarely, spots that are treated with hydroquinone can become darker (pseudoochronosis).  Should this occur, patient instructed to stop medication and call the office. The patient verbalized understanding of the proper use and possible adverse effects of hydroquinone.  All of the patient's questions and concerns were addressed. Qbrexza Counseling:  I discussed with the patient the risks of Qbrexza including but not limited to headache, mydriasis, blurred vision, dry eyes, nasal dryness, dry mouth, dry throat, dry skin, urinary hesitation, and constipation.  Local skin reactions including erythema, burning, stinging, and itching can also occur. Litfulo Counseling: I discussed with the patient the risks of Litfulo therapy including but not limited to upper respiratory tract infections, shingles, cold sores, and nausea. Live vaccines should be avoided.  This medication has been linked to serious infections; higher rate of mortality; malignancy and lymphoproliferative disorders; major adverse cardiovascular events; thrombosis; gastrointestinal perforations; neutropenia; lymphopenia; anemia; liver enzyme elevations; and lipid elevations. Cosentyx Counseling:  I discussed with the patient the risks of Cosentyx including but not limited to worsening of Crohn's disease, immunosuppression, allergic reactions and infections.  The patient understands that monitoring is required including a PPD at baseline and must alert us or the primary physician if symptoms of infection or other concerning signs are noted. Doxycycline Counseling:  Patient counseled regarding possible photosensitivity and increased risk for sunburn.  Patient instructed to avoid sunlight, if possible.  When exposed to sunlight, patients should wear protective clothing, sunglasses, and sunscreen.  The patient was instructed to call the office immediately if the following severe adverse effects occur:  hearing changes, easy bruising/bleeding, severe headache, or vision changes.  The patient verbalized understanding of the proper use and possible adverse effects of doxycycline.  All of the patient's questions and concerns were addressed. Glycopyrrolate Pregnancy And Lactation Text: This medication is Pregnancy Category B and is considered safe during pregnancy. It is unknown if it is excreted breast milk. Calcipotriene Pregnancy And Lactation Text: The use of this medication during pregnancy or lactation is not recommended as there is insufficient data. Thalidomide Counseling: I discussed with the patient the risks of thalidomide including but not limited to birth defects, anxiety, weakness, chest pain, dizziness, cough and severe allergy. Doxycycline Pregnancy And Lactation Text: This medication is Pregnancy Category D and not consider safe during pregnancy. It is also excreted in breast milk but is considered safe for shorter treatment courses. Finasteride Pregnancy And Lactation Text: This medication is absolutely contraindicated during pregnancy. It is unknown if it is excreted in breast milk. Cyclosporine Counseling:  I discussed with the patient the risks of cyclosporine including but not limited to hypertension, gingival hyperplasia,myelosuppression, immunosuppression, liver damage, kidney damage, neurotoxicity, lymphoma, and serious infections. The patient understands that monitoring is required including baseline blood pressure, CBC, CMP, lipid panel and uric acid, and then 1-2 times monthly CMP and blood pressure. Litfulo Pregnancy And Lactation Text: Based on animal studies, Lifulo may cause embryo-fetal harm when administered to pregnant women.  The medication should not be used in pregnancy.  Breastfeeding is not recommended during treatment. Azithromycin Counseling:  I discussed with the patient the risks of azithromycin including but not limited to GI upset, allergic reaction, drug rash, diarrhea, and yeast infections. Aklief counseling:  Patient advised to apply a pea-sized amount only at bedtime and wait 30 minutes after washing their face before applying.  If too drying, patient may add a non-comedogenic moisturizer.  The most commonly reported side effects including irritation, redness, scaling, dryness, stinging, burning, itching, and increased risk of sunburn.  The patient verbalized understanding of the proper use and possible adverse effects of retinoids.  All of the patient's questions and concerns were addressed. Hydroxychloroquine Counseling:  I discussed with the patient that a baseline ophthalmologic exam is needed at the start of therapy and every year thereafter while on therapy. A CBC may also be warranted for monitoring.  The side effects of this medication were discussed with the patient, including but not limited to agranulocytosis, aplastic anemia, seizures, rashes, retinopathy, and liver toxicity. Patient instructed to call the office should any adverse effect occur.  The patient verbalized understanding of the proper use and possible adverse effects of Plaquenil.  All the patient's questions and concerns were addressed. Erivedge Counseling- I discussed with the patient the risks of Erivedge including but not limited to nausea, vomiting, diarrhea, constipation, weight loss, changes in the sense of taste, decreased appetite, muscle spasms, and hair loss.  The patient verbalized understanding of the proper use and possible adverse effects of Erivedge.  All of the patient's questions and concerns were addressed. Zoryve Counseling:  I discussed with the patient that Zoryve is not for use in the eyes, mouth or vagina. The most commonly reported side effects include diarrhea, headache, insomnia, application site pain, upper respiratory tract infections, and urinary tract infections.  All of the patient's questions and concerns were addressed. Olanzapine Pregnancy And Lactation Text: This medication is pregnancy category C.   There are no adequate and well controlled trials with olanzapine in pregnant females.  Olanzapine should be used during pregnancy only if the potential benefit justifies the potential risk to the fetus.   In a study in lactating healthy women, olanzapine was excreted in breast milk.  It is recommended that women taking olanzapine should not breast feed. Infliximab Counseling:  I discussed with the patient the risks of infliximab including but not limited to myelosuppression, immunosuppression, autoimmune hepatitis, demyelinating diseases, lymphoma, and serious infections.  The patient understands that monitoring is required including a PPD at baseline and must alert us or the primary physician if symptoms of infection or other concerning signs are noted. Topical Steroids Applications Pregnancy And Lactation Text: Most topical steroids are considered safe to use during pregnancy and lactation.  Any topical steroid applied to the breast or nipple should be washed off before breastfeeding. Griseofulvin Pregnancy And Lactation Text: This medication is Pregnancy Category X and is known to cause serious birth defects. It is unknown if this medication is excreted in breast milk but breast feeding should be avoided. Cantharidin Counseling:  I discussed with the patient the risks of Cantharidin including but not limited to pain, redness, burning, itching, and blistering. Rifampin Counseling: I discussed with the patient the risks of rifampin including but not limited to liver damage, kidney damage, red-orange body fluids, nausea/vomiting and severe allergy. Ivermectin Counseling:  Patient instructed to take medication on an empty stomach with a full glass of water.  Patient informed of potential adverse effects including but not limited to nausea, diarrhea, dizziness, itching, and swelling of the extremities or lymph nodes.  The patient verbalized understanding of the proper use and possible adverse effects of ivermectin.  All of the patient's questions and concerns were addressed.

## 2024-01-17 ENCOUNTER — APPOINTMENT (OUTPATIENT)
Dept: NEPHROLOGY | Facility: CLINIC | Age: 74
End: 2024-01-17

## 2024-03-11 ENCOUNTER — APPOINTMENT (OUTPATIENT)
Dept: PSYCHIATRY | Facility: CLINIC | Age: 74
End: 2024-03-11

## 2024-03-26 ENCOUNTER — APPOINTMENT (OUTPATIENT)
Dept: TRANSPLANT | Facility: CLINIC | Age: 74
End: 2024-03-26

## 2024-03-27 ENCOUNTER — APPOINTMENT (OUTPATIENT)
Dept: NEPHROLOGY | Facility: CLINIC | Age: 74
End: 2024-03-27
Payer: MEDICARE

## 2024-03-27 LAB
ALBUMIN SERPL ELPH-MCNC: 4.5 G/DL
ALP BLD-CCNC: 113 U/L
ALT SERPL-CCNC: 12 U/L
ANION GAP SERPL CALC-SCNC: 13 MMOL/L
APPEARANCE: CLEAR
AST SERPL-CCNC: 13 U/L
BACTERIA: NEGATIVE /HPF
BASOPHILS # BLD AUTO: 0.06 K/UL
BASOPHILS NFR BLD AUTO: 0.8 %
BILIRUB SERPL-MCNC: 0.6 MG/DL
BILIRUBIN URINE: NEGATIVE
BLOOD URINE: NEGATIVE
BUN SERPL-MCNC: 36 MG/DL
CALCIUM SERPL-MCNC: 9.9 MG/DL
CAST: 1 /LPF
CHLORIDE SERPL-SCNC: 100 MMOL/L
CMV DNA SPEC QL NAA+PROBE: NOT DETECTED IU/ML
CMVPCR LOG: NOT DETECTED LOG10IU/ML
CO2 SERPL-SCNC: 26 MMOL/L
COLOR: YELLOW
CREAT SERPL-MCNC: 1.5 MG/DL
CREAT SPEC-SCNC: 54 MG/DL
CREAT/PROT UR: 0.6 RATIO
EGFR: 49 ML/MIN/1.73M2
EOSINOPHIL # BLD AUTO: 0.11 K/UL
EOSINOPHIL NFR BLD AUTO: 1.5 %
EPITHELIAL CELLS: 0 /HPF
ESTIMATED AVERAGE GLUCOSE: 151 MG/DL
GLUCOSE QUALITATIVE U: NEGATIVE MG/DL
GLUCOSE SERPL-MCNC: 132 MG/DL
HBA1C MFR BLD HPLC: 6.9 %
HCT VFR BLD CALC: 48.4 %
HGB BLD-MCNC: 15.8 G/DL
IMM GRANULOCYTES NFR BLD AUTO: 0.4 %
KETONES URINE: NEGATIVE MG/DL
LEUKOCYTE ESTERASE URINE: ABNORMAL
LYMPHOCYTES # BLD AUTO: 1.04 K/UL
LYMPHOCYTES NFR BLD AUTO: 14.3 %
MAGNESIUM SERPL-MCNC: 2 MG/DL
MAN DIFF?: NORMAL
MCHC RBC-ENTMCNC: 28.6 PG
MCHC RBC-ENTMCNC: 32.6 GM/DL
MCV RBC AUTO: 87.7 FL
MICROSCOPIC-UA: NORMAL
MONOCYTES # BLD AUTO: 0.73 K/UL
MONOCYTES NFR BLD AUTO: 10 %
NEUTROPHILS # BLD AUTO: 5.32 K/UL
NEUTROPHILS NFR BLD AUTO: 73 %
NITRITE URINE: NEGATIVE
PH URINE: 5.5
PHOSPHATE SERPL-MCNC: 4.2 MG/DL
PLATELET # BLD AUTO: 229 K/UL
POTASSIUM SERPL-SCNC: 4.7 MMOL/L
PROT SERPL-MCNC: 7.5 G/DL
PROT UR-MCNC: 33 MG/DL
PROTEIN URINE: 30 MG/DL
RBC # BLD: 5.52 M/UL
RBC # FLD: 14.2 %
RED BLOOD CELLS URINE: 1 /HPF
SODIUM SERPL-SCNC: 140 MMOL/L
SPECIFIC GRAVITY URINE: 1.01
TACROLIMUS SERPL-MCNC: 7.6 NG/ML
URATE SERPL-MCNC: 8.8 MG/DL
UROBILINOGEN URINE: 0.2 MG/DL
WBC # FLD AUTO: 7.29 K/UL
WHITE BLOOD CELLS URINE: 0 /HPF

## 2024-03-27 PROCEDURE — 99215 OFFICE O/P EST HI 40 MIN: CPT

## 2024-03-27 NOTE — PHYSICAL EXAM
[General Appearance - Alert] : alert [General Appearance - In No Acute Distress] : in no acute distress [General Appearance - Well Nourished] : well nourished [General Appearance - Well Developed] : well developed [General Appearance - Well-Appearing] : healthy appearing [Sclera] : the sclera and conjunctiva were normal [PERRL With Normal Accommodation] : pupils were equal in size, round, and reactive to light [Oropharynx] : the oropharynx was normal [Jugular Venous Distention Increased] : there was no jugular-venous distention [Respiration, Rhythm And Depth] : normal respiratory rhythm and effort [Exaggerated Use Of Accessory Muscles For Inspiration] : no accessory muscle use [Auscultation Breath Sounds / Voice Sounds] : lungs were clear to auscultation bilaterally [Heart Sounds] : normal S1 and S2 [Murmurs] : no murmurs [Heart Sounds Gallop] : no gallops [Bowel Sounds] : normal bowel sounds [Abdomen Soft] : soft [Involuntary Movements] : no involuntary movements were seen [___ (cm) Fistula] : [unfilled] (cm) fistula [Bruit] : a bruit was present [Thrill] : a thrill was present [Oriented To Time, Place, And Person] : oriented to person, place, and time [No Focal Deficits] : no focal deficits [Impaired Insight] : insight and judgment were intact [Edema] : there was no peripheral edema [] : no rash [FreeTextEntry1] : Mood better

## 2024-03-27 NOTE — ASSESSMENT
[FreeTextEntry1] :  74-year-old man with ESRD due to DM, was on PD, s/p DDRT on 3/9/21 initially complicated by DGF. Graft function recovered and off PD since 3/30.  S/p DDRT on 3/9/21 complicated by DGF improved. Serum creatinine was ~ 1mg/dL until August 2022 when he was started on diuretics for pulmonary edema. Cr remains stable 1.3-1.5mg/dL, No significant proteinuria UPCR 0.6. Continue Lasix 40mg po bid  Immunosuppression - S/p Simulect induction - Reduce Envarsus 1mg daily, level 7.6 drawn in afternoon, confirmed he did not take it prior to blood work. Target trough 5-7.  - Continue CellCept 500 bid - dose was lowered for leukopenia - Prednisone discontinued due to side effects  Diabetes type II - On Lantus 25 units qhs, Humalog ~12 units qac + sliding scale. Followed closely by endo A1c 6.9%  Hypertension: Well controlled, On Carvedilol 25mg po bid. Nifedipine xl 60mg po bid  Pulmonary HTN/ JOSE L:- Furosemide 40mg po bid. Not using BiPAP or oxygen    Hyperparathyroidism - on Calcitriol 0.25mcg po daily.  BPH: Flomax 0.4mg po qhs  Anxiety/OCD - On Wellbutrin 300mg po daily and Duloxetine 60mg po daily. Followed by psych.  Vitamin D deficiency - vitamin D supplement 1000 units po daily  Health maintenance Covid [place] Fall 2023 Flu vaccine Fall 2023  Shingles vaccine - forgot to schedule with PMD F/u with Dermatologist - has not seen one yet. He will make appt.  Check labs in 1-2 weeks - check Envarsus  Dr. Novak (primary nephrologist) no longer takes insurance. F/u here in 3 months.   27-Oct-2018

## 2024-03-27 NOTE — HISTORY OF PRESENT ILLNESS
[FreeTextEntry1] :  74-year-old man with ESRD due to DM on PD since April 2020. S/p DDRT on 3/9/2021.  PMH: DM type II dx in 40's, complicated by retinopathy and nephropathy,  Hypertension diagnosed in his early 40's. Hyperlipidemia, gout, Anxiety, Depression, OCD. No cardiac disease. cPRA 0%.   Donor 49 yr old, DCD, KDPI 71%, Terminal Cr 8mg/dL, No HLA mismatch, CMV D-R-.   Course complicated by DGF. Discharged on peritoneal dialysis. Graft function recovered and has been off PD 3/30/21. PD catheter and transplant ureteric stent removed on 4/22/21.  Hospitalized 4/30/21 with hyperkalemia, metabolic acidosis  and severe hyperglycemia. Managed with insulin.  Received 3rd dose in Sept. 2021. COVID + in Jan 2022, had no symptoms , received monoclonal antibody infusion on 01/06/2022.  S/p bilateral Cataract surgery 5/2022  Hospitalized 11/15/22-11/17/22 after presenting with fluid overload and hypertension. Echo with EF 70%, elevated pul pressures. Treated with diuretics and sent home on oxygen.   Presents for scheduled follow up. Depression is much better, mood is great. Overall he feels much better. Followed by psych Dr. Buchanan Not wearing CPAP machine b/c it is uncomfortable and feels he does not need it. No longer using oxygen. Feels better every day.  Leg swelling is better, no need for compression stockings.  Shortness of breath improving, able to go up the stairs.  Voiding urine without difficulty. No dysuria or hematuria.  No abdominal pain, nausea, vomiting or diarrhea. Constipation is controlled.  Appetite is good but he is eating less and healthier. Weight is down to 199 from 205. Not exercising but keeps busy.  Blood glucose improved < 150. Has CGM. Followed by endocrine and ophthalmology.  No fever, chills. Energy is fair. No chest pain.  Taking all medications as prescribed  BP at home is 130/80

## 2024-03-28 LAB
COVID-19 NUCLEOCAPSID  GAM ANTIBODY INTERPRETATION: POSITIVE
SARS-COV-2 AB SERPL QL IA: 109 INDEX

## 2024-03-29 RX ORDER — NIFEDIPINE 60 MG/1
60 TABLET, EXTENDED RELEASE ORAL
Qty: 60 | Refills: 11 | Status: ACTIVE | COMMUNITY
Start: 2022-08-09 | End: 1900-01-01

## 2024-03-29 RX ORDER — TAMSULOSIN HYDROCHLORIDE 0.4 MG/1
0.4 CAPSULE ORAL
Qty: 30 | Refills: 11 | Status: ACTIVE | COMMUNITY
Start: 2021-03-16 | End: 1900-01-01

## 2024-03-29 RX ORDER — FUROSEMIDE 40 MG/1
40 TABLET ORAL
Qty: 60 | Refills: 3 | Status: ACTIVE | COMMUNITY
Start: 2023-02-15 | End: 1900-01-01

## 2024-04-01 LAB — BKV DNA SPEC QL NAA+PROBE: NOT DETECTED IU/ML

## 2024-04-17 DIAGNOSIS — Z94.0 KIDNEY TRANSPLANT STATUS: ICD-10-CM

## 2024-04-22 ENCOUNTER — APPOINTMENT (OUTPATIENT)
Dept: TRANSPLANT | Facility: CLINIC | Age: 74
End: 2024-04-22

## 2024-04-23 LAB
ALBUMIN SERPL ELPH-MCNC: 4.6 G/DL
ALP BLD-CCNC: 109 U/L
ALT SERPL-CCNC: 12 U/L
ANION GAP SERPL CALC-SCNC: 15 MMOL/L
APPEARANCE: CLEAR
AST SERPL-CCNC: 17 U/L
BACTERIA: NEGATIVE /HPF
BASOPHILS # BLD AUTO: 0.08 K/UL
BASOPHILS NFR BLD AUTO: 0.7 %
BILIRUB SERPL-MCNC: 0.5 MG/DL
BILIRUBIN URINE: NEGATIVE
BLOOD URINE: NEGATIVE
BUN SERPL-MCNC: 34 MG/DL
CALCIUM SERPL-MCNC: 10 MG/DL
CAST: 0 /LPF
CHLORIDE SERPL-SCNC: 98 MMOL/L
CMV DNA SPEC QL NAA+PROBE: NOT DETECTED IU/ML
CMVPCR LOG: NOT DETECTED LOG10IU/ML
CO2 SERPL-SCNC: 26 MMOL/L
COLOR: YELLOW
CREAT SERPL-MCNC: 1.27 MG/DL
CREAT SPEC-SCNC: 31 MG/DL
CREAT/PROT UR: 0.9 RATIO
EGFR: 59 ML/MIN/1.73M2
EOSINOPHIL # BLD AUTO: 0.1 K/UL
EOSINOPHIL NFR BLD AUTO: 0.9 %
EPITHELIAL CELLS: 0 /HPF
GLUCOSE QUALITATIVE U: NEGATIVE MG/DL
GLUCOSE SERPL-MCNC: 79 MG/DL
HCT VFR BLD CALC: 49.1 %
HGB BLD-MCNC: 16.3 G/DL
IMM GRANULOCYTES NFR BLD AUTO: 0.3 %
KETONES URINE: NEGATIVE MG/DL
LEUKOCYTE ESTERASE URINE: NEGATIVE
LYMPHOCYTES # BLD AUTO: 1.42 K/UL
LYMPHOCYTES NFR BLD AUTO: 12.3 %
MAGNESIUM SERPL-MCNC: 2.1 MG/DL
MAN DIFF?: NORMAL
MCHC RBC-ENTMCNC: 29.2 PG
MCHC RBC-ENTMCNC: 33.2 GM/DL
MCV RBC AUTO: 87.8 FL
MICROSCOPIC-UA: NORMAL
MONOCYTES # BLD AUTO: 0.8 K/UL
MONOCYTES NFR BLD AUTO: 6.9 %
NEUTROPHILS # BLD AUTO: 9.14 K/UL
NEUTROPHILS NFR BLD AUTO: 78.9 %
NITRITE URINE: NEGATIVE
PH URINE: 6
PHOSPHATE SERPL-MCNC: 4.2 MG/DL
PLATELET # BLD AUTO: 220 K/UL
POTASSIUM SERPL-SCNC: 4.5 MMOL/L
PROT SERPL-MCNC: 7.4 G/DL
PROT UR-MCNC: 27 MG/DL
PROTEIN URINE: 30 MG/DL
RBC # BLD: 5.59 M/UL
RBC # FLD: 13.9 %
RED BLOOD CELLS URINE: 0 /HPF
SODIUM SERPL-SCNC: 140 MMOL/L
SPECIFIC GRAVITY URINE: 1.01
TACROLIMUS SERPL-MCNC: 3.3 NG/ML
URATE SERPL-MCNC: 8.7 MG/DL
UROBILINOGEN URINE: 0.2 MG/DL
WBC # FLD AUTO: 11.58 K/UL
WHITE BLOOD CELLS URINE: 1 /HPF

## 2024-04-23 RX ORDER — ASPIRIN 81 MG/1
81 TABLET, COATED ORAL DAILY
Qty: 90 | Refills: 3 | Status: ACTIVE | COMMUNITY
Start: 2021-03-11 | End: 1900-01-01

## 2024-04-23 RX ORDER — TACROLIMUS 1 MG/1
1 TABLET, EXTENDED RELEASE ORAL DAILY
Qty: 180 | Refills: 3 | Status: DISCONTINUED | COMMUNITY
Start: 2021-08-10 | End: 2024-04-23

## 2024-04-23 RX ORDER — TACROLIMUS 0.75 MG/1
0.75 TABLET, EXTENDED RELEASE ORAL DAILY
Qty: 180 | Refills: 3 | Status: ACTIVE | COMMUNITY
Start: 2024-04-23 | End: 1900-01-01

## 2024-04-25 LAB — BKV DNA SPEC QL NAA+PROBE: NOT DETECTED IU/ML

## 2024-04-26 NOTE — PROGRESS NOTE ADULT - ASSESSMENT
Patient is a 70 y/o M w PMH of ESRD on PD for 5 months, was on HD in 2020, with nephrologist Dr Novak, IDDM, HTN, gout, depression, anxiety and OCD. S/p DCD with JOJO DDRT on 3/9/21.     1. S/p DDRT on 3/9/21 - has ATN from ischemia reperfusion injury and ATN of donor. S/p Simulect induction. Urine output increased. Last HD was 3/18/21 with 2L UF and currently on PD. Last PD also with ~2.2L UF. Will continue PD. Monitor labs and urine output.    2. Immunosupression - Simulect induction, currently on Envarsus, MMF 1g PO BID and steroid taper     3. Prophylaxis - bactrim/nystatin/valcyte     4. Hypertension - BP at target range. On Coreg to 25 mg PO BID and Nifedipine to 90 mg PO daily. Monitor BP. Low salt diet.     5.  DM2 - Now on Lantus 20 unit at bedtime, 3 unit lispro pre-meal, with corrective sliding scale. Monitor f/s.     6.  Hyponatremia - from poor clearance and fluid intake.  Restrict to 1 L. UF with PD and HD     7. Anemia - last Hb 8.4. Now on retacrit 10,000 unit TTS. Suggest to send iron work up including ferritin, TIBC and iron level. Monitor CBC.     If any questions, please feel free to contact me     Jennifer Mcdowell  Nephrology Fellow  [facility] Pager: [phone]  [facility] Pager: [phone]     Opt out

## 2024-05-14 NOTE — PHYSICAL EXAM
Pt here for Venofer infusion  Tesfaye well  Dsd applied  Disch amb with family to home  [Well Developed] : well developed [Well Nourished] : well nourished [Normal Rate] : normal rate [Soft] : soft [Non-tender] : non-tender [] : right posterior tibial palpable [Alert] : alert [Responds to Questions Appropriately] : responds to questions appropriately [Oriented] : oriented [Appropriate] : appropriate [TextBox_34] : no edema [Site: ___] : Site: [unfilled] [FreeTextEntry1] : well healed Discharged

## 2024-05-28 ENCOUNTER — APPOINTMENT (OUTPATIENT)
Dept: TRANSPLANT | Facility: CLINIC | Age: 74
End: 2024-05-28

## 2024-05-29 LAB
25(OH)D3 SERPL-MCNC: 79 NG/ML
ALBUMIN SERPL ELPH-MCNC: 4.6 G/DL
ALP BLD-CCNC: 99 U/L
ALT SERPL-CCNC: 12 U/L
ANION GAP SERPL CALC-SCNC: 16 MMOL/L
APPEARANCE: CLEAR
AST SERPL-CCNC: 13 U/L
BACTERIA: NEGATIVE /HPF
BILIRUB SERPL-MCNC: 0.7 MG/DL
BILIRUBIN URINE: NEGATIVE
BLOOD URINE: NEGATIVE
BUN SERPL-MCNC: 35 MG/DL
CALCIUM SERPL-MCNC: 10 MG/DL
CALCIUM SERPL-MCNC: 10 MG/DL
CAST: 2 /LPF
CHLORIDE SERPL-SCNC: 102 MMOL/L
CHOLEST SERPL-MCNC: 174 MG/DL
CMV DNA SPEC QL NAA+PROBE: NOT DETECTED IU/ML
CMVPCR LOG: NOT DETECTED LOG10IU/ML
CO2 SERPL-SCNC: 25 MMOL/L
COLOR: YELLOW
COVID-19 SPIKE DOMAIN ANTIBODY INTERPRETATION: POSITIVE
CREAT SERPL-MCNC: 1.4 MG/DL
CREAT SPEC-SCNC: 130 MG/DL
CREAT/PROT UR: 0.7 RATIO
EGFR: 53 ML/MIN/1.73M2
EPITHELIAL CELLS: 1 /HPF
ESTIMATED AVERAGE GLUCOSE: 140 MG/DL
GLUCOSE QUALITATIVE U: NEGATIVE MG/DL
GLUCOSE SERPL-MCNC: 193 MG/DL
HBA1C MFR BLD HPLC: 6.5 %
HCT VFR BLD CALC: 50.1 %
HDLC SERPL-MCNC: 56 MG/DL
HGB BLD-MCNC: 16.3 G/DL
KETONES URINE: NEGATIVE MG/DL
LDLC SERPL CALC-MCNC: 104 MG/DL
LEUKOCYTE ESTERASE URINE: ABNORMAL
MAGNESIUM SERPL-MCNC: 2.4 MG/DL
MCHC RBC-ENTMCNC: 29.1 PG
MCHC RBC-ENTMCNC: 32.5 GM/DL
MCV RBC AUTO: 89.5 FL
MICROSCOPIC-UA: NORMAL
NITRITE URINE: NEGATIVE
NONHDLC SERPL-MCNC: 118 MG/DL
PARATHYROID HORMONE INTACT: 86 PG/ML
PH URINE: 5.5
PHOSPHATE SERPL-MCNC: 3 MG/DL
PLATELET # BLD AUTO: 218 K/UL
POTASSIUM SERPL-SCNC: 5.1 MMOL/L
PROT SERPL-MCNC: 7.5 G/DL
PROT UR-MCNC: 89 MG/DL
PROTEIN URINE: 100 MG/DL
RBC # BLD: 5.6 M/UL
RBC # FLD: 13.8 %
RED BLOOD CELLS URINE: 1 /HPF
SARS-COV-2 AB SERPL IA-ACNC: >250 U/ML
SODIUM SERPL-SCNC: 143 MMOL/L
SPECIFIC GRAVITY URINE: 1.02
TACROLIMUS SERPL-MCNC: 4.6 NG/ML
TRIGL SERPL-MCNC: 71 MG/DL
URATE SERPL-MCNC: 8.9 MG/DL
UROBILINOGEN URINE: 1 MG/DL
WBC # FLD AUTO: 6.56 K/UL
WHITE BLOOD CELLS URINE: 9 /HPF

## 2024-05-30 LAB — BKV DNA SPEC QL NAA+PROBE: NOT DETECTED IU/ML

## 2024-05-31 RX ORDER — INSULIN LISPRO 100 [IU]/ML
100 INJECTION, SOLUTION INTRAVENOUS; SUBCUTANEOUS
Qty: 1 | Refills: 3 | Status: ACTIVE | COMMUNITY
Start: 2021-03-10 | End: 1900-01-01

## 2024-06-11 ENCOUNTER — APPOINTMENT (OUTPATIENT)
Dept: PSYCHIATRY | Facility: CLINIC | Age: 74
End: 2024-06-11
Payer: MEDICARE

## 2024-06-11 DIAGNOSIS — F32.A DEPRESSION, UNSPECIFIED: ICD-10-CM

## 2024-06-11 DIAGNOSIS — F41.9 ANXIETY DISORDER, UNSPECIFIED: ICD-10-CM

## 2024-06-11 PROCEDURE — 99214 OFFICE O/P EST MOD 30 MIN: CPT | Mod: 95

## 2024-06-11 RX ORDER — BUPROPION HYDROCHLORIDE 300 MG/1
300 TABLET, EXTENDED RELEASE ORAL DAILY
Qty: 90 | Refills: 1 | Status: ACTIVE | COMMUNITY
Start: 2022-07-26 | End: 1900-01-01

## 2024-06-11 RX ORDER — DULOXETINE HYDROCHLORIDE 60 MG/1
60 CAPSULE, DELAYED RELEASE PELLETS ORAL
Qty: 90 | Refills: 1 | Status: ACTIVE | COMMUNITY
Start: 2022-02-03 | End: 1900-01-01

## 2024-06-11 NOTE — FAMILY HISTORY
[FreeTextEntry1] : Patient born in [place] parents  of old age he has a sister 94.  On the father side of the family there is a history of bipolar depression and substance abuse.

## 2024-06-11 NOTE — HISTORY OF PRESENT ILLNESS
[Home] : at home, [unfilled] , at the time of the visit. [Medical Office: ([facility])___] : at the medical office located in  [Verbal consent obtained from patient] : the patient, [unfilled] [de-identified] : Patient is a 71-year-old male, , retired on disability for psychiatric reasons.  Patient lives at home with wife aged 48 who takes care of his properties there is a 16-year-old son and 11-year-old son at home along with the mother-in-law.  Patient states he is here because it is difficult just living.  He stays in the house a lot.  Patient states that he had a renal transplant done in March of this past year.  He feels that the transplantation went very well he has no medical problems.  However because of the coronavirus the patient has been afraid to go out of the house.  Patient has received his vaccinations but still does not want to do anything.  He has had a lot of anxiety.  He feels that there are times when he feels shaky he feels that he cannot take it anymore.  The plasty had a lot of obsessive-compulsive disorder symptoms.  When asked about this the patient described himself as having ruminating about things thinking things over and over not being able to get thoughts out of his mind.  Right now he constantly worries about the coronavirus.  He also has some stressors in that because of the rent moratorium his 7 properties that he has the tendons are not paying rent.  The wife is been handling these issues.  He states he has no concentration he has no energy he felt so badly this weekend he was going to go to the emergency room but decided not to.  He is not interested in watching TV cannot read he has a lot of distressing dreams.  His diet is fair he is on a diabetic diet he occasionally walks inside on a treadmill he rarely goes out except to doctors appointments.\par  \par  Patient gets up at about 8:30 in the morning in the morning she will have something to eat it is the worst time a day for his depression and anxiety pill they lay down in the afternoon he will do some paperwork spend time on the computer in the evenings he will have dinner he laid down because he is tired he goes to bed about 9:00 at night.  He has no problem falling asleep he awakens once or twice during the night.  Energy level decreased height 5 feet 6 inches weight 168 pounds.  Appetite has been decreased.  Patient no longer enjoys things. [FreeTextEntry1] : Moods are good.  Feels that his sex drive is returning.  Overall feeling markedly better.  No complications with current medications.

## 2024-06-11 NOTE — CURRENT PSYCHIATRIC SYMPTOMS
[Depressed Mood] : no depressed mood [Anhedonia] : no anhedonia [Psychomotor Retardation] : no psychomotor retardation [Anorexia] : no anorexia [Excessive Worry] : no excessive worries [Ruminations] : no rumination disorder [de-identified] : Denied [de-identified] : Denied [de-identified] : Denied [de-identified] : Denied [de-identified] : None [de-identified] : None [de-identified] : None

## 2024-06-11 NOTE — DISCUSSION/SUMMARY
[FreeTextEntry1] : 74-year-old male with depression anxiety.  Plan continue Cymbalta 60 mg Wellbutrin  mg.  Follow-up in 6 months.

## 2024-06-11 NOTE — SOCIAL HISTORY
[FreeTextEntry1] : At 7 years of age the patient moved to [facility].  He attended [employer] graduating in 1968.  High school was okay he had a few friends he did not participate in any activities his grades were in the 90s.  He then went to [employer] graduating in 1972 with a degree in chemical engineering.  He was a good student.  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  Patient last worked in 1998.  Patient was  the first time from 1982 until 2007.  He has a daughter 28 and 2 sons ages 37 and 23 by that marriage.  Patient  his current wife in 2010.

## 2024-06-28 NOTE — ED ADULT NURSE NOTE - ISOLATION TYPE:
Received pt orally intubated to vent with fentanyl and versed infusing for sedation. Weaning both gtts slowly. Pt beginning to open eye to painful stimuli with bilat scleral edema decreasing. Magy but sluggish. Intermittent nonpurposeful movement to all extremities. Ocassional spontaneous resp noted. Abdomen soft with hypoactive bowel sounds. Ngt in place per auscultation draining green secretions to low intermittent suction. Ndiaye patent with large urine output. Phenylephrine gtt weaned off. Weaning norepinephrine gtt as tolerated to maintain map >65. Continue current plan of care.   None

## 2024-11-11 RX ORDER — BLOOD SUGAR DIAGNOSTIC
STRIP MISCELLANEOUS
Qty: 1 | Refills: 11 | Status: ACTIVE | COMMUNITY
Start: 2024-11-11 | End: 1900-01-01

## 2024-12-10 ENCOUNTER — APPOINTMENT (OUTPATIENT)
Dept: TRANSPLANT | Facility: CLINIC | Age: 74
End: 2024-12-10

## 2024-12-10 DIAGNOSIS — Z94.0 KIDNEY TRANSPLANT STATUS: ICD-10-CM

## 2024-12-12 ENCOUNTER — NON-APPOINTMENT (OUTPATIENT)
Age: 74
End: 2024-12-12

## 2024-12-12 LAB
ALBUMIN SERPL ELPH-MCNC: 4.4 G/DL
ALP BLD-CCNC: 101 U/L
ALT SERPL-CCNC: 13 U/L
ANION GAP SERPL CALC-SCNC: 13 MMOL/L
APPEARANCE: CLEAR
AST SERPL-CCNC: 15 U/L
BACTERIA: NEGATIVE /HPF
BASOPHILS # BLD AUTO: 0.06 K/UL
BASOPHILS NFR BLD AUTO: 0.8 %
BILIRUB SERPL-MCNC: 0.5 MG/DL
BILIRUBIN URINE: NEGATIVE
BKV DNA SPEC QL NAA+PROBE: NOT DETECTED IU/ML
BLOOD URINE: NEGATIVE
BUN SERPL-MCNC: 34 MG/DL
CALCIUM SERPL-MCNC: 9.7 MG/DL
CALCIUM SERPL-MCNC: 9.7 MG/DL
CAST: 0 /LPF
CHLORIDE SERPL-SCNC: 97 MMOL/L
CMV DNA SPEC QL NAA+PROBE: NOT DETECTED IU/ML
CMVPCR LOG: NOT DETECTED LOG10IU/ML
CO2 SERPL-SCNC: 27 MMOL/L
COLOR: YELLOW
COVID-19 SPIKE DOMAIN ANTIBODY INTERPRETATION: POSITIVE
CREAT SERPL-MCNC: 1.37 MG/DL
CREAT SPEC-SCNC: 49 MG/DL
CREAT/PROT UR: 1 RATIO
EGFR: 54 ML/MIN/1.73M2
EOSINOPHIL # BLD AUTO: 0.17 K/UL
EOSINOPHIL NFR BLD AUTO: 2.4 %
EPITHELIAL CELLS: 0 /HPF
GLUCOSE QUALITATIVE U: 100 MG/DL
GLUCOSE SERPL-MCNC: 271 MG/DL
HCT VFR BLD CALC: 48.2 %
HGB BLD-MCNC: 15.7 G/DL
IMM GRANULOCYTES NFR BLD AUTO: 0.4 %
KETONES URINE: NEGATIVE MG/DL
LEUKOCYTE ESTERASE URINE: NEGATIVE
LYMPHOCYTES # BLD AUTO: 1.32 K/UL
LYMPHOCYTES NFR BLD AUTO: 18.6 %
MAGNESIUM SERPL-MCNC: 2.1 MG/DL
MAN DIFF?: NORMAL
MCHC RBC-ENTMCNC: 30.3 PG
MCHC RBC-ENTMCNC: 32.6 G/DL
MCV RBC AUTO: 92.9 FL
MICROSCOPIC-UA: NORMAL
MONOCYTES # BLD AUTO: 0.81 K/UL
MONOCYTES NFR BLD AUTO: 11.4 %
NEUTROPHILS # BLD AUTO: 4.69 K/UL
NEUTROPHILS NFR BLD AUTO: 66.4 %
NITRITE URINE: NEGATIVE
PARATHYROID HORMONE INTACT: 103 PG/ML
PH URINE: 6
PHOSPHATE SERPL-MCNC: 4 MG/DL
PLATELET # BLD AUTO: 231 K/UL
POTASSIUM SERPL-SCNC: 4.8 MMOL/L
PROT SERPL-MCNC: 6.9 G/DL
PROT UR-MCNC: 50 MG/DL
PROTEIN URINE: 100 MG/DL
RBC # BLD: 5.19 M/UL
RBC # FLD: 13.4 %
RED BLOOD CELLS URINE: 0 /HPF
SARS-COV-2 AB SERPL IA-ACNC: >250 U/ML
SODIUM SERPL-SCNC: 137 MMOL/L
SPECIFIC GRAVITY URINE: 1.01
TACROLIMUS SERPL-MCNC: 4.1 NG/ML
UROBILINOGEN URINE: 0.2 MG/DL
WBC # FLD AUTO: 7.08 K/UL
WHITE BLOOD CELLS URINE: 1 /HPF

## 2025-01-06 NOTE — PATIENT PROFILE ADULT - FUNCTIONAL ASSESSMENT - BASIC MOBILITY SCORE.
S: see above; DEMETRIA/LV noted above. Tech note reviewed and accepted  Chief Complaint/History of Present Illness:       VISION STABLE since last eye examination/evaluation.   -- MUCH** better s/p YAG OU 1/24, TH **  HAPPY OU    --dist better sc    OU FEEL FINE NOW**  (RCE post YAG OD, NADJA)        No (other) associated signs/symptoms   No other new visual complaints.    Current need(s) for glasses reviewed: pg '18, RP  --mainly for NEAR    Use of eye gtts reviewed:   --AT PRN    MEDS verified   PAST MEDICAL HISTORY/PROBLEMS/PAST SURGICAL HISTORY/FAMILY HISTORY/SOCIAL HISTORY: reviewed   POH:   Extracapsular cataract removal w insert io lens prosth wo ecp Right 03/24/2016    [id number] 17.0 Dr Smith    Extracapsular cataract removal w insert io lens prosth wo ecp Left 04/07/2016    IOL [id number] 17.0 DR TRENT arellano      Discission,2nd cataract,laser Left 01/22/2024    yag cap lt eye Dr Raffi Arellano    Discission,2nd cataract,laser Right 01/29/2024    Raffi Arellano MD       Review of Systems:  1) see HPI(S)/POH for ocular  ROS  2) denies recent/progressive HA's  3) general health fairly stable, feeling fine    SLE :       L/L/L:  OD: nl //OS: nl; DECR TF OU       CONJ:  OD: clear  //OS: clear          CORNEA:  OD: clear  //OS: clear   +- mild MDFP CD OU **       AC:  OD: d,q  // OS: d,q       IRIS:  OD: clear  //OS: clear       LENS:  OD: PC IOL centered and pc open (yag)**   //OS: PC IOL centered and pc open (yag)**     FUNDUS: C/D  0.4 /0.45 w temp slope OU           vitr clear with nl ON/V/P; + Rudd ring OS**           mac: OD: clear +- mild ERM                   OS: clear +- mild ERM           no break/RD      --see CIRRUS (Spectral Domain) HD-OCT: MAC CUBE/ HD 21/5 LINE MAC OCT: 1/24**  MACULAR SCAN: c/w fundus exam  OD: WNL +- Mild ERM with NO pucker effect with fairly normal - slightly irregular foveal contour   OS: WNL +- Mild ERM with NO pucker effect with fairly normal - slightly irregular foveal contour    --DRY OU**        EXT EXAM:  normal  NPs: Alert and oriented x 3, no apparent distress    A:  see diagnosis below; tech note reviewed and accepted     P:  see orders/disposition    COMMENT: conditions stable, observation reccommended, pt concerned...reassured, and questions answered     Rx for glasses given.  --PRN NEW FRAMES : \"BETTER\"    retinal detachment warning symptoms reviewed       RCE symptoms reviewed  --don't rub**  --CONT LUBR OU**  Recommend CONT artificial tears 3-4X/D  --hs lubr sid PRN**      Follow up with Optometry: NADJA **   --me prn     20

## 2025-03-26 ENCOUNTER — APPOINTMENT (OUTPATIENT)
Dept: NEPHROLOGY | Facility: CLINIC | Age: 75
End: 2025-03-26
Payer: MEDICARE

## 2025-03-26 VITALS
HEIGHT: 67 IN | HEART RATE: 64 BPM | TEMPERATURE: 98.2 F | DIASTOLIC BLOOD PRESSURE: 63 MMHG | BODY MASS INDEX: 32.33 KG/M2 | WEIGHT: 206 LBS | OXYGEN SATURATION: 92 % | SYSTOLIC BLOOD PRESSURE: 133 MMHG

## 2025-03-26 PROCEDURE — 99215 OFFICE O/P EST HI 40 MIN: CPT

## 2025-03-27 ENCOUNTER — NON-APPOINTMENT (OUTPATIENT)
Age: 75
End: 2025-03-27

## 2025-03-27 LAB
ALBUMIN SERPL ELPH-MCNC: 4.1 G/DL
ALP BLD-CCNC: 101 U/L
ALT SERPL-CCNC: 9 U/L
ANION GAP SERPL CALC-SCNC: 14 MMOL/L
APPEARANCE: CLEAR
AST SERPL-CCNC: 10 U/L
BACTERIA: NEGATIVE /HPF
BASOPHILS # BLD AUTO: 0.06 K/UL
BASOPHILS NFR BLD AUTO: 0.8 %
BILIRUB SERPL-MCNC: 0.6 MG/DL
BILIRUBIN URINE: NEGATIVE
BLOOD URINE: NEGATIVE
BUN SERPL-MCNC: 43 MG/DL
CALCIUM SERPL-MCNC: 9.5 MG/DL
CAST: 4 /LPF
CHLORIDE SERPL-SCNC: 100 MMOL/L
CMV DNA SPEC QL NAA+PROBE: NOT DETECTED IU/ML
CMVPCR LOG: NOT DETECTED LOG10IU/ML
CO2 SERPL-SCNC: 26 MMOL/L
COLOR: YELLOW
COVID-19 SPIKE DOMAIN ANTIBODY INTERPRETATION: POSITIVE
CREAT SERPL-MCNC: 1.52 MG/DL
CREAT SPEC-SCNC: 71 MG/DL
CREAT/PROT UR: 0.7 RATIO
EGFRCR SERPLBLD CKD-EPI 2021: 47 ML/MIN/1.73M2
EOSINOPHIL # BLD AUTO: 0.18 K/UL
EOSINOPHIL NFR BLD AUTO: 2.3 %
EPITHELIAL CELLS: 0 /HPF
GLUCOSE QUALITATIVE U: NEGATIVE MG/DL
GLUCOSE SERPL-MCNC: 282 MG/DL
HCT VFR BLD CALC: 43 %
HGB BLD-MCNC: 14.2 G/DL
IMM GRANULOCYTES NFR BLD AUTO: 0.5 %
KETONES URINE: NEGATIVE MG/DL
LEUKOCYTE ESTERASE URINE: NEGATIVE
LYMPHOCYTES # BLD AUTO: 0.84 K/UL
LYMPHOCYTES NFR BLD AUTO: 10.8 %
MAGNESIUM SERPL-MCNC: 2 MG/DL
MAN DIFF?: NORMAL
MCHC RBC-ENTMCNC: 29.8 PG
MCHC RBC-ENTMCNC: 33 G/DL
MCV RBC AUTO: 90.1 FL
MICROSCOPIC-UA: NORMAL
MONOCYTES # BLD AUTO: 0.75 K/UL
MONOCYTES NFR BLD AUTO: 9.6 %
NEUTROPHILS # BLD AUTO: 5.93 K/UL
NEUTROPHILS NFR BLD AUTO: 76 %
NITRITE URINE: NEGATIVE
PH URINE: 5.5
PHOSPHATE SERPL-MCNC: 4.5 MG/DL
PLATELET # BLD AUTO: 312 K/UL
POTASSIUM SERPL-SCNC: 5.2 MMOL/L
PROT SERPL-MCNC: 7.3 G/DL
PROT UR-MCNC: 50 MG/DL
PROTEIN URINE: 30 MG/DL
RBC # BLD: 4.77 M/UL
RBC # FLD: 13.4 %
RED BLOOD CELLS URINE: 0 /HPF
SARS-COV-2 AB SERPL IA-ACNC: >250 U/ML
SODIUM SERPL-SCNC: 140 MMOL/L
SPECIFIC GRAVITY URINE: 1.01
TACROLIMUS SERPL-MCNC: 9.9 NG/ML
UROBILINOGEN URINE: 0.2 MG/DL
WBC # FLD AUTO: 7.8 K/UL
WHITE BLOOD CELLS URINE: 3 /HPF

## 2025-03-31 LAB — BKV DNA SPEC QL NAA+PROBE: NOT DETECTED IU/ML

## 2025-03-31 RX ORDER — TACROLIMUS 1 MG/1
1 TABLET, EXTENDED RELEASE ORAL
Qty: 30 | Refills: 5 | Status: DISCONTINUED | COMMUNITY
Start: 2025-03-27 | End: 2025-03-31

## 2025-03-31 RX ORDER — TACROLIMUS 0.75 MG/1
0.75 TABLET, EXTENDED RELEASE ORAL DAILY
Qty: 180 | Refills: 3 | Status: ACTIVE | COMMUNITY
Start: 2025-03-31 | End: 1900-01-01

## 2025-04-10 DIAGNOSIS — Z94.0 KIDNEY TRANSPLANT STATUS: ICD-10-CM

## 2025-04-14 ENCOUNTER — APPOINTMENT (OUTPATIENT)
Dept: TRANSPLANT | Facility: CLINIC | Age: 75
End: 2025-04-14

## 2025-04-21 ENCOUNTER — EMERGENCY (EMERGENCY)
Facility: HOSPITAL | Age: 75
LOS: 1 days | End: 2025-04-21
Attending: EMERGENCY MEDICINE
Payer: MEDICARE

## 2025-04-21 VITALS
DIASTOLIC BLOOD PRESSURE: 94 MMHG | OXYGEN SATURATION: 92 % | TEMPERATURE: 98 F | HEIGHT: 66 IN | WEIGHT: 197.98 LBS | RESPIRATION RATE: 18 BRPM | HEART RATE: 83 BPM | SYSTOLIC BLOOD PRESSURE: 195 MMHG

## 2025-04-21 VITALS
HEART RATE: 81 BPM | OXYGEN SATURATION: 94 % | TEMPERATURE: 98 F | RESPIRATION RATE: 20 BRPM | DIASTOLIC BLOOD PRESSURE: 84 MMHG | SYSTOLIC BLOOD PRESSURE: 178 MMHG

## 2025-04-21 DIAGNOSIS — Z94.0 KIDNEY TRANSPLANT STATUS: Chronic | ICD-10-CM

## 2025-04-21 LAB
HCV AB S/CO SERPL IA: 0.05 S/CO — SIGNIFICANT CHANGE UP
HCV AB SERPL-IMP: SIGNIFICANT CHANGE UP
HIV 1 & 2 AB SERPL IA.RAPID: SIGNIFICANT CHANGE UP

## 2025-04-21 PROCEDURE — 80074 ACUTE HEPATITIS PANEL: CPT

## 2025-04-21 PROCEDURE — 87591 N.GONORRHOEAE DNA AMP PROB: CPT

## 2025-04-21 PROCEDURE — 86803 HEPATITIS C AB TEST: CPT

## 2025-04-21 PROCEDURE — 86780 TREPONEMA PALLIDUM: CPT

## 2025-04-21 PROCEDURE — 87491 CHLMYD TRACH DNA AMP PROBE: CPT

## 2025-04-21 PROCEDURE — 99284 EMERGENCY DEPT VISIT MOD MDM: CPT

## 2025-04-21 PROCEDURE — 99283 EMERGENCY DEPT VISIT LOW MDM: CPT

## 2025-04-21 PROCEDURE — 86703 HIV-1/HIV-2 1 RESULT ANTBDY: CPT

## 2025-04-21 NOTE — ED ADULT NURSE NOTE - NSFALLHARMRISKINTERV_ED_ALL_ED

## 2025-04-21 NOTE — ED PROVIDER NOTE - CLINICAL SUMMARY MEDICAL DECISION MAKING FREE TEXT BOX
74 y/o male presents for STD testing. He is hemodynamically stable, afebrile, on exam he is comfortable appearing with even, unlabored respirations. No rash, no oropharyngeal erythema/exudates, lungs clear. Patient denying any signs/symptoms of STD, partner requesting he get tested. 76 y/o male presents for STD testing. He is hemodynamically stable, afebrile, on exam he is comfortable appearing with even, unlabored respirations. No rash, no oropharyngeal erythema/exudates, lungs clear. Patient denying any signs/symptoms of STD, partner requesting he get tested.  '  '  '    Attending note.  Patient was seen in [place] [id number].  Patient states he is here to get HIV test.  Patient has 8 sexual partners and does not use condoms or STD protection.  He was advised by one of his sex partners to get HIV test.  Patient reports having STI screening approximately 5 weeks ago by his PCP.  He says he was "clean" at that time.  He denies any STI symptoms.  He denies any unexplained weight loss.  He has a prior history of renal transplant in 2021.  He also has history of diabetes, hypertension and genital herpes.  His Advil and Levaquin allergy.     ROS-as above, otherwise negative.  PE-patient is alert in no acute distress.  Lungs are clear and equal bilaterally.  Heart is regular rate and rhythm.  Abdomen is obese, soft and nontender.  A/P-asymptomatic patient requesting HIV testing.  Recommended to patient for STI screening as well.

## 2025-04-21 NOTE — ED PROVIDER NOTE - NSFOLLOWUPINSTRUCTIONS_ED_ALL_ED_FT
You have been tested for HIV. The test was negative. There were a couple of other tests sent for hepatitis and syphilis, you may view your results as they become available in your portal or you will receive a call with results if positive.    Please return to Emergency Department immediately for any new, concerning, or worsening symptoms.     Any results obtained today during your evaluation is attached and available in your portal. Please take all your results to follow up with your primary care doctor so that they can determine if you need any additional testing or treatment as an outpatient.     Please take prescriptions as discussed.

## 2025-04-21 NOTE — ED ADULT NURSE NOTE - CHIEF COMPLAINT QUOTE
here for HIV test, recently was told by a sexual partner he should get tested for HIV  renal transplant at [facility] 2021, uses home O2

## 2025-04-21 NOTE — ED ADULT TRIAGE NOTE - CHIEF COMPLAINT QUOTE
here for HIV test, recently was told by a sexual partner he should get tested for HIV  renal transplant at [facility] 2021, uses home O2

## 2025-04-21 NOTE — ED PROVIDER NOTE - OBJECTIVE STATEMENT
76 y/o male presents for STD testing. He denies any symptoms, but a sexual partner requested he get HIV testing. He does not want chlamydia/gonorrhea testing. 76 y/o male presents for STD testing. He denies any symptoms, but a sexual partner requested he get HIV testing.

## 2025-04-21 NOTE — ED ADULT NURSE NOTE - OBJECTIVE STATEMENT
76 y/o male coming to the ER for HIV testing. A&Ox4. Ambulatory with a cane. [facility] kidney transplant 2021 (old left arm AV fistula), DM, HTN. patient endorses having multiple sexual partners and states one of them requested he be tested for HIV, Patient states he has no known HIV exposure, but would like to be tested. patient endorses no s/s. Patient denies chest pain, penile Discharge, fever, chills, n/v/d, urinary symptoms, abdominal pain. Safety measures maintained. Bed in the lowest position. Provider at the bedside. No acute distress noted or further complaints at this time.

## 2025-04-21 NOTE — ED PROVIDER NOTE - PATIENT PORTAL LINK FT
You can access the FollowMyHealth Patient Portal offered by [facility] by registering at the following website: [email or web address]. By joining [facility]’s FollowMyHealth portal, you will also be able to view your health information using other applications (apps) compatible with our system.

## 2025-04-22 LAB
C TRACH RRNA SPEC QL NAA+PROBE: SIGNIFICANT CHANGE UP
HAV IGM SER-ACNC: SIGNIFICANT CHANGE UP
HBV CORE IGM SER-ACNC: SIGNIFICANT CHANGE UP
HBV SURFACE AG SER-ACNC: SIGNIFICANT CHANGE UP
HCV AB S/CO SERPL IA: 0.18 S/CO — SIGNIFICANT CHANGE UP (ref 0–0.79)
HCV AB SERPL-IMP: SIGNIFICANT CHANGE UP
N GONORRHOEA RRNA SPEC QL NAA+PROBE: SIGNIFICANT CHANGE UP
SPECIMEN SOURCE: SIGNIFICANT CHANGE UP
T PALLIDUM AB TITR SER: NEGATIVE — SIGNIFICANT CHANGE UP

## 2025-06-16 NOTE — PHYSICAL THERAPY INITIAL EVALUATION ADULT - LEVEL OF INDEPENDENCE: STAND/SIT, REHAB EVAL
"Follow Up Sleep Disorders Center Note     Chief Complaint:  LINDA     Primary Care Physician: Vaishali Wagner [facility].  Downloads between 5/10/contact guard

## 2025-07-05 ENCOUNTER — INPATIENT (INPATIENT)
Facility: HOSPITAL | Age: 75
LOS: 5 days | Discharge: HOME HEALTH | DRG: 884 | End: 2025-07-11
Attending: STUDENT IN AN ORGANIZED HEALTH CARE EDUCATION/TRAINING PROGRAM | Admitting: HOSPITALIST
Payer: MEDICARE

## 2025-07-05 VITALS
TEMPERATURE: 98 F | SYSTOLIC BLOOD PRESSURE: 159 MMHG | HEART RATE: 66 BPM | RESPIRATION RATE: 20 BRPM | HEIGHT: 65 IN | DIASTOLIC BLOOD PRESSURE: 72 MMHG | WEIGHT: 199.96 LBS | OXYGEN SATURATION: 94 %

## 2025-07-05 DIAGNOSIS — Z94.0 KIDNEY TRANSPLANT STATUS: Chronic | ICD-10-CM

## 2025-07-05 LAB
ALBUMIN SERPL ELPH-MCNC: 4.1 G/DL — SIGNIFICANT CHANGE UP (ref 3.3–5)
ALP SERPL-CCNC: 78 U/L — SIGNIFICANT CHANGE UP (ref 40–120)
ALT FLD-CCNC: 15 U/L — SIGNIFICANT CHANGE UP (ref 10–45)
ANION GAP SERPL CALC-SCNC: 15 MMOL/L — SIGNIFICANT CHANGE UP (ref 5–17)
APTT BLD: 28.9 SEC — SIGNIFICANT CHANGE UP (ref 26.1–36.8)
AST SERPL-CCNC: 19 U/L — SIGNIFICANT CHANGE UP (ref 10–40)
B-OH-BUTYR SERPL-SCNC: 0.1 MMOL/L — SIGNIFICANT CHANGE UP
BASOPHILS # BLD AUTO: 0.1 K/UL — SIGNIFICANT CHANGE UP (ref 0–0.2)
BASOPHILS NFR BLD AUTO: 1.4 % — SIGNIFICANT CHANGE UP (ref 0–2)
BILIRUB SERPL-MCNC: 0.8 MG/DL — SIGNIFICANT CHANGE UP (ref 0.2–1.2)
BUN SERPL-MCNC: 21 MG/DL — SIGNIFICANT CHANGE UP (ref 7–23)
CALCIUM SERPL-MCNC: 9.9 MG/DL — SIGNIFICANT CHANGE UP (ref 8.4–10.5)
CHLORIDE SERPL-SCNC: 94 MMOL/L — LOW (ref 96–108)
CO2 SERPL-SCNC: 22 MMOL/L — SIGNIFICANT CHANGE UP (ref 22–31)
CREAT SERPL-MCNC: 1.2 MG/DL — SIGNIFICANT CHANGE UP (ref 0.5–1.3)
EGFR: 63 ML/MIN/1.73M2 — SIGNIFICANT CHANGE UP
EGFR: 63 ML/MIN/1.73M2 — SIGNIFICANT CHANGE UP
EOSINOPHIL # BLD AUTO: 0.25 K/UL — SIGNIFICANT CHANGE UP (ref 0–0.5)
EOSINOPHIL NFR BLD AUTO: 3.5 % — SIGNIFICANT CHANGE UP (ref 0–6)
FLUAV AG NPH QL: SIGNIFICANT CHANGE UP
FLUBV AG NPH QL: SIGNIFICANT CHANGE UP
GAS PNL BLDV: SIGNIFICANT CHANGE UP
GLUCOSE SERPL-MCNC: 562 MG/DL — CRITICAL HIGH (ref 70–99)
HCT VFR BLD CALC: 41.5 % — SIGNIFICANT CHANGE UP (ref 39–50)
HGB BLD-MCNC: 13.7 G/DL — SIGNIFICANT CHANGE UP (ref 13–17)
IMM GRANULOCYTES # BLD AUTO: 0.03 K/UL — SIGNIFICANT CHANGE UP (ref 0–0.07)
IMM GRANULOCYTES NFR BLD AUTO: 0.4 % — SIGNIFICANT CHANGE UP (ref 0–0.9)
INR BLD: 1.03 RATIO — SIGNIFICANT CHANGE UP (ref 0.85–1.16)
LACTATE SERPL-SCNC: 2 MMOL/L — SIGNIFICANT CHANGE UP (ref 0.5–2)
LYMPHOCYTES # BLD AUTO: 1.31 K/UL — SIGNIFICANT CHANGE UP (ref 1–3.3)
LYMPHOCYTES NFR BLD AUTO: 18.2 % — SIGNIFICANT CHANGE UP (ref 13–44)
MAGNESIUM SERPL-MCNC: 2.2 MG/DL — SIGNIFICANT CHANGE UP (ref 1.6–2.6)
MCHC RBC-ENTMCNC: 29.2 PG — SIGNIFICANT CHANGE UP (ref 27–34)
MCHC RBC-ENTMCNC: 33 G/DL — SIGNIFICANT CHANGE UP (ref 32–36)
MCV RBC AUTO: 88.5 FL — SIGNIFICANT CHANGE UP (ref 80–100)
MONOCYTES # BLD AUTO: 1.01 K/UL — HIGH (ref 0–0.9)
MONOCYTES NFR BLD AUTO: 14 % — SIGNIFICANT CHANGE UP (ref 2–14)
NEUTROPHILS # BLD AUTO: 4.49 K/UL — SIGNIFICANT CHANGE UP (ref 1.8–7.4)
NEUTROPHILS NFR BLD AUTO: 62.5 % — SIGNIFICANT CHANGE UP (ref 43–77)
NRBC # BLD AUTO: 0 K/UL — SIGNIFICANT CHANGE UP (ref 0–0)
NRBC # FLD: 0 K/UL — SIGNIFICANT CHANGE UP (ref 0–0)
NRBC BLD AUTO-RTO: 0 /100 WBCS — SIGNIFICANT CHANGE UP (ref 0–0)
PHOSPHATE SERPL-MCNC: 3.5 MG/DL — SIGNIFICANT CHANGE UP (ref 2.5–4.5)
PLATELET # BLD AUTO: 157 K/UL — SIGNIFICANT CHANGE UP (ref 150–400)
PMV BLD: 9.9 FL — SIGNIFICANT CHANGE UP (ref 7–13)
POTASSIUM SERPL-MCNC: 5.1 MMOL/L — SIGNIFICANT CHANGE UP (ref 3.5–5.3)
POTASSIUM SERPL-SCNC: 5.1 MMOL/L — SIGNIFICANT CHANGE UP (ref 3.5–5.3)
PROT SERPL-MCNC: 7.7 G/DL — SIGNIFICANT CHANGE UP (ref 6–8.3)
PROTHROM AB SERPL-ACNC: 11.8 SEC — SIGNIFICANT CHANGE UP (ref 9.9–13.4)
RBC # BLD: 4.69 M/UL — SIGNIFICANT CHANGE UP (ref 4.2–5.8)
RBC # FLD: 12.7 % — SIGNIFICANT CHANGE UP (ref 10.3–14.5)
RSV RNA NPH QL NAA+NON-PROBE: SIGNIFICANT CHANGE UP
SARS-COV-2 RNA SPEC QL NAA+PROBE: SIGNIFICANT CHANGE UP
SODIUM SERPL-SCNC: 131 MMOL/L — LOW (ref 135–145)
SOURCE RESPIRATORY: SIGNIFICANT CHANGE UP
WBC # BLD: 7.19 K/UL — SIGNIFICANT CHANGE UP (ref 3.8–10.5)
WBC # FLD AUTO: 7.19 K/UL — SIGNIFICANT CHANGE UP (ref 3.8–10.5)

## 2025-07-05 PROCEDURE — 93010 ELECTROCARDIOGRAM REPORT: CPT

## 2025-07-05 PROCEDURE — 99285 EMERGENCY DEPT VISIT HI MDM: CPT

## 2025-07-05 RX ORDER — QUETIAPINE FUMARATE 25 MG/1
25 TABLET ORAL ONCE
Refills: 0 | Status: DISCONTINUED | OUTPATIENT
Start: 2025-07-05 | End: 2025-07-05

## 2025-07-05 RX ORDER — HALOPERIDOL 10 MG/1
2.5 TABLET ORAL ONCE
Refills: 0 | Status: COMPLETED | OUTPATIENT
Start: 2025-07-05 | End: 2025-07-05

## 2025-07-05 RX ADMIN — Medication 1000 MILLILITER(S): at 22:53

## 2025-07-05 RX ADMIN — HALOPERIDOL 2.5 MILLIGRAM(S): 10 TABLET ORAL at 22:53

## 2025-07-05 NOTE — ED PROVIDER NOTE - CLINICAL SUMMARY MEDICAL DECISION MAKING FREE TEXT BOX
75-year-old male with past medical history of kidney transplant, hypertension, lipidemia, diabetes presenting to the emergency due to worsening mental status per family.  Per the family he has been having increasing confusion, difficulty hearing, and change in behavior over the past year which acutely worsened over the past month.  Patient's family states they do not feel they are able to care for the patient at home.  Patient states he has been feeling more dizzy than normal this past month, and states he may have some dysuria and shortness of breath.  No fever, chills, nausea, vomiting, weakness or numbness to extremities.    Patient is afebrile and hemodynamically stable in the emergency department.  AO x 2.  The patient is intermittently agitated and verbally aggressive in the ED.  No physical aggression.  Lungs good auscultation.  Heart has normal rate and rhythm.  Abdomen soft nontender.  Symptoms appear to be due to dementia, but will evaluate for any signs of underlying etiology that could be causing symptoms such as infection, metabolic abnormalities, or intracranial pathology.

## 2025-07-05 NOTE — ED ADULT TRIAGE NOTE - CHIEF COMPLAINT QUOTE
hx of kidney transplant  dizziness for 3 days, denies falls   wife states patient is more acutely confused and aggressive behavior the last 3 days

## 2025-07-05 NOTE — ED PROVIDER NOTE - ATTENDING CONTRIBUTION TO CARE
I have personally performed a face to face medical and diagnostic evaluation of the patient. I have discussed with and reviewed the Resident's and/or ACP's and/or Medical/PA/NP student's note and agree with the History, ROS, Physical Exam and MDM unless otherwise indicated. A brief summary of my personal evaluation and impression can be found below.    Patient is a 74 YO M with PMH of kidney transplant (on tacrolimus and mycophenalate), HTN, HLD, DM presenting to the ER for worsening altered mental status as per family. States that over the past year has been declining with worsening confusion, difficulty hearing, and change in behavior. Ambulates with cane with no recent reported changes in gait. Denies chest pain, shortness of breath, nausea/vomiting, changes in vision, dysuria/hematuria. Family states that they are unable to care for him alone and requesting placement.     VSS, labile, intermittently verbally aggressive, AAOx2 (name and date), confused regarding hospital presentation, no toxic appearance, abdomen soft NTND, lungs CTA b/l, no obvious neuro focal deficits, ambulates with normal gait with cane, overall unremarkable physical exam     likely worsening dementia   NPH unlikely, will obtain CTH   Delirium? unlikely due to prolonged development of symptoms     labs   EKG   CXR   CTH   IVF   UA   IV Haldol 2.5mg   Admit

## 2025-07-05 NOTE — ED ADULT NURSE NOTE - OBJECTIVE STATEMENT
75Y male, A&Ox2, pmh of kidney transplant, esrd, dementia. presents to the ED c/o dizziness x3 days. as per wife pt has become increasingly confused and agitated over the past few days. pt denies all other symptoms besides mild pain in right hand.

## 2025-07-05 NOTE — ED PROVIDER NOTE - PROGRESS NOTE DETAILS
X-rays and CT showed no signs of any masses or bleeds.  Labs came back showing hyperglycemia, but no signs of any other metabolic derangements or infections.  Patient's wife states that with the patient in the current state, she feels uncomfortable taking care of him at home.  Patient currently AO x 2 and acutely agitated without medications.  Patient will be admitted for worsening dementia.  The hospitalist who agreed to admit the patient.    Baltazar Bocanegra MD (PGY 2)

## 2025-07-05 NOTE — ED PROVIDER NOTE - NSTIMEPROVIDERCAREINITIATE_GEN_ER
8/12/2022: SS Note/Discharge plan;  Per IDR and nursing report, pt presented to the hospital with a hip fracture from a fall at home and having orthopedic surgical repair on Saturday 8/13. Met with pt and pt's son, Delfina Marin at his bedside (ph# [phone]), pt currently a&o, pleasant, explained sw role for transition of care planing and discussed anticipated rehab options, pt lives alone and functioned independently prior to admission, no past [place] [address] or SNF/Rehab placement, pt currently hopes to return home at discharge vs rehab placement, pt's sister, Marito Zuñiga or children will stay with him to help as needed, will follow on Monday 8/15 for post-op therapy evas to confirm d/c plan and for HHC/DME needs, pt has [facility], will NEED PRE CERT if rehab placement is needed, nursing informed.  Electronically signed by CHRISTINA Yun on 8/12/2022 at 1:04 PM
8/15/2022: SS Note/Discharge plan:  Discharge plan for pt to return home when medically stable with home health PT/OT, referral made to [facility] and sw confirmed with Conrad Mckenna, agency liaison that they have accepted pt for home therapy for start of care on Wednesday if discharged tomorrow, sw unable to meet with pt to discuss dme recommendations for w/w, bsc and ttb, pt out of room for an MRI, no d/c today per nursing, sw/cm to follow. Electronically signed by CHRISTINA Raymond on 8/15/2022 at 1:37 PM
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, PRE CERT NEEDED, sw to follow. Electronically signed by CHRISTINA Gonzalez on 8/15/2022 at 3:32 PM
8/16/2022: SS Note/Discharge plan:  Abby Chong admissions liaison for [facility], reviewed PT update and family supports after rehab available and agree pt is meeting acute rehab criteria for [facility], will SUBMIT PRE CERT TODAY, PCR COVID TEST NEEDED with in 72 hours prior to transfer, nursing notified, CHRYSTAL initiated, sw/cm will follow to confirm transfer arrangements. Electronically signed by CHRISTINA Scott on 8/16/2022 at 1:59 PM
8/16/2022: SS Note/Discharge plan:  Notified by Cand[facility] admissions liaison for [facility] that she is following for a PT eval update post CVA, to determine if pt meets Acute rehab criteria, last note states \"not assessed\" for bed mobility, transfers and ambulation, she will notify sw after note is in if she feels pt will qualify for acute rehab admission under [facility], PRE CERT and PCR COVID test will be needed prior to transfer, nursing informed, sw to follow. Electronically signed by CHRISTINA Viramontes on 8/16/2022 at 9:19 AM
8/17/2022 Late Entry: CM on call last evening. Pt wanted to know if [facility] was an in network provider. CM phone [facility] and Ariel Rasmussen is an in network provider. Pt costs for [facility] and the transportation to be determined by insurance company. CM phone pt and [facility] to inform them of this information.  Electronically signed by DARIEN Byrd on 8/17/2022 at 7:16 AM
8/17/2022: SS Note/Discharge plan:  Notified by nursing of pt being transferred to [facility] today, Mary Sharpe admissions for [facility] notified of pt's transfer and she will withdrawal their pre cert request.Electronically signed by CHRISTINA Arteaga on 8/17/2022 at 8:46 AM
05-Jul-2025 21:34

## 2025-07-06 DIAGNOSIS — G93.41 METABOLIC ENCEPHALOPATHY: ICD-10-CM

## 2025-07-06 DIAGNOSIS — Z94.0 KIDNEY TRANSPLANT STATUS: ICD-10-CM

## 2025-07-06 DIAGNOSIS — R45.1 RESTLESSNESS AND AGITATION: ICD-10-CM

## 2025-07-06 DIAGNOSIS — Z79.899 OTHER LONG TERM (CURRENT) DRUG THERAPY: ICD-10-CM

## 2025-07-06 DIAGNOSIS — I10 ESSENTIAL (PRIMARY) HYPERTENSION: ICD-10-CM

## 2025-07-06 DIAGNOSIS — N40.0 BENIGN PROSTATIC HYPERPLASIA WITHOUT LOWER URINARY TRACT SYMPTOMS: ICD-10-CM

## 2025-07-06 DIAGNOSIS — F41.9 ANXIETY DISORDER, UNSPECIFIED: ICD-10-CM

## 2025-07-06 DIAGNOSIS — E21.3 HYPERPARATHYROIDISM, UNSPECIFIED: ICD-10-CM

## 2025-07-06 DIAGNOSIS — E11.9 TYPE 2 DIABETES MELLITUS WITHOUT COMPLICATIONS: ICD-10-CM

## 2025-07-06 LAB
APPEARANCE UR: CLEAR — SIGNIFICANT CHANGE UP
BACTERIA # UR AUTO: NEGATIVE /HPF — SIGNIFICANT CHANGE UP
BILIRUB UR-MCNC: NEGATIVE — SIGNIFICANT CHANGE UP
CAST: 0 /LPF — SIGNIFICANT CHANGE UP (ref 0–4)
COLOR SPEC: YELLOW — SIGNIFICANT CHANGE UP
DIFF PNL FLD: ABNORMAL
GLUCOSE BLDC GLUCOMTR-MCNC: 185 MG/DL — HIGH (ref 70–99)
GLUCOSE BLDC GLUCOMTR-MCNC: 342 MG/DL — HIGH (ref 70–99)
GLUCOSE BLDC GLUCOMTR-MCNC: 351 MG/DL — HIGH (ref 70–99)
GLUCOSE BLDC GLUCOMTR-MCNC: 358 MG/DL — HIGH (ref 70–99)
GLUCOSE UR QL: >=1000 MG/DL
KETONES UR QL: NEGATIVE MG/DL — SIGNIFICANT CHANGE UP
LEUKOCYTE ESTERASE UR-ACNC: NEGATIVE — SIGNIFICANT CHANGE UP
NITRITE UR-MCNC: NEGATIVE — SIGNIFICANT CHANGE UP
PH UR: 5.5 — SIGNIFICANT CHANGE UP (ref 5–8)
PROT UR-MCNC: 100 MG/DL
RBC CASTS # UR COMP ASSIST: 2 /HPF — SIGNIFICANT CHANGE UP (ref 0–4)
SP GR SPEC: >1.03 — HIGH (ref 1–1.03)
SQUAMOUS # UR AUTO: 0 /HPF — SIGNIFICANT CHANGE UP (ref 0–5)
TACROLIMUS SERPL-MCNC: 4 NG/ML — SIGNIFICANT CHANGE UP
UROBILINOGEN FLD QL: 0.2 MG/DL — SIGNIFICANT CHANGE UP (ref 0.2–1)
WBC UR QL: 1 /HPF — SIGNIFICANT CHANGE UP (ref 0–5)

## 2025-07-06 PROCEDURE — 85730 THROMBOPLASTIN TIME PARTIAL: CPT

## 2025-07-06 PROCEDURE — 85025 COMPLETE CBC W/AUTO DIFF WBC: CPT

## 2025-07-06 PROCEDURE — 70450 CT HEAD/BRAIN W/O DYE: CPT | Mod: 26

## 2025-07-06 PROCEDURE — 93005 ELECTROCARDIOGRAM TRACING: CPT

## 2025-07-06 PROCEDURE — 71260 CT THORAX DX C+: CPT

## 2025-07-06 PROCEDURE — 82330 ASSAY OF CALCIUM: CPT

## 2025-07-06 PROCEDURE — 71260 CT THORAX DX C+: CPT | Mod: 26

## 2025-07-06 PROCEDURE — 82962 GLUCOSE BLOOD TEST: CPT

## 2025-07-06 PROCEDURE — 82947 ASSAY GLUCOSE BLOOD QUANT: CPT

## 2025-07-06 PROCEDURE — 0241U: CPT

## 2025-07-06 PROCEDURE — 71045 X-RAY EXAM CHEST 1 VIEW: CPT

## 2025-07-06 PROCEDURE — 70450 CT HEAD/BRAIN W/O DYE: CPT

## 2025-07-06 PROCEDURE — 84132 ASSAY OF SERUM POTASSIUM: CPT

## 2025-07-06 PROCEDURE — 80180 DRUG SCRN QUAN MYCOPHENOLATE: CPT

## 2025-07-06 PROCEDURE — 83605 ASSAY OF LACTIC ACID: CPT

## 2025-07-06 PROCEDURE — 80053 COMPREHEN METABOLIC PANEL: CPT

## 2025-07-06 PROCEDURE — 80197 ASSAY OF TACROLIMUS: CPT

## 2025-07-06 PROCEDURE — 85014 HEMATOCRIT: CPT

## 2025-07-06 PROCEDURE — 83735 ASSAY OF MAGNESIUM: CPT

## 2025-07-06 PROCEDURE — 84295 ASSAY OF SERUM SODIUM: CPT

## 2025-07-06 PROCEDURE — 85610 PROTHROMBIN TIME: CPT

## 2025-07-06 PROCEDURE — 81001 URINALYSIS AUTO W/SCOPE: CPT

## 2025-07-06 PROCEDURE — 85018 HEMOGLOBIN: CPT

## 2025-07-06 PROCEDURE — 82435 ASSAY OF BLOOD CHLORIDE: CPT

## 2025-07-06 PROCEDURE — 71045 X-RAY EXAM CHEST 1 VIEW: CPT | Mod: 26

## 2025-07-06 PROCEDURE — 84100 ASSAY OF PHOSPHORUS: CPT

## 2025-07-06 PROCEDURE — 82010 KETONE BODYS QUAN: CPT

## 2025-07-06 PROCEDURE — 82803 BLOOD GASES ANY COMBINATION: CPT

## 2025-07-06 PROCEDURE — 99223 1ST HOSP IP/OBS HIGH 75: CPT

## 2025-07-06 RX ORDER — INSULIN LISPRO 100 U/ML
INJECTION, SOLUTION INTRAVENOUS; SUBCUTANEOUS AT BEDTIME
Refills: 0 | Status: DISCONTINUED | OUTPATIENT
Start: 2025-07-06 | End: 2025-07-11

## 2025-07-06 RX ORDER — INSULIN LISPRO 100 U/ML
INJECTION, SOLUTION INTRAVENOUS; SUBCUTANEOUS EVERY 6 HOURS
Refills: 0 | Status: DISCONTINUED | OUTPATIENT
Start: 2025-07-06 | End: 2025-07-06

## 2025-07-06 RX ORDER — ENOXAPARIN SODIUM 100 MG/ML
40 INJECTION SUBCUTANEOUS EVERY 24 HOURS
Refills: 0 | Status: DISCONTINUED | OUTPATIENT
Start: 2025-07-07 | End: 2025-07-11

## 2025-07-06 RX ORDER — QUETIAPINE FUMARATE 25 MG/1
25 TABLET ORAL AT BEDTIME
Refills: 0 | Status: DISCONTINUED | OUTPATIENT
Start: 2025-07-06 | End: 2025-07-07

## 2025-07-06 RX ORDER — HALOPERIDOL 10 MG/1
1 TABLET ORAL ONCE
Refills: 0 | Status: COMPLETED | OUTPATIENT
Start: 2025-07-06 | End: 2025-07-06

## 2025-07-06 RX ORDER — TACROLIMUS 0.5 MG/1
1.5 CAPSULE ORAL DAILY
Refills: 0 | Status: DISCONTINUED | OUTPATIENT
Start: 2025-07-06 | End: 2025-07-11

## 2025-07-06 RX ORDER — DEXTROSE 50 % IN WATER 50 %
15 SYRINGE (ML) INTRAVENOUS ONCE
Refills: 0 | Status: DISCONTINUED | OUTPATIENT
Start: 2025-07-06 | End: 2025-07-06

## 2025-07-06 RX ORDER — DEXTROSE 50 % IN WATER 50 %
25 SYRINGE (ML) INTRAVENOUS ONCE
Refills: 0 | Status: DISCONTINUED | OUTPATIENT
Start: 2025-07-06 | End: 2025-07-11

## 2025-07-06 RX ORDER — DEXTROSE 50 % IN WATER 50 %
25 SYRINGE (ML) INTRAVENOUS ONCE
Refills: 0 | Status: DISCONTINUED | OUTPATIENT
Start: 2025-07-06 | End: 2025-07-06

## 2025-07-06 RX ORDER — MYCOPHENOLATE MOFETIL 500 MG/1
500 TABLET, FILM COATED ORAL
Refills: 0 | Status: DISCONTINUED | OUTPATIENT
Start: 2025-07-06 | End: 2025-07-11

## 2025-07-06 RX ORDER — INSULIN GLARGINE-YFGN 100 [IU]/ML
28 INJECTION, SOLUTION SUBCUTANEOUS AT BEDTIME
Refills: 0 | Status: DISCONTINUED | OUTPATIENT
Start: 2025-07-06 | End: 2025-07-11

## 2025-07-06 RX ORDER — DEXTROSE 50 % IN WATER 50 %
12.5 SYRINGE (ML) INTRAVENOUS ONCE
Refills: 0 | Status: DISCONTINUED | OUTPATIENT
Start: 2025-07-06 | End: 2025-07-06

## 2025-07-06 RX ORDER — CARVEDILOL 3.12 MG/1
25 TABLET, FILM COATED ORAL EVERY 12 HOURS
Refills: 0 | Status: DISCONTINUED | OUTPATIENT
Start: 2025-07-06 | End: 2025-07-09

## 2025-07-06 RX ORDER — TAMSULOSIN HYDROCHLORIDE 0.4 MG/1
0.4 CAPSULE ORAL AT BEDTIME
Refills: 0 | Status: DISCONTINUED | OUTPATIENT
Start: 2025-07-06 | End: 2025-07-11

## 2025-07-06 RX ORDER — NIFEDIPINE 30 MG
60 TABLET, EXTENDED RELEASE 24 HR ORAL
Refills: 0 | Status: DISCONTINUED | OUTPATIENT
Start: 2025-07-06 | End: 2025-07-11

## 2025-07-06 RX ORDER — DULOXETINE 20 MG/1
60 CAPSULE, DELAYED RELEASE ORAL DAILY
Refills: 0 | Status: DISCONTINUED | OUTPATIENT
Start: 2025-07-06 | End: 2025-07-11

## 2025-07-06 RX ORDER — ASPIRIN 325 MG
81 TABLET ORAL
Refills: 0 | Status: DISCONTINUED | OUTPATIENT
Start: 2025-07-06 | End: 2025-07-11

## 2025-07-06 RX ORDER — CALCITRIOL 0.5 UG/1
0.25 CAPSULE, GELATIN COATED ORAL DAILY
Refills: 0 | Status: DISCONTINUED | OUTPATIENT
Start: 2025-07-06 | End: 2025-07-11

## 2025-07-06 RX ORDER — SODIUM CHLORIDE 9 G/1000ML
1000 INJECTION, SOLUTION INTRAVENOUS
Refills: 0 | Status: DISCONTINUED | OUTPATIENT
Start: 2025-07-06 | End: 2025-07-11

## 2025-07-06 RX ORDER — FUROSEMIDE 10 MG/ML
40 INJECTION INTRAMUSCULAR; INTRAVENOUS
Refills: 0 | Status: DISCONTINUED | OUTPATIENT
Start: 2025-07-06 | End: 2025-07-11

## 2025-07-06 RX ORDER — SODIUM CHLORIDE 9 G/1000ML
1000 INJECTION, SOLUTION INTRAVENOUS
Refills: 0 | Status: DISCONTINUED | OUTPATIENT
Start: 2025-07-06 | End: 2025-07-06

## 2025-07-06 RX ORDER — GLUCAGON 3 MG/1
1 POWDER NASAL ONCE
Refills: 0 | Status: DISCONTINUED | OUTPATIENT
Start: 2025-07-06 | End: 2025-07-06

## 2025-07-06 RX ORDER — INSULIN LISPRO 100 U/ML
6 INJECTION, SOLUTION INTRAVENOUS; SUBCUTANEOUS ONCE
Refills: 0 | Status: COMPLETED | OUTPATIENT
Start: 2025-07-06 | End: 2025-07-06

## 2025-07-06 RX ORDER — GLUCAGON 3 MG/1
1 POWDER NASAL ONCE
Refills: 0 | Status: DISCONTINUED | OUTPATIENT
Start: 2025-07-06 | End: 2025-07-11

## 2025-07-06 RX ORDER — ACETAMINOPHEN 500 MG/5ML
1000 LIQUID (ML) ORAL ONCE
Refills: 0 | Status: COMPLETED | OUTPATIENT
Start: 2025-07-06 | End: 2025-07-06

## 2025-07-06 RX ORDER — INSULIN LISPRO 100 U/ML
INJECTION, SOLUTION INTRAVENOUS; SUBCUTANEOUS
Refills: 0 | Status: DISCONTINUED | OUTPATIENT
Start: 2025-07-06 | End: 2025-07-11

## 2025-07-06 RX ORDER — DEXTROSE 50 % IN WATER 50 %
12.5 SYRINGE (ML) INTRAVENOUS ONCE
Refills: 0 | Status: DISCONTINUED | OUTPATIENT
Start: 2025-07-06 | End: 2025-07-11

## 2025-07-06 RX ORDER — BUPROPION HYDROBROMIDE 522 MG/1
300 TABLET, EXTENDED RELEASE ORAL DAILY
Refills: 0 | Status: DISCONTINUED | OUTPATIENT
Start: 2025-07-06 | End: 2025-07-11

## 2025-07-06 RX ORDER — DEXTROSE 50 % IN WATER 50 %
15 SYRINGE (ML) INTRAVENOUS ONCE
Refills: 0 | Status: DISCONTINUED | OUTPATIENT
Start: 2025-07-06 | End: 2025-07-11

## 2025-07-06 RX ORDER — INSULIN LISPRO 100 U/ML
14 INJECTION, SOLUTION INTRAVENOUS; SUBCUTANEOUS
Refills: 0 | Status: DISCONTINUED | OUTPATIENT
Start: 2025-07-06 | End: 2025-07-09

## 2025-07-06 RX ORDER — INSULIN LISPRO 100 U/ML
5 INJECTION, SOLUTION INTRAVENOUS; SUBCUTANEOUS ONCE
Refills: 0 | Status: DISCONTINUED | OUTPATIENT
Start: 2025-07-06 | End: 2025-07-06

## 2025-07-06 RX ADMIN — QUETIAPINE FUMARATE 25 MILLIGRAM(S): 25 TABLET ORAL at 21:39

## 2025-07-06 RX ADMIN — Medication 60 MILLIGRAM(S): at 17:24

## 2025-07-06 RX ADMIN — DULOXETINE 60 MILLIGRAM(S): 20 CAPSULE, DELAYED RELEASE ORAL at 14:02

## 2025-07-06 RX ADMIN — TAMSULOSIN HYDROCHLORIDE 0.4 MILLIGRAM(S): 0.4 CAPSULE ORAL at 21:39

## 2025-07-06 RX ADMIN — MYCOPHENOLATE MOFETIL 500 MILLIGRAM(S): 500 TABLET, FILM COATED ORAL at 17:24

## 2025-07-06 RX ADMIN — CALCITRIOL 0.25 MICROGRAM(S): 0.5 CAPSULE, GELATIN COATED ORAL at 17:24

## 2025-07-06 RX ADMIN — INSULIN LISPRO 4: 100 INJECTION, SOLUTION INTRAVENOUS; SUBCUTANEOUS at 11:06

## 2025-07-06 RX ADMIN — INSULIN LISPRO 10: 100 INJECTION, SOLUTION INTRAVENOUS; SUBCUTANEOUS at 17:23

## 2025-07-06 RX ADMIN — BUPROPION HYDROBROMIDE 300 MILLIGRAM(S): 522 TABLET, EXTENDED RELEASE ORAL at 14:02

## 2025-07-06 RX ADMIN — CARVEDILOL 25 MILLIGRAM(S): 3.12 TABLET, FILM COATED ORAL at 17:24

## 2025-07-06 RX ADMIN — INSULIN LISPRO 14 UNIT(S): 100 INJECTION, SOLUTION INTRAVENOUS; SUBCUTANEOUS at 17:23

## 2025-07-06 RX ADMIN — FUROSEMIDE 40 MILLIGRAM(S): 10 INJECTION INTRAMUSCULAR; INTRAVENOUS at 14:02

## 2025-07-06 RX ADMIN — TACROLIMUS 1.5 MILLIGRAM(S): 0.5 CAPSULE ORAL at 17:45

## 2025-07-06 RX ADMIN — INSULIN LISPRO 6 UNIT(S): 100 INJECTION, SOLUTION INTRAVENOUS; SUBCUTANEOUS at 00:47

## 2025-07-06 RX ADMIN — HALOPERIDOL 1 MILLIGRAM(S): 10 TABLET ORAL at 01:32

## 2025-07-06 RX ADMIN — Medication 60 MILLILITER(S): at 11:05

## 2025-07-06 RX ADMIN — INSULIN GLARGINE-YFGN 28 UNIT(S): 100 INJECTION, SOLUTION SUBCUTANEOUS at 21:42

## 2025-07-06 NOTE — H&P ADULT - HISTORY OF PRESENT ILLNESS
75 year old male with PMH kidney transplant in 2021 (follows with Dr. Patino), DM, BPH, HTN, hyperparathyroidism and anxiety/OCD who presents due to worsening confusion. Patient endorses being more confused recently but unable to tell me the circumstance for his admission to the hospital. Collateral obtained from wife Rossy, states that he was recently admitted to [facility] ([facility]) for about 2 weeks. She notes that his mental status has worsened over the past year but the last few days has been aggressive as well and she cannot care for him at home. Upon arrival to the ED, vital signs were /72, HR 66, RR 20, temperature 98 degrees Farenheit and saturating 94% on room air. Aside from hyperglycemia to 562, labs an imaging were unremarkable. He received 2L NS, 6 units Admelog, 1G IV Tylenol, 1mg IV Haldol, another 2.5mg IV Haldol,1mg IM Haldol and was admitted for further management

## 2025-07-06 NOTE — H&P ADULT - PROBLEM SELECTOR PLAN 3
-With hyperglycemia over 500  -Obtain A1C  -Restarted on home Lantus 28 units as well as Humalog 14 units TID

## 2025-07-06 NOTE — PATIENT PROFILE ADULT - FALL HARM RISK - RISK INTERVENTIONS
Assistance OOB with selected safe patient handling equipment/Assistance with ambulation/Communicate Fall Risk and Risk Factors to all staff, patient, and family/Discuss with provider need for PT consult/Monitor gait and stability/Provide patient with walking aids - walker, cane, crutches/Reinforce activity limits and safety measures with patient and family/Use of alarms - bed, chair and/or voice tab/Visual Cue: Yellow wristband/Bed in lowest position, wheels locked, appropriate side rails in place/Call bell, personal items and telephone in reach/Instruct patient to call for assistance before getting out of bed or chair/Non-slip footwear when patient is out of bed/[place] to call system/Physically safe environment - no spills, clutter or unnecessary equipment/Purposeful Proactive Rounding/Room/bathroom lighting operational, light cord in reach

## 2025-07-06 NOTE — ED ADULT NURSE REASSESSMENT NOTE - NS ED NURSE REASSESS COMMENT FT1
Pt restless, repeatedly asking to leave however ED work up still pending results, pt not very redirectable at this time, Pt given IVP Haldol for agitation per MD Hoffman

## 2025-07-06 NOTE — H&P ADULT - NSHPPHYSICALEXAM_GEN_ALL_CORE
.  VITAL SIGNS:  T(C): 36.3 (07-06-25 @ 11:55), Max: 36.9 (07-06-25 @ 05:15)  T(F): 97.4 (07-06-25 @ 11:55), Max: 98.4 (07-06-25 @ 05:15)  HR: 57 (07-06-25 @ 11:55) (57 - 79)  BP: 156/75 (07-06-25 @ 11:55) (149/70 - 160/70)  BP(mean): 100 (07-06-25 @ 09:02) (100 - 100)  RR: 18 (07-06-25 @ 11:55) (17 - 20)  SpO2: 94% (07-06-25 @ 11:55) (92% - 95%)  Wt(kg): --    PHYSICAL EXAM:    Constitutional: WDWN resting comfortably in bed; NAD  Respiratory: CTA B/L; no W/R/R  Cardiac: +S1/S2; RRR; no M/R/G  Gastrointestinal: soft, NT/ND; no rebound or guarding;  Neurologic: AAOx3;   Psychiatric: Odd affect, slow to answer questions, perseverating

## 2025-07-06 NOTE — H&P ADULT - ASSESSMENT
75 year old male with PMH kidney transplant in 2021 (follows with Dr. Patino), DM, BPH, HTN, hyperparathyroidism and anxiety/OCD who presents due to worsening confusion.

## 2025-07-06 NOTE — PATIENT PROFILE ADULT - NSPROHMDIABETBLDGLCUSUAL_GEN_A_NUR
Report received from day shift RN. Patient arrives to the ED for abdominal pain and weakness after taking mag citrate for constipation x1 week. Patient breathing even and nonlabored. Patient stable. Vital signs as noted. Patient brought to CDU.
unknown

## 2025-07-06 NOTE — H&P ADULT - PROBLEM SELECTOR PLAN 8
-Unable to obtain full med rec  -Discussed with wife, states Dr. Patino knows medications so medications taken from most recent note from March

## 2025-07-06 NOTE — H&P ADULT - PROBLEM SELECTOR PLAN 1
-Patient presenting with worsening confusion, AAOx3 on exam today but unable to tell me the circumstance of his hospitalization  -Per wife, recently hospitalized at [facility] (Psych facility)  -Obtain RPR, TSH, B12  -Obtain psych consult tomorrow for medication management  -Head CT negative for acute pathology  -Seroquel at bedtime for mood stabilization for now (aggressive in ED requiring multiple IV/IM medications)

## 2025-07-06 NOTE — H&P ADULT - PROBLEM SELECTOR PLAN 2
-Continue Cellcept 500mg BID  -Continue Envarsus 1.5mg daily and obtain daily trough  -Consult renal transplant team tomorrow

## 2025-07-06 NOTE — H&P ADULT - NSHPREVIEWOFSYSTEMS_GEN_ALL_CORE
REVIEW OF SYSTEMS:    CONSTITUTIONAL: No weakness, fevers or chills  EYES/ENT: No visual changes;  No vertigo or throat pain   NECK: No pain or stiffness  RESPIRATORY: No cough, wheezing, hemoptysis; No shortness of breath  CARDIOVASCULAR: No chest pain or palpitations  GASTROINTESTINAL: No abdominal or epigastric pain. No nausea, vomiting, or hematemesis; No diarrhea or constipation. No melena or hematochezia.  GENITOURINARY: No dysuria, frequency or hematuria  NEUROLOGICAL: Endorses confusion  SKIN: No itching, burning, rashes, or lesions  MSK: No joint pain, no back pain  HEME: No easy bleeding, no easy bruising  All other review of systems is negative unless indicated above.

## 2025-07-06 NOTE — H&P ADULT - NSHPLABSRESULTS_GEN_ALL_CORE
.  LABS:                         13.7   7.19  )-----------( 157      ( 05 Jul 2025 22:55 )             41.5     07-05    131[L]  |  94[L]  |  21  ----------------------------<  562[HH]  5.1   |  22  |  1.20    Ca    9.9      05 Jul 2025 22:55  Phos  3.5     07-05  Mg     2.2     07-05    TPro  7.7  /  Alb  4.1  /  TBili  0.8  /  DBili  x   /  AST  19  /  ALT  15  /  AlkPhos  78  07-05    PT/INR - ( 05 Jul 2025 22:55 )   PT: 11.8 sec;   INR: 1.03 ratio         PTT - ( 05 Jul 2025 22:55 )  PTT:28.9 sec  Urinalysis Basic - ( 06 Jul 2025 00:31 )    Color: Yellow / Appearance: Clear / SG: >1.030 / pH: x  Gluc: x / Ketone: x  / Bili: Negative / Urobili: 0.2 mg/dL   Blood: x / Protein: 100 mg/dL / Nitrite: Negative   Leuk Esterase: Negative / RBC: 2 /HPF / WBC 1 /HPF   Sq Epi: x / Non Sq Epi: 0 /HPF / Bacteria: Negative /HPF

## 2025-07-07 LAB
A1C WITH ESTIMATED AVERAGE GLUCOSE RESULT: 11.1 % — HIGH (ref 4–5.6)
ALBUMIN SERPL ELPH-MCNC: 3.6 G/DL — SIGNIFICANT CHANGE UP (ref 3.3–5)
ALP SERPL-CCNC: 69 U/L — SIGNIFICANT CHANGE UP (ref 40–120)
ALT FLD-CCNC: 15 U/L — SIGNIFICANT CHANGE UP (ref 10–45)
ANION GAP SERPL CALC-SCNC: 14 MMOL/L — SIGNIFICANT CHANGE UP (ref 5–17)
AST SERPL-CCNC: 15 U/L — SIGNIFICANT CHANGE UP (ref 10–40)
BILIRUB SERPL-MCNC: 0.5 MG/DL — SIGNIFICANT CHANGE UP (ref 0.2–1.2)
BUN SERPL-MCNC: 21 MG/DL — SIGNIFICANT CHANGE UP (ref 7–23)
CALCIUM SERPL-MCNC: 9.4 MG/DL — SIGNIFICANT CHANGE UP (ref 8.4–10.5)
CHLORIDE SERPL-SCNC: 101 MMOL/L — SIGNIFICANT CHANGE UP (ref 96–108)
CO2 SERPL-SCNC: 21 MMOL/L — LOW (ref 22–31)
CREAT SERPL-MCNC: 1.11 MG/DL — SIGNIFICANT CHANGE UP (ref 0.5–1.3)
EGFR: 69 ML/MIN/1.73M2 — SIGNIFICANT CHANGE UP
EGFR: 69 ML/MIN/1.73M2 — SIGNIFICANT CHANGE UP
ESTIMATED AVERAGE GLUCOSE: 272 MG/DL — HIGH (ref 68–114)
GLUCOSE BLDC GLUCOMTR-MCNC: 131 MG/DL — HIGH (ref 70–99)
GLUCOSE BLDC GLUCOMTR-MCNC: 134 MG/DL — HIGH (ref 70–99)
GLUCOSE BLDC GLUCOMTR-MCNC: 145 MG/DL — HIGH (ref 70–99)
GLUCOSE BLDC GLUCOMTR-MCNC: 247 MG/DL — HIGH (ref 70–99)
GLUCOSE BLDC GLUCOMTR-MCNC: 317 MG/DL — HIGH (ref 70–99)
GLUCOSE BLDC GLUCOMTR-MCNC: 344 MG/DL — HIGH (ref 70–99)
GLUCOSE BLDC GLUCOMTR-MCNC: 66 MG/DL — LOW (ref 70–99)
GLUCOSE BLDC GLUCOMTR-MCNC: 79 MG/DL — SIGNIFICANT CHANGE UP (ref 70–99)
GLUCOSE SERPL-MCNC: 248 MG/DL — HIGH (ref 70–99)
HCT VFR BLD CALC: 37.8 % — LOW (ref 39–50)
HGB BLD-MCNC: 13 G/DL — SIGNIFICANT CHANGE UP (ref 13–17)
MAGNESIUM SERPL-MCNC: 2 MG/DL — SIGNIFICANT CHANGE UP (ref 1.6–2.6)
MCHC RBC-ENTMCNC: 29.9 PG — SIGNIFICANT CHANGE UP (ref 27–34)
MCHC RBC-ENTMCNC: 34.4 G/DL — SIGNIFICANT CHANGE UP (ref 32–36)
MCV RBC AUTO: 86.9 FL — SIGNIFICANT CHANGE UP (ref 80–100)
NRBC # BLD AUTO: 0 K/UL — SIGNIFICANT CHANGE UP (ref 0–0)
NRBC # FLD: 0 K/UL — SIGNIFICANT CHANGE UP (ref 0–0)
NRBC BLD AUTO-RTO: 0 /100 WBCS — SIGNIFICANT CHANGE UP (ref 0–0)
PHOSPHATE SERPL-MCNC: 3.6 MG/DL — SIGNIFICANT CHANGE UP (ref 2.5–4.5)
PLATELET # BLD AUTO: 190 K/UL — SIGNIFICANT CHANGE UP (ref 150–400)
PMV BLD: 10 FL — SIGNIFICANT CHANGE UP (ref 7–13)
POTASSIUM SERPL-MCNC: 4.4 MMOL/L — SIGNIFICANT CHANGE UP (ref 3.5–5.3)
POTASSIUM SERPL-SCNC: 4.4 MMOL/L — SIGNIFICANT CHANGE UP (ref 3.5–5.3)
PROT SERPL-MCNC: 6.7 G/DL — SIGNIFICANT CHANGE UP (ref 6–8.3)
RBC # BLD: 4.35 M/UL — SIGNIFICANT CHANGE UP (ref 4.2–5.8)
RBC # FLD: 12.7 % — SIGNIFICANT CHANGE UP (ref 10.3–14.5)
SODIUM SERPL-SCNC: 136 MMOL/L — SIGNIFICANT CHANGE UP (ref 135–145)
T3FREE SERPL-MCNC: 2.38 PG/ML — SIGNIFICANT CHANGE UP (ref 2–4.4)
T4 FREE SERPL-MCNC: 1.2 NG/DL — SIGNIFICANT CHANGE UP (ref 0.9–1.8)
TACROLIMUS SERPL-MCNC: 4.8 NG/ML — SIGNIFICANT CHANGE UP
TSH SERPL-MCNC: 1.63 UIU/ML — SIGNIFICANT CHANGE UP (ref 0.27–4.2)
VIT B12 SERPL-MCNC: 776 PG/ML — SIGNIFICANT CHANGE UP (ref 232–1245)
WBC # BLD: 8.33 K/UL — SIGNIFICANT CHANGE UP (ref 3.8–10.5)
WBC # FLD AUTO: 8.33 K/UL — SIGNIFICANT CHANGE UP (ref 3.8–10.5)

## 2025-07-07 PROCEDURE — 82330 ASSAY OF CALCIUM: CPT

## 2025-07-07 PROCEDURE — 81001 URINALYSIS AUTO W/SCOPE: CPT

## 2025-07-07 PROCEDURE — 99233 SBSQ HOSP IP/OBS HIGH 50: CPT

## 2025-07-07 PROCEDURE — 86780 TREPONEMA PALLIDUM: CPT

## 2025-07-07 PROCEDURE — A9585: CPT

## 2025-07-07 PROCEDURE — 85027 COMPLETE CBC AUTOMATED: CPT

## 2025-07-07 PROCEDURE — 70450 CT HEAD/BRAIN W/O DYE: CPT

## 2025-07-07 PROCEDURE — 84100 ASSAY OF PHOSPHORUS: CPT

## 2025-07-07 PROCEDURE — 83036 HEMOGLOBIN GLYCOSYLATED A1C: CPT

## 2025-07-07 PROCEDURE — 82607 VITAMIN B-12: CPT

## 2025-07-07 PROCEDURE — 99222 1ST HOSP IP/OBS MODERATE 55: CPT

## 2025-07-07 PROCEDURE — 36415 COLL VENOUS BLD VENIPUNCTURE: CPT

## 2025-07-07 PROCEDURE — 82010 KETONE BODYS QUAN: CPT

## 2025-07-07 PROCEDURE — 84443 ASSAY THYROID STIM HORMONE: CPT

## 2025-07-07 PROCEDURE — 80197 ASSAY OF TACROLIMUS: CPT

## 2025-07-07 PROCEDURE — 83605 ASSAY OF LACTIC ACID: CPT

## 2025-07-07 PROCEDURE — 97162 PT EVAL MOD COMPLEX 30 MIN: CPT

## 2025-07-07 PROCEDURE — 84132 ASSAY OF SERUM POTASSIUM: CPT

## 2025-07-07 PROCEDURE — 71260 CT THORAX DX C+: CPT

## 2025-07-07 PROCEDURE — 80053 COMPREHEN METABOLIC PANEL: CPT

## 2025-07-07 PROCEDURE — 99222 1ST HOSP IP/OBS MODERATE 55: CPT | Mod: GC

## 2025-07-07 PROCEDURE — 85018 HEMOGLOBIN: CPT

## 2025-07-07 PROCEDURE — 70553 MRI BRAIN STEM W/O & W/DYE: CPT | Mod: 26

## 2025-07-07 PROCEDURE — 71045 X-RAY EXAM CHEST 1 VIEW: CPT

## 2025-07-07 PROCEDURE — 70553 MRI BRAIN STEM W/O & W/DYE: CPT

## 2025-07-07 PROCEDURE — 84295 ASSAY OF SERUM SODIUM: CPT

## 2025-07-07 PROCEDURE — 80180 DRUG SCRN QUAN MYCOPHENOLATE: CPT

## 2025-07-07 PROCEDURE — 82947 ASSAY GLUCOSE BLOOD QUANT: CPT

## 2025-07-07 PROCEDURE — 82962 GLUCOSE BLOOD TEST: CPT

## 2025-07-07 PROCEDURE — 93010 ELECTROCARDIOGRAM REPORT: CPT

## 2025-07-07 PROCEDURE — 85730 THROMBOPLASTIN TIME PARTIAL: CPT

## 2025-07-07 PROCEDURE — 83735 ASSAY OF MAGNESIUM: CPT

## 2025-07-07 PROCEDURE — 82435 ASSAY OF BLOOD CHLORIDE: CPT

## 2025-07-07 PROCEDURE — 85610 PROTHROMBIN TIME: CPT

## 2025-07-07 PROCEDURE — 85025 COMPLETE CBC W/AUTO DIFF WBC: CPT

## 2025-07-07 PROCEDURE — 93005 ELECTROCARDIOGRAM TRACING: CPT

## 2025-07-07 PROCEDURE — 84439 ASSAY OF FREE THYROXINE: CPT

## 2025-07-07 PROCEDURE — 0241U: CPT

## 2025-07-07 PROCEDURE — 84481 FREE ASSAY (FT-3): CPT

## 2025-07-07 PROCEDURE — 82803 BLOOD GASES ANY COMBINATION: CPT

## 2025-07-07 PROCEDURE — 85014 HEMATOCRIT: CPT

## 2025-07-07 RX ORDER — HALOPERIDOL 10 MG/1
0.5 TABLET ORAL ONCE
Refills: 0 | Status: COMPLETED | OUTPATIENT
Start: 2025-07-07 | End: 2025-07-07

## 2025-07-07 RX ORDER — HALOPERIDOL 10 MG/1
0.5 TABLET ORAL EVERY 6 HOURS
Refills: 0 | Status: DISCONTINUED | OUTPATIENT
Start: 2025-07-07 | End: 2025-07-07

## 2025-07-07 RX ORDER — INSULIN GLARGINE-YFGN 100 [IU]/ML
14 INJECTION, SOLUTION SUBCUTANEOUS ONCE
Refills: 0 | Status: COMPLETED | OUTPATIENT
Start: 2025-07-07 | End: 2025-07-07

## 2025-07-07 RX ORDER — DEXTROSE 50 % IN WATER 50 %
12.5 SYRINGE (ML) INTRAVENOUS ONCE
Refills: 0 | Status: COMPLETED | OUTPATIENT
Start: 2025-07-07 | End: 2025-07-07

## 2025-07-07 RX ORDER — MELATONIN 5 MG
3 TABLET ORAL AT BEDTIME
Refills: 0 | Status: DISCONTINUED | OUTPATIENT
Start: 2025-07-07 | End: 2025-07-11

## 2025-07-07 RX ORDER — HALOPERIDOL 10 MG/1
0.5 TABLET ORAL EVERY 6 HOURS
Refills: 0 | Status: DISCONTINUED | OUTPATIENT
Start: 2025-07-07 | End: 2025-07-08

## 2025-07-07 RX ORDER — QUETIAPINE FUMARATE 25 MG/1
25 TABLET ORAL
Refills: 0 | Status: DISCONTINUED | OUTPATIENT
Start: 2025-07-07 | End: 2025-07-08

## 2025-07-07 RX ADMIN — INSULIN LISPRO 14 UNIT(S): 100 INJECTION, SOLUTION INTRAVENOUS; SUBCUTANEOUS at 12:18

## 2025-07-07 RX ADMIN — CARVEDILOL 25 MILLIGRAM(S): 3.12 TABLET, FILM COATED ORAL at 17:08

## 2025-07-07 RX ADMIN — CALCITRIOL 0.25 MICROGRAM(S): 0.5 CAPSULE, GELATIN COATED ORAL at 12:23

## 2025-07-07 RX ADMIN — Medication 60 MILLIGRAM(S): at 17:07

## 2025-07-07 RX ADMIN — TAMSULOSIN HYDROCHLORIDE 0.4 MILLIGRAM(S): 0.4 CAPSULE ORAL at 21:27

## 2025-07-07 RX ADMIN — HALOPERIDOL 0.5 MILLIGRAM(S): 10 TABLET ORAL at 18:39

## 2025-07-07 RX ADMIN — ENOXAPARIN SODIUM 40 MILLIGRAM(S): 100 INJECTION SUBCUTANEOUS at 12:18

## 2025-07-07 RX ADMIN — INSULIN GLARGINE-YFGN 14 UNIT(S): 100 INJECTION, SOLUTION SUBCUTANEOUS at 22:35

## 2025-07-07 RX ADMIN — QUETIAPINE FUMARATE 25 MILLIGRAM(S): 25 TABLET ORAL at 18:42

## 2025-07-07 RX ADMIN — Medication 3 MILLIGRAM(S): at 21:28

## 2025-07-07 RX ADMIN — INSULIN LISPRO 14 UNIT(S): 100 INJECTION, SOLUTION INTRAVENOUS; SUBCUTANEOUS at 17:07

## 2025-07-07 RX ADMIN — INSULIN LISPRO 4: 100 INJECTION, SOLUTION INTRAVENOUS; SUBCUTANEOUS at 08:15

## 2025-07-07 RX ADMIN — FUROSEMIDE 40 MILLIGRAM(S): 10 INJECTION INTRAMUSCULAR; INTRAVENOUS at 06:01

## 2025-07-07 RX ADMIN — Medication 60 MILLIGRAM(S): at 06:02

## 2025-07-07 RX ADMIN — DULOXETINE 60 MILLIGRAM(S): 20 CAPSULE, DELAYED RELEASE ORAL at 12:21

## 2025-07-07 RX ADMIN — FUROSEMIDE 40 MILLIGRAM(S): 10 INJECTION INTRAMUSCULAR; INTRAVENOUS at 15:01

## 2025-07-07 RX ADMIN — INSULIN LISPRO 14 UNIT(S): 100 INJECTION, SOLUTION INTRAVENOUS; SUBCUTANEOUS at 08:14

## 2025-07-07 RX ADMIN — TACROLIMUS 1.5 MILLIGRAM(S): 0.5 CAPSULE ORAL at 08:13

## 2025-07-07 RX ADMIN — BUPROPION HYDROBROMIDE 300 MILLIGRAM(S): 522 TABLET, EXTENDED RELEASE ORAL at 12:21

## 2025-07-07 RX ADMIN — Medication 81 MILLIGRAM(S): at 06:01

## 2025-07-07 RX ADMIN — CARVEDILOL 25 MILLIGRAM(S): 3.12 TABLET, FILM COATED ORAL at 06:01

## 2025-07-07 RX ADMIN — MYCOPHENOLATE MOFETIL 500 MILLIGRAM(S): 500 TABLET, FILM COATED ORAL at 17:07

## 2025-07-07 RX ADMIN — MYCOPHENOLATE MOFETIL 500 MILLIGRAM(S): 500 TABLET, FILM COATED ORAL at 06:02

## 2025-07-07 NOTE — BH CONSULTATION LIAISON ASSESSMENT NOTE - OTHER PAST PSYCHIATRIC HISTORY (INCLUDE DETAILS REGARDING ONSET, COURSE OF ILLNESS, INPATIENT/OUTPATIENT TREATMENT)
Anxiety prescribed Seroquel for h/o mood d/o and OCD per chart, recent inpt psych admission 2 weeks ago for AMS

## 2025-07-07 NOTE — BH CONSULTATION LIAISON ASSESSMENT NOTE - RISK ASSESSMENT
Patient denies suicidal ideation or intent. Collateral information confirms no current suicidal threat. Suicide risk is assessed as low at this time. Risk factors: h/o inpt psych admission, h/o mood disorder, acute/chronic cognitive impairments     Protective factors: no current SIIP/HIIP, no h/o SA/SIB, no active substance abuse, domiciled, intact marriage, social supports, positive therapeutic relationship, engaged in treatment, compliant with treatment, help-seeking behaviors, future-orientation    Overall, pt is at chronically elevated risk of harm mitigated by multiple protective factors and does not meet criteria for psychiatric admission at this time.

## 2025-07-07 NOTE — PHYSICAL THERAPY INITIAL EVALUATION ADULT - PERTINENT HX OF CURRENT PROBLEM, REHAB EVAL
75M with past medical history of kidney transplant, hypertension, lipidemia, diabetes presenting to the emergency due to worsening mental status per family.  Per the family he has been having increasing confusion, difficulty hearing, and change in behavior over the past year which acutely worsened over the past month.  Patient's family states they do not feel they are able to care for the patient at home.  Patient states he has been feeling more dizzy than normal this past month, and states he may have some dysuria and shortness of breath.    CHEST XRAY 7/6: Limited exam due to patient rotation. No focal consolidation. HEAD CT 7/6: Motion degraded exam. No acute hemorrhage or midline shift. Opacified left maxillary sinus. CT CHEST 7/6: No mediastinal mass or lymphadenopathy.

## 2025-07-07 NOTE — PROGRESS NOTE ADULT - SUBJECTIVE AND OBJECTIVE BOX
[facility] Division of Hospital Medicine  Bernadette Gardner DO  MS Teams PREFERRED      SUBJECTIVE / OVERNIGHT EVENTS: No acute events overnight. Patient calm, cooperative -tangential thinking. He reported chest pain this morning - EKG without acute ST changes. At present, patient comfortable - reports feeling stressed.  ADDITIONAL REVIEW OF SYSTEMS:    MEDICATIONS  (STANDING):  aspirin enteric coated 81 milliGRAM(s) Oral <User Schedule>  buPROPion XL (24-Hour) . 300 milliGRAM(s) Oral daily  calcitriol   Capsule 0.25 MICROGram(s) Oral daily  carvedilol 25 milliGRAM(s) Oral every 12 hours  dextrose 5%. 1000 milliLiter(s) (50 mL/Hr) IV Continuous <Continuous>  dextrose 5%. 1000 milliLiter(s) (100 mL/Hr) IV Continuous <Continuous>  dextrose 50% Injectable 25 Gram(s) IV Push once  dextrose 50% Injectable 12.5 Gram(s) IV Push once  dextrose 50% Injectable 25 Gram(s) IV Push once  DULoxetine 60 milliGRAM(s) Oral daily  enoxaparin Injectable 40 milliGRAM(s) SubCutaneous every 24 hours  furosemide    Tablet 40 milliGRAM(s) Oral two times a day  glucagon  Injectable 1 milliGRAM(s) IntraMuscular once  insulin glargine Injectable (LANTUS) 28 Unit(s) SubCutaneous at bedtime  insulin lispro (ADMELOG) corrective regimen sliding scale   SubCutaneous three times a day before meals  insulin lispro (ADMELOG) corrective regimen sliding scale   SubCutaneous at bedtime  insulin lispro Injectable (ADMELOG) 14 Unit(s) SubCutaneous three times a day before meals  mycophenolate mofetil 500 milliGRAM(s) Oral two times a day  NIFEdipine XL 60 milliGRAM(s) Oral two times a day  QUEtiapine 25 milliGRAM(s) Oral two times a day  tacrolimus ER Tablet (ENVARSUS XR) 1.5 milliGRAM(s) Oral daily  tamsulosin 0.4 milliGRAM(s) Oral at bedtime    MEDICATIONS  (PRN):  dextrose Oral Gel 15 Gram(s) Oral once PRN Blood Glucose LESS THAN 70 milliGRAM(s)/deciliter      I&O's Summary      PHYSICAL EXAM:  Vital Signs Last 24 Hrs  T(C): 36.5 (07 Jul 2025 11:30), Max: 36.5 (06 Jul 2025 19:43)  T(F): 97.7 (07 Jul 2025 11:30), Max: 97.7 (06 Jul 2025 19:43)  HR: 57 (07 Jul 2025 11:30) (57 - 66)  BP: 121/65 (07 Jul 2025 11:30) (116/68 - 158/86)  BP(mean): --  RR: 18 (07 Jul 2025 11:30) (18 - 18)  SpO2: 93% (07 Jul 2025 11:30) (92% - 94%)    Parameters below as of 07 Jul 2025 11:30  Patient On (Oxygen Delivery Method): room air    CONSTITUTIONAL: NAD   EYES: Conjunctiva and sclera clear  ENMT: Moist oral mucosa, no pharyngeal injection or exudates   NECK: Supple, midline  RESPIRATORY: Normal respiratory effort; lungs are clear to auscultation bilaterally  CARDIOVASCULAR: Regular rate and rhythm, normal S1 and S2.   ABDOMEN: Nontender to palpation, no rebound/guarding  PSYCH: A+O to person, place, and time. Tangential thinking        LABS:                        13.0   8.33  )-----------( 190      ( 07 Jul 2025 05:59 )             37.8     07-07    136  |  101  |  21  ----------------------------<  248[H]  4.4   |  21[L]  |  1.11    Ca    9.4      07 Jul 2025 06:00  Phos  3.6     07-07  Mg     2.0     07-07    TPro  6.7  /  Alb  3.6  /  TBili  0.5  /  DBili  x   /  AST  15  /  ALT  15  /  AlkPhos  69  07-07    PT/INR - ( 05 Jul 2025 22:55 )   PT: 11.8 sec;   INR: 1.03 ratio         PTT - ( 05 Jul 2025 22:55 )  PTT:28.9 sec      Urinalysis Basic - ( 07 Jul 2025 06:00 )    Color: x / Appearance: x / SG: x / pH: x  Gluc: 248 mg/dL / Ketone: x  / Bili: x / Urobili: x   Blood: x / Protein: x / Nitrite: x   Leuk Esterase: x / RBC: x / WBC x   Sq Epi: x / Non Sq Epi: x / Bacteria: x        Urinalysis with Rflx Culture (collected 06 Jul 2025 00:31)        RADIOLOGY & ADDITIONAL TESTS:  Results Reviewed:   Imaging Personally Reviewed:  Electrocardiogram Personally Reviewed:    COORDINATION OF CARE:  Care Discussed with Consultants/Other Providers [Y/N]: Y  Prior or Outpatient Records Reviewed [Y/N]: Y

## 2025-07-07 NOTE — BH CONSULTATION LIAISON ASSESSMENT NOTE - NSBHATTESTCOMMENTATTENDFT_PSY_A_CORE
75M domiciled with wife, retired, with PPHx mood d/o and OCD, recent psychiatric admission at [facility] for AMS, denies substance abuse, with PMH significant for kidney transplant in 2021, DM, BPH, HTN, hyperparathyroidism who presents due to worsening confusion, psychiatry consulted for management of agitation.  Pt irritable and restless on interview, trying to get OOB.  Oriented to self and being in a hospital, but thought this was a psychiatric hospital and could not provide date or context for admission.  States he is "tired of everyone asking me the same questions like I'm stupid!" and denies SI/HI, AVH, paranoia, depression or anxiety.  STates he has OCD around orderliness, but it has been well-controlled on his home regimen.  Inattention prominent.  Denies substance abuse.  States he lives with his wife.  Nursing staff notes pt has had fluctuating MS, sometimes pleasant and other times irritable and more confused, struggling to answer questions.  Presentation c/w Delirium 2/2 GMC, r/o dementia, mood d/o and OCD by hx.  Recs: Would increase Seroquel, c/w home psych regimen, SW for safe d/c planning.  PRN Haldol/Seroquel as above.  Agree with trainee's assessment and plan as above.

## 2025-07-07 NOTE — BH CONSULTATION LIAISON ASSESSMENT NOTE - SUMMARY
75 year old male consulted by psych for worsening AMS and agitation over the past 2 weeks. He reports a past psychiatric history of anxiety and depression which he says is controlled by medication. Upon psychiatric assessment, he is A&Ox4 although he states feeling confused and appears uncertain about the reason for his admission. Collateral information from his wife reveals changes in his behavior for the past 2 years in which he has been very forgetful, confused, and agitated.  75 year old male consulted by psych for worsening AMS and agitation over the past 2 weeks. He reports a past psychiatric history of anxiety and depression which he says is controlled by medication. Upon psychiatric assessment, he is A&Ox3 although he states feeling confused and appears uncertain about the reason for his admission. Collateral information from his wife reveals changes in his behavior for the past 2 years in which he has been very forgetful, confused, and agitated. DDX includes delirium vs. dementia, mood d/o and OCD by hx

## 2025-07-07 NOTE — PHYSICAL THERAPY INITIAL EVALUATION ADULT - NSPTDISCHREC_GEN_A_CORE
Sub-acute Rehab If pt goes home, will need home PT and assist in all aspects of mobility & function. Pt owns straight cane, RW, shower chair/Sub-acute Rehab

## 2025-07-07 NOTE — PHYSICAL THERAPY INITIAL EVALUATION ADULT - ADDITIONAL COMMENTS
Per phone conversation with pt's spouse (Rossy [phone]), pt lives with her in a private house, no stairs to enter however 1 flight of stairs to the bedroom and bathroom. Pt usually indep in household ambulation however because of worsening confusion in the past days, has been needed supervision from spouse. Pt requires assist for ADLs. Own RW and shower chair

## 2025-07-07 NOTE — BH CONSULTATION LIAISON ASSESSMENT NOTE - VIOLENCE PROTECTIVE FACTORS:
None Known Residential stability/Relationship stability/Sobriety/Engagement in treatment/Good treatment response/compliance

## 2025-07-07 NOTE — PROVIDER CONTACT NOTE (OTHER) - ASSESSMENT
axo3, patient is saying he has chest pain and sob and not feeling well. He states its because of his wife and for his chest pain to stop his wife has to be okay with his 20 year old girlfriend. He states the doctor has to call her to make her stop. When asked again if he has chest pain and SOB, he states he has it. BP is 104/60, HR 64, temp 97.4F, o2sat 95%.

## 2025-07-07 NOTE — BH CONSULTATION LIAISON ASSESSMENT NOTE - NSBHCHARTREVIEWVS_PSY_A_CORE FT
Vital Signs Last 24 Hrs  T(C): 36.5 (07 Jul 2025 11:30), Max: 36.5 (06 Jul 2025 19:43)  T(F): 97.7 (07 Jul 2025 11:30), Max: 97.7 (06 Jul 2025 19:43)  HR: 57 (07 Jul 2025 11:30) (57 - 66)  BP: 121/65 (07 Jul 2025 11:30) (116/68 - 158/86)  BP(mean): --  RR: 18 (07 Jul 2025 11:30) (18 - 18)  SpO2: 93% (07 Jul 2025 11:30) (92% - 94%)    Parameters below as of 07 Jul 2025 11:30  Patient On (Oxygen Delivery Method): room air

## 2025-07-07 NOTE — BH CONSULTATION LIAISON ASSESSMENT NOTE - NSBHCHARTREVIEWINVESTIGATE_PSY_A_CORE FT
< from: 12 Lead ECG (07.05.25 @ 22:09) >      Ventricular Rate 62 BPM    Atrial Rate 62 BPM    P-R Interval 170 ms    QRS Duration 88 ms    Q-T Interval 440 ms    QTC Calculation(Bazett) 446 ms    P Axis 44 degrees    R Axis -13 degrees    T Axis 48 degrees    Diagnosis Line NORMAL SINUS RHYTHM  NORMAL ECG  WHEN COMPARED WITH ECG OF 15-NOV-2022 00:59,  NO SIGNIFICANT CHANGE WAS FOUND    < end of copied text >     < from: 12 Lead ECG (07.05.25 @ 22:09) >      Ventricular Rate 62 BPM    Atrial Rate 62 BPM    P-R Interval 170 ms    QRS Duration 88 ms    Q-T Interval 440 ms    QTC Calculation(Bazett) 446 ms    P Axis 44 degrees    R Axis -13 degrees    T Axis 48 degrees    Diagnosis Line NORMAL SINUS RHYTHM  NORMAL ECG  WHEN COMPARED WITH ECG OF 15-NOV-2022 00:59,  NO SIGNIFICANT CHANGE WAS FOUND    < end of copied text >    All components on Neuro screening negative, unless marked below:  Tone: cogwheel rigidity [ ], hypertonus [ ]  Movement: tremor - static [ ] intentional [ ], dysmetria [ ]  Reflexes: hyperreflexia [ ], myoclonus [ ], hyporeflexia [ ], primitive reflexes [ ]  Speech: aphasia (any subtype) [ ]    DB - 2:  Day of the Week: Correct [ ] Incorrect [ x]  Months of the Year Backwards: Correct [ ] Incorrect [ x]
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
## 2025-07-07 NOTE — BH CONSULTATION LIAISON ASSESSMENT NOTE - CURRENT MEDICATION
MEDICATIONS  (STANDING):  aspirin enteric coated 81 milliGRAM(s) Oral <User Schedule>  buPROPion XL (24-Hour) . 300 milliGRAM(s) Oral daily  calcitriol   Capsule 0.25 MICROGram(s) Oral daily  carvedilol 25 milliGRAM(s) Oral every 12 hours  dextrose 5%. 1000 milliLiter(s) (50 mL/Hr) IV Continuous <Continuous>  dextrose 5%. 1000 milliLiter(s) (100 mL/Hr) IV Continuous <Continuous>  dextrose 50% Injectable 25 Gram(s) IV Push once  dextrose 50% Injectable 12.5 Gram(s) IV Push once  dextrose 50% Injectable 25 Gram(s) IV Push once  DULoxetine 60 milliGRAM(s) Oral daily  enoxaparin Injectable 40 milliGRAM(s) SubCutaneous every 24 hours  furosemide    Tablet 40 milliGRAM(s) Oral two times a day  glucagon  Injectable 1 milliGRAM(s) IntraMuscular once  insulin glargine Injectable (LANTUS) 28 Unit(s) SubCutaneous at bedtime  insulin lispro (ADMELOG) corrective regimen sliding scale   SubCutaneous three times a day before meals  insulin lispro (ADMELOG) corrective regimen sliding scale   SubCutaneous at bedtime  insulin lispro Injectable (ADMELOG) 14 Unit(s) SubCutaneous three times a day before meals  mycophenolate mofetil 500 milliGRAM(s) Oral two times a day  NIFEdipine XL 60 milliGRAM(s) Oral two times a day  QUEtiapine 25 milliGRAM(s) Oral at bedtime  tacrolimus ER Tablet (ENVARSUS XR) 1.5 milliGRAM(s) Oral daily  tamsulosin 0.4 milliGRAM(s) Oral at bedtime    MEDICATIONS  (PRN):  dextrose Oral Gel 15 Gram(s) Oral once PRN Blood Glucose LESS THAN 70 milliGRAM(s)/deciliter

## 2025-07-07 NOTE — PROGRESS NOTE ADULT - PROBLEM SELECTOR PLAN 1
-Patient presenting with worsening confusion, AAOx3 on exam today but unable to tell me the circumstance of his hospitalization and with tangential thinking.   -Per wife, recently hospitalized at [facility] (psychiatry facility). Ongoing fluctuation in mentation.   -RPR negative, TSH and B12 wnl.   -Head CT negative for acute pathology  -Psych recs appreciated- delirium vs. dementia, mood d/o and OCD by hx. Continue home medications for now (Wellbutrin, duloxetine); increased Seroquel to 25mg BID  - Obtain MRI with and without contrast to rule out mass/bleed/CVA/chronic ischemic changes

## 2025-07-07 NOTE — PROGRESS NOTE ADULT - NSPROGADDITIONALINFOA_GEN_ALL_CORE
Time-based billing (NON-critical care).     52 minutes spent on total encounter. The necessity of the time spent during the encounter on this date of service was due to:     - Reviewing, and interpreting labs, testing, and imaging.  - Independently obtaining a review of systems and performing a physical exam  - Reviewing prior records and where necessary, outpatient records.  - Counselling and educating regarding interpretation of aforementioned items and plan of care to patient and/or family.      d/w ACP

## 2025-07-07 NOTE — BH CONSULTATION LIAISON ASSESSMENT NOTE - SUICIDALITY
Additional Notes: Patient consent was obtained to proceed with the visit and recommended plan of care after discussion of all risks and benefits, including the risks of COVID-19 exposure.\\n
Detail Level: Simple
Render Risk Assessment In Note?: no
No

## 2025-07-07 NOTE — BH CONSULTATION LIAISON ASSESSMENT NOTE - DETAILS
Admitted to [facility] 2 weeks ago for wandering and agitation  Admitted to [facility] 2 weeks ago for ?wandering and agitation

## 2025-07-07 NOTE — BH CONSULTATION LIAISON ASSESSMENT NOTE - NSBHCHARTREVIEWLAB_PSY_A_CORE FT
13.0   8.33  )-----------( 190      ( 07 Jul 2025 05:59 )             37.8     07-07    136  |  101  |  21  ----------------------------<  248[H]  4.4   |  21[L]  |  1.11    Ca    9.4      07 Jul 2025 06:00  Phos  3.6     07-07  Mg     2.0     07-07    TPro  6.7  /  Alb  3.6  /  TBili  0.5  /  DBili  x   /  AST  15  /  ALT  15  /  AlkPhos  69  07-07    Urinalysis Basic - ( 07 Jul 2025 06:00 )    Color: x / Appearance: x / SG: x / pH: x  Gluc: 248 mg/dL / Ketone: x  / Bili: x / Urobili: x   Blood: x / Protein: x / Nitrite: x   Leuk Esterase: x / RBC: x / WBC x   Sq Epi: x / Non Sq Epi: x / Bacteria: x

## 2025-07-07 NOTE — BH CONSULTATION LIAISON ASSESSMENT NOTE - NSBHCONSULTRECOMMENDOTHER_PSY_A_CORE FT
1. C/w home psych regimen.  Would increase Seroquel to 25mg PO BID.    2. PRN: Seroquel 25mg PO q6h PRN agitation; Haldol 0.5mg-2mg IV q6h PRN severe agitation.  Monitor for QTc<500.  Melatonin 3mg PO qHS PRN insomnia.    3. Minimize use of benzos, opioids, anticholinergics, or other deliriogenic agents when possible.  Maintain sleep wake cycle.  Provide frequent reorientation and redirection.  Family member at bedside if possible. Assess for need for glasses and hearing aid (if applicable).    4. C-L Psych will follow.

## 2025-07-07 NOTE — PROVIDER CONTACT NOTE (HYPOGLYCEMIA EVENT) - NS PROVIDER CONTACT BACKGROUND-HYPO
Age: 75y    Gender: Male    POCT Blood Glucose:  145 mg/dL (07-07-25 @ 22:02)  79 mg/dL (07-07-25 @ 21:25)  66 mg/dL (07-07-25 @ 21:23)  131 mg/dL (07-07-25 @ 16:52)  134 mg/dL (07-07-25 @ 12:04)  247 mg/dL (07-07-25 @ 08:03)      eMAR:  insulin glargine Injectable (LANTUS)   14 Unit(s) SubCutaneous (07-07-25 @ 22:35)    insulin lispro (ADMELOG) corrective regimen sliding scale   4 Unit(s) SubCutaneous (07-07-25 @ 08:15)    insulin lispro Injectable (ADMELOG)   14 Unit(s) SubCutaneous (07-07-25 @ 17:07)   14 Unit(s) SubCutaneous (07-07-25 @ 12:18)   14 Unit(s) SubCutaneous (07-07-25 @ 08:14)

## 2025-07-07 NOTE — BH CONSULTATION LIAISON ASSESSMENT NOTE - NSBHATTESTBILLING_PSY_A_CORE
[facility]    [address] [place] [id number]    Phone:  [phone]       Thank You for choosing us for your health care visit. We are glad to serve you and happy to provide you with this summary of your visit. Please help us to ensure we have accurate records. If you find anything that needs to be changed, please let our staff know as soon as possible.          Your Demographic Information     Patient Name Sex Latoya Ashley Female 1983       Ethnic Group Patient Race    Not of  or  Origin White      Your Visit Details     Date & Time Provider Department    2017 12:30 PM Jerica Piña MD [facility]      Your Upcoming Appointment*(Max 10)       3:20 PM CDT   Obstetric Check with Jerica Piña MD   [facility] ([facility])    [address] [place] [address]   [phone]            Thursday Esther 15, 2017 11:50 AM CDT   Obstetric Check with Jerica Piña MD   [facility] ([facility])    [address] [place] [address]   [phone]              9:50 AM CDT   Obstetric Check with Jerica Piña MD   [facility] ([facility])    [address] [place] [address]   [phone]              Conditions Discussed Today or Order-Related Diagnoses        Comments    Antepartum anemia in second trimester           Your Vitals Were     BP Height Weight LMP BMI Smoking Status    120/58 5' 2\" (1.575 m) 143 lb (64.9 kg) 2016 (Exact Date) 26.16 kg/m2 Never Smoker      Medications Prescribed or Re-Ordered Today     None      Your Current Medications Are        Disp Refills Start End    Polysaccharide Iron Complex  (IRON UP) 15 MG/0.5ML Liquid   2017     Si mg daily    Class: Historical Med    Prenatal MV-Min-Fe Fum-FA-DHA (PRENATAL 1 PO)        Sig - Route: Take by mouth daily. - Oral    Class: Historical Med      Allergies     No Known Allergies      Immunizations History as of 2017     Name Date    INFLUENZA QUADRIVALENT 2015    Influenza 10/3/2016    Tdap 2017, 2014 12:35 PM      Problem List as of 2017     Antepartum anemia in second trimester            Patient Instructions     None       Billing in another system

## 2025-07-08 LAB
ANION GAP SERPL CALC-SCNC: 14 MMOL/L — SIGNIFICANT CHANGE UP (ref 5–17)
BUN SERPL-MCNC: 24 MG/DL — HIGH (ref 7–23)
CALCIUM SERPL-MCNC: 9.4 MG/DL — SIGNIFICANT CHANGE UP (ref 8.4–10.5)
CHLORIDE SERPL-SCNC: 100 MMOL/L — SIGNIFICANT CHANGE UP (ref 96–108)
CO2 SERPL-SCNC: 23 MMOL/L — SIGNIFICANT CHANGE UP (ref 22–31)
CREAT ?TM UR-MCNC: 137 MG/DL — SIGNIFICANT CHANGE UP
CREAT SERPL-MCNC: 1.3 MG/DL — SIGNIFICANT CHANGE UP (ref 0.5–1.3)
EGFR: 57 ML/MIN/1.73M2 — LOW
EGFR: 57 ML/MIN/1.73M2 — LOW
GLUCOSE BLDC GLUCOMTR-MCNC: 112 MG/DL — HIGH (ref 70–99)
GLUCOSE BLDC GLUCOMTR-MCNC: 140 MG/DL — HIGH (ref 70–99)
GLUCOSE BLDC GLUCOMTR-MCNC: 200 MG/DL — HIGH (ref 70–99)
GLUCOSE BLDC GLUCOMTR-MCNC: 257 MG/DL — HIGH (ref 70–99)
GLUCOSE BLDC GLUCOMTR-MCNC: 90 MG/DL — SIGNIFICANT CHANGE UP (ref 70–99)
GLUCOSE SERPL-MCNC: 235 MG/DL — HIGH (ref 70–99)
HCT VFR BLD CALC: 38.4 % — LOW (ref 39–50)
HGB BLD-MCNC: 12.8 G/DL — LOW (ref 13–17)
MCHC RBC-ENTMCNC: 29.1 PG — SIGNIFICANT CHANGE UP (ref 27–34)
MCHC RBC-ENTMCNC: 33.3 G/DL — SIGNIFICANT CHANGE UP (ref 32–36)
MCV RBC AUTO: 87.3 FL — SIGNIFICANT CHANGE UP (ref 80–100)
NRBC # BLD AUTO: 0 K/UL — SIGNIFICANT CHANGE UP (ref 0–0)
NRBC # FLD: 0 K/UL — SIGNIFICANT CHANGE UP (ref 0–0)
NRBC BLD AUTO-RTO: 0 /100 WBCS — SIGNIFICANT CHANGE UP (ref 0–0)
PLATELET # BLD AUTO: 194 K/UL — SIGNIFICANT CHANGE UP (ref 150–400)
PMV BLD: 9.9 FL — SIGNIFICANT CHANGE UP (ref 7–13)
POTASSIUM SERPL-MCNC: 4.3 MMOL/L — SIGNIFICANT CHANGE UP (ref 3.5–5.3)
POTASSIUM SERPL-SCNC: 4.3 MMOL/L — SIGNIFICANT CHANGE UP (ref 3.5–5.3)
PROT ?TM UR-MCNC: 85 MG/DL — HIGH (ref 0–12)
PROT/CREAT UR-RTO: 0.6 RATIO — HIGH (ref 0–0.2)
RBC # BLD: 4.4 M/UL — SIGNIFICANT CHANGE UP (ref 4.2–5.8)
RBC # FLD: 13.2 % — SIGNIFICANT CHANGE UP (ref 10.3–14.5)
SODIUM SERPL-SCNC: 137 MMOL/L — SIGNIFICANT CHANGE UP (ref 135–145)
T PALLIDUM AB TITR SER: NEGATIVE — SIGNIFICANT CHANGE UP
WBC # BLD: 7.14 K/UL — SIGNIFICANT CHANGE UP (ref 3.8–10.5)
WBC # FLD AUTO: 7.14 K/UL — SIGNIFICANT CHANGE UP (ref 3.8–10.5)

## 2025-07-08 PROCEDURE — 99233 SBSQ HOSP IP/OBS HIGH 50: CPT

## 2025-07-08 RX ORDER — POLYETHYLENE GLYCOL 3350 17 G/17G
17 POWDER, FOR SOLUTION ORAL DAILY
Refills: 0 | Status: DISCONTINUED | OUTPATIENT
Start: 2025-07-08 | End: 2025-07-11

## 2025-07-08 RX ORDER — QUETIAPINE FUMARATE 25 MG/1
25 TABLET ORAL
Refills: 0 | Status: DISCONTINUED | OUTPATIENT
Start: 2025-07-08 | End: 2025-07-11

## 2025-07-08 RX ORDER — SENNA 187 MG
2 TABLET ORAL AT BEDTIME
Refills: 0 | Status: DISCONTINUED | OUTPATIENT
Start: 2025-07-08 | End: 2025-07-11

## 2025-07-08 RX ORDER — QUETIAPINE FUMARATE 25 MG/1
50 TABLET ORAL
Refills: 0 | Status: DISCONTINUED | OUTPATIENT
Start: 2025-07-08 | End: 2025-07-09

## 2025-07-08 RX ADMIN — MYCOPHENOLATE MOFETIL 500 MILLIGRAM(S): 500 TABLET, FILM COATED ORAL at 06:21

## 2025-07-08 RX ADMIN — MYCOPHENOLATE MOFETIL 500 MILLIGRAM(S): 500 TABLET, FILM COATED ORAL at 17:00

## 2025-07-08 RX ADMIN — CARVEDILOL 25 MILLIGRAM(S): 3.12 TABLET, FILM COATED ORAL at 17:00

## 2025-07-08 RX ADMIN — INSULIN LISPRO 14 UNIT(S): 100 INJECTION, SOLUTION INTRAVENOUS; SUBCUTANEOUS at 12:15

## 2025-07-08 RX ADMIN — BUPROPION HYDROBROMIDE 300 MILLIGRAM(S): 522 TABLET, EXTENDED RELEASE ORAL at 12:19

## 2025-07-08 RX ADMIN — INSULIN LISPRO 2: 100 INJECTION, SOLUTION INTRAVENOUS; SUBCUTANEOUS at 12:16

## 2025-07-08 RX ADMIN — QUETIAPINE FUMARATE 25 MILLIGRAM(S): 25 TABLET ORAL at 12:18

## 2025-07-08 RX ADMIN — Medication 60 MILLIGRAM(S): at 06:20

## 2025-07-08 RX ADMIN — QUETIAPINE FUMARATE 25 MILLIGRAM(S): 25 TABLET ORAL at 06:20

## 2025-07-08 RX ADMIN — TAMSULOSIN HYDROCHLORIDE 0.4 MILLIGRAM(S): 0.4 CAPSULE ORAL at 21:14

## 2025-07-08 RX ADMIN — Medication 3 MILLIGRAM(S): at 21:15

## 2025-07-08 RX ADMIN — CARVEDILOL 25 MILLIGRAM(S): 3.12 TABLET, FILM COATED ORAL at 08:45

## 2025-07-08 RX ADMIN — Medication 2 TABLET(S): at 21:15

## 2025-07-08 RX ADMIN — QUETIAPINE FUMARATE 50 MILLIGRAM(S): 25 TABLET ORAL at 17:00

## 2025-07-08 RX ADMIN — FUROSEMIDE 40 MILLIGRAM(S): 10 INJECTION INTRAMUSCULAR; INTRAVENOUS at 12:20

## 2025-07-08 RX ADMIN — INSULIN LISPRO 6: 100 INJECTION, SOLUTION INTRAVENOUS; SUBCUTANEOUS at 08:46

## 2025-07-08 RX ADMIN — INSULIN LISPRO 14 UNIT(S): 100 INJECTION, SOLUTION INTRAVENOUS; SUBCUTANEOUS at 16:56

## 2025-07-08 RX ADMIN — Medication 60 MILLIGRAM(S): at 17:00

## 2025-07-08 RX ADMIN — INSULIN LISPRO 14 UNIT(S): 100 INJECTION, SOLUTION INTRAVENOUS; SUBCUTANEOUS at 08:47

## 2025-07-08 RX ADMIN — CALCITRIOL 0.25 MICROGRAM(S): 0.5 CAPSULE, GELATIN COATED ORAL at 12:19

## 2025-07-08 RX ADMIN — TACROLIMUS 1.5 MILLIGRAM(S): 0.5 CAPSULE ORAL at 08:45

## 2025-07-08 RX ADMIN — Medication 1 APPLICATION(S): at 17:00

## 2025-07-08 RX ADMIN — FUROSEMIDE 40 MILLIGRAM(S): 10 INJECTION INTRAMUSCULAR; INTRAVENOUS at 06:20

## 2025-07-08 RX ADMIN — INSULIN GLARGINE-YFGN 28 UNIT(S): 100 INJECTION, SOLUTION SUBCUTANEOUS at 21:50

## 2025-07-08 RX ADMIN — POLYETHYLENE GLYCOL 3350 17 GRAM(S): 17 POWDER, FOR SOLUTION ORAL at 12:17

## 2025-07-08 RX ADMIN — ENOXAPARIN SODIUM 40 MILLIGRAM(S): 100 INJECTION SUBCUTANEOUS at 12:19

## 2025-07-08 RX ADMIN — DULOXETINE 60 MILLIGRAM(S): 20 CAPSULE, DELAYED RELEASE ORAL at 12:18

## 2025-07-08 NOTE — DIETITIAN INITIAL EVALUATION ADULT - PERTINENT MEDS FT
MEDICATIONS  (STANDING):  aspirin enteric coated 81 milliGRAM(s) Oral <User Schedule>  buPROPion XL (24-Hour) . 300 milliGRAM(s) Oral daily  calcitriol   Capsule 0.25 MICROGram(s) Oral daily  carvedilol 25 milliGRAM(s) Oral every 12 hours  dextrose 5%. 1000 milliLiter(s) (50 mL/Hr) IV Continuous <Continuous>  dextrose 5%. 1000 milliLiter(s) (100 mL/Hr) IV Continuous <Continuous>  dextrose 50% Injectable 25 Gram(s) IV Push once  dextrose 50% Injectable 12.5 Gram(s) IV Push once  dextrose 50% Injectable 25 Gram(s) IV Push once  DULoxetine 60 milliGRAM(s) Oral daily  enoxaparin Injectable 40 milliGRAM(s) SubCutaneous every 24 hours  furosemide    Tablet 40 milliGRAM(s) Oral two times a day  glucagon  Injectable 1 milliGRAM(s) IntraMuscular once  insulin glargine Injectable (LANTUS) 28 Unit(s) SubCutaneous at bedtime  insulin lispro (ADMELOG) corrective regimen sliding scale   SubCutaneous three times a day before meals  insulin lispro (ADMELOG) corrective regimen sliding scale   SubCutaneous at bedtime  insulin lispro Injectable (ADMELOG) 14 Unit(s) SubCutaneous three times a day before meals  melatonin 3 milliGRAM(s) Oral at bedtime  mycophenolate mofetil 500 milliGRAM(s) Oral two times a day  NIFEdipine XL 60 milliGRAM(s) Oral two times a day  QUEtiapine 25 milliGRAM(s) Oral two times a day  tacrolimus ER Tablet (ENVARSUS XR) 1.5 milliGRAM(s) Oral daily  tamsulosin 0.4 milliGRAM(s) Oral at bedtime    MEDICATIONS  (PRN):  dextrose Oral Gel 15 Gram(s) Oral once PRN Blood Glucose LESS THAN 70 milliGRAM(s)/deciliter  haloperidol    Injectable 0.5 milliGRAM(s) IV Push every 6 hours PRN severe Agitation

## 2025-07-08 NOTE — CONSULT NOTE ADULT - ATTENDING COMMENTS
Allograft function is at baseline  IS meds as above
HPI as per resident note, personally verified by me. Patient with extensive psychiatric history including anxiety/depression and OCD and past psychiatric hospitalizations. Patient with continued paranoia, confusion, and agitation. No reports of any focal neurologic deficits or abnormal movements. History very limited and patient refused rest of examination early into evaluation and had us leave.    Neurologic exam as per resident note with additions as below:  AAO x2.5 (did not know day of the month of week), speech fluent but intermittently pressured  CN's II-XII intact except for dec up gaze but full down gaze  Strength would not cooperate for full testing but BUE's at least 4/5 and BLE's at least 3/5 and symmetric  Sens intact all  Coordination grossly intact for level of strength with occasional tremors of BUE's and head with possible postural component  Would not cooperate to assess for plantar response    BMP with inc BUN/Cr 24/1.3 (GFR dec 57), otherwise essentially WNL  ESR inc 49, CRP inc 11, HIV (-), A1C inc 11.1%, B12 WNL, TSH/free T4 WNL, syphilis serology TP (-), Tacrolimus level 4.8    < from: MR Head w/wo IV Cont (07.07.25 @ 20:09) >    - No evidence of acute infarction or acute intracranial hemorrhage.  - Extensive T2/FLAIR white matter hyperintensities, which are   nonspecific, however most likely represent chronic microvascular ischemic   changes.  - Numerous punctate foci of microhemorrhage scattered throughout the   brain, likely representing chronic hypertension versus amyloid angiopathy.  Side of the parietal. White matter of undetermined etiology which may   represent enhancing subacute infarcts, hemorrhage or neoplasm    < end of copied text >      A/P:  Encephalopathy  Anxiety/depression  HTN  DM type 2  CKD s/p renal transplant 2021  Hyperparathyroidism    - Etiology for mental status and behavioral changes likely due to psychiatric disease but may superimposed neurodegenerative disorder ([place] given MRI findings?), toxic/metabolic, or inflammatory process. No reported acute focal neurologic deficits or abnormal movements noted, making acute cerebrovascular event and seizure less likely but cannot entirely exclude non-convulsive event. MRI brain, personally reviewed by me, with severe white matter disease and microhemorrhages likely consistent with HTN angiopathy and less likely CAA given no cortical involvement but no other acute intracranial findings  - Labs as per resident note  - Consider vEEG if mental status continues to fluctuate but may be difficult due to patient's lack of cooperation  - Appreciate psychiatry input  - Continue to address above medical issues, as you are doing  - Will continue to follow patient with you
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
## 2025-07-08 NOTE — DIETITIAN INITIAL EVALUATION ADULT - PROBLEM SELECTOR PLAN 1
-Patient presenting with worsening confusion, AAOx3 on exam today but unable to tell me the circumstance of his hospitalization  -Per wife, recently hospitalized at [facility] (Psych facility)  -Obtain RPR, TSH, B12  -Obtain psych consult tomorrow for medication management  -Head CT negative for acute pathology  -Seroquel at bedtime for mood stabilization for now (aggressive in ED requiring multiple IV/IM medications)

## 2025-07-08 NOTE — PROGRESS NOTE ADULT - NSPROGADDITIONALINFOA_GEN_ALL_CORE
Time-based billing (NON-critical care).     52 minutes spent on total encounter. The necessity of the time spent during the encounter on this date of service was due to:     - Reviewing, and interpreting labs, testing, and imaging.  - Independently obtaining a review of systems and performing a physical exam  - Reviewing prior records and where necessary, outpatient records.  - Counselling and educating regarding interpretation of aforementioned items and plan of care to patient and/or family.      d/w ACP Time-based billing (NON-critical care).     55 minutes spent on total encounter. The necessity of the time spent during the encounter on this date of service was due to:     - Reviewing, and interpreting labs, testing, and imaging.  - Independently obtaining a review of systems and performing a physical exam  - Reviewing prior records and where necessary, outpatient records.  - Counselling and educating regarding interpretation of aforementioned items and plan of care to patient and/or family.      d/w ACP

## 2025-07-08 NOTE — DIETITIAN INITIAL EVALUATION ADULT - NUTRITION DIAGNOSITC TERMINOLOGY #1
Margarita Tuttle was seen and treated in our emergency department on 1/4/2023  No restrictions            Diagnosis:     Alvino  may return to school on return date  She may return on this date: 01/06/2023         If you have any questions or concerns, please don't hesitate to call        Leni Nelson DO    ______________________________           _______________          _______________  Hospital Representative                              Date                                Time
Altered Nutrition Related Lab Values

## 2025-07-08 NOTE — PROGRESS NOTE ADULT - SUBJECTIVE AND OBJECTIVE BOX
[facility] Division of Hospital Medicine  Bernadette Gardner DO MS Teams PREFERRED      SUBJECTIVE / OVERNIGHT EVENTS:   ADDITIONAL REVIEW OF SYSTEMS:    MEDICATIONS  (STANDING):  aspirin enteric coated 81 milliGRAM(s) Oral <User Schedule>  buPROPion XL (24-Hour) . 300 milliGRAM(s) Oral daily  calcitriol   Capsule 0.25 MICROGram(s) Oral daily  carvedilol 25 milliGRAM(s) Oral every 12 hours  dextrose 5%. 1000 milliLiter(s) (50 mL/Hr) IV Continuous <Continuous>  dextrose 5%. 1000 milliLiter(s) (100 mL/Hr) IV Continuous <Continuous>  dextrose 50% Injectable 12.5 Gram(s) IV Push once  dextrose 50% Injectable 25 Gram(s) IV Push once  dextrose 50% Injectable 25 Gram(s) IV Push once  DULoxetine 60 milliGRAM(s) Oral daily  enoxaparin Injectable 40 milliGRAM(s) SubCutaneous every 24 hours  furosemide    Tablet 40 milliGRAM(s) Oral two times a day  glucagon  Injectable 1 milliGRAM(s) IntraMuscular once  insulin glargine Injectable (LANTUS) 28 Unit(s) SubCutaneous at bedtime  insulin lispro (ADMELOG) corrective regimen sliding scale   SubCutaneous three times a day before meals  insulin lispro (ADMELOG) corrective regimen sliding scale   SubCutaneous at bedtime  insulin lispro Injectable (ADMELOG) 14 Unit(s) SubCutaneous three times a day before meals  melatonin 3 milliGRAM(s) Oral at bedtime  mycophenolate mofetil 500 milliGRAM(s) Oral two times a day  NIFEdipine XL 60 milliGRAM(s) Oral two times a day  polyethylene glycol 3350 17 Gram(s) Oral daily  QUEtiapine 50 milliGRAM(s) Oral <User Schedule>  QUEtiapine 25 milliGRAM(s) Oral <User Schedule>  senna 2 Tablet(s) Oral at bedtime  tacrolimus ER Tablet (ENVARSUS XR) 1.5 milliGRAM(s) Oral daily  tamsulosin 0.4 milliGRAM(s) Oral at bedtime    MEDICATIONS  (PRN):  dextrose Oral Gel 15 Gram(s) Oral once PRN Blood Glucose LESS THAN 70 milliGRAM(s)/deciliter      I&O's Summary      PHYSICAL EXAM:  Vital Signs Last 24 Hrs  T(C): 36.6 (08 Jul 2025 11:40), Max: 36.8 (07 Jul 2025 21:05)  T(F): 97.8 (08 Jul 2025 11:40), Max: 98.2 (07 Jul 2025 21:05)  HR: 56 (08 Jul 2025 11:40) (56 - 65)  BP: 111/67 (08 Jul 2025 11:40) (111/67 - 135/74)  BP(mean): --  RR: 18 (08 Jul 2025 11:40) (18 - 19)  SpO2: 92% (08 Jul 2025 11:40) (91% - 92%)    Parameters below as of 08 Jul 2025 11:40  Patient On (Oxygen Delivery Method): room air    CONSTITUTIONAL: NAD   EYES: Conjunctiva and sclera clear  ENMT: Moist oral mucosa, no pharyngeal injection or exudates   NECK: Supple, midline  RESPIRATORY: Normal respiratory effort; lungs are clear to auscultation bilaterally  CARDIOVASCULAR: Regular rate and rhythm, normal S1 and S2.   ABDOMEN: Nontender to palpation, no rebound/guarding  PSYCH: A+O to person, place, and time. Tangential thinking          LABS:                        12.8   7.14  )-----------( 194      ( 08 Jul 2025 07:51 )             38.4     07-08    137  |  100  |  24[H]  ----------------------------<  235[H]  4.3   |  23  |  1.30    Ca    9.4      08 Jul 2025 07:51  Phos  3.6     07-07  Mg     2.0     07-07    TPro  6.7  /  Alb  3.6  /  TBili  0.5  /  DBili  x   /  AST  15  /  ALT  15  /  AlkPhos  69  07-07          Urinalysis Basic - ( 08 Jul 2025 07:51 )    Color: x / Appearance: x / SG: x / pH: x  Gluc: 235 mg/dL / Ketone: x  / Bili: x / Urobili: x   Blood: x / Protein: x / Nitrite: x   Leuk Esterase: x / RBC: x / WBC x   Sq Epi: x / Non Sq Epi: x / Bacteria: x        Urinalysis with Rflx Culture (collected 06 Jul 2025 00:31)        RADIOLOGY & ADDITIONAL TESTS:  Results Reviewed:   Imaging Personally Reviewed:  Electrocardiogram Personally Reviewed:    COORDINATION OF CARE:  Care Discussed with Consultants/Other Providers [Y/N]: Y  Prior or Outpatient Records Reviewed [Y/N]: Y   [facility] Division of Hospital Medicine  Bernadette Gardner DO  MS Teams PREFERRED      SUBJECTIVE / OVERNIGHT EVENTS: Last evening, patient code grey. This morning, he is calm, perseverates on calling his wife but redirectable.   ADDITIONAL REVIEW OF SYSTEMS:    MEDICATIONS  (STANDING):  aspirin enteric coated 81 milliGRAM(s) Oral <User Schedule>  buPROPion XL (24-Hour) . 300 milliGRAM(s) Oral daily  calcitriol   Capsule 0.25 MICROGram(s) Oral daily  carvedilol 25 milliGRAM(s) Oral every 12 hours  dextrose 5%. 1000 milliLiter(s) (50 mL/Hr) IV Continuous <Continuous>  dextrose 5%. 1000 milliLiter(s) (100 mL/Hr) IV Continuous <Continuous>  dextrose 50% Injectable 12.5 Gram(s) IV Push once  dextrose 50% Injectable 25 Gram(s) IV Push once  dextrose 50% Injectable 25 Gram(s) IV Push once  DULoxetine 60 milliGRAM(s) Oral daily  enoxaparin Injectable 40 milliGRAM(s) SubCutaneous every 24 hours  furosemide    Tablet 40 milliGRAM(s) Oral two times a day  glucagon  Injectable 1 milliGRAM(s) IntraMuscular once  insulin glargine Injectable (LANTUS) 28 Unit(s) SubCutaneous at bedtime  insulin lispro (ADMELOG) corrective regimen sliding scale   SubCutaneous three times a day before meals  insulin lispro (ADMELOG) corrective regimen sliding scale   SubCutaneous at bedtime  insulin lispro Injectable (ADMELOG) 14 Unit(s) SubCutaneous three times a day before meals  melatonin 3 milliGRAM(s) Oral at bedtime  mycophenolate mofetil 500 milliGRAM(s) Oral two times a day  NIFEdipine XL 60 milliGRAM(s) Oral two times a day  polyethylene glycol 3350 17 Gram(s) Oral daily  QUEtiapine 50 milliGRAM(s) Oral <User Schedule>  QUEtiapine 25 milliGRAM(s) Oral <User Schedule>  senna 2 Tablet(s) Oral at bedtime  tacrolimus ER Tablet (ENVARSUS XR) 1.5 milliGRAM(s) Oral daily  tamsulosin 0.4 milliGRAM(s) Oral at bedtime    MEDICATIONS  (PRN):  dextrose Oral Gel 15 Gram(s) Oral once PRN Blood Glucose LESS THAN 70 milliGRAM(s)/deciliter      I&O's Summary      PHYSICAL EXAM:  Vital Signs Last 24 Hrs  T(C): 36.6 (08 Jul 2025 11:40), Max: 36.8 (07 Jul 2025 21:05)  T(F): 97.8 (08 Jul 2025 11:40), Max: 98.2 (07 Jul 2025 21:05)  HR: 56 (08 Jul 2025 11:40) (56 - 65)  BP: 111/67 (08 Jul 2025 11:40) (111/67 - 135/74)  BP(mean): --  RR: 18 (08 Jul 2025 11:40) (18 - 19)  SpO2: 92% (08 Jul 2025 11:40) (91% - 92%)    Parameters below as of 08 Jul 2025 11:40  Patient On (Oxygen Delivery Method): room air    CONSTITUTIONAL: NAD   EYES: Conjunctiva and sclera clear  ENMT: Moist oral mucosa, no pharyngeal injection or exudates   NECK: Supple, midline  RESPIRATORY: Normal respiratory effort; lungs are clear to auscultation bilaterally  CARDIOVASCULAR: Regular rate and rhythm, normal S1 and S2.   ABDOMEN: Nontender to palpation, no rebound/guarding  PSYCH: A+O to person, place, and time. Tangential thinking          LABS:                        12.8   7.14  )-----------( 194      ( 08 Jul 2025 07:51 )             38.4     07-08    137  |  100  |  24[H]  ----------------------------<  235[H]  4.3   |  23  |  1.30    Ca    9.4      08 Jul 2025 07:51  Phos  3.6     07-07  Mg     2.0     07-07    TPro  6.7  /  Alb  3.6  /  TBili  0.5  /  DBili  x   /  AST  15  /  ALT  15  /  AlkPhos  69  07-07          Urinalysis Basic - ( 08 Jul 2025 07:51 )    Color: x / Appearance: x / SG: x / pH: x  Gluc: 235 mg/dL / Ketone: x  / Bili: x / Urobili: x   Blood: x / Protein: x / Nitrite: x   Leuk Esterase: x / RBC: x / WBC x   Sq Epi: x / Non Sq Epi: x / Bacteria: x        Urinalysis with Rflx Culture (collected 06 Jul 2025 00:31)        RADIOLOGY & ADDITIONAL TESTS:  Results Reviewed:   Imaging Personally Reviewed:  Electrocardiogram Personally Reviewed:    COORDINATION OF CARE:  Care Discussed with Consultants/Other Providers [Y/N]: Y  Prior or Outpatient Records Reviewed [Y/N]: Y

## 2025-07-08 NOTE — CONSULT NOTE ADULT - ASSESSMENT
75-year-old man with PMHx of DM2, c/b retinopathy/nephropathy, anxiety, depression OCD, BPH, HTN, ESRD due to DM on PD since April 2020 S/p DDRT on 3/9/2021 (pt. of Dr. Patino) presenting with AMS/increased confusion. Transplant Nephrology consulted for IS management.     #s/p DDRT 3/9/21 c/b DGF (off HD since 3/30/31)  Simulect induction. cPRA 0%. Nephrologist: Dr. Bryant Patino. Baseline SCr is 1.3-1.5mg/dL, Minimal proteinuria UPCR 0.6  SCr is currently at patient's baseline, or better.   U/A: concentrated, 100 protein.   - Obtain UPCr  - Continue home lasix 40mg PO BID.   - Continue flomax 0.4mg daily   - Monitor labs and I/Os. Avoid nephrotoxins including, ACE/ARB, NSAIDs, contrast, etc. Dose medications as per eGFR.     #IS  Home IS: Envarsus 1.5, lasix 40mg PO BID, MMF 500mg BID, d/c'd prednisone  - Continue with Env 1.5mg daily   - Check tacrolimus level 30mins prior to AM dose daily. Dose per level   - Continue MMF 50mmg BID     #AMS  #Anxiety, Depression/OCD  CTH negative. No fever, no infectious symptoms. b12 WNL.   - If febrile, would pursue full fever work up.   -  follow up for medication adjustments.   - folate, thiamine, AM cortisol levels     #Hyperparathyroidism - on Calcitriol 0.25mcg po daily.     If you have any questions, please feel free to contact me:  Pema Cortés MD PGY-5  Nephrology Chief Fellow  [employer] Teams (Preferred)/ Pager [phone]   (After 5pm or on weekends please page the on-call fellow)   
75M PMHx HTN, DM (A1c 11.1), renal transplant, BPH, anxiety, depression, OCD, prior psychiatric hospitalizations who presented due to abnormal behavior x2 years with worsening symptoms over several days prior to admission. Patient was evaluated by psychiatry and recommended low-dose seroquel for agitation. MRI done today with chronic microvascular changes and chronic subcortical microhemorrhages, several of which are enhancing. On neurologic exam, patient is AAOx3, speech is clear and fluent with some tangential speech. Attention, memory, and fund of knowledge are intact. Cranial nerve exam with mild R facial droop (unclear chronicity). Reflexes slightly more prominent in RUE>LUE.    Labs noted with B12 776,     Impression: 1) Fluctuating mental status and paranoia, likely delirium in setting of medical illness (poorly controlled diabetes) as well as history of psychiatric illness. At time of exam, patient appears calm with only mildly tangential speech. 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 Low suspicion for cerebral amyloid given lack of cortical involvement.     Recommendations:  [] Labs to assess for reversible causes: TSH, T3, T4, B1, B6, folate, copper (RBC), vitamin D (25OH), ESR, CRP  [] If continues to have fluctuating symptoms, would consider vEEG in the future  [] Psychiatry recommendations appreciated  [] Rest of care per primary team    Case and plan not finalized until attending attestation

## 2025-07-08 NOTE — PROGRESS NOTE ADULT - PROBLEM SELECTOR PLAN 1
-Patient presenting with worsening confusion, AAOx3 on exam today but unable to tell me the circumstance of his hospitalization and with tangential thinking.   -[facility] Ongoing fluctuation in mentation.   -RPR negative, TSH and B12 wnl.   -Head CT negative for acute pathology  -Psych recs appreciated- delirium vs. dementia, mood d/o and OCD by hx. Continue home medications for now (Wellbutrin, duloxetine); increased Seroquel to 25mg BID  - Obtain MRI with and without contrast to rule out mass/bleed/CVA/chronic ischemic changes -Patient presenting with worsening confusion, AAOx3 on exam today but unable to tell me the circumstance of his hospitalization and with tangential thinking.   -Per wife, recently hospitalized at [facility] (psychiatry facility). Ongoing fluctuation in mentation.   -RPR negative, TSH and B12 wnl.   -Head CT negative for acute pathology  -Psych recs appreciated- delirium vs. dementia, mood d/o and OCD by hx. Continue home medications for now (Wellbutrin, duloxetine); increased Seroquel to 25mg BID. Patient with code grey overnight - will increase evening dose; continue 25mg in AM/50mg at 6pm.   - MRI - No evidence of acute infarction or acute intracranial hemorrhage. Extensive T2/FLAIR white matter hyperintensities, which are nonspecific, however most likely represent chronic microvascular ischemic   changes. Numerous punctate foci of microhemorrhage scattered throughout the brain, likely representing chronic hypertension versus amyloid angiopathy. Side of the parietal. White matter of undetermined etiology which may represent enhancing subacute infarcts, hemorrhage or neoplasm  Consult neurology for AMS

## 2025-07-08 NOTE — PROGRESS NOTE ADULT - PROBLEM SELECTOR PLAN 8
Medication reconciliation completed by admitting hospitalist Medication reconciliation completed by admitting hospitalist    7/8- Care plan discussed with wife via phone.

## 2025-07-09 ENCOUNTER — APPOINTMENT (OUTPATIENT)
Dept: NEPHROLOGY | Facility: CLINIC | Age: 75
End: 2025-07-09

## 2025-07-09 LAB
24R-OH-CALCIDIOL SERPL-MCNC: 41.5 NG/ML — SIGNIFICANT CHANGE UP
CRP SERPL-MCNC: 11 MG/L — HIGH (ref 0–4)
ERYTHROCYTE [SEDIMENTATION RATE] IN BLOOD: 49 MM/HR — HIGH (ref 0–15)
FOLATE SERPL-MCNC: 11.4 NG/ML — SIGNIFICANT CHANGE UP
GLUCOSE BLDC GLUCOMTR-MCNC: 152 MG/DL — HIGH (ref 70–99)
GLUCOSE BLDC GLUCOMTR-MCNC: 160 MG/DL — HIGH (ref 70–99)
GLUCOSE BLDC GLUCOMTR-MCNC: 212 MG/DL — HIGH (ref 70–99)
GLUCOSE BLDC GLUCOMTR-MCNC: 213 MG/DL — HIGH (ref 70–99)
GLUCOSE BLDC GLUCOMTR-MCNC: 70 MG/DL — SIGNIFICANT CHANGE UP (ref 70–99)
GLUCOSE BLDC GLUCOMTR-MCNC: 74 MG/DL — SIGNIFICANT CHANGE UP (ref 70–99)
MRSA PCR RESULT.: SIGNIFICANT CHANGE UP
MYCOPHENOLATE SERPL-MCNC: 0.3 UG/ML — LOW (ref 1–3.5)
MYCOPHENOLIC ACID GLUCURONIDE: 15 UG/ML — LOW (ref 35–100)
S AUREUS DNA NOSE QL NAA+PROBE: SIGNIFICANT CHANGE UP
TACROLIMUS SERPL-MCNC: 4.8 NG/ML — SIGNIFICANT CHANGE UP

## 2025-07-09 PROCEDURE — 99231 SBSQ HOSP IP/OBS SF/LOW 25: CPT

## 2025-07-09 PROCEDURE — 99223 1ST HOSP IP/OBS HIGH 75: CPT | Mod: GC

## 2025-07-09 PROCEDURE — 99233 SBSQ HOSP IP/OBS HIGH 50: CPT

## 2025-07-09 RX ORDER — HALOPERIDOL 10 MG/1
0.5 TABLET ORAL ONCE
Refills: 0 | Status: COMPLETED | OUTPATIENT
Start: 2025-07-09 | End: 2025-07-09

## 2025-07-09 RX ORDER — QUETIAPINE FUMARATE 25 MG/1
50 TABLET ORAL
Refills: 0 | Status: DISCONTINUED | OUTPATIENT
Start: 2025-07-09 | End: 2025-07-11

## 2025-07-09 RX ORDER — CARVEDILOL 3.12 MG/1
12.5 TABLET, FILM COATED ORAL EVERY 12 HOURS
Refills: 0 | Status: DISCONTINUED | OUTPATIENT
Start: 2025-07-09 | End: 2025-07-10

## 2025-07-09 RX ADMIN — INSULIN LISPRO 14 UNIT(S): 100 INJECTION, SOLUTION INTRAVENOUS; SUBCUTANEOUS at 08:34

## 2025-07-09 RX ADMIN — TACROLIMUS 1.5 MILLIGRAM(S): 0.5 CAPSULE ORAL at 08:35

## 2025-07-09 RX ADMIN — QUETIAPINE FUMARATE 50 MILLIGRAM(S): 25 TABLET ORAL at 13:02

## 2025-07-09 RX ADMIN — CALCITRIOL 0.25 MICROGRAM(S): 0.5 CAPSULE, GELATIN COATED ORAL at 12:34

## 2025-07-09 RX ADMIN — Medication 81 MILLIGRAM(S): at 05:51

## 2025-07-09 RX ADMIN — BUPROPION HYDROBROMIDE 300 MILLIGRAM(S): 522 TABLET, EXTENDED RELEASE ORAL at 12:34

## 2025-07-09 RX ADMIN — CARVEDILOL 25 MILLIGRAM(S): 3.12 TABLET, FILM COATED ORAL at 05:49

## 2025-07-09 RX ADMIN — POLYETHYLENE GLYCOL 3350 17 GRAM(S): 17 POWDER, FOR SOLUTION ORAL at 12:34

## 2025-07-09 RX ADMIN — Medication 1 APPLICATION(S): at 12:34

## 2025-07-09 RX ADMIN — INSULIN LISPRO 14 UNIT(S): 100 INJECTION, SOLUTION INTRAVENOUS; SUBCUTANEOUS at 12:32

## 2025-07-09 RX ADMIN — QUETIAPINE FUMARATE 50 MILLIGRAM(S): 25 TABLET ORAL at 21:46

## 2025-07-09 RX ADMIN — DULOXETINE 60 MILLIGRAM(S): 20 CAPSULE, DELAYED RELEASE ORAL at 12:34

## 2025-07-09 RX ADMIN — Medication 60 MILLIGRAM(S): at 05:49

## 2025-07-09 RX ADMIN — Medication 2 TABLET(S): at 21:46

## 2025-07-09 RX ADMIN — Medication 3 MILLIGRAM(S): at 21:46

## 2025-07-09 RX ADMIN — INSULIN GLARGINE-YFGN 28 UNIT(S): 100 INJECTION, SOLUTION SUBCUTANEOUS at 21:46

## 2025-07-09 RX ADMIN — TAMSULOSIN HYDROCHLORIDE 0.4 MILLIGRAM(S): 0.4 CAPSULE ORAL at 21:46

## 2025-07-09 RX ADMIN — FUROSEMIDE 40 MILLIGRAM(S): 10 INJECTION INTRAMUSCULAR; INTRAVENOUS at 05:49

## 2025-07-09 RX ADMIN — QUETIAPINE FUMARATE 25 MILLIGRAM(S): 25 TABLET ORAL at 05:51

## 2025-07-09 RX ADMIN — CARVEDILOL 12.5 MILLIGRAM(S): 3.12 TABLET, FILM COATED ORAL at 17:17

## 2025-07-09 RX ADMIN — ENOXAPARIN SODIUM 40 MILLIGRAM(S): 100 INJECTION SUBCUTANEOUS at 12:34

## 2025-07-09 RX ADMIN — MYCOPHENOLATE MOFETIL 500 MILLIGRAM(S): 500 TABLET, FILM COATED ORAL at 05:49

## 2025-07-09 RX ADMIN — HALOPERIDOL 0.5 MILLIGRAM(S): 10 TABLET ORAL at 02:13

## 2025-07-09 RX ADMIN — FUROSEMIDE 40 MILLIGRAM(S): 10 INJECTION INTRAMUSCULAR; INTRAVENOUS at 13:02

## 2025-07-09 RX ADMIN — Medication 60 MILLIGRAM(S): at 17:16

## 2025-07-09 RX ADMIN — INSULIN LISPRO 2: 100 INJECTION, SOLUTION INTRAVENOUS; SUBCUTANEOUS at 12:33

## 2025-07-09 RX ADMIN — INSULIN LISPRO 2: 100 INJECTION, SOLUTION INTRAVENOUS; SUBCUTANEOUS at 08:34

## 2025-07-09 RX ADMIN — MYCOPHENOLATE MOFETIL 500 MILLIGRAM(S): 500 TABLET, FILM COATED ORAL at 17:16
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
## 2025-07-09 NOTE — BH CONSULTATION LIAISON PROGRESS NOTE - NSBHCHARTREVIEWINVESTIGATE_PSY_A_CORE FT
< from: 12 Lead ECG (07.07.25 @ 09:25) >      Ventricular Rate 61 BPM    Atrial Rate 61 BPM    P-R Interval 176 ms    QRS Duration 90 ms    Q-T Interval 446 ms    QTC Calculation(Bazett) 448 ms    P Axis 74 degrees    R Axis -2 degrees    T Axis 19 degrees    Diagnosis Line SINUS RHYTHM WITH PREMATURE ATRIAL COMPLEXES  MINIMAL VOLTAGE CRITERIA FOR LVH, MAY BE NORMAL VARIANT  NONSPECIFIC ST ABNORMALITY  WHEN COMPARED WITH ECG OF 05-JUL-2025 22:09,  PREMATURE ATRIAL COMPLEXES ARE NOW PRESENT  T WAVE AMPLITUDE HAS DECREASED IN INFERIOR LEADS    < end of copied text >

## 2025-07-09 NOTE — BH CONSULTATION LIAISON PROGRESS NOTE - NSBHATTESTCOMMENTATTENDFT_PSY_A_CORE
75M domiciled with wife, retired, with PPHx mood d/o and OCD, recent psychiatric admission at [facility] for AMS, denies substance abuse, with PMH significant for kidney transplant in 2021, DM, BPH, HTN, hyperparathyroidism who presents due to worsening confusion, psychiatry consulted for management of agitation.  Pt irritable and restless on interview, trying to get OOB.  Oriented to self and being in a hospital, but thought this was a psychiatric hospital and could not provide date or context for admission.  States he is "tired of everyone asking me the same questions like I'm stupid!" and denies SI/HI, AVH, paranoia, depression or anxiety.  STates he has OCD around orderliness, but it has been well-controlled on his home regimen.  Inattention prominent.  Denies substance abuse.  States he lives with his wife.  Nursing staff notes pt has had fluctuating MS, sometimes pleasant and other times irritable and more confused, struggling to answer questions.  Presentation c/w Delirium 2/2 GMC, r/o dementia, mood d/o and OCD by hx. 7/9: Pt with increased dose Seroquel per primary team, presently calm and cooperative, less irritable, orientation somewhat better.  Denies SI/HI or AVH.   Recs: Would c/w increased Seroquel per primary team, c/w home psych regimen, SW for safe d/c planning.  PRN Haldol/Seroquel as above.  Agree with trainee's assessment and plan as above.

## 2025-07-09 NOTE — PROGRESS NOTE ADULT - NSPROGADDITIONALINFOA_GEN_ALL_CORE
Time-based billing (NON-critical care).     51 minutes spent on total encounter. The necessity of the time spent during the encounter on this date of service was due to:     - Reviewing, and interpreting labs, testing, and imaging.  - Independently obtaining a review of systems and performing a physical exam  - Reviewing prior records and where necessary, outpatient records.  - Counselling and educating regarding interpretation of aforementioned items and plan of care to patient and/or family.      d/w ACP

## 2025-07-09 NOTE — BH CONSULTATION LIAISON PROGRESS NOTE - CURRENT MEDICATION
MEDICATIONS  (STANDING):  aspirin enteric coated 81 milliGRAM(s) Oral <User Schedule>  buPROPion XL (24-Hour) . 300 milliGRAM(s) Oral daily  calcitriol   Capsule 0.25 MICROGram(s) Oral daily  carvedilol 12.5 milliGRAM(s) Oral every 12 hours  chlorhexidine 2% Cloths 1 Application(s) Topical daily  dextrose 5%. 1000 milliLiter(s) (50 mL/Hr) IV Continuous <Continuous>  dextrose 5%. 1000 milliLiter(s) (100 mL/Hr) IV Continuous <Continuous>  dextrose 50% Injectable 25 Gram(s) IV Push once  dextrose 50% Injectable 12.5 Gram(s) IV Push once  dextrose 50% Injectable 25 Gram(s) IV Push once  DULoxetine 60 milliGRAM(s) Oral daily  enoxaparin Injectable 40 milliGRAM(s) SubCutaneous every 24 hours  furosemide    Tablet 40 milliGRAM(s) Oral two times a day  glucagon  Injectable 1 milliGRAM(s) IntraMuscular once  insulin glargine Injectable (LANTUS) 28 Unit(s) SubCutaneous at bedtime  insulin lispro (ADMELOG) corrective regimen sliding scale   SubCutaneous three times a day before meals  insulin lispro (ADMELOG) corrective regimen sliding scale   SubCutaneous at bedtime  insulin lispro Injectable (ADMELOG) 14 Unit(s) SubCutaneous three times a day before meals  melatonin 3 milliGRAM(s) Oral at bedtime  mycophenolate mofetil 500 milliGRAM(s) Oral two times a day  NIFEdipine XL 60 milliGRAM(s) Oral two times a day  polyethylene glycol 3350 17 Gram(s) Oral daily  QUEtiapine 25 milliGRAM(s) Oral <User Schedule>  QUEtiapine 50 milliGRAM(s) Oral <User Schedule>  senna 2 Tablet(s) Oral at bedtime  tacrolimus ER Tablet (ENVARSUS XR) 1.5 milliGRAM(s) Oral daily  tamsulosin 0.4 milliGRAM(s) Oral at bedtime    MEDICATIONS  (PRN):  dextrose Oral Gel 15 Gram(s) Oral once PRN Blood Glucose LESS THAN 70 milliGRAM(s)/deciliter

## 2025-07-09 NOTE — PROGRESS NOTE ADULT - PROBLEM SELECTOR PLAN 1
-Patient presenting with worsening confusion, AAOx3 on exam today but unable to tell me the circumstance of his hospitalization and with tangential thinking.   -Per wife, recently hospitalized at [facility] (psychiatry facility). Ongoing fluctuation in mentation.   -RPR negative, TSH and B12 wnl.   -Head CT negative for acute pathology  -Psych recs appreciated- delirium vs. dementia, mood d/o and OCD by hx. Continue home medications for now (Wellbutrin, duloxetine).  - Adjust Seroquel to 25mg at 6am, 50pm at 2pm and 10pm.  - MRI - No evidence of acute infarction or acute intracranial hemorrhage. Extensive T2/FLAIR white matter hyperintensities, which are nonspecific, however most likely represent chronic microvascular ischemic   changes. Numerous punctate foci of microhemorrhage scattered throughout the brain, likely representing chronic hypertension versus amyloid angiopathy. Side of the parietal. White matter of undetermined etiology which may represent enhancing subacute infarcts, hemorrhage or neoplasm  Neuro recs appreciated- Low suspicion for cerebral amyloid given lack of cortical involvement.

## 2025-07-09 NOTE — BH CONSULTATION LIAISON PROGRESS NOTE - NSBHCHARTREVIEWVS_PSY_A_CORE FT
Vital Signs Last 24 Hrs  T(C): 36.6 (09 Jul 2025 04:59), Max: 36.6 (09 Jul 2025 04:59)  T(F): 97.8 (09 Jul 2025 04:59), Max: 97.8 (09 Jul 2025 04:59)  HR: 61 (09 Jul 2025 04:59) (61 - 65)  BP: 129/71 (09 Jul 2025 04:59) (106/62 - 129/71)  BP(mean): --  RR: 18 (09 Jul 2025 04:59) (18 - 18)  SpO2: 91% (09 Jul 2025 04:59) (91% - 92%)    Parameters below as of 09 Jul 2025 04:59  Patient On (Oxygen Delivery Method): room air

## 2025-07-09 NOTE — BH CONSULTATION LIAISON PROGRESS NOTE - NSBHCHARTREVIEWLAB_PSY_A_CORE FT
12.8   7.14  )-----------( 194      ( 08 Jul 2025 07:51 )             38.4     07-08    137  |  100  |  24[H]  ----------------------------<  235[H]  4.3   |  23  |  1.30    Ca    9.4      08 Jul 2025 07:51      Urinalysis Basic - ( 08 Jul 2025 07:51 )    Color: x / Appearance: x / SG: x / pH: x  Gluc: 235 mg/dL / Ketone: x  / Bili: x / Urobili: x   Blood: x / Protein: x / Nitrite: x   Leuk Esterase: x / RBC: x / WBC x   Sq Epi: x / Non Sq Epi: x / Bacteria: x

## 2025-07-09 NOTE — PROGRESS NOTE ADULT - SUBJECTIVE AND OBJECTIVE BOX
[facility] Division of Hospital Medicine  Bernadette Gardner DO  MS Teams PREFERRED      SUBJECTIVE / OVERNIGHT EVENTS: Overnight, patient was agitated - received Haldol x1. This morning, he is pleasant, calm, cooperative.  ADDITIONAL REVIEW OF SYSTEMS:    MEDICATIONS  (STANDING):  aspirin enteric coated 81 milliGRAM(s) Oral <User Schedule>  buPROPion XL (24-Hour) . 300 milliGRAM(s) Oral daily  calcitriol   Capsule 0.25 MICROGram(s) Oral daily  carvedilol 12.5 milliGRAM(s) Oral every 12 hours  chlorhexidine 2% Cloths 1 Application(s) Topical daily  dextrose 5%. 1000 milliLiter(s) (50 mL/Hr) IV Continuous <Continuous>  dextrose 5%. 1000 milliLiter(s) (100 mL/Hr) IV Continuous <Continuous>  dextrose 50% Injectable 25 Gram(s) IV Push once  dextrose 50% Injectable 12.5 Gram(s) IV Push once  dextrose 50% Injectable 25 Gram(s) IV Push once  DULoxetine 60 milliGRAM(s) Oral daily  enoxaparin Injectable 40 milliGRAM(s) SubCutaneous every 24 hours  furosemide    Tablet 40 milliGRAM(s) Oral two times a day  glucagon  Injectable 1 milliGRAM(s) IntraMuscular once  insulin glargine Injectable (LANTUS) 28 Unit(s) SubCutaneous at bedtime  insulin lispro (ADMELOG) corrective regimen sliding scale   SubCutaneous three times a day before meals  insulin lispro (ADMELOG) corrective regimen sliding scale   SubCutaneous at bedtime  insulin lispro Injectable (ADMELOG) 14 Unit(s) SubCutaneous three times a day before meals  melatonin 3 milliGRAM(s) Oral at bedtime  mycophenolate mofetil 500 milliGRAM(s) Oral two times a day  NIFEdipine XL 60 milliGRAM(s) Oral two times a day  polyethylene glycol 3350 17 Gram(s) Oral daily  QUEtiapine 25 milliGRAM(s) Oral <User Schedule>  QUEtiapine 50 milliGRAM(s) Oral <User Schedule>  senna 2 Tablet(s) Oral at bedtime  tacrolimus ER Tablet (ENVARSUS XR) 1.5 milliGRAM(s) Oral daily  tamsulosin 0.4 milliGRAM(s) Oral at bedtime    MEDICATIONS  (PRN):  dextrose Oral Gel 15 Gram(s) Oral once PRN Blood Glucose LESS THAN 70 milliGRAM(s)/deciliter      I&O's Summary    09 Jul 2025 07:01  -  09 Jul 2025 15:44  --------------------------------------------------------  IN: 0 mL / OUT: 120 mL / NET: -120 mL        PHYSICAL EXAM:  Vital Signs Last 24 Hrs  T(C): 36.6 (09 Jul 2025 04:59), Max: 36.6 (09 Jul 2025 04:59)  T(F): 97.8 (09 Jul 2025 04:59), Max: 97.8 (09 Jul 2025 04:59)  HR: 61 (09 Jul 2025 04:59) (61 - 65)  BP: 129/71 (09 Jul 2025 04:59) (106/62 - 129/71)  BP(mean): --  RR: 18 (09 Jul 2025 04:59) (18 - 18)  SpO2: 91% (09 Jul 2025 04:59) (91% - 92%)    Parameters below as of 09 Jul 2025 04:59  Patient On (Oxygen Delivery Method): room air    CONSTITUTIONAL: NAD   EYES: Conjunctiva and sclera clear  ENMT: Moist oral mucosa, no pharyngeal injection or exudates   NECK: Supple, midline  RESPIRATORY: Normal respiratory effort; lungs are clear to auscultation bilaterally  CARDIOVASCULAR: Regular rate and rhythm, normal S1 and S2.   ABDOMEN: Nontender to palpation, no rebound/guarding  PSYCH: A+O to person, place, and time. Tangential thinking          LABS:                        12.8   7.14  )-----------( 194      ( 08 Jul 2025 07:51 )             38.4     07-08    137  |  100  |  24[H]  ----------------------------<  235[H]  4.3   |  23  |  1.30    Ca    9.4      08 Jul 2025 07:51            Urinalysis Basic - ( 08 Jul 2025 07:51 )    Color: x / Appearance: x / SG: x / pH: x  Gluc: 235 mg/dL / Ketone: x  / Bili: x / Urobili: x   Blood: x / Protein: x / Nitrite: x   Leuk Esterase: x / RBC: x / WBC x   Sq Epi: x / Non Sq Epi: x / Bacteria: x          RADIOLOGY & ADDITIONAL TESTS:  Results Reviewed:   Imaging Personally Reviewed:  Electrocardiogram Personally Reviewed:    COORDINATION OF CARE:  Care Discussed with Consultants/Other Providers [Y/N]: Y  Prior or Outpatient Records Reviewed [Y/N]: Y

## 2025-07-09 NOTE — PROGRESS NOTE ADULT - PROBLEM SELECTOR PLAN 8
Medication reconciliation completed by admitting hospitalist    7/9- Care plan discussed with wife at bedside

## 2025-07-10 ENCOUNTER — TRANSCRIPTION ENCOUNTER (OUTPATIENT)
Age: 75
End: 2025-07-10

## 2025-07-10 LAB
CRP SERPL-MCNC: 7 MG/L — HIGH (ref 0–4)
ERYTHROCYTE [SEDIMENTATION RATE] IN BLOOD: 54 MM/HR — HIGH (ref 0–15)
GLUCOSE BLDC GLUCOMTR-MCNC: 107 MG/DL — HIGH (ref 70–99)
GLUCOSE BLDC GLUCOMTR-MCNC: 162 MG/DL — HIGH (ref 70–99)
GLUCOSE BLDC GLUCOMTR-MCNC: 198 MG/DL — HIGH (ref 70–99)
GLUCOSE BLDC GLUCOMTR-MCNC: 223 MG/DL — HIGH (ref 70–99)
TACROLIMUS SERPL-MCNC: 4.2 NG/ML — SIGNIFICANT CHANGE UP

## 2025-07-10 PROCEDURE — 99233 SBSQ HOSP IP/OBS HIGH 50: CPT

## 2025-07-10 RX ORDER — QUETIAPINE FUMARATE 25 MG/1
1 TABLET ORAL
Qty: 0 | Refills: 0 | DISCHARGE
Start: 2025-07-10

## 2025-07-10 RX ORDER — FUROSEMIDE 10 MG/ML
1 INJECTION INTRAMUSCULAR; INTRAVENOUS
Qty: 0 | Refills: 0 | DISCHARGE
Start: 2025-07-10

## 2025-07-10 RX ORDER — DULOXETINE 20 MG/1
1 CAPSULE, DELAYED RELEASE ORAL
Qty: 0 | Refills: 0 | DISCHARGE
Start: 2025-07-10

## 2025-07-10 RX ORDER — BUPROPION HYDROBROMIDE 522 MG/1
1 TABLET, EXTENDED RELEASE ORAL
Qty: 0 | Refills: 0 | DISCHARGE
Start: 2025-07-10

## 2025-07-10 RX ORDER — TACROLIMUS 0.5 MG/1
2 CAPSULE ORAL
Qty: 0 | Refills: 0 | DISCHARGE
Start: 2025-07-10

## 2025-07-10 RX ORDER — TACROLIMUS 0.5 MG/1
2 CAPSULE ORAL
Refills: 0 | DISCHARGE

## 2025-07-10 RX ORDER — BUPROPION HYDROBROMIDE 522 MG/1
1 TABLET, EXTENDED RELEASE ORAL
Refills: 0 | DISCHARGE

## 2025-07-10 RX ORDER — MELATONIN 5 MG
1 TABLET ORAL
Qty: 0 | Refills: 0 | DISCHARGE
Start: 2025-07-10

## 2025-07-10 RX ORDER — NIFEDIPINE 30 MG
1 TABLET, EXTENDED RELEASE 24 HR ORAL
Qty: 0 | Refills: 0 | DISCHARGE
Start: 2025-07-10

## 2025-07-10 RX ORDER — INSULIN LISPRO 100 U/ML
10 INJECTION, SOLUTION INTRAVENOUS; SUBCUTANEOUS
Refills: 0 | Status: DISCONTINUED | OUTPATIENT
Start: 2025-07-10 | End: 2025-07-11

## 2025-07-10 RX ORDER — INSULIN GLARGINE-YFGN 100 [IU]/ML
28 INJECTION, SOLUTION SUBCUTANEOUS
Qty: 0 | Refills: 0 | DISCHARGE
Start: 2025-07-10

## 2025-07-10 RX ORDER — CARVEDILOL 3.12 MG/1
1 TABLET, FILM COATED ORAL
Qty: 0 | Refills: 0 | DISCHARGE
Start: 2025-07-10

## 2025-07-10 RX ORDER — ASPIRIN 325 MG
1 TABLET ORAL
Qty: 0 | Refills: 0 | DISCHARGE
Start: 2025-07-10

## 2025-07-10 RX ORDER — CARVEDILOL 3.12 MG/1
6.25 TABLET, FILM COATED ORAL EVERY 12 HOURS
Refills: 0 | Status: DISCONTINUED | OUTPATIENT
Start: 2025-07-10 | End: 2025-07-11

## 2025-07-10 RX ADMIN — FUROSEMIDE 40 MILLIGRAM(S): 10 INJECTION INTRAMUSCULAR; INTRAVENOUS at 13:10

## 2025-07-10 RX ADMIN — INSULIN LISPRO 2: 100 INJECTION, SOLUTION INTRAVENOUS; SUBCUTANEOUS at 08:11

## 2025-07-10 RX ADMIN — CALCITRIOL 0.25 MICROGRAM(S): 0.5 CAPSULE, GELATIN COATED ORAL at 13:10

## 2025-07-10 RX ADMIN — MYCOPHENOLATE MOFETIL 500 MILLIGRAM(S): 500 TABLET, FILM COATED ORAL at 17:00

## 2025-07-10 RX ADMIN — CARVEDILOL 12.5 MILLIGRAM(S): 3.12 TABLET, FILM COATED ORAL at 06:16

## 2025-07-10 RX ADMIN — INSULIN LISPRO 4: 100 INJECTION, SOLUTION INTRAVENOUS; SUBCUTANEOUS at 12:20

## 2025-07-10 RX ADMIN — DULOXETINE 60 MILLIGRAM(S): 20 CAPSULE, DELAYED RELEASE ORAL at 13:13

## 2025-07-10 RX ADMIN — INSULIN LISPRO 10 UNIT(S): 100 INJECTION, SOLUTION INTRAVENOUS; SUBCUTANEOUS at 17:01

## 2025-07-10 RX ADMIN — QUETIAPINE FUMARATE 25 MILLIGRAM(S): 25 TABLET ORAL at 06:14

## 2025-07-10 RX ADMIN — ENOXAPARIN SODIUM 40 MILLIGRAM(S): 100 INJECTION SUBCUTANEOUS at 13:10

## 2025-07-10 RX ADMIN — INSULIN GLARGINE-YFGN 28 UNIT(S): 100 INJECTION, SOLUTION SUBCUTANEOUS at 21:45

## 2025-07-10 RX ADMIN — Medication 3 MILLIGRAM(S): at 21:44

## 2025-07-10 RX ADMIN — QUETIAPINE FUMARATE 50 MILLIGRAM(S): 25 TABLET ORAL at 13:10

## 2025-07-10 RX ADMIN — POLYETHYLENE GLYCOL 3350 17 GRAM(S): 17 POWDER, FOR SOLUTION ORAL at 12:22

## 2025-07-10 RX ADMIN — INSULIN LISPRO 10 UNIT(S): 100 INJECTION, SOLUTION INTRAVENOUS; SUBCUTANEOUS at 12:20

## 2025-07-10 RX ADMIN — CARVEDILOL 6.25 MILLIGRAM(S): 3.12 TABLET, FILM COATED ORAL at 17:00

## 2025-07-10 RX ADMIN — TAMSULOSIN HYDROCHLORIDE 0.4 MILLIGRAM(S): 0.4 CAPSULE ORAL at 21:44

## 2025-07-10 RX ADMIN — Medication 1 APPLICATION(S): at 12:21

## 2025-07-10 RX ADMIN — Medication 60 MILLIGRAM(S): at 06:14

## 2025-07-10 RX ADMIN — QUETIAPINE FUMARATE 50 MILLIGRAM(S): 25 TABLET ORAL at 21:44

## 2025-07-10 RX ADMIN — Medication 60 MILLIGRAM(S): at 17:00

## 2025-07-10 RX ADMIN — FUROSEMIDE 40 MILLIGRAM(S): 10 INJECTION INTRAMUSCULAR; INTRAVENOUS at 06:14

## 2025-07-10 RX ADMIN — BUPROPION HYDROBROMIDE 300 MILLIGRAM(S): 522 TABLET, EXTENDED RELEASE ORAL at 13:11

## 2025-07-10 RX ADMIN — MYCOPHENOLATE MOFETIL 500 MILLIGRAM(S): 500 TABLET, FILM COATED ORAL at 06:14

## 2025-07-10 RX ADMIN — Medication 2 TABLET(S): at 21:44

## 2025-07-10 RX ADMIN — TACROLIMUS 1.5 MILLIGRAM(S): 0.5 CAPSULE ORAL at 07:35

## 2025-07-10 NOTE — PROGRESS NOTE ADULT - PROBLEM SELECTOR PLAN 1
-Patient presenting with worsening confusion, AAOx3 on exam today but unable to tell me the circumstance of his hospitalization and with tangential thinking.   -Per wife, recently hospitalized at [facility] (psychiatry facility). Ongoing fluctuation in mentation.   -RPR negative, TSH and B12 wnl.   -Head CT negative for acute pathology  -Psych recs appreciated- delirium vs. dementia, mood d/o and OCD by hx. Continue home medications for now (Wellbutrin, duloxetine).  - Adjusted Seroquel to 25mg at 6am, 50pm at 2pm and 10pm with improvement in behaviors  - MRI - No evidence of acute infarction or acute intracranial hemorrhage. Extensive T2/FLAIR white matter hyperintensities, which are nonspecific, however most likely represent chronic microvascular ischemic   changes. Numerous punctate foci of microhemorrhage scattered throughout the brain, likely representing chronic hypertension versus amyloid angiopathy. Side of the parietal. White matter of undetermined etiology which may represent enhancing subacute infarcts, hemorrhage or neoplasm  Neuro recs appreciated- Low suspicion for cerebral amyloid given lack of cortical involvement.

## 2025-07-10 NOTE — DISCHARGE NOTE PROVIDER - CARE PROVIDER_API CALL
CRISTY DIAZ  [address] [place] [address]  Phone: [phone]  Fax: ([phone]  Established Patient  Follow Up Time: 2 weeks    Bryant Patino  Nephrology  [address] [place] [id number]  Phone: [phone]  Fax: [phone]  Established Patient  Follow Up Time: 2 weeks   CRISTY DIAZ  [address] [place] [address]  Phone: ()-  Fax: ()-  Established Patient  Follow Up Time: 2 weeks    Bryant Patino  Nephrology  [address] [place] [address]  Phone: [phone]  Fax: [phone]  Established Patient  Follow Up Time: 2 weeks    [facility] at [place],   [address] [place] [address], [address] [address]  Phone: [phone]  Fax: (   )    -  Follow Up Time:

## 2025-07-10 NOTE — PROGRESS NOTE ADULT - PROBLEM SELECTOR PLAN 7
-Continue Wellbutrin 300mg and Duloxetine 60mg

## 2025-07-10 NOTE — PROGRESS NOTE ADULT - SUBJECTIVE AND OBJECTIVE BOX
[facility] Division of Hospital Medicine  Bernadette Gardner DO  MS Teams PREFERRED      SUBJECTIVE / OVERNIGHT EVENTS: No acute events overnight. Per nursing report, he was calm, cooperative, slept through night. Now sitting in common area doing word search.  ADDITIONAL REVIEW OF SYSTEMS:    MEDICATIONS  (STANDING):  aspirin enteric coated 81 milliGRAM(s) Oral <User Schedule>  buPROPion XL (24-Hour) . 300 milliGRAM(s) Oral daily  calcitriol   Capsule 0.25 MICROGram(s) Oral daily  carvedilol 12.5 milliGRAM(s) Oral every 12 hours  chlorhexidine 2% Cloths 1 Application(s) Topical daily  dextrose 5%. 1000 milliLiter(s) (50 mL/Hr) IV Continuous <Continuous>  dextrose 5%. 1000 milliLiter(s) (100 mL/Hr) IV Continuous <Continuous>  dextrose 50% Injectable 25 Gram(s) IV Push once  dextrose 50% Injectable 12.5 Gram(s) IV Push once  dextrose 50% Injectable 25 Gram(s) IV Push once  DULoxetine 60 milliGRAM(s) Oral daily  enoxaparin Injectable 40 milliGRAM(s) SubCutaneous every 24 hours  furosemide    Tablet 40 milliGRAM(s) Oral two times a day  glucagon  Injectable 1 milliGRAM(s) IntraMuscular once  insulin glargine Injectable (LANTUS) 28 Unit(s) SubCutaneous at bedtime  insulin lispro (ADMELOG) corrective regimen sliding scale   SubCutaneous three times a day before meals  insulin lispro (ADMELOG) corrective regimen sliding scale   SubCutaneous at bedtime  insulin lispro Injectable (ADMELOG) 14 Unit(s) SubCutaneous three times a day before meals  melatonin 3 milliGRAM(s) Oral at bedtime  mycophenolate mofetil 500 milliGRAM(s) Oral two times a day  NIFEdipine XL 60 milliGRAM(s) Oral two times a day  polyethylene glycol 3350 17 Gram(s) Oral daily  QUEtiapine 25 milliGRAM(s) Oral <User Schedule>  QUEtiapine 50 milliGRAM(s) Oral <User Schedule>  senna 2 Tablet(s) Oral at bedtime  tacrolimus ER Tablet (ENVARSUS XR) 1.5 milliGRAM(s) Oral daily  tamsulosin 0.4 milliGRAM(s) Oral at bedtime    MEDICATIONS  (PRN):  dextrose Oral Gel 15 Gram(s) Oral once PRN Blood Glucose LESS THAN 70 milliGRAM(s)/deciliter      I&O's Summary    09 Jul 2025 07:01  -  09 Jul 2025 15:44  --------------------------------------------------------  IN: 0 mL / OUT: 120 mL / NET: -120 mL        PHYSICAL EXAM:  Vital Signs Last 24 Hrs  T(C): 36.4 (10 Jul 2025 05:10), Max: 36.6 (09 Jul 2025 15:44)  T(F): 97.6 (10 Jul 2025 05:10), Max: 97.8 (09 Jul 2025 15:44)  HR: 65 (10 Jul 2025 05:10) (60 - 65)  BP: 128/66 (10 Jul 2025 05:10) (119/64 - 128/66)  BP(mean): --  RR: 18 (10 Jul 2025 05:10) (18 - 18)  SpO2: 92% (10 Jul 2025 05:10) (92% - 94%)    Parameters below as of 10 Jul 2025 05:10  Patient On (Oxygen Delivery Method): room air        CONSTITUTIONAL: NAD   EYES: Conjunctiva and sclera clear  ENMT: Moist oral mucosa, no pharyngeal injection or exudates   NECK: Supple, midline  RESPIRATORY: Normal respiratory effort; lungs are clear to auscultation bilaterally  CARDIOVASCULAR: Regular rate and rhythm, normal S1 and S2.   ABDOMEN: Nontender to palpation, no rebound/guarding  PSYCH: A+O to person, place, and time. Tangential thinking          LABS:                        12.8   7.14  )-----------( 194      ( 08 Jul 2025 07:51 )             38.4     07-08    137  |  100  |  24[H]  ----------------------------<  235[H]  4.3   |  23  |  1.30    Ca    9.4      08 Jul 2025 07:51            Urinalysis Basic - ( 08 Jul 2025 07:51 )    Color: x / Appearance: x / SG: x / pH: x  Gluc: 235 mg/dL / Ketone: x  / Bili: x / Urobili: x   Blood: x / Protein: x / Nitrite: x   Leuk Esterase: x / RBC: x / WBC x   Sq Epi: x / Non Sq Epi: x / Bacteria: x          RADIOLOGY & ADDITIONAL TESTS:  Results Reviewed:   Imaging Personally Reviewed:  Electrocardiogram Personally Reviewed:    COORDINATION OF CARE:  Care Discussed with Consultants/Other Providers [Y/N]: Y  Prior or Outpatient Records Reviewed [Y/N]: Y

## 2025-07-10 NOTE — PROGRESS NOTE ADULT - NSPROGADDITIONALINFOA_GEN_ALL_CORE
Time-based billing (NON-critical care).     55 minutes spent on total encounter. The necessity of the time spent during the encounter on this date of service was due to:     - Reviewing, and interpreting labs, testing, and imaging.  - Independently obtaining a review of systems and performing a physical exam  - Reviewing prior records and where necessary, outpatient records.  - Counselling and educating regarding interpretation of aforementioned items and plan of care to patient and/or family.      d/w ACP Time-based billing (NON-critical care).     51 minutes spent on total encounter. The necessity of the time spent during the encounter on this date of service was due to:     - Reviewing, and interpreting labs, testing, and imaging.  - Independently obtaining a review of systems and performing a physical exam  - Reviewing prior records and where necessary, outpatient records.  - Counselling and educating regarding interpretation of aforementioned items and plan of care to patient and/or family.    d/w ACP

## 2025-07-10 NOTE — DISCHARGE NOTE PROVIDER - CARE PROVIDERS DIRECT ADDRESSES
,[email or web address]

## 2025-07-10 NOTE — DISCHARGE NOTE PROVIDER - NSDCMRMEDTOKEN_GEN_ALL_CORE_FT
aspirin 81 mg oral delayed release tablet: 1 tab(s) orally once a day  buPROPion 300 mg/24 hours (XL) oral tablet, extended release: 1 tab(s) orally once a day  calcitriol 0.25 mcg oral capsule: 1 cap(s) orally once a day  carvedilol 25 mg oral tablet: 1 tab(s) orally every 12 hours  DULoxetine 60 mg oral delayed release capsule: 1 cap(s) orally once a day  furosemide 20 mg oral tablet: 2 tab(s) orally 2 times a day  insulin glargine 100 units/mL subcutaneous solution: 28 unit(s) subcutaneous once a day (at bedtime)  insulin lispro 100 units/mL injectable solution: 14 unit(s) injectable 3 times a day  mycophenolate mofetil 250 mg oral capsule: 500 milligram(s) orally 2 times a day  NIFEdipine 30 mg oral tablet, extended release: 2 tab(s) orally 2 times a day  tacrolimus 0.75 mg oral tablet, extended release: 2 tab(s) orally once a day  tamsulosin 0.4 mg oral capsule: 1 cap(s) orally once a day (at bedtime)   aspirin 81 mg oral delayed release tablet: 1 tab(s) orally once a day  buPROPion 300 mg/24 hours (XL) oral tablet, extended release: 1 tab(s) orally once a day  calcitriol 0.25 mcg oral capsule: 1 cap(s) orally once a day  carvedilol 6.25 mg oral tablet: 1 tab(s) orally every 12 hours  DULoxetine 60 mg oral delayed release capsule: 1 cap(s) orally once a day  furosemide 40 mg oral tablet: 1 tab(s) orally 2 times a day  insulin glargine 100 units/mL subcutaneous solution: 28 unit(s) subcutaneous once a day (at bedtime)  insulin lispro 100 units/mL injectable solution: 10 unit(s) injectable 3 times a day (before meals)  melatonin 3 mg oral tablet: 1 tab(s) orally once a day (at bedtime)  mycophenolate mofetil 250 mg oral capsule: 500 milligram(s) orally 2 times a day  NIFEdipine 60 mg oral tablet, extended release: 1 tab(s) orally 2 times a day  QUEtiapine 25 mg oral tablet: 1 tab(s) orally once a day at 6am  QUEtiapine 50 mg oral tablet: 1 tab(s) orally 2 times a day at 2pm and 10pm  tacrolimus 0.75 mg oral tablet, extended release: 2 tab(s) orally once a day  tamsulosin 0.4 mg oral capsule: 1 cap(s) orally once a day (at bedtime)

## 2025-07-10 NOTE — DISCHARGE NOTE NURSING/CASE MANAGEMENT/SOCIAL WORK - FINANCIAL ASSISTANCE
[facility] provides services at a reduced cost to those who are determined to be eligible through [facility]’s financial assistance program. Information regarding [facility]’s financial assistance program can be found by going to [email or web address] or by calling [phone].

## 2025-07-10 NOTE — DISCHARGE NOTE NURSING/CASE MANAGEMENT/SOCIAL WORK - PATIENT PORTAL LINK FT
You can access the FollowMyHealth Patient Portal offered by [facility] by registering at the following website: [email or web address]. By joining [employer]’s FollowMyHealth portal, you will also be able to view your health information using other applications (apps) compatible with our system.

## 2025-07-10 NOTE — PROGRESS NOTE ADULT - ASSESSMENT
ASSESSMENT: 76 y/o M w/ PMHx of HTN, DM (A1c 11.1), renal transplant, BPH, anxiety, depression, OCD, prior psychiatric hospitalizations who presented due to abnormal behavior x2 years with worsening symptoms over several days prior to admission. Patient with extensive psychiatric history including anxiety/depression and OCD and past psychiatric hospitalizations. Patient with continued paranoia, confusion, and agitation. No reports of any focal neurologic deficits or abnormal movements. History very limited and patient refused rest of examination early into evaluation and had us leave. 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 7/10 - Patient seen and examined by neurology today, is much more calm and cooperative today than prior exam. Has more insight into his situation and reason for current inpatient admission. Denies any acute cimplaints at this time. Mental status improving. No other focal neurologic deficits or abnormal movements noted.    IMPRESSION: Etiology for mental status and behavioral changes likely due to psychiatric disease but may superimposed neurodegenerative disorder (Yari given MRI findings?), toxic/metabolic, or inflammatory process. No reported acute focal neurologic deficits or abnormal movements noted, making acute cerebrovascular event and seizure less likely but cannot entirely exclude non-convulsive event.     RECOMMENDATIONS:  -BH/Psychiatry following, appreciate input  -Given improvement in mental status and lack of clinical events, would defer vEEG at this time as lower yield  -No neurologic contraindications to discharge if patient is otherwise medically stable  -Patient can follow up with stroke neurology at [address] [place] [address] ([phone]) 1-2 weeks after discharge. Please instruct the patient to call the respective numbers to schedule this appointment  -Continue to address above medical issues, as you are doing  -No further inpatient neurologic workup  -Rest of care per primary team    Neurology will sign off at this time.  Thank you [id number]  
75 year old male with PMH kidney transplant in 2021 (follows with Dr. Patino), DM, BPH, HTN, hyperparathyroidism and anxiety/OCD who presents due to worsening confusion.

## 2025-07-10 NOTE — DISCHARGE NOTE PROVIDER - NSDCFUADDAPPT_GEN_ALL_CORE_FT
APPTS ARE READY TO BE MADE: [X] YES    Best Family or Patient Contact (if needed): Wife, Rossy    Additional Information about above appointments (if needed):    1: PCP  2: Nephrology  3: Geriatrics    Other comments or requests:    APPTS ARE READY TO BE MADE: [X] YES    Best Family or Patient Contact (if needed): Wife, Rossy    Additional Information about above appointments (if needed):    1: PCP  2: Nephrology  3: Geriatrics    Other comments or requests:   Patient was outreached but did not answer. A voicemail was left for the patient to return our call. APPTS ARE READY TO BE MADE: [X] YES    Best Family or Patient Contact (if needed): Wife, Rossy    Additional Information about above appointments (if needed):    1: PCP  2: Nephrology  3: Geriatrics    Other comments or requests:     Geriatrics:  Prior to outreaching the patient, it was visible that the patient has secured a follow up appointment which was not scheduled by our team. 8/26/2025 02:30 PM APPTS ARE READY TO BE MADE: [X] YES    Best Family or Patient Contact (if needed): Wife, Rossy    Additional Information about above appointments (if needed):    1: PCP  2: Nephrology  3: Geriatrics    Other comments or requests:     pcp - Prior to outreaching the patient, it was visible that the patient has secured a follow up appointment which was not scheduled by our team for 7/22/25 at 3pm at [facility].    nephro - Prior to outreaching the patient, it was visible that the patient has secured a follow up appointment which was not scheduled by our team for 8/6/25 at 9:30am at [facility]

## 2025-07-10 NOTE — DISCHARGE NOTE PROVIDER - NSDCFUSCHEDAPPT_GEN_ALL_CORE_FT
[facility] Physician [facility]  GERIATRICS [address] [place] [address]   Scheduled Appointment: 08/26/2025     [facility] Physician [facility]  TRANSPLANT [address] [facility]   Scheduled Appointment: 08/06/2025    [facility]  GERIATRICS [address]   Scheduled Appointment: 08/26/2025

## 2025-07-10 NOTE — DISCHARGE NOTE PROVIDER - PROVIDER TOKENS
PROVIDER:[TOKEN:[[id number]],FOLLOWUP:[2 weeks],ESTABLISHEDPATIENT:[T]],PROVIDER:[TOKEN:[[id number]],FOLLOWUP:[2 weeks],ESTABLISHEDPATIENT:[T]] PROVIDER:[TOKEN:[[id number]],FOLLOWUP:[2 weeks],ESTABLISHEDPATIENT:[T]],PROVIDER:[TOKEN:[[id number]],FOLLOWUP:[2 weeks],ESTABLISHEDPATIENT:[T]],FREE:[LAST:[[facility] at [place]],PHONE:[phone]],FAX:[(   )    -],ADDRESS:[[address] [place] [address]]]

## 2025-07-10 NOTE — PROGRESS NOTE ADULT - SUBJECTIVE AND OBJECTIVE BOX
NEUROLOGY FOLLOW-UP CONSULT NOTE    RFC: Paranoia    Interval history: No acute neurologic events overnight. Patient seen and examined by neurology today, is much more calm and cooperative today than prior exam. Has more insight into his situation and reason for current inpatient admission. Denies any acute cimplaints at this time. Mental status improving. No other focal neurologic deficits or abnormal movements noted.    Meds:  MEDICATIONS  (STANDING):  aspirin enteric coated 81 milliGRAM(s) Oral <User Schedule>  buPROPion XL (24-Hour) . 300 milliGRAM(s) Oral daily  calcitriol   Capsule 0.25 MICROGram(s) Oral daily  carvedilol 6.25 milliGRAM(s) Oral every 12 hours  chlorhexidine 2% Cloths 1 Application(s) Topical daily  dextrose 5%. 1000 milliLiter(s) (50 mL/Hr) IV Continuous <Continuous>  dextrose 5%. 1000 milliLiter(s) (100 mL/Hr) IV Continuous <Continuous>  dextrose 50% Injectable 25 Gram(s) IV Push once  dextrose 50% Injectable 12.5 Gram(s) IV Push once  dextrose 50% Injectable 25 Gram(s) IV Push once  DULoxetine 60 milliGRAM(s) Oral daily  enoxaparin Injectable 40 milliGRAM(s) SubCutaneous every 24 hours  furosemide    Tablet 40 milliGRAM(s) Oral two times a day  glucagon  Injectable 1 milliGRAM(s) IntraMuscular once  insulin glargine Injectable (LANTUS) 28 Unit(s) SubCutaneous at bedtime  insulin lispro (ADMELOG) corrective regimen sliding scale   SubCutaneous three times a day before meals  insulin lispro (ADMELOG) corrective regimen sliding scale   SubCutaneous at bedtime  insulin lispro Injectable (ADMELOG) 10 Unit(s) SubCutaneous three times a day before meals  melatonin 3 milliGRAM(s) Oral at bedtime  mycophenolate mofetil 500 milliGRAM(s) Oral two times a day  NIFEdipine XL 60 milliGRAM(s) Oral two times a day  polyethylene glycol 3350 17 Gram(s) Oral daily  QUEtiapine 25 milliGRAM(s) Oral <User Schedule>  QUEtiapine 50 milliGRAM(s) Oral <User Schedule>  senna 2 Tablet(s) Oral at bedtime  tacrolimus ER Tablet (ENVARSUS XR) 1.5 milliGRAM(s) Oral daily  tamsulosin 0.4 milliGRAM(s) Oral at bedtime    MEDICATIONS  (PRN):  dextrose Oral Gel 15 Gram(s) Oral once PRN Blood Glucose LESS THAN 70 milliGRAM(s)/deciliter      Allergies:  Levaquin (Angioedema)  ibuprofen (Nephrotoxicity)  ACE inhibitors (Angioedema)      ROS: All systems negative except as documented in Interval history    O:  T(C): 36.4 (07-10-25 @ 05:10), Max: 36.6 (07-09-25 @ 15:44)  HR: 65 (07-10-25 @ 05:10) (60 - 65)  BP: 128/66 (07-10-25 @ 05:10) (119/64 - 128/66)  RR: 18 (07-10-25 @ 05:10) (18 - 18)  SpO2: 92% (07-10-25 @ 05:10) (92% - 94%)    Focused neurologic exam:  MS - AAO x2.75 (not oriented to date of the month but rest ok), speech fluent, follows commands, knows the president, can spell "world" forwards and backwards 2/3 recall  CN - PERRLA, EOMI, VFF, face sens/str/hearing WNL b/l, tongue/palate midline, trap 5/5 b/l  Motor - Normal bulk/tone, 5/5 all  Sens - LT intact all  DTR's - Neutral b/l plantar response  Coord - FtN intact b/l. L>RUE intention tremor w/ mild postural component  Gait and station - Due to fall risk/safety concerns did not assess    Pertinent labs/studies:  Vit D 41.5, folate 11.4, [place] level 4.7, B12 776, TSH/FT4 WNL    RADIOLOGY, EKG & ADDITIONAL TESTS: Reviewed.     < from: MR Head w/wo IV Cont (07.07.25 @ 20:09) >    IMPRESSION:    - No evidence of acute infarction or acute intracranial hemorrhage.  - Extensive T2/FLAIR white matter hyperintensities, which are   nonspecific, however most likely represent chronic microvascular ischemic   changes.  - Numerous punctate foci of microhemorrhage scattered throughout the   brain, likely representing chronic hypertension versus amyloid angiopathy.  Side of the parietal. White matter of undetermined etiology which may   represent enhancing subacute infarcts, hemorrhage or neoplasm    --- End of Report ---    < end of copied text >    < from: CT Head No Cont (07.06.25 @ 00:38) >    IMPRESSION:    Motion degraded exam.  No acute hemorrhage or midline shift.  Opacified left maxillary sinus.    --- End of Report ---    < end of copied text >

## 2025-07-10 NOTE — DISCHARGE NOTE PROVIDER - NSDCCPCAREPLAN_GEN_ALL_CORE_FT
PRINCIPAL DISCHARGE DIAGNOSIS  Diagnosis: Agitation  Assessment and Plan of Treatment: You were admitted due to confusion and agitation.   Lab work was not suggestive of infectious process.  MRI of the brain showed no evidence of acute infarction or acute intracranial hemorrhage. Extensive T2/FLAIR white matter hyperintensities, which are nonspecific, however most likely represent chronic microvascular ischemic changes. Numerous punctate foci of microhemorrhage scattered throughout the brain, likely representing chronic hypertension versus amyloid angiopathy.  You were evaluated by neurology - low suspicion for cerebral amyloid given lack of cortical involvement.  Also evaluated by psychiatry. You were started on Seroquel 25mg in the morning (6am) and 50mg in afternoon and bedtime (2pm and 10pm).  If any new or worsening symptoms, including fever, chest pain, shortness of breath, nausea, vomit, abdominal pain, seek immediate medical attention.     PRINCIPAL DISCHARGE DIAGNOSIS  Diagnosis: Agitation  Assessment and Plan of Treatment: You were admitted due to confusion and agitation.   Lab work was not suggestive of infectious process.  MRI of the brain showed no evidence of acute infarction or acute intracranial hemorrhage. Extensive T2/FLAIR white matter hyperintensities, which are nonspecific, however most likely represent chronic microvascular ischemic changes. Numerous punctate foci of microhemorrhage scattered throughout the brain, likely representing chronic hypertension versus amyloid angiopathy.  You were evaluated by neurology - low suspicion for cerebral amyloid given lack of cortical involvement.  Also evaluated by psychiatry. You were started on Seroquel 25mg in the morning (6am) and 50mg in afternoon and bedtime (2pm and 10pm).  Your home dose of carvedilol (brand name Coreg) was lowered to 6.25 twice daily.   If any new or worsening symptoms, including fever, chest pain, shortness of breath, nausea, vomit, abdominal pain, seek immediate medical attention.

## 2025-07-10 NOTE — PROGRESS NOTE ADULT - PROBLEM SELECTOR PLAN 8
Medication reconciliation completed by admitting hospitalist    7/10- Care plan extensively discussed with wife via phone - unable to care for patient at home, requesting global [facility] referral.

## 2025-07-10 NOTE — PROGRESS NOTE ADULT - PROBLEM SELECTOR PLAN 2
-Continue Cellcept 500mg BID  -Continue Envarsus 1.5mg daily and monitor trough  -Transplant nephrology recs appreciated

## 2025-07-10 NOTE — PROGRESS NOTE ADULT - SUPERVISING ATTENDING
Dr. Jeanette Ha at \[facility]\"" [address], [address] [facility]  12/02/20 [id number]
Austyn Stewart MD

## 2025-07-10 NOTE — DISCHARGE NOTE PROVIDER - ATTENDING DISCHARGE PHYSICAL EXAMINATION:
Vital Signs Last 24 Hrs  T(C): 36.4 (10 Jul 2025 05:10), Max: 36.6 (09 Jul 2025 15:44)  T(F): 97.6 (10 Jul 2025 05:10), Max: 97.8 (09 Jul 2025 15:44)  HR: 65 (10 Jul 2025 05:10) (60 - 65)  BP: 128/66 (10 Jul 2025 05:10) (119/64 - 128/66)  BP(mean): --  RR: 18 (10 Jul 2025 05:10) (18 - 18)  SpO2: 92% (10 Jul 2025 05:10) (92% - 94%)    Parameters below as of 10 Jul 2025 05:10  Patient On (Oxygen Delivery Method): room air    CONSTITUTIONAL: NAD   EYES: Conjunctiva and sclera clear  ENMT: Moist oral mucosa, no pharyngeal injection or exudates   NECK: Supple, midline  RESPIRATORY: Normal respiratory effort; lungs are clear to auscultation bilaterally  CARDIOVASCULAR: Regular rate and rhythm, normal S1 and S2.   ABDOMEN: Nontender to palpation, no rebound/guarding  PSYCH: A+O to person, place, and time.      Vital Signs Last 24 Hrs  T(C): 36.4 (10 Jul 2025 21:00), Max: 36.4 (10 Jul 2025 21:00)  T(F): 97.5 (10 Jul 2025 21:00), Max: 97.5 (10 Jul 2025 21:00)  HR: 68 (10 Jul 2025 21:00) (55 - 68)  BP: 132/72 (10 Jul 2025 21:00) (114/67 - 132/72)  BP(mean): --  RR: 18 (10 Jul 2025 21:00) (18 - 18)  SpO2: 95% (10 Jul 2025 21:00) (91% - 95%)    Parameters below as of 10 Jul 2025 21:00  Patient On (Oxygen Delivery Method): room air        CONSTITUTIONAL: NAD   EYES: Conjunctiva and sclera clear  ENMT: Moist oral mucosa, no pharyngeal injection or exudates   NECK: Supple, midline  RESPIRATORY: Normal respiratory effort; lungs are clear to auscultation bilaterally  CARDIOVASCULAR: Regular rate and rhythm, normal S1 and S2.   ABDOMEN: Nontender to palpation, no rebound/guarding  PSYCH: A+O to person, place, and time.

## 2025-07-10 NOTE — DISCHARGE NOTE PROVIDER - HOSPITAL COURSE
HPI:  75 year old male with PMH kidney transplant in 2021 (follows with Dr. Patino), DM, BPH, HTN, hyperparathyroidism and anxiety/OCD who presents due to worsening confusion. Patient endorses being more confused recently but unable to tell me the circumstance for his admission to the hospital. Collateral obtained from wife Rossy, states that he was recently admitted to [facility] (Critical access hospital) for about 2 weeks. She notes that his mental status has worsened over the past year but the last few days has been aggressive as well and she cannot care for him at home. Upon arrival to the ED, vital signs were /72, HR 66, RR 20, temperature 98 degrees Farenheit and saturating 94% on room air. Aside from hyperglycemia to 562, labs an imaging were unremarkable. He received 2L NS, 6 units Admelog, 1G IV Tylenol, 1mg IV Haldol, another 2.5mg IV Haldol,1mg IM Haldol and was admitted for further management (06 Jul 2025 13:43)    Hospital Course: Patient presented with worsening mental status. Noted to have a CT head without acute pathology. RPR was negative and TSH and B12 were negative for reversible causes. MRI was performed without e/o infarct,noted to have chronic micorvascular changes and nemours hemorrhagic punctate foci that represnt likely chronic hypertensive changes. Neurology evaluated and determined unlikely amyloid, no further neurologic testing needed.  was consulted and adjusted meds  including increasing seroquel which helped to improve behavior. Due to history of transplant was seen by transplant nephrology and continued anti-rejection meds. Once patients symptoms and behavior improved was cleared for discharge home.     Important Medication Changes and Reason: See med review    Active or Pending Issues Requiring Follow-up: PMD follow up  Nephrology follow up    Advanced Directives:   [ X ] Full code  [ ] DNR  [ ] Hospice    Discharge Diagnoses: Encephalopathy   dementia/cognitive impairment  essential hypertension  hyperlipidemia         HPI:  75 year old male with PMH kidney transplant in 2021 (follows with Dr. Patino), DM, BPH, HTN, hyperparathyroidism and anxiety/OCD who presents due to worsening confusion. Patient endorses being more confused recently but unable to tell me the circumstance for his admission to the hospital. Collateral obtained from wife Rossy, states that he was recently admitted to [facility] (Critical access hospital) for about 2 weeks. She notes that his mental status has worsened over the past year but the last few days has been aggressive as well and she cannot care for him at home. Upon arrival to the ED, vital signs were /72, HR 66, RR 20, temperature 98 degrees Farenheit and saturating 94% on room air. Aside from hyperglycemia to 562, labs an imaging were unremarkable. He received 2L NS, 6 units Admelog, 1G IV Tylenol, 1mg IV Haldol, another 2.5mg IV Haldol,1mg IM Haldol and was admitted for further management (06 Jul 2025 13:43)    Hospital Course: Patient presented with worsening mental status. Noted to have a CT head without acute pathology. RPR was negative and TSH and B12 were negative for reversible causes. MRI was performed without e/o infarct, noted to have chronic microvascular changes and numerous hemorrhagic punctate foci that represent likely chronic hypertensive changes. Neurology evaluated and determined unlikely amyloid angiopathy, no further neurologic testing needed.  was consulted and adjusted meds  including increasing Seroquel which helped to improve behavior. Due to history of transplant was seen by transplant nephrology and continued anti-rejection meds. Patient symptomatically improved and hemodynamically stable for DC.    Important Medication Changes and Reason:  Humelog decreased to 10u TIDCC due to episodes of hypoglycemia  Coreg decreased to 6.25mg BID due to bradycardia  Started on Seroquel 25mg at 6am, 50mg in 2pm and 10pm    Active or Pending Issues Requiring Follow-up: PMD follow up  Follow-up nephrology      Advanced Directives:   [ X ] Full code  [ ] DNR  [ ] Hospice    Discharge Diagnoses: Encephalopathy likely multifactorial with underlying vascular dementia with behavioral disturbance, also with mood disorder.  Essential hypertension  Hyperlipidemia  Hx renal transplant        HPI:  75 year old male with PMH kidney transplant in 2021 (follows with Dr. Patino), DM, BPH, HTN, hyperparathyroidism and anxiety/OCD who presents due to worsening confusion. Patient endorses being more confused recently but unable to tell me the circumstance for his admission to the hospital. Collateral obtained from wife Rossy, states that he was recently admitted to [facility] ([facility] for about 2 weeks. She notes that his mental status has worsened over the past year but the last few days has been aggressive as well and she cannot care for him at home. Upon arrival to the ED, vital signs were /72, HR 66, RR 20, temperature 98 degrees Farenheit and saturating 94% on room air. Aside from hyperglycemia to 562, labs an imaging were unremarkable. He received 2L NS, 6 units Admelog, 1G IV Tylenol, 1mg IV Haldol, another 2.5mg IV Haldol,1mg IM Haldol and was admitted for further management (06 Jul 2025 13:43)    Hospital Course: Patient presented with worsening mental status. Noted to have a CT head without acute pathology. RPR was negative and TSH and B12 were negative for reversible causes. MRI was performed without e/o infarct, noted to have chronic microvascular changes and numerous hemorrhagic punctate foci that represent likely chronic hypertensive changes. Neurology evaluated and determined unlikely amyloid angiopathy, no further neurologic testing needed.  was consulted and adjusted meds including increasing Seroquel which helped to improve behavior. Due to history of transplant was seen by transplant nephrology and continued anti-rejection meds. Patient symptomatically improved and hemodynamically stable for DC.    Important Medication Changes and Reason:  Humelog decreased to 10u TIDCC due to episodes of hypoglycemia  Coreg decreased to 6.25mg BID due to bradycardia  Started on Seroquel 25mg at 6am, 50mg in 2pm and 10pm    Active or Pending Issues Requiring Follow-up: PMD follow up  Follow-up nephrology      Advanced Directives:   [ X ] Full code  [ ] DNR  [ ] Hospice    Discharge Diagnoses: Encephalopathy likely multifactorial with underlying vascular dementia with behavioral disturbance, also with mood disorder.  Essential hypertension  Hyperlipidemia  Hx renal transplant        HPI:  75 year old male with PMH kidney transplant in 2021 (follows with Dr. Patino), DM, BPH, HTN, hyperparathyroidism and anxiety/OCD who presents due to worsening confusion. Patient endorses being more confused recently but unable to tell me the circumstance for his admission to the hospital. Collateral obtained from wife Rossy, states that he was recently admitted to [facility] ([facility] for about 2 weeks. She notes that his mental status has worsened over the past year but the last few days has been aggressive as well and she cannot care for him at home. Upon arrival to the ED, vital signs were /72, HR 66, RR 20, temperature 98 degrees Farenheit and saturating 94% on room air. Aside from hyperglycemia to 562, labs an imaging were unremarkable. He received 2L NS, 6 units Admelog, 1G IV Tylenol, 1mg IV Haldol, another 2.5mg IV Haldol,1mg IM Haldol and was admitted for further management (06 Jul 2025 13:43)    Hospital Course: Patient presented with worsening mental status. Noted to have a CT head without acute pathology. RPR was negative and TSH and B12 were negative for reversible causes. MRI was performed without e/o infarct, noted to have chronic microvascular changes and numerous hemorrhagic punctate foci that represent likely chronic hypertensive changes. Neurology evaluated and determined unlikely amyloid angiopathy, no further neurologic testing needed.  was consulted and adjusted meds including increasing Seroquel which helped to improve behavior. Due to history of transplant was seen by transplant nephrology and continued anti-rejection meds. Patient symptomatically improved and hemodynamically stable for DC - wife electing to take him home.     Important Medication Changes and Reason:  Humelog decreased to 10u TIDCC due to episodes of hypoglycemia  Coreg decreased to 6.25mg BID due to bradycardia  Started on Seroquel 25mg at 6am, 50mg in 2pm and 10pm    Active or Pending Issues Requiring Follow-up: PMD follow up  Follow-up nephrology      Advanced Directives:   [ X ] Full code  [ ] DNR  [ ] Hospice    Discharge Diagnoses: Encephalopathy likely multifactorial with underlying vascular dementia with behavioral disturbance, also with mood disorder.  Essential hypertension  Hyperlipidemia  Hx renal transplant

## 2025-07-10 NOTE — PROGRESS NOTE ADULT - PROBLEM SELECTOR PLAN 4
-Continue Tamsulosin 0.4mg daily

## 2025-07-10 NOTE — PROGRESS NOTE ADULT - PROBLEM SELECTOR PLAN 5
-Continue Coreg 25mg BID, Lasix 40mg BID and Nifedipine 60mg BID
-Continue Coreg 25mg BID, Lasix 40mg BID and Nifedipine 60mg BID
-Continue Coreg, Lasix 40mg BID and Nifedipine 60mg BID  Decrease Coreg due to borderline bradycardia and potential interaction with bupropion.
-Continue Coreg, Lasix 40mg BID and Nifedipine 60mg BID  Decrease Coreg to 6.25mg due to borderline bradycardia and potential interaction with bupropion.

## 2025-07-10 NOTE — PROGRESS NOTE ADULT - PROBLEM SELECTOR PLAN 6
-Continue Calcitriol 0.25mcg daily

## 2025-07-10 NOTE — PROGRESS NOTE ADULT - PROBLEM SELECTOR PLAN 3
-With hyperglycemia over 500  - Hgb A1c 11.1  -Restarted on home Lantus 28 units as well as Humalog 14 units TID
-With hyperglycemia over 500  - Hgb A1c 11.1  -Restarted on home Lantus 28 units. Held Humalog 14 units TID due to episodes of hypoglycemia - will resume at lower dose 10u TIDCC
-With hyperglycemia over 500  - Hgb A1c 11.1  -Restarted on home Lantus 28 units as well as Humalog 14 units TID
-With hyperglycemia over 500  - Hgb A1c 11.1  -Restarted on home Lantus 28 units as well as Humalog 14 units TID

## 2025-07-11 VITALS
TEMPERATURE: 98 F | SYSTOLIC BLOOD PRESSURE: 108 MMHG | OXYGEN SATURATION: 92 % | RESPIRATION RATE: 18 BRPM | DIASTOLIC BLOOD PRESSURE: 66 MMHG | HEART RATE: 61 BPM

## 2025-07-11 LAB
COPPER SERPL-MCNC: 88 UG/DL — SIGNIFICANT CHANGE UP (ref 69–132)
GLUCOSE BLDC GLUCOMTR-MCNC: 122 MG/DL — HIGH (ref 70–99)
TACROLIMUS SERPL-MCNC: 5 NG/ML — SIGNIFICANT CHANGE UP

## 2025-07-11 PROCEDURE — 80180 DRUG SCRN QUAN MYCOPHENOLATE: CPT

## 2025-07-11 PROCEDURE — 83605 ASSAY OF LACTIC ACID: CPT

## 2025-07-11 PROCEDURE — 82010 KETONE BODYS QUAN: CPT

## 2025-07-11 PROCEDURE — 85610 PROTHROMBIN TIME: CPT

## 2025-07-11 PROCEDURE — 82570 ASSAY OF URINE CREATININE: CPT

## 2025-07-11 PROCEDURE — A9585: CPT

## 2025-07-11 PROCEDURE — 82525 ASSAY OF COPPER: CPT

## 2025-07-11 PROCEDURE — 96374 THER/PROPH/DIAG INJ IV PUSH: CPT

## 2025-07-11 PROCEDURE — 80053 COMPREHEN METABOLIC PANEL: CPT

## 2025-07-11 PROCEDURE — 99239 HOSP IP/OBS DSCHRG MGMT >30: CPT

## 2025-07-11 PROCEDURE — 82330 ASSAY OF CALCIUM: CPT

## 2025-07-11 PROCEDURE — 96372 THER/PROPH/DIAG INJ SC/IM: CPT

## 2025-07-11 PROCEDURE — 80197 ASSAY OF TACROLIMUS: CPT

## 2025-07-11 PROCEDURE — 82947 ASSAY GLUCOSE BLOOD QUANT: CPT

## 2025-07-11 PROCEDURE — 84207 ASSAY OF VITAMIN B-6: CPT

## 2025-07-11 PROCEDURE — 82746 ASSAY OF FOLIC ACID SERUM: CPT

## 2025-07-11 PROCEDURE — 85027 COMPLETE CBC AUTOMATED: CPT

## 2025-07-11 PROCEDURE — 85018 HEMOGLOBIN: CPT

## 2025-07-11 PROCEDURE — 82962 GLUCOSE BLOOD TEST: CPT

## 2025-07-11 PROCEDURE — 87637 SARSCOV2&INF A&B&RSV AMP PRB: CPT

## 2025-07-11 PROCEDURE — 87641 MR-STAPH DNA AMP PROBE: CPT

## 2025-07-11 PROCEDURE — 85025 COMPLETE CBC W/AUTO DIFF WBC: CPT

## 2025-07-11 PROCEDURE — 82607 VITAMIN B-12: CPT

## 2025-07-11 PROCEDURE — 70450 CT HEAD/BRAIN W/O DYE: CPT

## 2025-07-11 PROCEDURE — 80048 BASIC METABOLIC PNL TOTAL CA: CPT

## 2025-07-11 PROCEDURE — 84443 ASSAY THYROID STIM HORMONE: CPT

## 2025-07-11 PROCEDURE — 85730 THROMBOPLASTIN TIME PARTIAL: CPT

## 2025-07-11 PROCEDURE — 70553 MRI BRAIN STEM W/O & W/DYE: CPT

## 2025-07-11 PROCEDURE — 71045 X-RAY EXAM CHEST 1 VIEW: CPT

## 2025-07-11 PROCEDURE — 82803 BLOOD GASES ANY COMBINATION: CPT

## 2025-07-11 PROCEDURE — 36415 COLL VENOUS BLD VENIPUNCTURE: CPT

## 2025-07-11 PROCEDURE — 93005 ELECTROCARDIOGRAM TRACING: CPT

## 2025-07-11 PROCEDURE — 97116 GAIT TRAINING THERAPY: CPT

## 2025-07-11 PROCEDURE — 99285 EMERGENCY DEPT VISIT HI MDM: CPT

## 2025-07-11 PROCEDURE — 83735 ASSAY OF MAGNESIUM: CPT

## 2025-07-11 PROCEDURE — 84100 ASSAY OF PHOSPHORUS: CPT

## 2025-07-11 PROCEDURE — 86140 C-REACTIVE PROTEIN: CPT

## 2025-07-11 PROCEDURE — 84425 ASSAY OF VITAMIN B-1: CPT

## 2025-07-11 PROCEDURE — 84481 FREE ASSAY (FT-3): CPT

## 2025-07-11 PROCEDURE — 84439 ASSAY OF FREE THYROXINE: CPT

## 2025-07-11 PROCEDURE — 71260 CT THORAX DX C+: CPT

## 2025-07-11 PROCEDURE — 84295 ASSAY OF SERUM SODIUM: CPT

## 2025-07-11 PROCEDURE — 85014 HEMATOCRIT: CPT

## 2025-07-11 PROCEDURE — 96376 TX/PRO/DX INJ SAME DRUG ADON: CPT

## 2025-07-11 PROCEDURE — 97162 PT EVAL MOD COMPLEX 30 MIN: CPT

## 2025-07-11 PROCEDURE — 86780 TREPONEMA PALLIDUM: CPT

## 2025-07-11 PROCEDURE — 81001 URINALYSIS AUTO W/SCOPE: CPT

## 2025-07-11 PROCEDURE — 84156 ASSAY OF PROTEIN URINE: CPT

## 2025-07-11 PROCEDURE — 82306 VITAMIN D 25 HYDROXY: CPT

## 2025-07-11 PROCEDURE — 82435 ASSAY OF BLOOD CHLORIDE: CPT

## 2025-07-11 PROCEDURE — 83036 HEMOGLOBIN GLYCOSYLATED A1C: CPT

## 2025-07-11 PROCEDURE — 85652 RBC SED RATE AUTOMATED: CPT

## 2025-07-11 PROCEDURE — 87640 STAPH A DNA AMP PROBE: CPT

## 2025-07-11 PROCEDURE — 84132 ASSAY OF SERUM POTASSIUM: CPT

## 2025-07-11 RX ORDER — CARVEDILOL 3.12 MG/1
1 TABLET, FILM COATED ORAL
Qty: 60 | Refills: 0
Start: 2025-07-11 | End: 2025-08-09

## 2025-07-11 RX ORDER — QUETIAPINE FUMARATE 25 MG/1
1 TABLET ORAL
Qty: 60 | Refills: 0
Start: 2025-07-11 | End: 2025-08-09

## 2025-07-11 RX ORDER — QUETIAPINE FUMARATE 25 MG/1
1 TABLET ORAL
Qty: 30 | Refills: 0
Start: 2025-07-11 | End: 2025-08-09

## 2025-07-11 RX ADMIN — INSULIN LISPRO 10 UNIT(S): 100 INJECTION, SOLUTION INTRAVENOUS; SUBCUTANEOUS at 08:27

## 2025-07-11 RX ADMIN — TACROLIMUS 1.5 MILLIGRAM(S): 0.5 CAPSULE ORAL at 08:28

## 2025-07-11 RX ADMIN — Medication 60 MILLIGRAM(S): at 05:38

## 2025-07-11 RX ADMIN — MYCOPHENOLATE MOFETIL 500 MILLIGRAM(S): 500 TABLET, FILM COATED ORAL at 05:39

## 2025-07-11 RX ADMIN — Medication 81 MILLIGRAM(S): at 05:38

## 2025-07-11 RX ADMIN — QUETIAPINE FUMARATE 25 MILLIGRAM(S): 25 TABLET ORAL at 05:39

## 2025-07-11 RX ADMIN — FUROSEMIDE 40 MILLIGRAM(S): 10 INJECTION INTRAMUSCULAR; INTRAVENOUS at 05:39

## 2025-07-11 RX ADMIN — CARVEDILOL 6.25 MILLIGRAM(S): 3.12 TABLET, FILM COATED ORAL at 05:39

## 2025-07-12 LAB — VIT B1 SERPL-MCNC: 134.2 NMOL/L — SIGNIFICANT CHANGE UP (ref 66.5–200)

## 2025-07-14 LAB — PYRIDOXAL PHOS SERPL-MCNC: 8.3 UG/L — SIGNIFICANT CHANGE UP (ref 3.4–65.2)

## 2025-07-22 ENCOUNTER — APPOINTMENT (OUTPATIENT)
Dept: GERIATRICS | Facility: CLINIC | Age: 75
End: 2025-07-22
Payer: MEDICARE

## 2025-07-22 VITALS
DIASTOLIC BLOOD PRESSURE: 78 MMHG | BODY MASS INDEX: 31.41 KG/M2 | SYSTOLIC BLOOD PRESSURE: 143 MMHG | HEIGHT: 67 IN | WEIGHT: 200.13 LBS | OXYGEN SATURATION: 92 % | RESPIRATION RATE: 16 BRPM | TEMPERATURE: 98.7 F | HEART RATE: 76 BPM

## 2025-07-22 DIAGNOSIS — F32.A DEPRESSION, UNSPECIFIED: ICD-10-CM

## 2025-07-22 DIAGNOSIS — R41.89 OTHER SYMPTOMS AND SIGNS INVOLVING COGNITIVE FUNCTIONS AND AWARENESS: ICD-10-CM

## 2025-07-22 DIAGNOSIS — I10 ESSENTIAL (PRIMARY) HYPERTENSION: ICD-10-CM

## 2025-07-22 DIAGNOSIS — I77.0 ARTERIOVENOUS FISTULA, ACQUIRED: ICD-10-CM

## 2025-07-22 PROCEDURE — G2211 COMPLEX E/M VISIT ADD ON: CPT

## 2025-07-22 PROCEDURE — 99205 OFFICE O/P NEW HI 60 MIN: CPT | Mod: GC

## 2025-07-23 PROBLEM — R41.89: Status: ACTIVE | Noted: 2025-07-23

## 2025-07-23 PROBLEM — I77.0 AV FISTULA: Status: ACTIVE | Noted: 2025-07-23

## 2025-07-28 ENCOUNTER — APPOINTMENT (OUTPATIENT)
Dept: NEUROLOGY | Facility: CLINIC | Age: 75
End: 2025-07-28

## 2025-08-01 ENCOUNTER — NON-APPOINTMENT (OUTPATIENT)
Age: 75
End: 2025-08-01

## 2025-08-06 ENCOUNTER — APPOINTMENT (OUTPATIENT)
Dept: TRANSPLANT | Facility: CLINIC | Age: 75
End: 2025-08-06

## 2025-08-06 LAB
ALBUMIN SERPL ELPH-MCNC: 4.3 G/DL
ALP BLD-CCNC: 88 U/L
ALT SERPL-CCNC: 13 U/L
ANION GAP SERPL CALC-SCNC: 16 MMOL/L
APPEARANCE: CLEAR
AST SERPL-CCNC: 13 U/L
BACTERIA: NEGATIVE /HPF
BASOPHILS # BLD AUTO: 0.06 K/UL
BASOPHILS NFR BLD AUTO: 0.9 %
BILIRUB SERPL-MCNC: 0.4 MG/DL
BILIRUBIN URINE: NEGATIVE
BLOOD URINE: NEGATIVE
BUN SERPL-MCNC: 38 MG/DL
CALCIUM SERPL-MCNC: 10.1 MG/DL
CAST: 1 /LPF
CHLORIDE SERPL-SCNC: 102 MMOL/L
CO2 SERPL-SCNC: 24 MMOL/L
COLOR: YELLOW
COVID-19 SPIKE DOMAIN ANTIBODY INTERPRETATION: POSITIVE
CREAT SERPL-MCNC: 1.62 MG/DL
CREAT SPEC-SCNC: 65 MG/DL
CREAT/PROT UR: 1 RATIO
EGFRCR SERPLBLD CKD-EPI 2021: 44 ML/MIN/1.73M2
EOSINOPHIL # BLD AUTO: 0.18 K/UL
EOSINOPHIL NFR BLD AUTO: 2.7 %
EPITHELIAL CELLS: 0 /HPF
GLUCOSE QUALITATIVE U: NEGATIVE MG/DL
GLUCOSE SERPL-MCNC: 90 MG/DL
HCT VFR BLD CALC: 42.7 %
HGB BLD-MCNC: 14.7 G/DL
IMM GRANULOCYTES NFR BLD AUTO: 0.3 %
KETONES URINE: NEGATIVE MG/DL
LEUKOCYTE ESTERASE URINE: NEGATIVE
LYMPHOCYTES # BLD AUTO: 1.17 K/UL
LYMPHOCYTES NFR BLD AUTO: 17.5 %
MAGNESIUM SERPL-MCNC: 2.1 MG/DL
MAN DIFF?: NORMAL
MCHC RBC-ENTMCNC: 30.9 PG
MCHC RBC-ENTMCNC: 34.4 G/DL
MCV RBC AUTO: 89.9 FL
MICROSCOPIC-UA: NORMAL
MONOCYTES # BLD AUTO: 0.64 K/UL
MONOCYTES NFR BLD AUTO: 9.6 %
NEUTROPHILS # BLD AUTO: 4.62 K/UL
NEUTROPHILS NFR BLD AUTO: 69 %
NITRITE URINE: NEGATIVE
PH URINE: 5.5
PHOSPHATE SERPL-MCNC: 3.9 MG/DL
PLATELET # BLD AUTO: 225 K/UL
POTASSIUM SERPL-SCNC: 4.3 MMOL/L
PROT SERPL-MCNC: 7.3 G/DL
PROT UR-MCNC: 62 MG/DL
PROTEIN URINE: 100 MG/DL
RBC # BLD: 4.75 M/UL
RBC # FLD: 13.9 %
RED BLOOD CELLS URINE: 0 /HPF
SARS-COV-2 AB SERPL IA-ACNC: >250 U/ML
SODIUM SERPL-SCNC: 142 MMOL/L
SPECIFIC GRAVITY URINE: 1.01
TACROLIMUS SERPL-MCNC: 3.8 NG/ML
UROBILINOGEN URINE: 0.2 MG/DL
WBC # FLD AUTO: 6.69 K/UL
WHITE BLOOD CELLS URINE: 2 /HPF

## 2025-08-11 LAB — BKV DNA SPEC QL NAA+PROBE: NOT DETECTED IU/ML

## 2025-08-18 DIAGNOSIS — Z94.0 KIDNEY TRANSPLANT STATUS: ICD-10-CM

## 2025-08-20 ENCOUNTER — APPOINTMENT (OUTPATIENT)
Dept: NEPHROLOGY | Facility: CLINIC | Age: 75
End: 2025-08-20
Payer: MEDICARE

## 2025-08-20 PROCEDURE — 99215 OFFICE O/P EST HI 40 MIN: CPT

## 2025-09-02 ENCOUNTER — APPOINTMENT (OUTPATIENT)
Dept: GERIATRICS | Facility: CLINIC | Age: 75
End: 2025-09-02
Payer: MEDICARE

## 2025-09-02 VITALS
BODY MASS INDEX: 31.86 KG/M2 | WEIGHT: 203 LBS | DIASTOLIC BLOOD PRESSURE: 80 MMHG | SYSTOLIC BLOOD PRESSURE: 168 MMHG | RESPIRATION RATE: 16 BRPM | TEMPERATURE: 97.6 F | OXYGEN SATURATION: 97 % | HEIGHT: 67 IN | HEART RATE: 93 BPM

## 2025-09-02 DIAGNOSIS — I10 ESSENTIAL (PRIMARY) HYPERTENSION: ICD-10-CM

## 2025-09-02 DIAGNOSIS — R41.89 OTHER SYMPTOMS AND SIGNS INVOLVING COGNITIVE FUNCTIONS AND AWARENESS: ICD-10-CM

## 2025-09-02 DIAGNOSIS — F32.A DEPRESSION, UNSPECIFIED: ICD-10-CM

## 2025-09-02 DIAGNOSIS — Z94.0 KIDNEY TRANSPLANT STATUS: ICD-10-CM

## 2025-09-02 PROCEDURE — 99483 ASSMT & CARE PLN PT COG IMP: CPT | Mod: GC
